# Patient Record
Sex: FEMALE | Race: BLACK OR AFRICAN AMERICAN | Employment: OTHER | URBAN - METROPOLITAN AREA
[De-identification: names, ages, dates, MRNs, and addresses within clinical notes are randomized per-mention and may not be internally consistent; named-entity substitution may affect disease eponyms.]

---

## 2017-03-26 RX ORDER — PRAVASTATIN SODIUM 80 MG/1
TABLET ORAL
Qty: 30 TABLET | Refills: 1 | Status: SHIPPED | OUTPATIENT
Start: 2017-03-26 | End: 2017-06-01

## 2017-06-01 RX ORDER — PRAVASTATIN SODIUM 80 MG/1
TABLET ORAL
Qty: 30 TABLET | Refills: 0 | Status: SHIPPED | OUTPATIENT
Start: 2017-06-01 | End: 2017-06-23

## 2017-06-23 ENCOUNTER — OFFICE VISIT (OUTPATIENT)
Dept: INTERNAL MEDICINE CLINIC | Facility: CLINIC | Age: 57
End: 2017-06-23

## 2017-06-23 VITALS
BODY MASS INDEX: 31.66 KG/M2 | OXYGEN SATURATION: 96 % | SYSTOLIC BLOOD PRESSURE: 116 MMHG | WEIGHT: 197 LBS | TEMPERATURE: 99 F | DIASTOLIC BLOOD PRESSURE: 74 MMHG | HEIGHT: 66 IN | HEART RATE: 92 BPM

## 2017-06-23 DIAGNOSIS — D12.6 ADENOMATOUS POLYP OF COLON, UNSPECIFIED PART OF COLON: ICD-10-CM

## 2017-06-23 DIAGNOSIS — E11.9 TYPE 2 DIABETES MELLITUS WITHOUT COMPLICATION, WITHOUT LONG-TERM CURRENT USE OF INSULIN (HCC): Primary | ICD-10-CM

## 2017-06-23 DIAGNOSIS — R10.31 ABDOMINAL PAIN, RLQ: ICD-10-CM

## 2017-06-23 DIAGNOSIS — N76.0 ACUTE VAGINITIS: ICD-10-CM

## 2017-06-23 DIAGNOSIS — Z01.419 ENCOUNTER FOR GYNECOLOGICAL EXAMINATION: ICD-10-CM

## 2017-06-23 PROCEDURE — 99215 OFFICE O/P EST HI 40 MIN: CPT | Performed by: INTERNAL MEDICINE

## 2017-06-23 PROCEDURE — 81003 URINALYSIS AUTO W/O SCOPE: CPT | Performed by: INTERNAL MEDICINE

## 2017-06-23 PROCEDURE — 99213 OFFICE O/P EST LOW 20 MIN: CPT | Performed by: INTERNAL MEDICINE

## 2017-06-23 PROCEDURE — 36415 COLL VENOUS BLD VENIPUNCTURE: CPT | Performed by: INTERNAL MEDICINE

## 2017-06-23 RX ORDER — PRAVASTATIN SODIUM 80 MG/1
TABLET ORAL
Qty: 90 TABLET | Refills: 1 | Status: SHIPPED | OUTPATIENT
Start: 2017-06-23 | End: 2018-04-02

## 2017-06-23 RX ORDER — METFORMIN HCL 100 %
POWDER (GRAM) MISCELLANEOUS
COMMUNITY
End: 2017-06-23

## 2017-06-23 RX ORDER — NITROGLYCERIN 0.4 MG/1
0.4 TABLET SUBLINGUAL
COMMUNITY
Start: 2012-06-23 | End: 2017-06-23

## 2017-06-23 NOTE — PROGRESS NOTES
HPI:    Patient ID: Marilyn Ashford is a 65year old female. HPI  Diabetes  Patient here for follow up of Diabetes. Has been taking medications regularly. Currently taking metformin  Checks sugars 1 times daily.   not active   Watches diabetic di uncontrolled    • B12 deficiency    • Other and unspecified hyperlipidemia          Past Surgical History    CHOLECYSTECTOMY  1994    TUBAL LIGATION  1986    COLONOSCOPY & POLYPECTOMY  2012    OTHER SURGICAL HISTORY  1993    Comment [facility] biopsy    OTHER OGLESBY and well-nourished. HENT:   Head: Normocephalic and atraumatic. Right Ear: Tympanic membrane normal.   Left Ear: Tympanic membrane normal.   Cardiovascular: Normal rate and regular rhythm. Edema not present.   Pulmonary/Chest: Effort normal and deborah

## 2017-06-23 NOTE — PATIENT INSTRUCTIONS
ASSESSMENT/PLAN:   Diagnoses and all orders for this visit:    Type 2 diabetes mellitus without complication, without long-term current use of insulin (HCC)  -     CMP;  Future  -     A1C; Future  Patient seems to be doing fairly well, needs to exercise mor

## 2017-06-26 ENCOUNTER — HOSPITAL ENCOUNTER (OUTPATIENT)
Dept: ULTRASOUND IMAGING | Age: 57
Discharge: HOME | End: 2017-06-26
Attending: INTERNAL MEDICINE
Payer: COMMERCIAL

## 2017-06-26 DIAGNOSIS — Z01.419 ENCOUNTER FOR GYNECOLOGICAL EXAMINATION: ICD-10-CM

## 2017-06-26 DIAGNOSIS — R10.31 ABDOMINAL PAIN, RLQ: ICD-10-CM

## 2017-06-26 PROCEDURE — 76856 US EXAM PELVIC COMPLETE: CPT | Performed by: INTERNAL MEDICINE

## 2017-06-26 PROCEDURE — 76830 TRANSVAGINAL US NON-OB: CPT | Performed by: INTERNAL MEDICINE

## 2017-07-07 PROBLEM — Z80.0 FAMILY HISTORY OF COLON CANCER: Status: ACTIVE | Noted: 2017-07-07

## 2017-07-07 PROCEDURE — 88305 TISSUE EXAM BY PATHOLOGIST: CPT | Performed by: INTERNAL MEDICINE

## 2017-08-11 PROCEDURE — 88305 TISSUE EXAM BY PATHOLOGIST: CPT | Performed by: INTERNAL MEDICINE

## 2018-03-27 RX ORDER — PRAVASTATIN SODIUM 80 MG/1
TABLET ORAL
Qty: 90 TABLET | Refills: 0 | OUTPATIENT
Start: 2018-03-27

## 2018-04-02 ENCOUNTER — OFFICE VISIT (OUTPATIENT)
Dept: INTERNAL MEDICINE CLINIC | Facility: CLINIC | Age: 58
End: 2018-04-02

## 2018-04-02 VITALS
OXYGEN SATURATION: 97 % | DIASTOLIC BLOOD PRESSURE: 80 MMHG | WEIGHT: 202 LBS | BODY MASS INDEX: 31.71 KG/M2 | SYSTOLIC BLOOD PRESSURE: 142 MMHG | HEIGHT: 67 IN | HEART RATE: 96 BPM | TEMPERATURE: 98 F

## 2018-04-02 DIAGNOSIS — E11.9 TYPE 2 DIABETES MELLITUS WITHOUT COMPLICATION, WITHOUT LONG-TERM CURRENT USE OF INSULIN (HCC): Primary | ICD-10-CM

## 2018-04-02 PROCEDURE — 99213 OFFICE O/P EST LOW 20 MIN: CPT | Performed by: INTERNAL MEDICINE

## 2018-04-02 PROCEDURE — 99212 OFFICE O/P EST SF 10 MIN: CPT | Performed by: INTERNAL MEDICINE

## 2018-04-02 RX ORDER — NAPROXEN 375 MG/1
375 TABLET ORAL 2 TIMES DAILY WITH MEALS
Qty: 60 TABLET | Refills: 3 | Status: ON HOLD | OUTPATIENT
Start: 2018-04-02 | End: 2021-03-24

## 2018-04-02 RX ORDER — PRAVASTATIN SODIUM 80 MG/1
TABLET ORAL
Qty: 90 TABLET | Refills: 1 | Status: SHIPPED | OUTPATIENT
Start: 2018-04-02 | End: 2021-02-22

## 2018-04-03 ENCOUNTER — TELEPHONE (OUTPATIENT)
Dept: INTERNAL MEDICINE CLINIC | Facility: CLINIC | Age: 58
End: 2018-04-03

## 2018-04-03 RX ORDER — CALCIUM CITRATE/VITAMIN D3 200MG-6.25
TABLET ORAL
Qty: 100 STRIP | Refills: 2 | Status: SHIPPED | OUTPATIENT
Start: 2018-04-03 | End: 2018-04-05

## 2018-04-03 NOTE — PATIENT INSTRUCTIONS
ASSESSMENT/PLAN:   Diagnoses and all orders for this visit:    Type 2 diabetes mellitus without complication, without long-term current use of insulin ([facility] [address]  Patient needs to be back on her meds, will restart and then do labs in 1 month and f/u for her BP

## 2018-04-03 NOTE — PROGRESS NOTES
HPI:    Patient ID: Guzman Patterson is a 63year old female. HPI   Patient hasn't been here in 10 months because she didn't have insurance. She has been out of her meds for quite some time and not taking her meds.    Diabetes  Patient here for follow (ONETOUCH TEST) In Vitro Strip Test sugar twice daily Disp: 100 each Rfl: 6   naproxen 375 MG Oral Tab Take 1 tablet (375 mg total) by mouth 2 (two) times daily with meals.  Disp: 60 tablet Rfl: 3   OneTouch UltraSoft Lancets Does not apply Misc Test sugar

## 2018-04-04 ENCOUNTER — TELEPHONE (OUTPATIENT)
Dept: INTERNAL MEDICINE CLINIC | Facility: CLINIC | Age: 58
End: 2018-04-04

## 2018-05-10 ENCOUNTER — TELEPHONE (OUTPATIENT)
Dept: INTERNAL MEDICINE CLINIC | Facility: CLINIC | Age: 58
End: 2018-05-10

## 2018-05-10 DIAGNOSIS — E11.9 TYPE 2 DIABETES MELLITUS WITHOUT COMPLICATION, WITHOUT LONG-TERM CURRENT USE OF INSULIN (HCC): Primary | ICD-10-CM

## 2018-05-10 NOTE — TELEPHONE ENCOUNTER
Patient is calling for A1C and cholesterol tests, patient will be going [address] [place] [address]

## 2018-07-11 ENCOUNTER — TELEPHONE (OUTPATIENT)
Dept: INTERNAL MEDICINE CLINIC | Facility: CLINIC | Age: 58
End: 2018-07-11

## 2018-07-11 DIAGNOSIS — E11.9 TYPE 2 DIABETES MELLITUS WITHOUT COMPLICATION, WITHOUT LONG-TERM CURRENT USE OF INSULIN (HCC): Primary | ICD-10-CM

## 2018-07-11 NOTE — TELEPHONE ENCOUNTER
Received fax from [employer] in [facility] stating one touch ultra blue is not covered by insurance. Per pharmacist true metrix is covered. Diabetic DME order pending, please sign.

## 2018-07-11 NOTE — TELEPHONE ENCOUNTER
Diabetic DME script to pharmacy, fax successful. Call placed to pharmacy to double check, spoke with pharmacist. Also called in verbal order just in case they do not receive script. No further action needed.

## 2021-01-02 ENCOUNTER — NURSE TRIAGE (OUTPATIENT)
Dept: INTERNAL MEDICINE CLINIC | Facility: CLINIC | Age: 61
End: 2021-01-02

## 2021-01-02 NOTE — TELEPHONE ENCOUNTER
Action Requested: Summary for Provider     []  Critical Lab, Recommendations Needed  [] Need Additional Advice  []   FYI    []   Need Orders  [] Need Medications Sent to Pharmacy  []  Other     SUMMARY: New patient scheduled for appt 1/4/21 11:45am with

## 2021-01-04 ENCOUNTER — OFFICE VISIT (OUTPATIENT)
Dept: INTERNAL MEDICINE CLINIC | Facility: CLINIC | Age: 61
End: 2021-01-04
Payer: MEDICAID

## 2021-01-04 VITALS
BODY MASS INDEX: 32.65 KG/M2 | TEMPERATURE: 98 F | WEIGHT: 208 LBS | HEIGHT: 67 IN | OXYGEN SATURATION: 99 % | RESPIRATION RATE: 18 BRPM | DIASTOLIC BLOOD PRESSURE: 78 MMHG | HEART RATE: 78 BPM | SYSTOLIC BLOOD PRESSURE: 112 MMHG

## 2021-01-04 DIAGNOSIS — K59.00 CONSTIPATION, UNSPECIFIED CONSTIPATION TYPE: ICD-10-CM

## 2021-01-04 DIAGNOSIS — R20.0 NUMBNESS OF FOOT: ICD-10-CM

## 2021-01-04 DIAGNOSIS — R51.9 CHRONIC NONINTRACTABLE HEADACHE, UNSPECIFIED HEADACHE TYPE: ICD-10-CM

## 2021-01-04 DIAGNOSIS — D51.8 OTHER VITAMIN B12 DEFICIENCY ANEMIA: ICD-10-CM

## 2021-01-04 DIAGNOSIS — G89.29 CHRONIC NONINTRACTABLE HEADACHE, UNSPECIFIED HEADACHE TYPE: ICD-10-CM

## 2021-01-04 DIAGNOSIS — E11.9 TYPE 2 DIABETES MELLITUS WITHOUT COMPLICATION, WITHOUT LONG-TERM CURRENT USE OF INSULIN (HCC): ICD-10-CM

## 2021-01-04 DIAGNOSIS — Z76.89 ENCOUNTER TO ESTABLISH CARE: Primary | ICD-10-CM

## 2021-01-04 LAB
ALBUMIN SERPL-MCNC: 3.9 G/DL (ref 3.4–5)
ALBUMIN/GLOB SERPL: 1 {RATIO} (ref 1–2)
ALP LIVER SERPL-CCNC: 91 U/L
ALT SERPL-CCNC: 18 U/L
ANION GAP SERPL CALC-SCNC: 7 MMOL/L (ref 0–18)
AST SERPL-CCNC: 11 U/L (ref 15–37)
BILIRUB SERPL-MCNC: 0.6 MG/DL (ref 0.1–2)
BUN BLD-MCNC: 11 MG/DL (ref 7–18)
BUN/CREAT SERPL: 15.3 (ref 10–20)
CALCIUM BLD-MCNC: 10 MG/DL (ref 8.5–10.1)
CHLORIDE SERPL-SCNC: 109 MMOL/L (ref 98–112)
CO2 SERPL-SCNC: 25 MMOL/L (ref 21–32)
CREAT BLD-MCNC: 0.72 MG/DL
GLOBULIN PLAS-MCNC: 4.1 G/DL (ref 2.8–4.4)
GLUCOSE BLD-MCNC: 152 MG/DL (ref 70–99)
M PROTEIN MFR SERPL ELPH: 8 G/DL (ref 6.4–8.2)
OSMOLALITY SERPL CALC.SUM OF ELEC: 294 MOSM/KG (ref 275–295)
PATIENT FASTING Y/N/NP: YES
POTASSIUM SERPL-SCNC: 4 MMOL/L (ref 3.5–5.1)
SODIUM SERPL-SCNC: 141 MMOL/L (ref 136–145)
VIT B12 SERPL-MCNC: 588 PG/ML (ref 193–986)

## 2021-01-04 PROCEDURE — 36415 COLL VENOUS BLD VENIPUNCTURE: CPT | Performed by: INTERNAL MEDICINE

## 2021-01-04 PROCEDURE — 3008F BODY MASS INDEX DOCD: CPT | Performed by: INTERNAL MEDICINE

## 2021-01-04 PROCEDURE — 3074F SYST BP LT 130 MM HG: CPT | Performed by: INTERNAL MEDICINE

## 2021-01-04 PROCEDURE — 3052F HG A1C>EQUAL 8.0%<EQUAL 9.0%: CPT | Performed by: INTERNAL MEDICINE

## 2021-01-04 PROCEDURE — 3078F DIAST BP <80 MM HG: CPT | Performed by: INTERNAL MEDICINE

## 2021-01-04 PROCEDURE — 99214 OFFICE O/P EST MOD 30 MIN: CPT | Performed by: INTERNAL MEDICINE

## 2021-01-04 RX ORDER — GLIPIZIDE 10 MG/1
10 TABLET ORAL DAILY
COMMUNITY
Start: 2020-04-04 | End: 2021-01-07

## 2021-01-04 RX ORDER — METOPROLOL SUCCINATE 25 MG/1
TABLET, EXTENDED RELEASE ORAL
COMMUNITY
Start: 2020-08-20 | End: 2021-03-01

## 2021-01-04 RX ORDER — ALBUTEROL SULFATE 90 UG/1
2 AEROSOL, METERED RESPIRATORY (INHALATION) EVERY 6 HOURS PRN
COMMUNITY

## 2021-01-04 RX ORDER — DOCUSATE SODIUM 100 MG/1
100 CAPSULE, LIQUID FILLED ORAL 2 TIMES DAILY PRN
Qty: 30 CAPSULE | Refills: 0 | Status: ON HOLD | OUTPATIENT
Start: 2021-01-04 | End: 2021-03-22

## 2021-01-04 RX ORDER — AMLODIPINE BESYLATE 5 MG/1
TABLET ORAL
COMMUNITY
End: 2021-02-01

## 2021-01-04 RX ORDER — ATORVASTATIN CALCIUM 40 MG/1
TABLET, FILM COATED ORAL
COMMUNITY
End: 2021-01-04

## 2021-01-04 NOTE — PROGRESS NOTES
HPI:    Patient ID: Khushi Rodriguez is a 62year old female. HPI    She comes in today to establish care. She used to see Dr. Giselle Garcia.   Patient has few complaints today she says lately she has been constipated and bloated stool is small shaped and lyndsey capsule 0   • MetFORMIN HCl 500 MG Oral Tab Take 2 tablets (1,000 mg total) by mouth 2 (two) times daily with meals. 360 tablet 1   • naproxen 375 MG Oral Tab Take 1 tablet (375 mg total) by mouth 2 (two) times daily with meals.  60 tablet 3   • Pravastatin Heart Disease Mother    • Diabetes Sister    • Thyroid Disorder Sister    • Heart Disease Maternal Grandmother    • Diabetes Brother    • Diabetes Brother       Social History: Social History    Tobacco Use      Smoking status: Current Every Day Smoker since new headaches and getting worse  Numbness of foot this most likely due to diabetes and poorly controlled blood sugars will follow results follow with podiatrist  Other vitamin b12 deficiency anemia we will check B12 level she has been deficient in th

## 2021-01-05 LAB
EST. AVERAGE GLUCOSE BLD GHB EST-MCNC: 189 MG/DL (ref 68–126)
HBA1C MFR BLD HPLC: 8.2 % (ref ?–5.7)

## 2021-01-07 RX ORDER — GLIPIZIDE 10 MG/1
10 TABLET ORAL
Qty: 60 TABLET | Refills: 2 | Status: SHIPPED | OUTPATIENT
Start: 2021-01-07 | End: 2021-01-31

## 2021-01-07 RX ORDER — GLIPIZIDE 10 MG/1
10 TABLET ORAL
Qty: 60 TABLET | Refills: 2 | Status: SHIPPED | OUTPATIENT
Start: 2021-01-07 | End: 2021-01-07

## 2021-01-19 ENCOUNTER — TELEPHONE (OUTPATIENT)
Dept: NEUROLOGY | Facility: CLINIC | Age: 61
End: 2021-01-19

## 2021-01-19 ENCOUNTER — OFFICE VISIT (OUTPATIENT)
Dept: NEUROLOGY | Facility: CLINIC | Age: 61
End: 2021-01-19
Payer: MEDICAID

## 2021-01-19 VITALS
SYSTOLIC BLOOD PRESSURE: 118 MMHG | WEIGHT: 198 LBS | HEIGHT: 66 IN | BODY MASS INDEX: 31.82 KG/M2 | DIASTOLIC BLOOD PRESSURE: 78 MMHG

## 2021-01-19 DIAGNOSIS — M48.02 CERVICAL STENOSIS OF SPINE: ICD-10-CM

## 2021-01-19 DIAGNOSIS — G44.059 SHORT UNILATERAL NEURALGIFORM HEADACHE, CONJUNCTIVAL INJECTION/TEARING: Primary | ICD-10-CM

## 2021-01-19 PROCEDURE — 3078F DIAST BP <80 MM HG: CPT | Performed by: OTHER

## 2021-01-19 PROCEDURE — 99205 OFFICE O/P NEW HI 60 MIN: CPT | Performed by: OTHER

## 2021-01-19 PROCEDURE — 3008F BODY MASS INDEX DOCD: CPT | Performed by: OTHER

## 2021-01-19 PROCEDURE — 3074F SYST BP LT 130 MM HG: CPT | Performed by: OTHER

## 2021-01-19 RX ORDER — LAMOTRIGINE 25 MG/1
TABLET ORAL
Qty: 84 TABLET | Refills: 0 | Status: SHIPPED | OUTPATIENT
Start: 2021-01-19 | End: 2021-02-11

## 2021-01-19 NOTE — TELEPHONE ENCOUNTER
Contacted Kelly PALOMINO at [facility] Case/Reference # [id number] to initiate authorization for C-Spine MRI CPT 70233 to be done at [address] [place] [address].   Hellen pollock clinicals-clinicals faxed to [phone]  Status: pending review

## 2021-01-19 NOTE — PROGRESS NOTES
[address]  NEW PATIENT EVALUATION  Reason for Admission/Consultation: headaches     Requested by: Librado Darby   PCP: Lala Britt MD    Chief Complaint: Headache (C/o intermittent headache for last 2 months. Tender to touch left temple. Reports her visions in her left eye is poor at baseline secondary to her DM;   She was prescribed contacts and glasses 2 yrs ago; she ran out of contacts and when she tried to use her glasses but had no improvement in her vision; denies any sx yesica Review and summation of prior records  1. 3/2/2011 [facility] emergency medicine note: Presented with dizziness, left arm heaviness, and a thick tongue. Has had intermittent dizziness since Monday.   Became lightheaded and dizzy again while she was cord.     C5-C6 : Diffuse disc bulge with superimposed central disc osteophyte complex and mild facet arthropathy. Moderate spinal canal stenosis. Mild bilateral foraminal stenosis.  There is indentation upon ventral surface of the cord.     C6-C7 : Diffuse Mil HARMON: Seen for dizziness for 4 months. She is also evaluated by cardiology for the same. They suspected she had postural hypotension.   Reported pain behind her eyes; she has pain behind her ears that radiates to the back of her eyes following epi enlargement of the thyroid gland. Attending: Bryna Leyden on 01/29/2020 3:56 PM      ROS:  Pertinent positive and negatives per HPI. All others were reviewed and negative.       Past Medical History:   Diagnosis Date   • B12 deficiency    • Hist Vitro Strip, Test sugar twice daily, Disp: 100 each, Rfl: 6  •  OneTouch UltraSoft Lancets Does not apply Misc, Test sugar twice daily, Disp: 100 each, Rfl: 6      Ace Inhibitors          ANAPHYLAXIS, ANGIOEDEMA, SWELLING    Comment:April 2020, hospitalize CV:S1, S2 normal and regular rate and rhythm   Carotids: no bruits  - Pulmonary: CTAB  Neurologic Exam  - Mental Status: Alert and attentive. Oriented to person, place, time/date, situation, day of week, month of year and year.   Speech is spontaneous, flue 11/12/2016    TRIG 82 11/12/2016     Lab Results   Component Value Date    HGB 13.9 01/16/2016    HCT 42.0 01/16/2016    MCV 84.6 01/16/2016    WBC 4.0 01/16/2016     01/16/2016      Lab Results   Component Value Date    BUN 11 01/04/2021    CA 10. 0 Headache/VISHAL:  1. Trial of lamictal; pt aware of AE  Including SJS. Knows to stop medication, call the clinic, and go to the hospital if she develops a rash. Therapeutics:  1.   Lamictal                 Education/Instructions given to: patient   Barriers

## 2021-01-22 NOTE — TELEPHONE ENCOUNTER
Case was Denied due to not receiving clinical info in allotted time. Татьяна Etienne at [place] to initiate new authorization for C-Spine MRI Case #[id number]  David Chinchilla transferred call to [facility] reviewer Mrs. Mauro Holter Mrs Mauro Holter Approved C-Spine MRI with Carmenza Warren

## 2021-01-25 ENCOUNTER — OFFICE VISIT (OUTPATIENT)
Dept: INTERNAL MEDICINE CLINIC | Facility: CLINIC | Age: 61
End: 2021-01-25
Payer: MEDICAID

## 2021-01-25 VITALS
RESPIRATION RATE: 18 BRPM | HEART RATE: 83 BPM | WEIGHT: 209 LBS | SYSTOLIC BLOOD PRESSURE: 132 MMHG | TEMPERATURE: 98 F | BODY MASS INDEX: 32.8 KG/M2 | OXYGEN SATURATION: 97 % | HEIGHT: 67 IN | DIASTOLIC BLOOD PRESSURE: 82 MMHG

## 2021-01-25 DIAGNOSIS — Z80.0 FAMILY HISTORY OF COLON CANCER: ICD-10-CM

## 2021-01-25 DIAGNOSIS — Z00.00 ANNUAL PHYSICAL EXAM: Primary | ICD-10-CM

## 2021-01-25 DIAGNOSIS — J44.9 CHRONIC OBSTRUCTIVE PULMONARY DISEASE, UNSPECIFIED COPD TYPE (HCC): ICD-10-CM

## 2021-01-25 DIAGNOSIS — E11.9 TYPE 2 DIABETES MELLITUS WITHOUT COMPLICATION, WITHOUT LONG-TERM CURRENT USE OF INSULIN (HCC): ICD-10-CM

## 2021-01-25 DIAGNOSIS — E04.2 NONTOXIC MULTINODULAR GOITER: ICD-10-CM

## 2021-01-25 DIAGNOSIS — G44.209 TENSION HEADACHE: ICD-10-CM

## 2021-01-25 DIAGNOSIS — K59.00 CONSTIPATION, UNSPECIFIED CONSTIPATION TYPE: ICD-10-CM

## 2021-01-25 PROCEDURE — 3079F DIAST BP 80-89 MM HG: CPT | Performed by: INTERNAL MEDICINE

## 2021-01-25 PROCEDURE — 90732 PPSV23 VACC 2 YRS+ SUBQ/IM: CPT | Performed by: INTERNAL MEDICINE

## 2021-01-25 PROCEDURE — 99396 PREV VISIT EST AGE 40-64: CPT | Performed by: INTERNAL MEDICINE

## 2021-01-25 PROCEDURE — 3008F BODY MASS INDEX DOCD: CPT | Performed by: INTERNAL MEDICINE

## 2021-01-25 PROCEDURE — 90471 IMMUNIZATION ADMIN: CPT | Performed by: INTERNAL MEDICINE

## 2021-01-25 PROCEDURE — 3075F SYST BP GE 130 - 139MM HG: CPT | Performed by: INTERNAL MEDICINE

## 2021-01-25 RX ORDER — PEN NEEDLE, DIABETIC 32GX 5/32"
1 NEEDLE, DISPOSABLE MISCELLANEOUS
COMMUNITY
End: 2021-01-25

## 2021-01-25 RX ORDER — PEN NEEDLE, DIABETIC 32GX 5/32"
1 NEEDLE, DISPOSABLE MISCELLANEOUS DAILY
Qty: 1000 EACH | Refills: 3 | Status: SHIPPED | OUTPATIENT
Start: 2021-01-25 | End: 2021-07-21

## 2021-01-25 NOTE — PROGRESS NOTES
HPI:    Patient ID: Dimple Gaviria is a 62year old female. HPI    Patient comes in for annual physical and was seen recently as new patient.   She says overall she is doing okay she tried glipizide to twice daily but she cannot take the evening dos tablets (1,000 mg total) by mouth 2 (two) times daily with meals. 360 tablet 1   • naproxen 375 MG Oral Tab Take 1 tablet (375 mg total) by mouth 2 (two) times daily with meals.  60 tablet 3   • Pravastatin Sodium 80 MG Oral Tab TAKE 1 TABLET BY MOUTH ATBED Disorder Sister    • Heart Disease Maternal Grandmother    • Diabetes Brother    • Diabetes Brother       Social History: Social History    Tobacco Use      Smoking status: Current Every Day Smoker        Packs/day: 1.00        Years: 20.00        Pack yea This Encounter      Lipid Panel [E], normal      TSH W Reflex To Free T4 [E], normal      Urinalysis, Routine, normal      Microalb/Creat Ratio, Random Urine      PNEUMOCOCCAL IMM, 23      Meds This Visit:  Requested Prescriptions     Signed Prescriptions

## 2021-01-27 ENCOUNTER — OFFICE VISIT (OUTPATIENT)
Dept: OPHTHALMOLOGY | Facility: CLINIC | Age: 61
End: 2021-01-27
Payer: MEDICAID

## 2021-01-27 DIAGNOSIS — R51.9 HEADACHE, UNSPECIFIED HEADACHE TYPE: ICD-10-CM

## 2021-01-27 DIAGNOSIS — E11.9 TYPE 2 DIABETES MELLITUS WITHOUT RETINOPATHY (HCC): Primary | ICD-10-CM

## 2021-01-27 DIAGNOSIS — H25.13 AGE-RELATED NUCLEAR CATARACT OF BOTH EYES: ICD-10-CM

## 2021-01-27 PROCEDURE — 99243 OFF/OP CNSLTJ NEW/EST LOW 30: CPT | Performed by: OPHTHALMOLOGY

## 2021-01-27 NOTE — PATIENT INSTRUCTIONS
Age-related nuclear cataract of both eyes  Discussed very mild cataracts in both eyes that are not affecting vision and are not surgical at this time.         Type 2 diabetes mellitus without retinopathy ([address]  Diabetes type II: no background of retinopathy

## 2021-01-27 NOTE — PROGRESS NOTES
Glendy Pitts is a 62year old female.     HPI:     HPI     Diabetic Eye Exam      Additional comments: Pt has been a diabetic for 20 years       Pt's diabetes is currently controlled by pills and insulin  Pt checks BS 4x a day  Pt's last blood sugar Grandmother    • Diabetes Brother    • Diabetes Brother    • Cataracts Niece    • Macular degeneration Neg        Social History: Social History    Tobacco Use      Smoking status: Current Every Day Smoker        Packs/day: 1.00        Years: 20.00 daily 100 each 6   • OneTouch UltraSoft Lancets Does not apply Misc Test sugar twice daily 100 each 6   • DiphenhydrAMINE HCl (BENADRYL ALLERGY) 25 MG Oral Cap Take  by mouth. • aspirin 81 MG Oral Tab EC Take 162 mg by mouth. Allergies:     Ace Macula Normal- no BDR Normal- no BDR    Vessels Normal Normal    Periphery Normal Normal            Refraction     Wearing Rx     Type: Forgot glasses          Manifest Refraction (Auto)       Sphere Cylinder Axis    Right -2.75 +1.25 165    Left -2.25

## 2021-02-01 RX ORDER — GLIPIZIDE 10 MG/1
10 TABLET ORAL
Qty: 60 TABLET | Refills: 2 | Status: SHIPPED | OUTPATIENT
Start: 2021-02-01

## 2021-02-01 RX ORDER — AMLODIPINE BESYLATE 5 MG/1
5 TABLET ORAL AS DIRECTED
Qty: 30 TABLET | Refills: 0 | Status: SHIPPED | OUTPATIENT
Start: 2021-02-01 | End: 2021-03-01

## 2021-02-02 ENCOUNTER — HOSPITAL ENCOUNTER (OUTPATIENT)
Dept: MRI IMAGING | Age: 61
Discharge: HOME | End: 2021-02-02
Attending: Other
Payer: MEDICAID

## 2021-02-02 DIAGNOSIS — M48.02 CERVICAL STENOSIS OF SPINE: ICD-10-CM

## 2021-02-02 PROCEDURE — 72141 MRI NECK SPINE W/O DYE: CPT | Performed by: OTHER

## 2021-02-04 ENCOUNTER — TELEPHONE (OUTPATIENT)
Dept: ENDOCRINOLOGY CLINIC | Facility: CLINIC | Age: 61
End: 2021-02-04

## 2021-02-04 ENCOUNTER — OFFICE VISIT (OUTPATIENT)
Dept: ENDOCRINOLOGY CLINIC | Facility: CLINIC | Age: 61
End: 2021-02-04
Payer: MEDICAID

## 2021-02-04 VITALS
WEIGHT: 208 LBS | HEART RATE: 92 BPM | BODY MASS INDEX: 33 KG/M2 | SYSTOLIC BLOOD PRESSURE: 152 MMHG | DIASTOLIC BLOOD PRESSURE: 65 MMHG

## 2021-02-04 DIAGNOSIS — E11.9 TYPE 2 DIABETES MELLITUS WITHOUT RETINOPATHY (HCC): Primary | ICD-10-CM

## 2021-02-04 DIAGNOSIS — E78.2 MIXED HYPERLIPIDEMIA: ICD-10-CM

## 2021-02-04 DIAGNOSIS — I10 ESSENTIAL HYPERTENSION: ICD-10-CM

## 2021-02-04 LAB
GLUCOSE BLOOD: 199
TEST STRIP LOT #: NORMAL NUMERIC

## 2021-02-04 PROCEDURE — 36416 COLLJ CAPILLARY BLOOD SPEC: CPT | Performed by: INTERNAL MEDICINE

## 2021-02-04 PROCEDURE — 3077F SYST BP >= 140 MM HG: CPT | Performed by: INTERNAL MEDICINE

## 2021-02-04 PROCEDURE — 99244 OFF/OP CNSLTJ NEW/EST MOD 40: CPT | Performed by: INTERNAL MEDICINE

## 2021-02-04 PROCEDURE — 3078F DIAST BP <80 MM HG: CPT | Performed by: INTERNAL MEDICINE

## 2021-02-04 PROCEDURE — 82947 ASSAY GLUCOSE BLOOD QUANT: CPT | Performed by: INTERNAL MEDICINE

## 2021-02-04 RX ORDER — LANCETS 33 GAUGE
1 EACH MISCELLANEOUS 3 TIMES DAILY
Qty: 300 EACH | Refills: 1 | Status: SHIPPED | OUTPATIENT
Start: 2021-02-04 | End: 2021-02-23

## 2021-02-04 RX ORDER — BLOOD-GLUCOSE METER
1 EACH MISCELLANEOUS ONCE
Qty: 1 KIT | Refills: 0 | Status: SHIPPED | OUTPATIENT
Start: 2021-02-04 | End: 2021-02-04

## 2021-02-04 RX ORDER — BLOOD SUGAR DIAGNOSTIC
1 STRIP MISCELLANEOUS 3 TIMES DAILY
Qty: 300 STRIP | Refills: 1 | Status: SHIPPED | OUTPATIENT
Start: 2021-02-04 | End: 2021-05-05

## 2021-02-04 NOTE — H&P
Name: Chepachet [place]  Date: 2/4/2021    Referring Physician: Dr. Erik Gibson is a 62year old female who presents for evaluaton and management of uncontrolled T2D.  She was diagnosed about 15 years on Medications:   •  glipiZIDE 10 MG Oral Tab, Take 1 tablet (10 mg total) by mouth 2 (two) times daily before meals. , Disp: 60 tablet, Rfl: 2  •  amLODIPine Besylate 5 MG Oral Tab, Take 1 tablet (5 mg total) by mouth As Directed., Disp: 30 tablet, Rfl: 0  • Tab EC, Take 162 mg by mouth., Disp: , Rfl:      Allergies:      Ace Inhibitors          ANAPHYLAXIS, ANGIOEDEMA, SWELLING    Comment:April 2020, hospitalized at [facility] in [facility] with             tongue swelling and throat closing sensation, on             l muscle strength and tone  Skin:  normal moisture and skin texture  Hair & Nails:  normal scalp hair     Hematologic:  no excessive bruising  Neuro:  sensory grossly intact and motor grossly intact  Psychiatric:  oriented to time, self, and place  Nutrition weeks with blood sugar check    2.) Management of Diabetic Complications- We discussed the complications of diabetes include retinopathy, neuropathy, nephropathy and cardiovascular disease.   - She is up to date with ophthalmology, podiatry  - She is [facility]

## 2021-02-04 NOTE — PATIENT INSTRUCTIONS
Please try and limit red meat and incorporate more vegetables into your diet    Please also have all of the blood tests and urine test as well

## 2021-02-09 ENCOUNTER — OFFICE VISIT (OUTPATIENT)
Dept: PODIATRY CLINIC | Facility: CLINIC | Age: 61
End: 2021-02-09
Payer: MEDICAID

## 2021-02-09 DIAGNOSIS — E11.9 TYPE 2 DIABETES MELLITUS WITHOUT COMPLICATION, WITHOUT LONG-TERM CURRENT USE OF INSULIN (HCC): Primary | ICD-10-CM

## 2021-02-09 PROCEDURE — 99243 OFF/OP CNSLTJ NEW/EST LOW 30: CPT | Performed by: PODIATRIST

## 2021-02-09 NOTE — PROGRESS NOTES
HPI:    Patient ID: Candy Butcher is a 62year old female. This 45-year-old female presents to me today as a new patient on consult from [address] [place] [address]. Patient states that she is here for a diabetic foot evaluation.   She states that she has been a diabe 14 days. 84 tablet 0   • Metoprolol Succinate ER 25 MG Oral Tablet 24 Hr metoprolol succinate ER 25 mg tablet,extended release 24 hr   TK 1 T PO QD     • insulin glargine 100 UNIT/ML Subcutaneous Solution Inject 16 Units into the skin nightly.      • Gabe are no concerns on the right. I am not able to detect any signs of weakness. Presently has a diabetic I see no evidence of concern. Hygiene is excellent her shoes today are less no bruit. Patient is scheduled to see neurology in the next 2 days.   On th

## 2021-02-11 ENCOUNTER — OFFICE VISIT (OUTPATIENT)
Dept: NEUROLOGY | Facility: CLINIC | Age: 61
End: 2021-02-11
Payer: MEDICAID

## 2021-02-11 VITALS
SYSTOLIC BLOOD PRESSURE: 120 MMHG | HEART RATE: 88 BPM | BODY MASS INDEX: 32.14 KG/M2 | DIASTOLIC BLOOD PRESSURE: 80 MMHG | HEIGHT: 66 IN | WEIGHT: 200 LBS

## 2021-02-11 DIAGNOSIS — G44.059 SHORT UNILATERAL NEURALGIFORM HEADACHE, CONJUNCTIVAL INJECTION/TEARING: ICD-10-CM

## 2021-02-11 DIAGNOSIS — M47.812 CERVICAL SPONDYLOSIS: Primary | ICD-10-CM

## 2021-02-11 PROCEDURE — 3008F BODY MASS INDEX DOCD: CPT | Performed by: OTHER

## 2021-02-11 PROCEDURE — 3074F SYST BP LT 130 MM HG: CPT | Performed by: OTHER

## 2021-02-11 PROCEDURE — 3079F DIAST BP 80-89 MM HG: CPT | Performed by: OTHER

## 2021-02-11 PROCEDURE — 99214 OFFICE O/P EST MOD 30 MIN: CPT | Performed by: OTHER

## 2021-02-11 RX ORDER — PREGABALIN 100 MG/1
100 CAPSULE ORAL 2 TIMES DAILY
Qty: 180 CAPSULE | Refills: 0 | Status: SHIPPED | OUTPATIENT
Start: 2021-02-11 | End: 2021-03-31

## 2021-02-11 NOTE — PROGRESS NOTES
[facility]  [address] [facility], [address] [place] [address]  [phone]        Neurology Clinic Follow Up Note    Chief Complaint:  Headache ([address] 1/19/21- Patient states she is still experiencing headaches and numbness in her left Medications:  Current Outpatient Medications   Medication Instructions   • Albuterol Sulfate  (90 Base) MCG/ACT Inhalation Aero Soln 2 puffs, Inhalation   • amLODIPine Besylate (NORVASC) 5 mg, Oral, As Directed   • aspirin 162 mg, Oral   • Blood Glu Phrase length and rate are normal. No paraphasic errors, neologisms, anomia, acalculia, apraxia, anosognosia, or R/L confusion.   - Cranial Nerves: No gaze preference.  Visual fields:normal  Pupils are 4 mm briskly constricting to3  and equally round and re 1. 1.Left arm weakness (reported) and pain known cervical stenosis/cervical myelopathy  Differential Diagnosis:  1. Progression of cervical myelopathy  Diagnostics:  1.  none  Therapeutics:  1. Physical therapy  2. - NEUROSURGERY - INTERNAL    2.   Headach

## 2021-02-12 ENCOUNTER — TELEPHONE (OUTPATIENT)
Dept: INTERNAL MEDICINE CLINIC | Facility: CLINIC | Age: 61
End: 2021-02-12

## 2021-02-12 DIAGNOSIS — M47.812 CERVICAL SPONDYLOSIS: Primary | ICD-10-CM

## 2021-02-12 NOTE — TELEPHONE ENCOUNTER
Patient is requesting a referral for a neurosurgeon. She states that her neurologist put a referral in but it was a surgeon not in her network.

## 2021-02-17 ENCOUNTER — ORDER TRANSCRIPTION (OUTPATIENT)
Dept: PHYSICAL THERAPY | Facility: HOSPITAL | Age: 61
End: 2021-02-17

## 2021-02-17 ENCOUNTER — APPOINTMENT (OUTPATIENT)
Dept: PHYSICAL THERAPY | Facility: HOSPITAL | Age: 61
End: 2021-02-17
Attending: Other
Payer: MEDICAID

## 2021-02-17 ENCOUNTER — TELEPHONE (OUTPATIENT)
Dept: INTERNAL MEDICINE CLINIC | Facility: CLINIC | Age: 61
End: 2021-02-17

## 2021-02-17 DIAGNOSIS — M47.812 CERVICAL SPONDYLOSIS: Primary | ICD-10-CM

## 2021-02-17 NOTE — TELEPHONE ENCOUNTER
Patient states that she was referred to two Neuro surgeons by Dr. Ying Abreu and neither doctor accept her insurance.

## 2021-02-17 NOTE — TELEPHONE ENCOUNTER
Managed Care - Care you able to assist with providing us a list of physicians who are covered under pts insurance? Or if you can redirect us to the correct department, that would be helpful too. Thanks?

## 2021-02-17 NOTE — TELEPHONE ENCOUNTER
Patient calling states her blood sugars have been very high since lov.  Checking am post 3 hrs post meals and hs   F PB pL PD Hs  2/17 248 298  2/16 226 226  233 254  2/15 218 244 268 273 265  2/14 195  264 296 228  2/13 221 213 99  213    States when she w

## 2021-02-18 ENCOUNTER — OFFICE VISIT (OUTPATIENT)
Dept: PHYSICAL THERAPY | Facility: HOSPITAL | Age: 61
End: 2021-02-18
Attending: Other
Payer: MEDICAID

## 2021-02-18 DIAGNOSIS — M47.812 CERVICAL SPONDYLOSIS: ICD-10-CM

## 2021-02-18 PROCEDURE — 97163 PT EVAL HIGH COMPLEX 45 MIN: CPT

## 2021-02-18 PROCEDURE — 97110 THERAPEUTIC EXERCISES: CPT

## 2021-02-18 NOTE — PROGRESS NOTES
SPINE EVALUATION:   Referring Physician: Dr. Saurav Díaz  Diagnosis: Cervical spondylosis (H68.845)     Date of Service: 2/18/2021     PATIENT Keo Guevara is a 62year old female who presents to therapy today with complaints of headaches L te 11/30/20:   Past Medical History:   Diagnosis Date   • Asthma   • Bronchitis 2017   • Cancer (CMS-HCC) 1999   Cervical Cancer   • Cervical stenosis of spinal canal   • Diabetes (CMS-HCC)   • DM (diabetes mellitus) (CMS-HCC)   • Dyslipidemia   • High choles performs HEP correctly without reported pain. Skilled Physical Therapy is medically necessary to address the above impairments and reach functional goals.      Precautions: TIA, HTN, DM, COPD, Asthma, hyperlipidemia, cancer per medical chart, stenosis of ce Lig tests:  Alar Lig  test: Neg  Modified Sharp Guanako test: Neg  Supine: Transverse Lig test: Neg    Deep Tendon Reflexes: slightly decreased B UE for biceps, triceps, and brachioradialis    Cervical AROM: KEY: ERP=end range pain;  NE=no efffect, thoracic spine.      Charges: PT Eval High Complexity, TE x1       Total Timed Treatment: 12 min     Total Treatment Time: 60 min     Based on clinical rationale and outcome measures, this evaluation involved High Complexity decision making due to 3+ person under this plan of treatment and while under my care.     X___________________________________________________ Date____________________    Certification From: 6/51/6198  To:5/19/2021

## 2021-02-19 NOTE — TELEPHONE ENCOUNTER
Patient was contacted and states her BG this morning fasting was 298. States she's frustrated because she's eating less and still waking up with high sugars. She denies any upper respiratory infections, UTI symptoms.   She said when she was on insulin her

## 2021-02-19 NOTE — TELEPHONE ENCOUNTER
Patient called in to speak directly to RN about her high blood sugar levels. She states this morning it was at 300. Attempting to call RN now no answer.  Please follow up as soon as possible

## 2021-02-19 NOTE — TELEPHONE ENCOUNTER
I am sorry to hear that   The body makes sugars and hence at times, even when we do not eat that much , sugars can be high  Based on her sugars, I recommend starting long acting insulin at 8 units daily along with metformin and glipizide  Please call with

## 2021-02-19 NOTE — TELEPHONE ENCOUNTER
Hi!  Noted. This sounds like a very good plan. Let us follow up with her on Monday and make adjustments as necessary. Thank you!

## 2021-02-19 NOTE — TELEPHONE ENCOUNTER
Dr. Quinn Rodriguez)  Spoke to patient to relay Dr. Cipriano Roberto recommendation - patient stated she will take 8 units of lantus at bedtime in addition to her MTF and glipizide  Patient stated she will call with BG readings on Monday and was advised recomm

## 2021-02-19 NOTE — TELEPHONE ENCOUNTER
Pt calling to f/u on referral. I explained to the pt she needs to call her insurance company and find out which provider is in network and can send us a Gusto message or call us back with the name of the provider.  We will send that info to Dr Honey Rodriguez to th

## 2021-02-22 ENCOUNTER — TELEPHONE (OUTPATIENT)
Dept: INTERNAL MEDICINE CLINIC | Facility: CLINIC | Age: 61
End: 2021-02-22

## 2021-02-22 RX ORDER — ATORVASTATIN CALCIUM 40 MG/1
40 TABLET, FILM COATED ORAL NIGHTLY
Qty: 30 TABLET | Refills: 2 | Status: SHIPPED | OUTPATIENT
Start: 2021-02-22 | End: 2021-03-10

## 2021-02-22 NOTE — TELEPHONE ENCOUNTER
Pt asking for Atorvastatin 40 mg prescription, states med was prescribed by previous doctor. Med pended for review, please advise.      LOV 1/25/21

## 2021-02-23 ENCOUNTER — OFFICE VISIT (OUTPATIENT)
Dept: SURGERY | Facility: CLINIC | Age: 61
End: 2021-02-23
Payer: MEDICAID

## 2021-02-23 ENCOUNTER — TELEPHONE (OUTPATIENT)
Dept: SURGERY | Facility: CLINIC | Age: 61
End: 2021-02-23

## 2021-02-23 VITALS
HEIGHT: 66 IN | DIASTOLIC BLOOD PRESSURE: 82 MMHG | SYSTOLIC BLOOD PRESSURE: 126 MMHG | BODY MASS INDEX: 32.14 KG/M2 | WEIGHT: 200 LBS

## 2021-02-23 DIAGNOSIS — R26.9 NEUROLOGIC GAIT DYSFUNCTION: ICD-10-CM

## 2021-02-23 DIAGNOSIS — R29.898 WEAKNESS OF BOTH LOWER EXTREMITIES: ICD-10-CM

## 2021-02-23 DIAGNOSIS — M54.12 CERVICAL MYELOPATHY WITH CERVICAL RADICULOPATHY (HCC): Primary | ICD-10-CM

## 2021-02-23 DIAGNOSIS — G95.9 CERVICAL MYELOPATHY WITH CERVICAL RADICULOPATHY (HCC): Primary | ICD-10-CM

## 2021-02-23 DIAGNOSIS — M48.02 CERVICAL STENOSIS OF SPINAL CANAL: ICD-10-CM

## 2021-02-23 DIAGNOSIS — R29.898 WEAKNESS OF BOTH UPPER EXTREMITIES: ICD-10-CM

## 2021-02-23 PROCEDURE — 99204 OFFICE O/P NEW MOD 45 MIN: CPT | Performed by: PHYSICIAN ASSISTANT

## 2021-02-23 PROCEDURE — 3079F DIAST BP 80-89 MM HG: CPT | Performed by: PHYSICIAN ASSISTANT

## 2021-02-23 PROCEDURE — 3008F BODY MASS INDEX DOCD: CPT | Performed by: PHYSICIAN ASSISTANT

## 2021-02-23 PROCEDURE — 3074F SYST BP LT 130 MM HG: CPT | Performed by: PHYSICIAN ASSISTANT

## 2021-02-23 NOTE — PATIENT INSTRUCTIONS
Refill policies:    • Allow 2-3 business days for refills; controlled substances may take longer.   • Contact your pharmacy at least 5 days prior to running out of medication and have them send an electronic request or submit request through the “request re Depending on your insurance carrier, approval may take 3-10 days. It is highly recommended patients contact their insurance carrier directly to determine coverage.   If test is done without insurance authorization, patient may be responsible for the entire placed for a C4-7 ACDF

## 2021-02-23 NOTE — PROGRESS NOTES
Had imaging done outside of here  Pain comes and goes, nothing really triggers pain  Pain on Left side of temple, if she touches too hard or if she lays on it, will cause pain to go across top of head  Can't walk too far, gets exhausted, heavy breathing an

## 2021-02-23 NOTE — PROGRESS NOTES
[facility] Neurosurgery Consultation      HISTORY OF PRESENT ILLNESS:Di Grayson is a 62year old female here for spinal evaluation. She has a complicated history of developing left arm and leg weakness in 2019 they thought she had a stroke or TIA.   Sh of onset: 46) in her father; Cataracts in her niece; Diabetes in her brother, brother, mother, and sister; Glaucoma in her sister; Heart Disease in her maternal grandmother and mother; Lung Disorder in her mother; Thyroid Disorder in her sister.     SOCIAL DTRs:     Biceps   Brachioradialis   Triceps    Patellar    Ankle  Hamstring   Right      1+           1+       1+        1+       1+           Left      1+           1+       1+        1+       1+              IMAGING:  MRI of the cervical spine 2/2/2021 a C4-7 ACDF    Dr Vika Amaya evaluated the patient and is in agreement.       Jen Reid M.S., PA-C  [facility]  [address], [address] [facility] Lexa Cunningham, [address] [place] [address] Rd  [phone]

## 2021-02-23 NOTE — TELEPHONE ENCOUNTER
You are scheduled for ANTERIOR DISCECTOMY AND FUSION. CERVICAL 4- CERVICAL5, CERVICAL5 - CERVICAL 6, AND CERVICAL 6- CERVICAL 7. ALLOGRAFT AND ALL REQUIRED INSTRUMENTATION on 3/22/21 with .     · You will need to contact the Pre-admission department will get authorization for surgery. · Surgery is usually scheduled as 1-2 day admission. · The hospital will contact you 1-2 days before surgery with your arrival time. · You may need an Aspen cervical collar.      · You may need an external bone

## 2021-02-23 NOTE — TELEPHONE ENCOUNTER
Patient is scheduled for Anterior Discectomy and Fusion. C4-C5, C5-C6, C6-C7 allograft and all required instrumentation on 3/22/21 with Dr Vera Dinh .     XSurgery order signed   Placed sx on surgery sheet  XPlaced on outlook calendar  XMychart message sent to

## 2021-02-24 ENCOUNTER — APPOINTMENT (OUTPATIENT)
Dept: PHYSICAL THERAPY | Facility: HOSPITAL | Age: 61
End: 2021-02-24
Payer: MEDICAID

## 2021-02-24 ENCOUNTER — APPOINTMENT (OUTPATIENT)
Dept: PHYSICAL THERAPY | Facility: HOSPITAL | Age: 61
End: 2021-02-24
Attending: Other
Payer: MEDICAID

## 2021-02-25 ENCOUNTER — APPOINTMENT (OUTPATIENT)
Dept: PHYSICAL THERAPY | Facility: HOSPITAL | Age: 61
End: 2021-02-25
Attending: Other
Payer: MEDICAID

## 2021-02-25 ENCOUNTER — TELEPHONE (OUTPATIENT)
Dept: INTERNAL MEDICINE CLINIC | Facility: CLINIC | Age: 61
End: 2021-02-25

## 2021-02-25 NOTE — TELEPHONE ENCOUNTER
Patient calling in because she is having neck surgery next month with Dr. Tylor Booker on 3/22 and is requesting someone contact her regarding a disabled parking placard. She did contact the forms department and leave a message but was never called back. Please contact patient with instructions on how to have this form completed and with her next step.

## 2021-02-25 NOTE — TELEPHONE ENCOUNTER
Dr. Parisa Sheldon,     Called patient and obtained the following update. She denies any recent changes or high carb intake lately, no new medications, no recent illness.   Asked if neck pain could be contributing to her erratic blood sugar, RN states pain c

## 2021-02-25 NOTE — TELEPHONE ENCOUNTER
Hi!  Can we please increase her lantus dose to 12 units. Then let us check back with her in two days. Thank !

## 2021-02-25 NOTE — TELEPHONE ENCOUNTER
Pt was called and informed of Dr. Camille Waterman message below.  Appt was made for Monday with Dr. Minesh Segura for pre op clearance    Future Appointments   Date Time Provider Rebecca Dan   2/26/2021 12:15 PM [facility] XR [address] [facility]   3/1/2021  2:45

## 2021-02-25 NOTE — TELEPHONE ENCOUNTER
Spoke with patient informed her for the disable parking placard, We will give it to the Doctor as soon as his/her done with the form she will have to come a  the form. Patient will need the placard for more than a year. Dr Kelsie Robison will you be able to fill out this for her or should we wait For Dr Kelsie Figueroa.

## 2021-02-25 NOTE — TELEPHONE ENCOUNTER
Spoke to patient to relay message below  -patient stated understanding to increase dose of lantus to 12 units nightly.   RN advised patient to call clinic on Monday with update on sugars - patient stated understanding

## 2021-02-25 NOTE — TELEPHONE ENCOUNTER
Patient is requesting Dr. Sarah Vasquez review her recent OV notes from Dr. Clementine Holt and Dr. Rashawn Banuelos and either have him or someone clinical give her a call back with Dr. Bk Gonzales opinion and recommendations regarding these visits.

## 2021-02-25 NOTE — TELEPHONE ENCOUNTER
I did review note from Dr. Russell Living he is recommending surgery. If this is his recommendation I will suggest him to do . He can wait and talk to Jelani Avalos when he is back on Monday.   If he goes for surgery he will need medical clearance he will need t

## 2021-02-26 ENCOUNTER — APPOINTMENT (OUTPATIENT)
Dept: PHYSICAL THERAPY | Facility: HOSPITAL | Age: 61
End: 2021-02-26
Payer: MEDICAID

## 2021-02-26 ENCOUNTER — HOSPITAL ENCOUNTER (OUTPATIENT)
Dept: GENERAL RADIOLOGY | Age: 61
Discharge: HOME | End: 2021-02-26
Attending: PHYSICIAN ASSISTANT
Payer: MEDICAID

## 2021-02-26 ENCOUNTER — HOSPITAL ENCOUNTER (EMERGENCY)
Facility: HOSPITAL | Age: 61
Discharge: HOME | End: 2021-02-27
Attending: EMERGENCY MEDICINE
Payer: MEDICAID

## 2021-02-26 DIAGNOSIS — M48.02 CERVICAL STENOSIS OF SPINAL CANAL: ICD-10-CM

## 2021-02-26 DIAGNOSIS — M54.12 CERVICAL MYELOPATHY WITH CERVICAL RADICULOPATHY (HCC): ICD-10-CM

## 2021-02-26 DIAGNOSIS — G95.9 CERVICAL MYELOPATHY WITH CERVICAL RADICULOPATHY (HCC): ICD-10-CM

## 2021-02-26 DIAGNOSIS — T78.40XA ALLERGIC REACTION, INITIAL ENCOUNTER: Primary | ICD-10-CM

## 2021-02-26 PROCEDURE — 99284 EMERGENCY DEPT VISIT MOD MDM: CPT

## 2021-02-26 PROCEDURE — 96375 TX/PRO/DX INJ NEW DRUG ADDON: CPT

## 2021-02-26 PROCEDURE — 72050 X-RAY EXAM NECK SPINE 4/5VWS: CPT | Performed by: PHYSICIAN ASSISTANT

## 2021-02-26 PROCEDURE — 96374 THER/PROPH/DIAG INJ IV PUSH: CPT

## 2021-02-26 PROCEDURE — S0028 INJECTION, FAMOTIDINE, 20 MG: HCPCS | Performed by: EMERGENCY MEDICINE

## 2021-02-26 RX ORDER — FAMOTIDINE 10 MG/ML
20 INJECTION, SOLUTION INTRAVENOUS ONCE
Status: COMPLETED | OUTPATIENT
Start: 2021-02-26 | End: 2021-02-26

## 2021-02-26 RX ORDER — METHYLPREDNISOLONE SODIUM SUCCINATE 125 MG/2ML
125 INJECTION, POWDER, LYOPHILIZED, FOR SOLUTION INTRAMUSCULAR; INTRAVENOUS ONCE
Status: COMPLETED | OUTPATIENT
Start: 2021-02-26 | End: 2021-02-26

## 2021-02-27 VITALS
SYSTOLIC BLOOD PRESSURE: 115 MMHG | DIASTOLIC BLOOD PRESSURE: 72 MMHG | RESPIRATION RATE: 16 BRPM | OXYGEN SATURATION: 98 % | TEMPERATURE: 98 F | HEART RATE: 82 BPM

## 2021-02-27 RX ORDER — FAMOTIDINE 20 MG/1
20 TABLET ORAL 2 TIMES DAILY
Qty: 10 TABLET | Refills: 0 | Status: SHIPPED | OUTPATIENT
Start: 2021-02-27 | End: 2021-03-04

## 2021-02-27 RX ORDER — PREDNISONE 20 MG/1
20 TABLET ORAL DAILY
Qty: 5 TABLET | Refills: 0 | Status: SHIPPED | OUTPATIENT
Start: 2021-02-27 | End: 2021-03-04

## 2021-02-27 RX ORDER — ACETAMINOPHEN 500 MG
1000 TABLET ORAL ONCE
Status: COMPLETED | OUTPATIENT
Start: 2021-02-27 | End: 2021-02-27

## 2021-02-27 NOTE — ED PROVIDER NOTES
Patient Seen in: [facility] AND [facility] Emergency Department      History   Patient presents with:   Allergic Rxn Allergies    Stated Complaint: allergic reaction after eating fish     HPI/Subjective:   HPI    50-year-old female with history of diabetes, TIA, Eyes: Negative for visual disturbance. Respiratory: Negative for shortness of breath. Cardiovascular: Negative for chest pain. Gastrointestinal: Negative for abdominal pain. Genitourinary: Negative for dysuria.    Musculoskeletal: Negative for ba previous visit (from the past 24 hour(s)).     Imaging Results Available and Reviewed by me while in ED:  Xr Cervical Spine Ap Lat Flex Ext Em (cpt=72050)    Result Date: 2/26/2021  CONCLUSION:  Mild multilevel cervical spondylosis with suspected superimpos MD  [address] [facility] [address]  [phone]    Schedule an appointment as soon as possible for a visit in 2 days  As needed          Medications Prescribed:  Discharge Medication List as of 2/27/2021 12:34 AM    START taking these medication

## 2021-02-27 NOTE — ED INITIAL ASSESSMENT (HPI)
Throat feeling tight after smelling fish around 2130. Benadryl 1 tablet PTA. Uvula appears mildly edematous. PT with known allergy to fish. Itching to arms.

## 2021-03-01 ENCOUNTER — APPOINTMENT (OUTPATIENT)
Dept: PHYSICAL THERAPY | Facility: HOSPITAL | Age: 61
End: 2021-03-01
Attending: Other
Payer: MEDICAID

## 2021-03-01 ENCOUNTER — OFFICE VISIT (OUTPATIENT)
Dept: INTERNAL MEDICINE CLINIC | Facility: CLINIC | Age: 61
End: 2021-03-01
Payer: MEDICAID

## 2021-03-01 VITALS
HEIGHT: 66 IN | RESPIRATION RATE: 17 BRPM | BODY MASS INDEX: 32.47 KG/M2 | HEART RATE: 76 BPM | TEMPERATURE: 98 F | WEIGHT: 202 LBS | SYSTOLIC BLOOD PRESSURE: 126 MMHG | DIASTOLIC BLOOD PRESSURE: 72 MMHG | OXYGEN SATURATION: 99 %

## 2021-03-01 DIAGNOSIS — Z01.818 PRE-OP EVALUATION: Primary | ICD-10-CM

## 2021-03-01 PROCEDURE — 3008F BODY MASS INDEX DOCD: CPT | Performed by: INTERNAL MEDICINE

## 2021-03-01 PROCEDURE — 3074F SYST BP LT 130 MM HG: CPT | Performed by: INTERNAL MEDICINE

## 2021-03-01 PROCEDURE — 3078F DIAST BP <80 MM HG: CPT | Performed by: INTERNAL MEDICINE

## 2021-03-01 PROCEDURE — 93000 ELECTROCARDIOGRAM COMPLETE: CPT | Performed by: INTERNAL MEDICINE

## 2021-03-01 PROCEDURE — 99214 OFFICE O/P EST MOD 30 MIN: CPT | Performed by: INTERNAL MEDICINE

## 2021-03-01 RX ORDER — METOPROLOL SUCCINATE 25 MG/1
TABLET, EXTENDED RELEASE ORAL
Qty: 90 TABLET | Refills: 1 | Status: CANCELLED | OUTPATIENT
Start: 2021-03-01

## 2021-03-01 RX ORDER — AMLODIPINE BESYLATE 5 MG/1
5 TABLET ORAL AS DIRECTED
Qty: 90 TABLET | Refills: 1 | Status: SHIPPED | OUTPATIENT
Start: 2021-03-01 | End: 2021-09-08

## 2021-03-01 RX ORDER — METOPROLOL SUCCINATE 25 MG/1
25 TABLET, EXTENDED RELEASE ORAL DAILY
Qty: 90 TABLET | Refills: 2 | Status: SHIPPED | OUTPATIENT
Start: 2021-03-01 | End: 2021-12-07

## 2021-03-01 NOTE — TELEPHONE ENCOUNTER
Received DME orders from [facility]. Faxed appropriately. Confirmation received.

## 2021-03-02 ENCOUNTER — TELEPHONE (OUTPATIENT)
Dept: PHYSICAL THERAPY | Facility: HOSPITAL | Age: 61
End: 2021-03-02

## 2021-03-03 ENCOUNTER — APPOINTMENT (OUTPATIENT)
Dept: PHYSICAL THERAPY | Facility: HOSPITAL | Age: 61
End: 2021-03-03
Payer: MEDICAID

## 2021-03-03 NOTE — PROGRESS NOTES
HPI:    Patient ID: Mary Tran is a 62year old female. HPI  Comes in for preop for clearance for cervical spine surgery.   Denies any chest pain or shortness of breath today she says that she has had a stress test most recently less than 6 gunner 100 MG Oral Cap Take 1 capsule (100 mg total) by mouth 2 (two) times daily as needed for constipation. 30 capsule 0   • MetFORMIN HCl 500 MG Oral Tab Take 2 tablets (1,000 mg total) by mouth 2 (two) times daily with meals.  360 tablet 1   • naproxen 375 MG Problem Relation Age of Onset   • Cancer Father 46        Lung   • Diabetes Mother    • Lung Disorder Mother         Emphysema   • Heart Disease Mother    • Diabetes Sister    • Thyroid Disorder Sister    • Glaucoma Sister    • Heart Disease Maternal Gra (14) [E] Specimen      Meds This Visit:  Requested Prescriptions     Signed Prescriptions Disp Refills   • amLODIPine Besylate 5 MG Oral Tab 90 tablet 1     Sig: Take 1 tablet (5 mg total) by mouth As Directed.    • Metoprolol Succinate ER 25 MG Oral Tablet

## 2021-03-03 NOTE — TELEPHONE ENCOUNTER
Prior Authorization for inpatient surgery initiated with [employer]  Requesting coverage for:ACDF C4-5,C5-6,C6-7  Date of Service: 3/22/21   Inpatient days requested: 3 days  CPT codes: [address]    Request for surgery pending review, p

## 2021-03-04 ENCOUNTER — APPOINTMENT (OUTPATIENT)
Dept: PHYSICAL THERAPY | Facility: HOSPITAL | Age: 61
End: 2021-03-04
Attending: Other
Payer: MEDICAID

## 2021-03-04 RX ORDER — EMPAGLIFLOZIN 10 MG/1
10 TABLET, FILM COATED ORAL DAILY
Qty: 30 TABLET | Refills: 0 | Status: SHIPPED | OUTPATIENT
Start: 2021-03-04 | End: 2021-04-03

## 2021-03-04 NOTE — TELEPHONE ENCOUNTER
Hi!  Can we start her on Jardiance? I will put the order in. Thank you! In the meantime, please ask her to increase her lantus to 14 units. Thank you!

## 2021-03-04 NOTE — TELEPHONE ENCOUNTER
Pt states sugars are coming down, but still too high and she is frustrated because she is eating lower carb and less food in general. Pt switched to only 1 pop/day and is substituting with tea/water.      fasting this morning -   Yesterday  fast

## 2021-03-04 NOTE — TELEPHONE ENCOUNTER
RN left  to check [facility] and call if she has any questions. Advised to start Jardiance (new med) and increase lantus to 14 units nightly. Call with questions.

## 2021-03-05 ENCOUNTER — APPOINTMENT (OUTPATIENT)
Dept: PHYSICAL THERAPY | Facility: HOSPITAL | Age: 61
End: 2021-03-05
Payer: MEDICAID

## 2021-03-05 NOTE — TELEPHONE ENCOUNTER
Per PCP Venecia Mckeon on 3/1/21: \"Pre-op evaluation  (primary encounter diagnosis) we will order labs pending results and once we get the results from prior stress test will clear for surgery  EKG nonspecific ST-T wave changes normal rate normal rhythm.   We wi

## 2021-03-08 ENCOUNTER — APPOINTMENT (OUTPATIENT)
Dept: PHYSICAL THERAPY | Facility: HOSPITAL | Age: 61
End: 2021-03-08
Attending: Other
Payer: MEDICAID

## 2021-03-09 ENCOUNTER — LABORATORY ENCOUNTER (OUTPATIENT)
Dept: LAB | Facility: REFERENCE LAB | Age: 61
End: 2021-03-09
Attending: NEUROLOGICAL SURGERY
Payer: MEDICAID

## 2021-03-09 ENCOUNTER — APPOINTMENT (OUTPATIENT)
Dept: PHYSICAL THERAPY | Facility: HOSPITAL | Age: 61
End: 2021-03-09
Payer: MEDICAID

## 2021-03-09 ENCOUNTER — HOSPITAL ENCOUNTER (OUTPATIENT)
Dept: GENERAL RADIOLOGY | Age: 61
Discharge: HOME | End: 2021-03-09
Attending: INTERNAL MEDICINE
Payer: MEDICAID

## 2021-03-09 DIAGNOSIS — K59.00 CONSTIPATION, UNSPECIFIED CONSTIPATION TYPE: ICD-10-CM

## 2021-03-09 DIAGNOSIS — M54.12 CERVICAL MYELOPATHY WITH CERVICAL RADICULOPATHY (HCC): ICD-10-CM

## 2021-03-09 DIAGNOSIS — E11.9 TYPE 2 DIABETES MELLITUS WITHOUT COMPLICATION, WITHOUT LONG-TERM CURRENT USE OF INSULIN (HCC): ICD-10-CM

## 2021-03-09 DIAGNOSIS — Z01.818 PRE-OP EVALUATION: ICD-10-CM

## 2021-03-09 DIAGNOSIS — Z00.00 ANNUAL PHYSICAL EXAM: ICD-10-CM

## 2021-03-09 DIAGNOSIS — G95.9 CERVICAL MYELOPATHY WITH CERVICAL RADICULOPATHY (HCC): ICD-10-CM

## 2021-03-09 LAB
ALBUMIN SERPL-MCNC: 3.7 G/DL (ref 3.4–5)
ALBUMIN/GLOB SERPL: 0.9 {RATIO} (ref 1–2)
ALP LIVER SERPL-CCNC: 95 U/L
ALT SERPL-CCNC: 16 U/L
ANION GAP SERPL CALC-SCNC: 6 MMOL/L (ref 0–18)
ANTIBODY SCREEN: NEGATIVE
AST SERPL-CCNC: 8 U/L (ref 15–37)
BASOPHILS # BLD AUTO: 0.03 X10(3) UL (ref 0–0.2)
BASOPHILS NFR BLD AUTO: 0.6 %
BILIRUB SERPL-MCNC: 0.5 MG/DL (ref 0.1–2)
BUN BLD-MCNC: 10 MG/DL (ref 7–18)
BUN/CREAT SERPL: 14.3 (ref 10–20)
CALCIUM BLD-MCNC: 9.1 MG/DL (ref 8.5–10.1)
CHLORIDE SERPL-SCNC: 108 MMOL/L (ref 98–112)
CHOLEST SMN-MCNC: 200 MG/DL (ref ?–200)
CO2 SERPL-SCNC: 25 MMOL/L (ref 21–32)
CREAT BLD-MCNC: 0.7 MG/DL
DEPRECATED RDW RBC AUTO: 47.1 FL (ref 35.1–46.3)
EOSINOPHIL # BLD AUTO: 0.3 X10(3) UL (ref 0–0.7)
EOSINOPHIL NFR BLD AUTO: 6 %
ERYTHROCYTE [DISTWIDTH] IN BLOOD BY AUTOMATED COUNT: 15.1 % (ref 11–15)
GLOBULIN PLAS-MCNC: 3.9 G/DL (ref 2.8–4.4)
GLUCOSE BLD-MCNC: 233 MG/DL (ref 70–99)
HCT VFR BLD AUTO: 40.4 %
HDLC SERPL-MCNC: 35 MG/DL (ref 40–59)
HGB BLD-MCNC: 12.8 G/DL
IMM GRANULOCYTES # BLD AUTO: 0.02 X10(3) UL (ref 0–1)
IMM GRANULOCYTES NFR BLD: 0.4 %
LDLC SERPL CALC-MCNC: 142 MG/DL (ref ?–100)
LYMPHOCYTES # BLD AUTO: 1.84 X10(3) UL (ref 1–4)
LYMPHOCYTES NFR BLD AUTO: 36.6 %
M PROTEIN MFR SERPL ELPH: 7.6 G/DL (ref 6.4–8.2)
MCH RBC QN AUTO: 27 PG (ref 26–34)
MCHC RBC AUTO-ENTMCNC: 31.7 G/DL (ref 31–37)
MCV RBC AUTO: 85.2 FL
MONOCYTES # BLD AUTO: 0.32 X10(3) UL (ref 0.1–1)
MONOCYTES NFR BLD AUTO: 6.4 %
NEUTROPHILS # BLD AUTO: 2.52 X10 (3) UL (ref 1.5–7.7)
NEUTROPHILS # BLD AUTO: 2.52 X10(3) UL (ref 1.5–7.7)
NEUTROPHILS NFR BLD AUTO: 50 %
NONHDLC SERPL-MCNC: 165 MG/DL (ref ?–130)
OSMOLALITY SERPL CALC.SUM OF ELEC: 295 MOSM/KG (ref 275–295)
PATIENT FASTING Y/N/NP: NO
PATIENT FASTING Y/N/NP: NO
PLATELET # BLD AUTO: 306 10(3)UL (ref 150–450)
POTASSIUM SERPL-SCNC: 4 MMOL/L (ref 3.5–5.1)
RBC # BLD AUTO: 4.74 X10(6)UL
RH BLOOD TYPE: POSITIVE
SODIUM SERPL-SCNC: 139 MMOL/L (ref 136–145)
TRIGL SERPL-MCNC: 116 MG/DL (ref 30–149)
TSI SER-ACNC: 0.7 MIU/ML (ref 0.36–3.74)
VLDLC SERPL CALC-MCNC: 23 MG/DL (ref 0–30)
WBC # BLD AUTO: 5 X10(3) UL (ref 4–11)

## 2021-03-09 PROCEDURE — 86901 BLOOD TYPING SEROLOGIC RH(D): CPT

## 2021-03-09 PROCEDURE — 80061 LIPID PANEL: CPT

## 2021-03-09 PROCEDURE — 86900 BLOOD TYPING SEROLOGIC ABO: CPT

## 2021-03-09 PROCEDURE — 80053 COMPREHEN METABOLIC PANEL: CPT

## 2021-03-09 PROCEDURE — 71046 X-RAY EXAM CHEST 2 VIEWS: CPT | Performed by: INTERNAL MEDICINE

## 2021-03-09 PROCEDURE — 86850 RBC ANTIBODY SCREEN: CPT

## 2021-03-09 PROCEDURE — 84443 ASSAY THYROID STIM HORMONE: CPT

## 2021-03-09 PROCEDURE — 85025 COMPLETE CBC W/AUTO DIFF WBC: CPT

## 2021-03-09 PROCEDURE — 36415 COLL VENOUS BLD VENIPUNCTURE: CPT

## 2021-03-10 RX ORDER — ATORVASTATIN CALCIUM 80 MG/1
80 TABLET, FILM COATED ORAL NIGHTLY
Qty: 90 TABLET | Refills: 1 | Status: SHIPPED | OUTPATIENT
Start: 2021-03-10 | End: 2021-09-07

## 2021-03-11 ENCOUNTER — APPOINTMENT (OUTPATIENT)
Dept: PHYSICAL THERAPY | Facility: HOSPITAL | Age: 61
End: 2021-03-11
Attending: Other
Payer: MEDICAID

## 2021-03-15 ENCOUNTER — APPOINTMENT (OUTPATIENT)
Dept: PHYSICAL THERAPY | Facility: HOSPITAL | Age: 61
End: 2021-03-15
Attending: Other
Payer: MEDICAID

## 2021-03-16 ENCOUNTER — APPOINTMENT (OUTPATIENT)
Dept: PHYSICAL THERAPY | Facility: HOSPITAL | Age: 61
End: 2021-03-16
Payer: MEDICAID

## 2021-03-16 ENCOUNTER — TELEPHONE (OUTPATIENT)
Dept: INTERNAL MEDICINE CLINIC | Facility: CLINIC | Age: 61
End: 2021-03-16

## 2021-03-16 DIAGNOSIS — Z01.818 PRE-OP EVALUATION: Primary | ICD-10-CM

## 2021-03-16 NOTE — TELEPHONE ENCOUNTER
Prior authorization request completed for: ACDF C4-5,C5-6,C6-7   Authorization #[id number]  Authorization dates: 3/3/21-5/3/21  CPT codes approved: 58068,45708L1,57117,67362  Number of visits/dates of service approved: called [employer] requested 3 days

## 2021-03-16 NOTE — TELEPHONE ENCOUNTER
Patient found her stress echo from [facility] done 10/6/2020. She is trying to upload report into [employer] acct. Patient unable to upload stress echo. She will print and bring to office.

## 2021-03-16 NOTE — TELEPHONE ENCOUNTER
Arleth from Pre admission testing testing states they are still missing PT, PTT Mrsa and confirmed EKG.  please fax to number below   Fax [phone]

## 2021-03-17 ENCOUNTER — TELEPHONE (OUTPATIENT)
Dept: INTERNAL MEDICINE CLINIC | Facility: CLINIC | Age: 61
End: 2021-03-17

## 2021-03-17 ENCOUNTER — MED REC SCAN ONLY (OUTPATIENT)
Dept: INTERNAL MEDICINE CLINIC | Facility: CLINIC | Age: 61
End: 2021-03-17

## 2021-03-17 DIAGNOSIS — Z23 NEED FOR VACCINATION: ICD-10-CM

## 2021-03-17 NOTE — TELEPHONE ENCOUNTER
Called pt to confirm PCP appointment date and time for clearance. Pt to review stress test with PCP. No answer, LVB or message clinic via [facility] informing us of PCP appt date.

## 2021-03-17 NOTE — TELEPHONE ENCOUNTER
Received call back from pt stating she does not have a follow up visit with her PCP and that they have requested the results of a previous stress test that she was not able to release to her PCP so she is dropping off the results to their office today for

## 2021-03-17 NOTE — TELEPHONE ENCOUNTER
Per MD notes, he wanted pts stress test she had done at [facility] about 6mos ago. We still have not received these results. Pt was called yesterday by Aldo HAWTHORNE and pt states she was not aware of this even though she signed an ELSIE to obtain this results, once obtained he will placed order for remaining labs. Pt states she will try to get these results over to us ASAP and her labs done ASAP.      Just FYI

## 2021-03-17 NOTE — TELEPHONE ENCOUNTER
Patient states the Covid vaccine was offered to her to have it on 3/18, 3/19, 3/20, but would like to know if this is safe to do before surgery on 3/22.

## 2021-03-17 NOTE — TELEPHONE ENCOUNTER
Called ROBBI pereira informing her stress test wise she is cleared for surgery. Dr added labs needed, and she needs to go to lab to have them done to be completely cleared for surgery.

## 2021-03-17 NOTE — TELEPHONE ENCOUNTER
Per pt PCP dr. Alcaraz Bark note 3/1/21 states    \"Addendum: She did have a stress test stress echo done 10/ 2019 which was normal also she had another echo in the summer 2020 which was normal again so patient will be cleared for surgery no further cardiac w

## 2021-03-17 NOTE — TELEPHONE ENCOUNTER
Dr Shane Lr, Please read my msg below.   Dr. Lisa Guan is not clearing pt for surgery until he receives Stress test from Nory Feliz, then he will place order for PT/PTT and MSA

## 2021-03-17 NOTE — TELEPHONE ENCOUNTER
Received call from [address] from Dr. Jose See office from [facility]. Per [address] they are still waiting for results of PTT/ INR and Nasal MRSA, EKG note of clearance for this patient's upcoming surgery on 3/22/21.      Please advise and fax this information to the following number:    [phone]

## 2021-03-18 ENCOUNTER — APPOINTMENT (OUTPATIENT)
Dept: PHYSICAL THERAPY | Facility: HOSPITAL | Age: 61
End: 2021-03-18
Attending: Other
Payer: MEDICAID

## 2021-03-18 ENCOUNTER — PATIENT MESSAGE (OUTPATIENT)
Dept: SURGERY | Facility: CLINIC | Age: 61
End: 2021-03-18

## 2021-03-18 ENCOUNTER — LAB ENCOUNTER (OUTPATIENT)
Dept: LAB | Facility: HOSPITAL | Age: 61
End: 2021-03-18
Attending: INTERNAL MEDICINE
Payer: MEDICAID

## 2021-03-18 ENCOUNTER — TELEPHONE (OUTPATIENT)
Dept: INTERNAL MEDICINE CLINIC | Facility: CLINIC | Age: 61
End: 2021-03-18

## 2021-03-18 ENCOUNTER — APPOINTMENT (OUTPATIENT)
Dept: PHYSICAL THERAPY | Facility: HOSPITAL | Age: 61
End: 2021-03-18
Payer: MEDICAID

## 2021-03-18 DIAGNOSIS — Z00.00 ANNUAL PHYSICAL EXAM: ICD-10-CM

## 2021-03-18 DIAGNOSIS — E11.9 TYPE 2 DIABETES MELLITUS WITHOUT COMPLICATION, WITHOUT LONG-TERM CURRENT USE OF INSULIN (HCC): ICD-10-CM

## 2021-03-18 DIAGNOSIS — G95.9 CERVICAL MYELOPATHY WITH CERVICAL RADICULOPATHY (HCC): ICD-10-CM

## 2021-03-18 DIAGNOSIS — M54.12 CERVICAL MYELOPATHY WITH CERVICAL RADICULOPATHY (HCC): ICD-10-CM

## 2021-03-18 DIAGNOSIS — K59.00 CONSTIPATION, UNSPECIFIED CONSTIPATION TYPE: ICD-10-CM

## 2021-03-18 DIAGNOSIS — Z01.818 PRE-OP EVALUATION: ICD-10-CM

## 2021-03-18 LAB
APTT PPP: 27.1 SECONDS (ref 23.2–35.3)
BILIRUB UR QL: NEGATIVE
COLOR UR: YELLOW
CREAT UR-SCNC: 299 MG/DL
GLUCOSE UR-MCNC: NEGATIVE MG/DL
INR BLD: 1.05 (ref 0.9–1.2)
KETONES UR-MCNC: NEGATIVE MG/DL
MICROALBUMIN UR-MCNC: 2.53 MG/DL
MICROALBUMIN/CREAT 24H UR-RTO: 8.5 UG/MG (ref ?–30)
NITRITE UR QL STRIP.AUTO: NEGATIVE
PH UR: 5 [PH] (ref 5–8)
PROT UR-MCNC: NEGATIVE MG/DL
PROTHROMBIN TIME: 13.5 SECONDS (ref 11.8–14.5)
SP GR UR STRIP: 1.03 (ref 1–1.03)
UROBILINOGEN UR STRIP-ACNC: 2

## 2021-03-18 PROCEDURE — 3061F NEG MICROALBUMINURIA REV: CPT | Performed by: INTERNAL MEDICINE

## 2021-03-18 PROCEDURE — 36415 COLL VENOUS BLD VENIPUNCTURE: CPT

## 2021-03-18 PROCEDURE — 81001 URINALYSIS AUTO W/SCOPE: CPT

## 2021-03-18 PROCEDURE — 82043 UR ALBUMIN QUANTITATIVE: CPT

## 2021-03-18 PROCEDURE — 87081 CULTURE SCREEN ONLY: CPT

## 2021-03-18 PROCEDURE — 82570 ASSAY OF URINE CREATININE: CPT

## 2021-03-18 PROCEDURE — 85730 THROMBOPLASTIN TIME PARTIAL: CPT

## 2021-03-18 PROCEDURE — 85610 PROTHROMBIN TIME: CPT

## 2021-03-18 NOTE — TELEPHONE ENCOUNTER
From: Aicha Jack  To: Giuliana Senior MD  Sent: 3/18/2021 9:21 AM CDT  Subject: Non-Urgent Medical Question    Good morning, wanted to know if I should get the Covid vaccine before surgery? The surgery is on Monday.     Thank you,  Alin Gallardo

## 2021-03-18 NOTE — TELEPHONE ENCOUNTER
Pt's in office EKG was faxed to Dr. Levi Marrero office today, fax # [phone], pt aware she needs additional labs to be drawn.

## 2021-03-18 NOTE — TELEPHONE ENCOUNTER
preop scheduling called to make sure that pt knows to complete lab work. (needs MRSA test, PT/PTT and EKG). Pt did have EKG in office at [facility] on 03/01. I called over to office to have them fax the EKG tracing directly to pre op testing.   Pt will have la

## 2021-03-19 ENCOUNTER — ANESTHESIA EVENT (OUTPATIENT)
Dept: SURGERY | Facility: HOSPITAL | Age: 61
DRG: 472 | End: 2021-03-19
Payer: MEDICAID

## 2021-03-19 ENCOUNTER — LAB ENCOUNTER (OUTPATIENT)
Dept: LAB | Age: 61
End: 2021-03-19
Attending: NEUROLOGICAL SURGERY
Payer: MEDICAID

## 2021-03-19 DIAGNOSIS — K59.00 CONSTIPATION, UNSPECIFIED CONSTIPATION TYPE: ICD-10-CM

## 2021-03-19 DIAGNOSIS — M54.12 CERVICAL MYELOPATHY WITH CERVICAL RADICULOPATHY (HCC): ICD-10-CM

## 2021-03-19 DIAGNOSIS — Z01.818 PRE-OP EVALUATION: ICD-10-CM

## 2021-03-19 DIAGNOSIS — E11.9 TYPE 2 DIABETES MELLITUS WITHOUT COMPLICATION, WITHOUT LONG-TERM CURRENT USE OF INSULIN (HCC): ICD-10-CM

## 2021-03-19 DIAGNOSIS — Z00.00 ANNUAL PHYSICAL EXAM: ICD-10-CM

## 2021-03-19 DIAGNOSIS — G95.9 CERVICAL MYELOPATHY WITH CERVICAL RADICULOPATHY (HCC): ICD-10-CM

## 2021-03-20 LAB — SARS-COV-2 RNA RESP QL NAA+PROBE: NOT DETECTED

## 2021-03-22 ENCOUNTER — APPOINTMENT (OUTPATIENT)
Dept: GENERAL RADIOLOGY | Facility: HOSPITAL | Age: 61
DRG: 472 | End: 2021-03-22
Attending: NEUROLOGICAL SURGERY
Payer: MEDICAID

## 2021-03-22 ENCOUNTER — HOSPITAL ENCOUNTER (INPATIENT)
Facility: HOSPITAL | Age: 61
LOS: 2 days | Discharge: HOME | DRG: 472 | End: 2021-03-24
Attending: NEUROLOGICAL SURGERY | Admitting: NEUROLOGICAL SURGERY
Payer: MEDICAID

## 2021-03-22 ENCOUNTER — ANESTHESIA (OUTPATIENT)
Dept: SURGERY | Facility: HOSPITAL | Age: 61
DRG: 472 | End: 2021-03-22
Payer: MEDICAID

## 2021-03-22 DIAGNOSIS — R29.898 WEAKNESS OF BOTH LOWER EXTREMITIES: ICD-10-CM

## 2021-03-22 DIAGNOSIS — M54.12 CERVICAL MYELOPATHY WITH CERVICAL RADICULOPATHY (HCC): Primary | ICD-10-CM

## 2021-03-22 DIAGNOSIS — M48.02 CERVICAL STENOSIS OF SPINAL CANAL: ICD-10-CM

## 2021-03-22 DIAGNOSIS — G95.9 CERVICAL MYELOPATHY WITH CERVICAL RADICULOPATHY (HCC): Primary | ICD-10-CM

## 2021-03-22 DIAGNOSIS — R29.898 WEAKNESS OF BOTH UPPER EXTREMITIES: ICD-10-CM

## 2021-03-22 LAB
GLUCOSE BLD-MCNC: 139 MG/DL (ref 70–99)
GLUCOSE BLD-MCNC: 198 MG/DL (ref 70–99)
GLUCOSE BLD-MCNC: 232 MG/DL (ref 70–99)
GLUCOSE BLD-MCNC: 278 MG/DL (ref 70–99)
GLUCOSE BLD-MCNC: 296 MG/DL (ref 70–99)
ISTAT BLOOD GAS BASE EXCESS: -3 MMOL/L (ref ?–30)
ISTAT BLOOD GAS BASE EXCESS: -3 MMOL/L (ref ?–30)
ISTAT BLOOD GAS HCO3: 22.9 MEQ/L (ref 22–26)
ISTAT BLOOD GAS HCO3: 23.5 MEQ/L (ref 22–26)
ISTAT BLOOD GAS O2 SATURATION: 100 % (ref 92–100)
ISTAT BLOOD GAS O2 SATURATION: 99 % (ref 92–100)
ISTAT BLOOD GAS PCO2: 45.6 MMHG (ref 35–45)
ISTAT BLOOD GAS PCO2: 46.1 MMHG (ref 35–45)
ISTAT BLOOD GAS PH: 7.3 (ref 7.35–7.45)
ISTAT BLOOD GAS PH: 7.32 (ref 7.35–7.45)
ISTAT BLOOD GAS PO2: 153 MMHG (ref 80–105)
ISTAT BLOOD GAS PO2: 223 MMHG (ref 80–105)
ISTAT BLOOD GAS TCO2: 24 MMOL/L (ref 22–32)
ISTAT BLOOD GAS TCO2: 25 MMOL/L (ref 22–32)
ISTAT HEMATOCRIT: 32 %
ISTAT HEMATOCRIT: 34 %
ISTAT IONIZED CALCIUM: 1.08 MMOL/L (ref 1.12–1.32)
ISTAT IONIZED CALCIUM: 1.2 MMOL/L (ref 1.12–1.32)
ISTAT POTASSIUM: 3.3 MMOL/L (ref 3.6–5.1)
ISTAT POTASSIUM: 3.7 MMOL/L (ref 3.6–5.1)
ISTAT SODIUM: 139 MMOL/L (ref 136–145)
ISTAT SODIUM: 140 MMOL/L (ref 136–145)

## 2021-03-22 PROCEDURE — 99252 IP/OBS CONSLTJ NEW/EST SF 35: CPT | Performed by: STUDENT IN AN ORGANIZED HEALTH CARE EDUCATION/TRAINING PROGRAM

## 2021-03-22 PROCEDURE — 0RG20K0 FUSION OF 2 OR MORE CERVICAL VERTEBRAL JOINTS WITH NONAUTOLOGOUS TISSUE SUBSTITUTE, ANTERIOR APPROACH, ANTERIOR COLUMN, OPEN APPROACH: ICD-10-PCS | Performed by: NEUROLOGICAL SURGERY

## 2021-03-22 PROCEDURE — 01N10ZZ RELEASE CERVICAL NERVE, OPEN APPROACH: ICD-10-PCS | Performed by: NEUROLOGICAL SURGERY

## 2021-03-22 PROCEDURE — 76942 ECHO GUIDE FOR BIOPSY: CPT | Performed by: INTERNAL MEDICINE

## 2021-03-22 PROCEDURE — P9045 ALBUMIN (HUMAN), 5%, 250 ML: HCPCS | Performed by: INTERNAL MEDICINE

## 2021-03-22 PROCEDURE — 3078F DIAST BP <80 MM HG: CPT | Performed by: STUDENT IN AN ORGANIZED HEALTH CARE EDUCATION/TRAINING PROGRAM

## 2021-03-22 PROCEDURE — 76000 FLUOROSCOPY <1 HR PHYS/QHP: CPT | Performed by: NEUROLOGICAL SURGERY

## 2021-03-22 PROCEDURE — 3074F SYST BP LT 130 MM HG: CPT | Performed by: STUDENT IN AN ORGANIZED HEALTH CARE EDUCATION/TRAINING PROGRAM

## 2021-03-22 PROCEDURE — 3008F BODY MASS INDEX DOCD: CPT | Performed by: STUDENT IN AN ORGANIZED HEALTH CARE EDUCATION/TRAINING PROGRAM

## 2021-03-22 PROCEDURE — S0028 INJECTION, FAMOTIDINE, 20 MG: HCPCS | Performed by: INTERNAL MEDICINE

## 2021-03-22 PROCEDURE — 0RB30ZZ EXCISION OF CERVICAL VERTEBRAL DISC, OPEN APPROACH: ICD-10-PCS | Performed by: NEUROLOGICAL SURGERY

## 2021-03-22 DEVICE — BONE CERV 5 LIFENET VG2C-T57: Type: IMPLANTABLE DEVICE | Site: NECK | Status: FUNCTIONAL

## 2021-03-22 DEVICE — PLATE 6190055 ANTERIOR CERVICAL 55MM
Type: IMPLANTABLE DEVICE | Site: NECK | Status: FUNCTIONAL
Brand: ATLANTIS® ANTERIOR CERVICAL PLATE SYSTEM

## 2021-03-22 RX ORDER — ACETAMINOPHEN 325 MG/1
650 TABLET ORAL EVERY 4 HOURS PRN
Status: DISCONTINUED | OUTPATIENT
Start: 2021-03-22 | End: 2021-03-24

## 2021-03-22 RX ORDER — AMLODIPINE BESYLATE 5 MG/1
5 TABLET ORAL DAILY
Status: DISCONTINUED | OUTPATIENT
Start: 2021-03-22 | End: 2021-03-24

## 2021-03-22 RX ORDER — DIPHENHYDRAMINE HYDROCHLORIDE 50 MG/ML
25 INJECTION INTRAMUSCULAR; INTRAVENOUS EVERY 4 HOURS PRN
Status: DISCONTINUED | OUTPATIENT
Start: 2021-03-22 | End: 2021-03-24

## 2021-03-22 RX ORDER — ALBUTEROL SULFATE 2.5 MG/3ML
SOLUTION RESPIRATORY (INHALATION)
Status: COMPLETED
Start: 2021-03-22 | End: 2021-03-22

## 2021-03-22 RX ORDER — DEXTROSE MONOHYDRATE 25 G/50ML
50 INJECTION, SOLUTION INTRAVENOUS
Status: DISCONTINUED | OUTPATIENT
Start: 2021-03-22 | End: 2021-03-22 | Stop reason: HOSPADM

## 2021-03-22 RX ORDER — METOPROLOL SUCCINATE 25 MG/1
25 TABLET, EXTENDED RELEASE ORAL DAILY
Status: DISCONTINUED | OUTPATIENT
Start: 2021-03-22 | End: 2021-03-24

## 2021-03-22 RX ORDER — PROCHLORPERAZINE EDISYLATE 5 MG/ML
10 INJECTION INTRAMUSCULAR; INTRAVENOUS EVERY 6 HOURS PRN
Status: DISCONTINUED | OUTPATIENT
Start: 2021-03-22 | End: 2021-03-24

## 2021-03-22 RX ORDER — HYDROMORPHONE HYDROCHLORIDE 1 MG/ML
0.8 INJECTION, SOLUTION INTRAMUSCULAR; INTRAVENOUS; SUBCUTANEOUS EVERY 2 HOUR PRN
Status: DISCONTINUED | OUTPATIENT
Start: 2021-03-22 | End: 2021-03-24

## 2021-03-22 RX ORDER — HYDROMORPHONE HYDROCHLORIDE 1 MG/ML
0.4 INJECTION, SOLUTION INTRAMUSCULAR; INTRAVENOUS; SUBCUTANEOUS EVERY 5 MIN PRN
Status: DISCONTINUED | OUTPATIENT
Start: 2021-03-22 | End: 2021-03-22 | Stop reason: HOSPADM

## 2021-03-22 RX ORDER — LIDOCAINE HYDROCHLORIDE 10 MG/ML
INJECTION, SOLUTION EPIDURAL; INFILTRATION; INTRACAUDAL; PERINEURAL AS NEEDED
Status: DISCONTINUED | OUTPATIENT
Start: 2021-03-22 | End: 2021-03-22 | Stop reason: SURG

## 2021-03-22 RX ORDER — DIPHENHYDRAMINE HCL 25 MG
25 CAPSULE ORAL EVERY 4 HOURS PRN
Status: DISCONTINUED | OUTPATIENT
Start: 2021-03-22 | End: 2021-03-24

## 2021-03-22 RX ORDER — INSULIN ASPART 100 [IU]/ML
3 INJECTION, SOLUTION INTRAVENOUS; SUBCUTANEOUS ONCE
Status: COMPLETED | OUTPATIENT
Start: 2021-03-22 | End: 2021-03-22

## 2021-03-22 RX ORDER — SODIUM PHOSPHATE, DIBASIC AND SODIUM PHOSPHATE, MONOBASIC 7; 19 G/133ML; G/133ML
1 ENEMA RECTAL ONCE AS NEEDED
Status: DISCONTINUED | OUTPATIENT
Start: 2021-03-22 | End: 2021-03-24

## 2021-03-22 RX ORDER — CEFAZOLIN SODIUM/WATER 2 G/20 ML
2 SYRINGE (ML) INTRAVENOUS EVERY 8 HOURS
Status: COMPLETED | OUTPATIENT
Start: 2021-03-22 | End: 2021-03-23

## 2021-03-22 RX ORDER — HYDROCODONE BITARTRATE AND ACETAMINOPHEN 10; 325 MG/1; MG/1
2 TABLET ORAL EVERY 4 HOURS PRN
Status: DISCONTINUED | OUTPATIENT
Start: 2021-03-22 | End: 2021-03-24

## 2021-03-22 RX ORDER — HYDROCODONE BITARTRATE AND ACETAMINOPHEN 10; 325 MG/1; MG/1
1 TABLET ORAL EVERY 4 HOURS PRN
Status: DISCONTINUED | OUTPATIENT
Start: 2021-03-22 | End: 2021-03-24

## 2021-03-22 RX ORDER — HYDROMORPHONE HYDROCHLORIDE 1 MG/ML
INJECTION, SOLUTION INTRAMUSCULAR; INTRAVENOUS; SUBCUTANEOUS
Status: COMPLETED
Start: 2021-03-22 | End: 2021-03-22

## 2021-03-22 RX ORDER — ALBUTEROL SULFATE 90 UG/1
2 AEROSOL, METERED RESPIRATORY (INHALATION) EVERY 6 HOURS PRN
Status: DISCONTINUED | OUTPATIENT
Start: 2021-03-22 | End: 2021-03-24

## 2021-03-22 RX ORDER — BACITRACIN 50000 [USP'U]/1
INJECTION, POWDER, LYOPHILIZED, FOR SOLUTION INTRAMUSCULAR AS NEEDED
Status: DISCONTINUED | OUTPATIENT
Start: 2021-03-22 | End: 2021-03-22 | Stop reason: HOSPADM

## 2021-03-22 RX ORDER — ATORVASTATIN CALCIUM 40 MG/1
80 TABLET, FILM COATED ORAL NIGHTLY
Status: DISCONTINUED | OUTPATIENT
Start: 2021-03-22 | End: 2021-03-24

## 2021-03-22 RX ORDER — ONDANSETRON 2 MG/ML
INJECTION INTRAMUSCULAR; INTRAVENOUS AS NEEDED
Status: DISCONTINUED | OUTPATIENT
Start: 2021-03-22 | End: 2021-03-22 | Stop reason: SURG

## 2021-03-22 RX ORDER — POLYETHYLENE GLYCOL 3350 17 G/17G
17 POWDER, FOR SOLUTION ORAL DAILY PRN
Status: DISCONTINUED | OUTPATIENT
Start: 2021-03-22 | End: 2021-03-24

## 2021-03-22 RX ORDER — DIPHENHYDRAMINE HYDROCHLORIDE 50 MG/ML
INJECTION INTRAMUSCULAR; INTRAVENOUS AS NEEDED
Status: DISCONTINUED | OUTPATIENT
Start: 2021-03-22 | End: 2021-03-22 | Stop reason: SURG

## 2021-03-22 RX ORDER — DEXTROSE MONOHYDRATE 25 G/50ML
50 INJECTION, SOLUTION INTRAVENOUS
Status: DISCONTINUED | OUTPATIENT
Start: 2021-03-22 | End: 2021-03-24

## 2021-03-22 RX ORDER — SODIUM CHLORIDE, SODIUM LACTATE, POTASSIUM CHLORIDE, CALCIUM CHLORIDE 600; 310; 30; 20 MG/100ML; MG/100ML; MG/100ML; MG/100ML
INJECTION, SOLUTION INTRAVENOUS CONTINUOUS
Status: DISCONTINUED | OUTPATIENT
Start: 2021-03-22 | End: 2021-03-22 | Stop reason: HOSPADM

## 2021-03-22 RX ORDER — ROCURONIUM BROMIDE 10 MG/ML
INJECTION, SOLUTION INTRAVENOUS AS NEEDED
Status: DISCONTINUED | OUTPATIENT
Start: 2021-03-22 | End: 2021-03-22 | Stop reason: SURG

## 2021-03-22 RX ORDER — INSULIN ASPART 100 [IU]/ML
INJECTION, SOLUTION INTRAVENOUS; SUBCUTANEOUS ONCE
Status: COMPLETED | OUTPATIENT
Start: 2021-03-22 | End: 2021-03-22

## 2021-03-22 RX ORDER — DIPHENHYDRAMINE HCL 25 MG
25 CAPSULE ORAL EVERY 4 HOURS PRN
Status: DISCONTINUED | OUTPATIENT
Start: 2021-03-22 | End: 2021-03-22

## 2021-03-22 RX ORDER — CEFAZOLIN SODIUM/WATER 2 G/20 ML
2 SYRINGE (ML) INTRAVENOUS ONCE
Status: COMPLETED | OUTPATIENT
Start: 2021-03-22 | End: 2021-03-22

## 2021-03-22 RX ORDER — GLIPIZIDE 5 MG/1
10 TABLET ORAL
Status: DISCONTINUED | OUTPATIENT
Start: 2021-03-23 | End: 2021-03-22

## 2021-03-22 RX ORDER — DOCUSATE SODIUM 100 MG/1
100 CAPSULE, LIQUID FILLED ORAL 2 TIMES DAILY
Status: DISCONTINUED | OUTPATIENT
Start: 2021-03-22 | End: 2021-03-24

## 2021-03-22 RX ORDER — SODIUM CHLORIDE 9 MG/ML
INJECTION, SOLUTION INTRAVENOUS CONTINUOUS PRN
Status: DISCONTINUED | OUTPATIENT
Start: 2021-03-22 | End: 2021-03-22 | Stop reason: SURG

## 2021-03-22 RX ORDER — PHENYLEPHRINE HCL 10 MG/ML
VIAL (ML) INJECTION AS NEEDED
Status: DISCONTINUED | OUTPATIENT
Start: 2021-03-22 | End: 2021-03-22 | Stop reason: SURG

## 2021-03-22 RX ORDER — SENNOSIDES 8.6 MG
17.2 TABLET ORAL NIGHTLY
Status: DISCONTINUED | OUTPATIENT
Start: 2021-03-22 | End: 2021-03-24

## 2021-03-22 RX ORDER — METOPROLOL TARTRATE 5 MG/5ML
INJECTION INTRAVENOUS AS NEEDED
Status: DISCONTINUED | OUTPATIENT
Start: 2021-03-22 | End: 2021-03-22 | Stop reason: SURG

## 2021-03-22 RX ORDER — FAMOTIDINE 10 MG/ML
INJECTION, SOLUTION INTRAVENOUS AS NEEDED
Status: DISCONTINUED | OUTPATIENT
Start: 2021-03-22 | End: 2021-03-22 | Stop reason: SURG

## 2021-03-22 RX ORDER — MIDAZOLAM HYDROCHLORIDE 1 MG/ML
INJECTION INTRAMUSCULAR; INTRAVENOUS AS NEEDED
Status: DISCONTINUED | OUTPATIENT
Start: 2021-03-22 | End: 2021-03-22 | Stop reason: SURG

## 2021-03-22 RX ORDER — DEXAMETHASONE SODIUM PHOSPHATE 4 MG/ML
VIAL (ML) INJECTION AS NEEDED
Status: DISCONTINUED | OUTPATIENT
Start: 2021-03-22 | End: 2021-03-22 | Stop reason: SURG

## 2021-03-22 RX ORDER — ONDANSETRON 2 MG/ML
4 INJECTION INTRAMUSCULAR; INTRAVENOUS AS NEEDED
Status: DISCONTINUED | OUTPATIENT
Start: 2021-03-22 | End: 2021-03-22 | Stop reason: HOSPADM

## 2021-03-22 RX ORDER — HYDROMORPHONE HYDROCHLORIDE 1 MG/ML
0.2 INJECTION, SOLUTION INTRAMUSCULAR; INTRAVENOUS; SUBCUTANEOUS EVERY 2 HOUR PRN
Status: DISCONTINUED | OUTPATIENT
Start: 2021-03-22 | End: 2021-03-24

## 2021-03-22 RX ORDER — NALOXONE HYDROCHLORIDE 0.4 MG/ML
80 INJECTION, SOLUTION INTRAMUSCULAR; INTRAVENOUS; SUBCUTANEOUS AS NEEDED
Status: DISCONTINUED | OUTPATIENT
Start: 2021-03-22 | End: 2021-03-22 | Stop reason: HOSPADM

## 2021-03-22 RX ORDER — ALBUTEROL SULFATE 2.5 MG/3ML
2.5 SOLUTION RESPIRATORY (INHALATION) AS NEEDED
Status: DISCONTINUED | OUTPATIENT
Start: 2021-03-22 | End: 2021-03-22 | Stop reason: HOSPADM

## 2021-03-22 RX ORDER — SODIUM CHLORIDE AND POTASSIUM CHLORIDE .9; .15 G/100ML; G/100ML
75 SOLUTION INTRAVENOUS CONTINUOUS
Status: DISCONTINUED | OUTPATIENT
Start: 2021-03-22 | End: 2021-03-24

## 2021-03-22 RX ORDER — BISACODYL 10 MG
10 SUPPOSITORY, RECTAL RECTAL
Status: DISCONTINUED | OUTPATIENT
Start: 2021-03-22 | End: 2021-03-24

## 2021-03-22 RX ORDER — CYCLOBENZAPRINE HCL 10 MG
10 TABLET ORAL 3 TIMES DAILY PRN
Status: DISCONTINUED | OUTPATIENT
Start: 2021-03-22 | End: 2021-03-24

## 2021-03-22 RX ORDER — PREGABALIN 100 MG/1
100 CAPSULE ORAL 2 TIMES DAILY
Status: DISCONTINUED | OUTPATIENT
Start: 2021-03-22 | End: 2021-03-24

## 2021-03-22 RX ORDER — ALBUMIN, HUMAN INJ 5% 5 %
SOLUTION INTRAVENOUS CONTINUOUS PRN
Status: DISCONTINUED | OUTPATIENT
Start: 2021-03-22 | End: 2021-03-22 | Stop reason: SURG

## 2021-03-22 RX ORDER — ONDANSETRON 2 MG/ML
4 INJECTION INTRAMUSCULAR; INTRAVENOUS EVERY 4 HOURS PRN
Status: ACTIVE | OUTPATIENT
Start: 2021-03-22 | End: 2021-03-23

## 2021-03-22 RX ORDER — HYDROMORPHONE HYDROCHLORIDE 1 MG/ML
0.4 INJECTION, SOLUTION INTRAMUSCULAR; INTRAVENOUS; SUBCUTANEOUS EVERY 2 HOUR PRN
Status: DISCONTINUED | OUTPATIENT
Start: 2021-03-22 | End: 2021-03-24

## 2021-03-22 RX ORDER — CEFAZOLIN SODIUM 1 G/3ML
INJECTION, POWDER, FOR SOLUTION INTRAMUSCULAR; INTRAVENOUS AS NEEDED
Status: DISCONTINUED | OUTPATIENT
Start: 2021-03-22 | End: 2021-03-22 | Stop reason: SURG

## 2021-03-22 RX ORDER — ACETAMINOPHEN 500 MG
1000 TABLET ORAL ONCE
Status: DISCONTINUED | OUTPATIENT
Start: 2021-03-22 | End: 2021-03-22

## 2021-03-22 RX ORDER — INSULIN ASPART 100 [IU]/ML
INJECTION, SOLUTION INTRAVENOUS; SUBCUTANEOUS
Status: COMPLETED
Start: 2021-03-22 | End: 2021-03-22

## 2021-03-22 RX ADMIN — SODIUM CHLORIDE: 9 INJECTION, SOLUTION INTRAVENOUS at 18:35:00

## 2021-03-22 RX ADMIN — ROCURONIUM BROMIDE 10 MG: 10 INJECTION, SOLUTION INTRAVENOUS at 13:28:00

## 2021-03-22 RX ADMIN — ROCURONIUM BROMIDE 10 MG: 10 INJECTION, SOLUTION INTRAVENOUS at 17:05:00

## 2021-03-22 RX ADMIN — MIDAZOLAM HYDROCHLORIDE 1 MG: 1 INJECTION INTRAMUSCULAR; INTRAVENOUS at 13:21:00

## 2021-03-22 RX ADMIN — PHENYLEPHRINE HCL 100 MCG: 10 MG/ML VIAL (ML) INJECTION at 14:45:00

## 2021-03-22 RX ADMIN — FAMOTIDINE 20 MG: 10 INJECTION, SOLUTION INTRAVENOUS at 12:29:00

## 2021-03-22 RX ADMIN — DIPHENHYDRAMINE HYDROCHLORIDE 50 MG: 50 INJECTION INTRAMUSCULAR; INTRAVENOUS at 12:29:00

## 2021-03-22 RX ADMIN — METOPROLOL TARTRATE 1 MG: 5 INJECTION INTRAVENOUS at 16:52:00

## 2021-03-22 RX ADMIN — PHENYLEPHRINE HCL 200 MCG: 10 MG/ML VIAL (ML) INJECTION at 12:12:00

## 2021-03-22 RX ADMIN — ROCURONIUM BROMIDE 10 MG: 10 INJECTION, SOLUTION INTRAVENOUS at 17:31:00

## 2021-03-22 RX ADMIN — MIDAZOLAM HYDROCHLORIDE 2 MG: 1 INJECTION INTRAMUSCULAR; INTRAVENOUS at 11:28:00

## 2021-03-22 RX ADMIN — LIDOCAINE HYDROCHLORIDE 50 MG: 10 INJECTION, SOLUTION EPIDURAL; INFILTRATION; INTRACAUDAL; PERINEURAL at 11:30:00

## 2021-03-22 RX ADMIN — DEXAMETHASONE SODIUM PHOSPHATE 4 MG: 4 MG/ML VIAL (ML) INJECTION at 11:38:00

## 2021-03-22 RX ADMIN — CEFAZOLIN SODIUM 2 G: 1 INJECTION, POWDER, FOR SOLUTION INTRAMUSCULAR; INTRAVENOUS at 15:55:00

## 2021-03-22 RX ADMIN — PHENYLEPHRINE HCL 100 MCG: 10 MG/ML VIAL (ML) INJECTION at 11:59:00

## 2021-03-22 RX ADMIN — PHENYLEPHRINE HCL 100 MCG: 10 MG/ML VIAL (ML) INJECTION at 13:52:00

## 2021-03-22 RX ADMIN — ONDANSETRON 4 MG: 2 INJECTION INTRAMUSCULAR; INTRAVENOUS at 18:14:00

## 2021-03-22 RX ADMIN — MIDAZOLAM HYDROCHLORIDE 1 MG: 1 INJECTION INTRAMUSCULAR; INTRAVENOUS at 15:00:00

## 2021-03-22 RX ADMIN — ROCURONIUM BROMIDE 10 MG: 10 INJECTION, SOLUTION INTRAVENOUS at 15:40:00

## 2021-03-22 RX ADMIN — ALBUMIN, HUMAN INJ 5%: 5 SOLUTION INTRAVENOUS at 12:32:00

## 2021-03-22 RX ADMIN — PHENYLEPHRINE HCL 100 MCG: 10 MG/ML VIAL (ML) INJECTION at 12:04:00

## 2021-03-22 RX ADMIN — ROCURONIUM BROMIDE 10 MG: 10 INJECTION, SOLUTION INTRAVENOUS at 13:50:00

## 2021-03-22 RX ADMIN — ALBUMIN, HUMAN INJ 5%: 5 SOLUTION INTRAVENOUS at 16:03:00

## 2021-03-22 RX ADMIN — SODIUM CHLORIDE: 9 INJECTION, SOLUTION INTRAVENOUS at 11:26:00

## 2021-03-22 RX ADMIN — CEFAZOLIN SODIUM/WATER 2 G: 2 G/20 ML SYRINGE (ML) INTRAVENOUS at 11:55:00

## 2021-03-22 RX ADMIN — ROCURONIUM BROMIDE 60 MG: 10 INJECTION, SOLUTION INTRAVENOUS at 11:33:00

## 2021-03-22 RX ADMIN — SODIUM CHLORIDE: 9 INJECTION, SOLUTION INTRAVENOUS at 12:47:00

## 2021-03-22 RX ADMIN — ROCURONIUM BROMIDE 10 MG: 10 INJECTION, SOLUTION INTRAVENOUS at 12:36:00

## 2021-03-22 NOTE — ANESTHESIA PROCEDURE NOTES
Airway  Date/Time: 3/22/2021 11:35 AM  Urgency: elective      General Information and Staff    Patient location during procedure: OR  Anesthesiologist: Ashley Vazquez MD  Performed: anesthesiologist     Indications and Patient Condition  Indications for a

## 2021-03-22 NOTE — ANESTHESIA POSTPROCEDURE EVALUATION
[address] Patient Status:  Inpatient   Age/Gender 62year old female MRN [id number]   Location [address] [facility] Attending Reji Castellanos MD   Hosp Day # 0 PCP Elyssa Interiano MD       Anesthesia Post-op

## 2021-03-22 NOTE — H&P
HISTORY OF PRESENT ILLNESS:Di Grayson is a 62year old female here for spinal evaluation. She has a complicated history of developing left arm and leg weakness in 2019 they thought she had a stroke or TIA.   She continued to have symptoms she had 46) in her father; Cataracts in her niece; Diabetes in her brother, brother, mother, and sister; Glaucoma in her sister; Heart Disease in her maternal grandmother and mother; Lung Disorder in her mother;  Thyroid Disorder in her sister.     SOCIAL HISTORY:    Reflexes DTRs:      Biceps   Brachioradialis   Triceps    Patellar    Ankle  Hamstring   Right      1+           1+       1+        1+       1+           Left      1+           1+       1+        1+       1+                IMAGING:  MRI of the cervica flexion-extension  Orders were placed for a C4-7 ACDF         Pt seen and examined. No changes since last visit. All questions answered. Pt ready for surgery.

## 2021-03-22 NOTE — BRIEF OP NOTE
Pre-Operative Diagnosis: Cervical myelopathy with cervical radiculopathy [M47.12]  Cervical stenosis of spinal canal [M48.02]  Weakness of both lower extremities [R29.898]  Weakness of both upper extremities [R29.898]     Post-Operative Diagnosis: Cervical

## 2021-03-22 NOTE — ANESTHESIA PREPROCEDURE EVALUATION
PRE-OP EVALUATION    Patient Name: Marycarmen Hurt    Admit Diagnosis: Cervical myelopathy with cervical radiculopathy [M47.12]  Cervical stenosis of spinal canal [M48.02]  Weakness of both lower extremities [R29.898]  Weakness of both upper extremitie tablet, Rfl: 0, 3/15/2021 at Unknown time  amLODIPine Besylate 5 MG Oral Tab, Take 1 tablet (5 mg total) by mouth As Directed., Disp: 90 tablet, Rfl: 1, 3/21/2021 at 0900  Metoprolol Succinate ER 25 MG Oral Tablet 24 Hr, Take 1 tablet (25 mg total) by mout Ace Inhibitors, Fish-Derived Products, Peanuts, and Tomato      Anesthesia Evaluation    Patient summary reviewed.     Anesthetic Complications  (-) history of anesthetic complications         GI/Hepatic/Renal                                 Cardiovascular 108 03/09/2021    CO2 25.0 03/09/2021    BUN 10 03/09/2021    CREATSERUM 0.70 03/09/2021     (H) 03/09/2021    CA 9.1 03/09/2021     Lab Results   Component Value Date    INR 1.05 03/18/2021         Airway      Mallampati: III  Mouth opening: 3 FB

## 2021-03-22 NOTE — ANESTHESIA PROCEDURE NOTES
Arterial Line  Performed by: Tereza Dejesus MD  Authorized by: Tereza Dejesus MD     General Information and Staff    Procedure Start:  3/22/2021 11:36 AM  Procedure End:  3/22/2021 11:37 AM  Anesthesiologist:  Tereza Dejesus MD  Performed By:  Raj Faye

## 2021-03-23 ENCOUNTER — APPOINTMENT (OUTPATIENT)
Dept: GENERAL RADIOLOGY | Facility: HOSPITAL | Age: 61
DRG: 472 | End: 2021-03-23
Attending: PHYSICIAN ASSISTANT
Payer: MEDICAID

## 2021-03-23 LAB
GLUCOSE BLD-MCNC: 179 MG/DL (ref 70–99)
GLUCOSE BLD-MCNC: 226 MG/DL (ref 70–99)
GLUCOSE BLD-MCNC: 322 MG/DL (ref 70–99)
GLUCOSE BLD-MCNC: 334 MG/DL (ref 70–99)

## 2021-03-23 PROCEDURE — 99232 SBSQ HOSP IP/OBS MODERATE 35: CPT | Performed by: HOSPITALIST

## 2021-03-23 PROCEDURE — 99024 POSTOP FOLLOW-UP VISIT: CPT | Performed by: NEUROLOGICAL SURGERY

## 2021-03-23 PROCEDURE — 72050 X-RAY EXAM NECK SPINE 4/5VWS: CPT | Performed by: PHYSICIAN ASSISTANT

## 2021-03-23 RX ORDER — DEXAMETHASONE SODIUM PHOSPHATE 4 MG/ML
4 VIAL (ML) INJECTION EVERY 6 HOURS
Status: COMPLETED | OUTPATIENT
Start: 2021-03-23 | End: 2021-03-23

## 2021-03-23 RX ORDER — DEXAMETHASONE SODIUM PHOSPHATE 4 MG/ML
10 VIAL (ML) INJECTION ONCE
Status: COMPLETED | OUTPATIENT
Start: 2021-03-23 | End: 2021-03-23

## 2021-03-23 NOTE — PROGRESS NOTES
[facility]  Neurosurgery Progress Note    Ronnie Stoner Patient Status:  Inpatient    3/2/1960 MRN [id number]   [facility] 3SW-A Attending Manish Varma MD   Hosp Day # 1 PCP Jadyn Díaz MD     Chief Complaint:  Cervical mye as written above with any additions or corrections by me in bold font. Juancho Ventura.  Renny Lombardi, [address] [place]  Neurological Surgery    Co-Director  [facility]  [address]

## 2021-03-23 NOTE — PROGRESS NOTES
Patient sitting up in chair. Reviewed swallowing precautions. Reviewed indications, side effects of pain medication/narcotics and constipation prevention.  Stressed importance of increased fluids/roughage in diet, continued use stool softeners along with la

## 2021-03-23 NOTE — PROGRESS NOTES
NURSING ADMISSION NOTE      Report received from PACU RN. PT S/P C4-7 ACDF. Patient admitted via bed. Oriented to room. Safety precautions initiated. Bed in low position. Call light in reach.

## 2021-03-23 NOTE — PLAN OF CARE
Recd SLIGHTLY DROWSY, OCC COUGHING. C/O SORE THROAT. C/O DIFFICULTY SWALLOWING REFUSED PILL CRUSH. APN DR Lucrecia Humphries NOTIFIED RE; ABIOVE SEE NEW ORDER. PT AWARE OF PLAN OF CARE.  AND INFORMED WILL HOLD PILLS AND FOOD UNTIL STEROID GIVEN PT ABLE TO FOLLOW INSTRUC

## 2021-03-23 NOTE — CONSULTS
[address] Patient Status:  Inpatient    3/2/1960 MRN [id number]   [facility] 3SW-A Attending Chiara Spivey MD   Hosp Day # 0 PCP Soco Werner MD     Reason for consult: MEdical Mgt     Request Mother    • Diabetes Sister    • Thyroid Disorder Sister    • Glaucoma Sister    • Heart Disease Maternal Grandmother    • Diabetes Brother    • Diabetes Brother    • Cataracts Niece    • Macular degeneration Neg     reviewed    Allergies:    Ace Inhibitors Disp: 1000 each, Rfl: 3  Blood Glucose Monitoring Suppl ([address]) W/DEVICE Does not apply Kit, Test sugar twice daily, Disp: 1 kit, Rfl: 0  OneTouch UltraSoft Lancets Does not apply Misc, Test sugar twice daily, Disp: 100 each, Rfl: 6  Diphen 4. Bowel care  2. Hypertension  1. Continue home meds  3. DM type 2  1. Hold orals  2. ISS  4. Obesity  5. HLD   6.  H/o COPD- stable    Quality:  · DVT Prophylaxis: SCD  · CODE status: full    Plan of care discussed with pt, RN    Flo Alvarez MD  3/22/

## 2021-03-23 NOTE — OCCUPATIONAL THERAPY NOTE
OCCUPATIONAL THERAPY EVALUATION - INPATIENT     Room Number: 839/054-Z  Evaluation Date: 3/23/2021  Type of Evaluation: Initial  Presenting Problem: s/p C4-C7 ACDF 3/22/21    Physician Order: Radha Clements Consult to Occupational Therapy  Reason for Therapy: ADL/IADL None    Occupation/Status: retired     Drives: Yes  Patient Regularly Uses: Glasses    Prior Level of Function: Patient reports independent in all BADL and functional mobility without device prior to admission.  Patient drives, but reports daughter performs Room [facility] at Rest: 87             [address] [facility] ‘6-Clicks’ Inpatient Daily Activity Short Form  How much help from another person does the patient currently need…  -   Putting on and taking off regular lower body clothing?: A benefit from further reinforcement as well as education on donning/doffing; education on incorporation of neck protection principles into safe electronics use, grooming, hair washing, IADLs.  Patient also educated on OT role, safety, fall prevention, pain m OT Discharge Recommendations: Home (family assist)  OT Device Recommendations: TBD    PLAN  OT Treatment Plan: Balance activities; ADL training;Functional transfer training; Endurance training;Patient/Family education;Patient/Family training; Compensatory

## 2021-03-23 NOTE — PROGRESS NOTES
[facility]  Progress Note     Khushi Codding Patient Status:  Inpatient    3/2/1960 MRN [id number]   [facility] 3SW-A Attending Татьяна Melgoza MD   Hosp Day # 1 PCP Juan Navarro MD     Chief Complaint: follow up medical loni Metoprolol Succinate ER  25 mg Oral Daily   • pregabalin  100 mg Oral BID   • Senna  17.2 mg Oral Nightly   • docusate sodium  100 mg Oral BID   • Insulin Aspart Pen  1-5 Units Subcutaneous TID CC and HS       ASSESSMENT / PLAN:     1.  Cervical disc dx wit

## 2021-03-23 NOTE — PAYOR COMM NOTE
--------------  CONTINUED STAY REVIEW    Payor: Devon Alonso #:  [id number]  Authorization Number: [id number]    Admit date: 3/22/21  Admit time:  8:24 AM    Admitting Physician: Brandi Kirk MD  Attending Phys Given 2.5 mg Nebulization Jose Cisneros RN    3/22/2021 1919 Given 2.5 mg Nebulization Jose Cisneros RN      amLODIPine Besylate (NORVASC) tab 5 mg     Date Action Dose Route User    3/22/2021 2258 Given 5 mg Oral Asael Guajardo RN mg Intravenous Maureen Dodge RN    3/22/2021 1944 Given 0.4 mg Intravenous Maureen Echo [facility]    3/22/2021 1933 Given 0.4 mg Intravenous Maureen MARS RN    3/22/2021 1923 Given 0.4 mg Intravenous Maureen Dodge RN      ins Ayden Quezada MD      phenylephrine HCl (VALENTINA-SYNEPHRINE) 10 MG/ML injection     Date Action Dose Route User    3/22/2021 1445 Given 100 mcg Intravenous Alonzo Graves MD    3/22/2021 1352 Given 100 mcg Intravenous Alonzo Graves MD      0.9 % NaCl with KCl 20 m

## 2021-03-23 NOTE — PHYSICAL THERAPY NOTE
PHYSICAL THERAPY SPINE EVALUATION - INPATIENT     Room Number: 645/240-O  Evaluation Date: 3/23/2021  Type of Evaluation: Initial  Physician Order: PT Eval and Treat    Presenting Problem: s/p C4-C7 ACDF on 3/22/21  Reason for Therapy: Mobility Dysfunction Layout: Two level; Able to live on main level  Stairs to Enter : 5  Railing:  (Yes rail w/back steps; No rail in front)  Stairs to Bedroom: 0       Lives With: Daughter  Drives: Yes  Patient Owned Equipment: None       Prior Level of [place]:  Mod indep wheelchair, bedside commode, etc.): A Little   -   Moving from lying on back to sitting on the side of the bed?: None   How much help from another person does the patient currently need. ..   -   Moving to and from a bed to a chair (including a wheelchair)? performance. Updated mobility info sheet on bathroom door. Patient End of Session: Up in chair;Needs met;Call light within reach;RN aware of session/findings; All patient questions and concerns addressed;SCDs in place; Alarm set    ASSESSMENT     Patient demonstrate transfers Sit to/from Stand at assistance level: supervison     Goal #3    Patient is able to ambulate 150 feet with assistive device at assistance level: modified independent    Goal #4    Patient will negotiate 4 stairs/one curb w/ assistive

## 2021-03-23 NOTE — CM/SW NOTE
MSW received a call from the patient's daughter Sallie asking about how to get a shower chair. MSW informed her that bathroom equipment is not rented due to hygenic purposes. She will purchase one from the local pharmacy.   PT is recommending home for the

## 2021-03-23 NOTE — PLAN OF CARE
PT arrived from PACU s/p ACDF C4-7. Drowsy, oriented x4. VSS on 2LO2. Sensation intact, weakness noted in L hand  and L foot dorsi/plantar flexion. Regular diet, no nausea or vomiting. Pain managed with prn po pain meds. Voiding via flanagan.  Deming collar

## 2021-03-23 NOTE — OPERATIVE REPORT
[facility]    OPERATIVE REPORT    Patient:  Lily Phillips;  YOB: 1960     CSN:  [id number]; Medical Record Number:  [id number]    Admission Date:  3/22/2021 Operation Date:  3/22/2021    . ..........................     Operating Phys stenosis and moderate central stenosis, C4-5 moderate central and severe left foraminal stenosis.     Procedure:  After induction of adequate endotracheal anesthesia in the supine position on the operating room table the patient had sequential compression b edges and removed with Leksell rongeur at all 3 levels. The discectomy was initiated with incision of the anterior longitudinal ligament, anterior disc annulus, and disc endplate at its attachment to the vertebral bodies down to mid vertebral body level. with a right angled bipolar forceps. The surgical assistant provided continuous irrigation during cautery. The epidural veins were then butterflied open with a 1 mm Kerrison rongeur to allow the nerve root dura to expand further into the foramen.   The dura placed: C4 4.0 x 15 mm, C5, C6, 4.0 x 14 mm, C7, 4.0 x 13 mm. Once an acceptable position on imaging and by visual inspection was accomplished the closure was initiated.     The closure included generous irrigation down the lateral aspects of the bone graft

## 2021-03-24 VITALS
WEIGHT: 202.81 LBS | RESPIRATION RATE: 17 BRPM | TEMPERATURE: 99 F | OXYGEN SATURATION: 91 % | SYSTOLIC BLOOD PRESSURE: 120 MMHG | HEART RATE: 79 BPM | HEIGHT: 66 IN | DIASTOLIC BLOOD PRESSURE: 61 MMHG | BODY MASS INDEX: 32.59 KG/M2

## 2021-03-24 LAB
ATRIAL RATE: 81 BPM
GLUCOSE BLD-MCNC: 208 MG/DL (ref 70–99)
GLUCOSE BLD-MCNC: 212 MG/DL (ref 70–99)
P AXIS: 53 DEGREES
P-R INTERVAL: 158 MS
Q-T INTERVAL: 410 MS
QRS DURATION: 88 MS
QTC CALCULATION (BEZET): 476 MS
R AXIS: -1 DEGREES
T AXIS: 69 DEGREES
VENTRICULAR RATE: 81 BPM

## 2021-03-24 PROCEDURE — 99024 POSTOP FOLLOW-UP VISIT: CPT | Performed by: NEUROLOGICAL SURGERY

## 2021-03-24 PROCEDURE — 99232 SBSQ HOSP IP/OBS MODERATE 35: CPT | Performed by: HOSPITALIST

## 2021-03-24 RX ORDER — HYDROCODONE BITARTRATE AND ACETAMINOPHEN 10; 325 MG/1; MG/1
1-2 TABLET ORAL EVERY 4 HOURS PRN
Qty: 60 TABLET | Refills: 0 | Status: SHIPPED | OUTPATIENT
Start: 2021-03-24 | End: 2021-09-23

## 2021-03-24 RX ORDER — CYCLOBENZAPRINE HCL 10 MG
10 TABLET ORAL 3 TIMES DAILY PRN
Qty: 45 TABLET | Refills: 0 | Status: SHIPPED | OUTPATIENT
Start: 2021-03-24

## 2021-03-24 NOTE — PLAN OF CARE
A&O x4. VSS. 2L O2 via nc prn, . Moderate neck pain controlled with norco and flexeril prn. Ambulating with min assist. Voids freely, up to bathroom with walker. Aspen collar on and aligned. Anterior neck coverlet dressing C/D/I.  Pt reporting some diffi

## 2021-03-24 NOTE — OCCUPATIONAL THERAPY NOTE
OCCUPATIONAL THERAPY TREATMENT NOTE - INPATIENT     Room Number: 627/231-X  Session: 1   Number of Visits to Meet Established Goals: 2    Presenting Problem: s/p C4-C7 ACDF 3/22/21        Problem List  Active Problems:    Type 2 diabetes mellitus without c Little  -   Bathing (including washing, rinsing, drying)?: A Little  -   Toileting, which includes using toilet, bedpan or urinal? : A Little  -   Putting on and taking off regular upper body clothing?: A Little  -   Taking care of personal grooming such a education  Rehab Potential : Good  Frequency (Obs): 3-5x/week      OT Goals:  All goals met 3/24  ADL GOALS   Patient will perform Lower Body Dressing with supervision  Patient will perform Toileting with supervision     FUNCTIONAL TRANSFER GOALS   Patient

## 2021-03-24 NOTE — CM/SW NOTE
[facility] has accepted pt for shower chair for home.     Kathy Olmstead LCSW  /Discharge Planner  [phone]

## 2021-03-24 NOTE — PROGRESS NOTES
[facility]  Neurosurgery Progress Note    Wandy Connor Patient Status:  Inpatient    3/2/1960 MRN [id number]   [facility] 3SW-A Attending Neptali Sneed MD   Hosp Day # 2 PCP Rabia Varela MD     Chief Complaint:  Cervical mye Pain medications as ordered      MAI Coffey Cea  [facility]  3/24/2021, 10:04 AM      Neurosurgery Attending    Patient was interviewed and examined by me with RENAN Coffey Cea acting as scribe.   I agree with the note as audie

## 2021-03-24 NOTE — CM/SW NOTE
03/24/21 0900   CM/SW Referral Data   Referral Source Physician   Reason for Referral Discharge planning   Informant Patient;Friend   Patient Info   Patient's Mental Status Alert;Oriented       Sw met with pt to discuss dc planning.  Pt is 63 yo female,

## 2021-03-24 NOTE — PLAN OF CARE
RECD ALERT ,AWAKE, ORIENTED. UP TO CHAIR, EATING BF. DENIES DIFFICULTY SWALLOWING. DENIES SOB OR CP . CALL LIGHT W/IN REACH. TO CALL FOR ALL NEEDS AND ASSISTANCE.  ENC USE IS X10 /HR WA.

## 2021-03-24 NOTE — PROGRESS NOTES
Saw patient earlier and she stated it was difficult to swallow eggs and pancakes. Swallowing precautions reviewed including dietary choices. Now she is eating lunch and tolerating chicken noodle soup and mac/cheese without coughing or difficulty.  Reviewed

## 2021-03-24 NOTE — PHYSICAL THERAPY NOTE
PHYSICAL THERAPY SPINE TREATMENT NOTE - INPATIENT      Room Number: 450/735-W     Session: 1  Number of Visits to Meet Established Goals: 2    Presenting Problem: s/p C4-C7 ACDF on 3/22/21    Problem List  Active Problems:    Type 2 diabetes mellitus witho MOBILITY  How much difficulty does the patient currently have. ..  -   Turning over in bed (including adjusting bedclothes, sheets and blankets)?: None   -   Sitting down on and standing up from a chair with arms (e.g., wheelchair, bedside commode, etc.): N concerns addressed;Bracing education provided; Alarm set    ASSESSMENT     Patient currently does not meet criteria for skilled inpatient physical therapy services, however patient will continue to benefit from QID ambulation with nursing staff.   Pt is here

## 2021-03-24 NOTE — DISCHARGE SUMMARY
[facility]  Discharge Summary    [facility] Patient Status:  Inpatient    3/2/1960 MRN [id number]   [facility] 3SW-A Attending Norm Louise MD   Hosp Day # 2 PCP Brian Howell MD     Date of Admission: 3/22/2021    Date of She has cervical pain which is a 7/10 gets up to an 8/10 is worse with activity.   She complains of left C6 radiculopathy with numbness and tingling she complains of weakness in her left shoulder her left arm and left hand that is been going on since 20 daily.  Qty: 30 tablet Refills: 0  Associated Diagnoses:Type 2 diabetes mellitus without retinopathy (HCC)    amLODIPine Besylate 5 MG Oral Tab  Take 1 tablet (5 mg total) by mouth As Directed.   Qty: 90 tablet Refills: 1    Metoprolol Succinate ER 25 MG Or appointments       Date & Time Appointment Department [facility])    Mar 24, 2021 11:00 AM CDT physical therapy with Lucrecia Trammell PTA [facility] Physical Therapy [facility])        Apr 06, 2021  1:00 PM CDT Post Op Visit wi

## 2021-03-25 ENCOUNTER — HOSPITAL ENCOUNTER (INPATIENT)
Facility: HOSPITAL | Age: 61
LOS: 3 days | Discharge: HOME | DRG: 176 | End: 2021-03-28
Attending: EMERGENCY MEDICINE | Admitting: INTERNAL MEDICINE
Payer: MEDICAID

## 2021-03-25 ENCOUNTER — APPOINTMENT (OUTPATIENT)
Dept: GENERAL RADIOLOGY | Facility: HOSPITAL | Age: 61
DRG: 176 | End: 2021-03-25
Attending: EMERGENCY MEDICINE
Payer: MEDICAID

## 2021-03-25 ENCOUNTER — APPOINTMENT (OUTPATIENT)
Dept: CT IMAGING | Facility: HOSPITAL | Age: 61
DRG: 176 | End: 2021-03-25
Attending: EMERGENCY MEDICINE
Payer: MEDICAID

## 2021-03-25 ENCOUNTER — APPOINTMENT (OUTPATIENT)
Dept: ULTRASOUND IMAGING | Facility: HOSPITAL | Age: 61
DRG: 176 | End: 2021-03-25
Attending: EMERGENCY MEDICINE
Payer: MEDICAID

## 2021-03-25 ENCOUNTER — APPOINTMENT (OUTPATIENT)
Dept: CV DIAGNOSTICS | Facility: HOSPITAL | Age: 61
DRG: 176 | End: 2021-03-25
Attending: EMERGENCY MEDICINE
Payer: MEDICAID

## 2021-03-25 DIAGNOSIS — I26.99 ACUTE PULMONARY EMBOLISM WITHOUT ACUTE COR PULMONALE, UNSPECIFIED PULMONARY EMBOLISM TYPE (HCC): Primary | ICD-10-CM

## 2021-03-25 PROCEDURE — 99223 1ST HOSP IP/OBS HIGH 75: CPT | Performed by: INTERNAL MEDICINE

## 2021-03-25 PROCEDURE — 71260 CT THORAX DX C+: CPT | Performed by: EMERGENCY MEDICINE

## 2021-03-25 PROCEDURE — 93970 EXTREMITY STUDY: CPT | Performed by: EMERGENCY MEDICINE

## 2021-03-25 PROCEDURE — 93306 TTE W/DOPPLER COMPLETE: CPT | Performed by: EMERGENCY MEDICINE

## 2021-03-25 PROCEDURE — 99222 1ST HOSP IP/OBS MODERATE 55: CPT | Performed by: INTERNAL MEDICINE

## 2021-03-25 PROCEDURE — 71045 X-RAY EXAM CHEST 1 VIEW: CPT | Performed by: EMERGENCY MEDICINE

## 2021-03-25 RX ORDER — CYCLOBENZAPRINE HCL 10 MG
10 TABLET ORAL 3 TIMES DAILY PRN
Status: DISCONTINUED | OUTPATIENT
Start: 2021-03-25 | End: 2021-03-28

## 2021-03-25 RX ORDER — HYDROCODONE BITARTRATE AND ACETAMINOPHEN 10; 325 MG/1; MG/1
1 TABLET ORAL EVERY 4 HOURS PRN
Status: DISCONTINUED | OUTPATIENT
Start: 2021-03-25 | End: 2021-03-28

## 2021-03-25 RX ORDER — ALBUTEROL SULFATE 90 UG/1
2 AEROSOL, METERED RESPIRATORY (INHALATION) 4 TIMES DAILY PRN
Status: DISCONTINUED | OUTPATIENT
Start: 2021-03-25 | End: 2021-03-25

## 2021-03-25 RX ORDER — DEXTROSE MONOHYDRATE 25 G/50ML
50 INJECTION, SOLUTION INTRAVENOUS
Status: DISCONTINUED | OUTPATIENT
Start: 2021-03-25 | End: 2021-03-28

## 2021-03-25 RX ORDER — HEPARIN SODIUM 1000 [USP'U]/ML
80 INJECTION, SOLUTION INTRAVENOUS; SUBCUTANEOUS ONCE
Status: COMPLETED | OUTPATIENT
Start: 2021-03-25 | End: 2021-03-25

## 2021-03-25 RX ORDER — ALBUTEROL SULFATE 90 UG/1
2 AEROSOL, METERED RESPIRATORY (INHALATION) EVERY 6 HOURS PRN
Status: DISCONTINUED | OUTPATIENT
Start: 2021-03-25 | End: 2021-03-28

## 2021-03-25 RX ORDER — HEPARIN SODIUM AND DEXTROSE 10000; 5 [USP'U]/100ML; G/100ML
INJECTION INTRAVENOUS CONTINUOUS
Status: DISCONTINUED | OUTPATIENT
Start: 2021-03-25 | End: 2021-03-26

## 2021-03-25 RX ORDER — MORPHINE SULFATE 2 MG/ML
2 INJECTION, SOLUTION INTRAMUSCULAR; INTRAVENOUS EVERY 4 HOURS PRN
Status: DISCONTINUED | OUTPATIENT
Start: 2021-03-25 | End: 2021-03-28

## 2021-03-25 RX ORDER — ATORVASTATIN CALCIUM 40 MG/1
80 TABLET, FILM COATED ORAL NIGHTLY
Status: DISCONTINUED | OUTPATIENT
Start: 2021-03-25 | End: 2021-03-28

## 2021-03-25 RX ORDER — DEXTROSE MONOHYDRATE 25 G/50ML
50 INJECTION, SOLUTION INTRAVENOUS
Status: DISCONTINUED | OUTPATIENT
Start: 2021-03-25 | End: 2021-03-25

## 2021-03-25 RX ORDER — HEPARIN SODIUM AND DEXTROSE 10000; 5 [USP'U]/100ML; G/100ML
18 INJECTION INTRAVENOUS ONCE
Status: COMPLETED | OUTPATIENT
Start: 2021-03-25 | End: 2021-03-25

## 2021-03-25 RX ORDER — METOPROLOL SUCCINATE 25 MG/1
25 TABLET, EXTENDED RELEASE ORAL DAILY
Status: DISCONTINUED | OUTPATIENT
Start: 2021-03-25 | End: 2021-03-26

## 2021-03-25 RX ORDER — AMLODIPINE BESYLATE 5 MG/1
5 TABLET ORAL DAILY
Status: DISCONTINUED | OUTPATIENT
Start: 2021-03-25 | End: 2021-03-26

## 2021-03-25 RX ORDER — MORPHINE SULFATE 4 MG/ML
4 INJECTION, SOLUTION INTRAMUSCULAR; INTRAVENOUS ONCE
Status: COMPLETED | OUTPATIENT
Start: 2021-03-25 | End: 2021-03-25

## 2021-03-25 NOTE — PAYOR COMM NOTE
--------------  DISCHARGE REVIEW    Payor: Devon Alonso #:  [id number]  Authorization Number: [id number]    Admit date: 3/22/21  Admit time:   8:24 AM  Discharge Date: 3/24/2021  3:01 PM     Admitting Physician: Elda Caldera

## 2021-03-25 NOTE — TELEPHONE ENCOUNTER
Noted:  Patient underwent surgery on 3/22/21:    ANTERIOR DISCECTOMY AND FUSION. CERVICAL 4- CERVICAL 5, CERVICAL5 - CERVICAL 6, AND CERVICAL 6- CERVICAL 7. ALLOGRAFT AND ALL REQUIRED INSTRUMENTATION.

## 2021-03-25 NOTE — H&P
[facility]    History and Physical    Aichamadiha Jack Patient Status:  Inpatient    3/2/1960 MRN [id number]   Location [facility] 2W/SW Attending Kelsea Bustamante MD   Hosp Day # 0 PCP Zhen Shelley MD     Date:  3/25/2021  Date • COLONOSCOPY & POLYPECTOMY  2012   • COLONOSCOPY, POSSIBLE BIOPSY, POSSIBLE POLYPECTOMY 90241 N/A 8/11/2017    Performed by Irish Alberto MD at [address] [place] [address]   • OTHER      Lung biopsy   • OTHER SURGICAL HISTORY  1993    Cone biopsy   • O (two) times daily. Glucose Blood (ONETOUCH VERIO) In Vitro Strip, Inject 1 each into the skin 3 (three) times daily. glipiZIDE 10 MG Oral Tab, Take 1 tablet (10 mg total) by mouth 2 (two) times daily before meals.   Insulin Pen Needle (BD PEN NEEDLE NAHID is oriented to person, place, and time. She appears well-developed. Neck: Neck supple. Cervical collar present   Cardiovascular: Normal rate, regular rhythm and normal heart sounds. Edema not present.   Pulmonary/Chest: Effort normal and breath soun AORTA (IV ONLY) (CPT=71260)    Result Date: 3/25/2021  CONCLUSION:  1. Moderate bilateral pulmonary emboli involving the left and to a lesser degree the right distal descending pulmonary arteries and their branches. No central embolus.   No suggestion of r except oral diabetic agents  Resume Lantus and cover with the sliding scale  Electrolyte protocol  Follow critical care and cardiology recommendations              Sita Armstrong MD  3/25/2021

## 2021-03-25 NOTE — CONSULTS
[facility] HOSP - [facility]    Report of Consultation    [facility]:  [facility]    3/2/1960 MRN [id number]   Location [address] Attending Hyun Ventura MD   Hosp Day # 0 PCP Torey Ellis MD     D BIOPSY, POSSIBLE POLYPECTOMY 78733 N/A 8/11/2017    Performed by Corinne Shire, MD at [facility]   • OTHER      Lung biopsy   • OTHER SURGICAL HISTORY  1993    Cone biopsy   • OTHER SURGICAL HISTORY  2013    Cystoscopy   • TUBAL LIGATION Family:       Frequency of Social Gatherings with Friends and Family:       Attends Christian Services:       Active Member of Clubs or Organizations:       Attends Club or Organization Meetings:       Marital Status:   Intimate Partner Violence:       Fea clubbing, cyanosis, edema  Neurologic: no gross motor deficits  Skin: warm, dry    Results:   Laboratory Data  Lab Results   Component Value Date    WBC 7.0 03/25/2021    HGB 11.5 (L) 03/25/2021    HCT 35.1 03/25/2021    .0 03/25/2021    CREATSERUM ground-glass airspace opacities bilaterally. I can not exclude multifocal pneumonia/atelectasis. Consider COVID-19 pneumonia. Recommend follow-up chest x-ray.  5. Results were called to the emergency room by Dr. Marlin Newman and discussed with Dr. Chelsie Reddy at

## 2021-03-25 NOTE — ED NOTES
rec'd care of pt from triage. Had neck/back surgery done this week and was released from the hospital last night. Returns to er today for increased pain and sob. Speaking in full sentences at this time. No distress noted. +congested cough. Denies fevers.  n

## 2021-03-25 NOTE — ED NOTES
NO CHANGE IN PT CONDITION. RESTING ON CART IN NO APPARENT DISTRESS. AWAITING ADMISSION TO ICU, WILL CONTINUE TO MONITOR. CALL LIGHT REMAINS WITHIN REACH.

## 2021-03-25 NOTE — CONSULTS
Cardiology Consult Note:    HPI: 28-year-old female, with history of hypertension, hyperlipidemia, diabetes came into the hospital for shortness of breath.     She underwent discectomy and cervical fusion surgery in [facility] and she got discharged on Lung Disorder Mother         Emphysema   • Heart Disease Mother    • Diabetes Sister    • Thyroid Disorder Sister    • Glaucoma Sister    • Heart Disease Maternal Grandmother    • Diabetes Brother    • Diabetes Brother    • Cataracts Niece    • Macular deg total) by mouth 3 (three) times daily as needed for Muscle spasms. atorvastatin 80 MG Oral Tab, Take 1 tablet (80 mg total) by mouth nightly. amLODIPine Besylate 5 MG Oral Tab, Take 1 tablet (5 mg total) by mouth As Directed.   Metoprolol Succinate ER 25 pressure 139/87, pulse 103, temperature 99.2 °F (37.3 °C), temperature source Temporal, resp. rate 22, height 5' 6\" (1.676 m), weight 201 lb 8 oz (91.4 kg), SpO2 97 %, not currently breastfeeding.     Scheduled Meds:   • amLODIPine Besylate  5 mg Oral Santino Mandi Schilling MD on 3/23/2021 at 3:39 PM     Finalized by (CST): Mandi Schilling MD on 3/23/2021 at 3:41 PM        XR CHEST AP PORTABLE  (CPT=71045)     Result Date: 3/25/2021  CONCLUSION:   Mild linear opacity left lung base which may reflect mild atelectas    Wall motion was normal; there were no regional wall motion      abnormalities. Doppler parameters are consistent with abnormal      left ventricular relaxation (grade 1 diastolic dysfunction).    No previous study was available for comparison.      LE

## 2021-03-25 NOTE — ED INITIAL ASSESSMENT (HPI)
Pt comes to ER with c/o sob, neck, and back pain. Pt reports she had neck and back surgery this past Monday. Pt arrives wearing an aspen collar.

## 2021-03-25 NOTE — ED PROVIDER NOTES
Patient Seen in: [facility] AND [facility] Emergency Department      History   Patient presents with:  Difficulty Breathing    Stated Complaint: Neck and Back pain after surgery discharged 3/24.  SOB when she lies down     HPI/Subjective:   HPI    62year-old fem 1993    Cone biopsy   • OTHER SURGICAL HISTORY  2013    Cystoscopy   • TUBAL LIGATION  1986                Social History    Tobacco Use      Smoking status: Current Every Day Smoker        Packs/day: 1.00        Years: 20.00        Pack years: 21        T warm and dry. Findings: No rash. Neurological:      General: No focal deficit present. Mental Status: She is alert and oriented to person, place, and time.                ED Course     Labs Reviewed   BASIC METABOLIC PANEL (8) - Abnormal; Notabl DOPPLER LEG BILAT-DIAG IMG (CPT=93970)         COMPARISON: None. INDICATIONS: recent spinal surgery, Pulmonary Embolism. TECHNIQUE: Color duplex Doppler venous ultrasound of both lower     extremities was performed in the     usual manner. infarction. There are patchy areas of     ground-glass airspace disease in the left upper lobe, right upper lobe,     lingula, and right lung base, can not exclude     early multifocal pneumonia and or atelectasis. Consider COVID-19     pneumonia.   Corre discussed     with Dr. Virgie Ghotra at approximately 0935 hours.                    Dictated by (CST): Radha Bermeo MD on 3/25/2021 at 9:29 AM         Finalized by (CST): Radha Bermeo MD on 3/25/2021 at 9:41 AM               XR CHEST AP PORTABLE  (CPT=7104 administration in time range)   cyclobenzaprine (FLEXERIL) tab 10 mg (has no administration in time range)   HYDROcodone-acetaminophen (NORCO)  MG per tab 1 tablet (has no administration in time range)   insulin detemir (LEVEMIR) 100 UNIT/ML flextouc dose and with stress. There is no evidence for      stress-induced ischemia. 2. Stress ECG conclusions: The stress ECG is negative for ischemia.      There were rare PVCs during exercise. There were frequent PVCs      in recovery.    3. Stress: 6 out of 1 Further Inpatient evaluation and treatment will be required.  I personally discussed the results of the above ED workup and a number of associated acute management issues with the patient, and I explained the need for further follow-up evaluation and t

## 2021-03-26 PROCEDURE — 99233 SBSQ HOSP IP/OBS HIGH 50: CPT | Performed by: INTERNAL MEDICINE

## 2021-03-26 PROCEDURE — 99232 SBSQ HOSP IP/OBS MODERATE 35: CPT | Performed by: INTERNAL MEDICINE

## 2021-03-26 RX ORDER — POTASSIUM CHLORIDE 1.5 G/1.77G
40 POWDER, FOR SOLUTION ORAL EVERY 4 HOURS
Status: COMPLETED | OUTPATIENT
Start: 2021-03-26 | End: 2021-03-26

## 2021-03-26 NOTE — PLAN OF CARE
Problem: Patient Centered Care  Goal: Patient preferences are identified and integrated in the patient's plan of care  Description: Interventions:  - What would you like us to know as we care for you?   - Provide timely, complete, and accurate informatio Progressing  Pt transferred from PCU this afternoon, accuchecks AC&HS, aspen collar in place, on remote tele, continues on eliquis, norco given prn for neck pain, bed kept low and locked, call light left within reach.

## 2021-03-26 NOTE — PROGRESS NOTES
[facility]    Progress Note    Candy Butcher Patient Status:  Inpatient    3/2/1960 MRN [id number]   Location [facility] 5SW/SE Attending Rudy Shaver MD   Hosp Day # 1 PCP Liane Lee MD     HPI/Subjective:   Emmanuel Taylor (CST):  Bk Holland MD on 3/25/2021 at 4:05 PM     Finalized by (CST): Bk Holland MD on 3/25/2021 at 4:05 PM          XR CHEST AP PORTABLE  (CPT=71045)    Result Date: 3/25/2021  CONCLUSION:   Mild linear opacity left lung base which may reflect mild discectomy and fusion-stable     Asthma/COPD-stable     Tobacco abuse-need to stop smoking.     Dyslipidemia continue statins     Hypertension blood pressure running slightly high.   Medication adjusted by cardiology     Diabetes-hold oral agents continue L

## 2021-03-26 NOTE — PLAN OF CARE
Patient is alert and oriented x 4, remains on room air. Heparin infusing. Denies chest pain and shortness of breath. Complains of pain in neck at surgical site; Norco given. Aspen collar in place. Ambulatory. Adequate urine output.  Nighttime accucheck 337

## 2021-03-26 NOTE — PROGRESS NOTES
[facility]     Progress Note        Dimple Gaviria Patient Status:  Inpatient    3/2/1960 MRN [id number]   Location [facility] 2W/SW Attending Kayleigh An MD   Hosp Day # 1 PCP Rafiq Cary MD       Subjective:   Patient Nightly  Insulin Aspart Pen (NOVOLOG) 100 UNIT/ML flexpen 1-7 Units, 1-7 Units, Subcutaneous, TID CC        Continuous Infusions:   • Continuous dose Heparin infusion 1,450 Units/hr (03/26/21 0526)       Physical Exam  Constitutional: no acute distress  Ey aortic dissection. 3. Moderate left basal consolidation/atelectasis, although I can not exclude early left basal pulmonary infarction. 4. Patchy poorly defined ground-glass airspace opacities bilaterally.   I can not exclude multifocal pneumonia/atelectasis floor    Angie Comas, DO  Pulmonary [address] [place] [address]

## 2021-03-26 NOTE — PROGRESS NOTES
Cardiology Consult Note:    Cardiology progress note    24 h events patient doing well, still tachy    Current Medications:  potassium chloride (KLOR-CON) powder packet 40 mEq, 40 mEq, Oral, Q4H  metoprolol Tartrate (LOPRESSOR) tab 25 mg, 25 mg, Oral, QID times daily. Glucose Blood (ONETOUCH VERIO) In Vitro Strip, Inject 1 each into the skin 3 (three) times daily. glipiZIDE 10 MG Oral Tab, Take 1 tablet (10 mg total) by mouth 2 (two) times daily before meals.   Insulin Pen Needle (BD PEN NEEDLE NAHID U/F) 3 Continuous Infusions:   • Continuous dose Heparin infusion 1,450 Units/hr (03/26/21 9035)         Physical Exam:    General: No acute distress. HEENT: No scleral icterus.   External ears are normal.  Lids are normal.  Oral mucosa is moist.  Neck: No on 3/25/2021 at 8:48 AM           CT CHEST PE AORTA (IV ONLY) (CPT=71260)     Result Date: 3/25/2021  CONCLUSION:  1.  Moderate bilateral pulmonary emboli involving the left and to a lesser degree the right distal descending pulmonary arteries and their bra surgery decision  - Echo with no RV strain  - post surgical state      2. Hypertension: BP ok. Continue current meds    3. Hyperlipidemia: On statin. Continue    4. Diabetes    5.  Tachycardia stop Norvasc and metoprolol XL start tartrate 25 po QID      Librado

## 2021-03-26 NOTE — PAYOR COMM NOTE
--------------  ADMISSION REVIEW     Payor: Devon Alonso #:  [id number]  Authorization Number: [id number]    Admit date: 3/25/21  Admit time:  1:40 PM       Admitting Physician: Ana Lilia Cooney MD  Attending Physici Type II or unspecified type diabetes mellitus without mention of complication, not stated as uncontrolled               Past Surgical History:   Procedure Laterality Date   • ANTERIOR DISCECTOMY AND FUSION.  CERVICAL 4- CERVICAL 5, CERVICAL5 - CERVICAL 6, A Normocephalic and atraumatic. Eyes:      Conjunctiva/sclera: Conjunctivae normal.   Cardiovascular:      Rate and Rhythm: Normal rate and regular rhythm. Heart sounds: Normal heart sounds.    Pulmonary:      Effort: Pulmonary effort is normal. No res -----------         ------                     CBC W/ DIFFERENTIAL[[id number]]          Abnormal            Final result                 Please view results for these tests on the individual orders.    PTT, ACTIVATED   RAINBOW DRAW BLUE contrast material.  Automated exposure control for dose reduction was     used. Adjustment of the mA and/or kV was done based on the patient's size. Use of iterative reconstruction     technique for dose reduction was used.  Dose information is transmit left and to a lesser     degree the right distal descending pulmonary arteries and their branches. No central embolus. No suggestion of right heart strain. 2. Mild cardiomegaly with suggestion of left ventricular hypertrophy.   No     aortic dissec mild atelectasis. Mild pneumonia thought to be less likely but not entirely excluded.                              Dictated by (CST): Skye Dumas MD on 3/25/2021 at 8:46 AM         Finalized by (CST): Skye Dumas MD on 3/25/2021 at 8:48 AM Intravenous Given 3/25/21 0952)   heparin (PORCINE) 22507xvsmv/250mL infusion ED INITIAL DOSE (18 Units/kg/hr × 91.4 kg Intravenous Handoff 3/25/21 1311)             03/25/21  1130 03/25/21  1215 03/25/21  1300 03/25/21  1400   BP: 136/86 136/83  139/87 is okay with heparin given patient's PE, will update Dr. Lisa Stoner. Discussed with and admitted to Dr. Kath Pineda. Discussed with and seen in the emergency department by Dr. Kathrine Mendoza. Discussed with cardiology, no further recommendations.     Impression:  After re J44.9 7/15/2006 Yes    Essential hypertension I10 2/4/2021 Yes    Type 2 diabetes mellitus without complication, with long-term current use of insulin ([facility] E11.9, Z79.4 4/24/2010 Yes    More. ..                           Signed by Brie Mora MD on abnormal cervical Pap smear    • Muscle weakness    • Neuropathy    • Other and unspecified hyperlipidemia    • Spinal stenosis    • TIA (transient ischemic attack)    • Type II or unspecified type diabetes mellitus without mention of complication, not sta SWELLING  HYDROcodone-acetaminophen  MG Oral Tab, Take 1-2 tablets by mouth every 4 (four) hours as needed for Pain. cyclobenzaprine 10 MG Oral Tab, Take 1 tablet (10 mg total) by mouth 3 (three) times daily as needed for Muscle spasms.   atorva Gastrointestinal: Negative for abdominal pain and constipation. Endocrine: Negative for polyuria. Genitourinary: Negative for flank pain. Musculoskeletal: Positive for neck pain. Negative for back pain and gait problem.    Neurological: Negative for d the cervical spine from C4-C7 with expected postsurgical changes.    Dictated by (CST): Carola Juan MD on 3/23/2021 at 3:39 PM     Finalized by (CST): Carola Juan MD on 3/23/2021 at 3:41 PM       XR CHEST AP PORTABLE  (CPT=71045)    Result Date: 3/25/20 consultation. Plan to start heparin drip.   Check echocardiogram  Check lower extremity duplex  Hopefully can start Coumadin tomorrow    Cervical spine disease status post cervical discectomy and fusion-stable    Asthma/COPD-resume home regimen    Tobacco Back pain    • Back problem    • COPD (chronic obstructive pulmonary disease) (HCC)    • High blood pressure    • History of abnormal cervical Pap smear    • Muscle weakness    • Neuropathy    • Other and unspecified hyperlipidemia    • Spinal stenosis Other Topics   Concerns:   Caffeine Concern: No   Exercise: No   Social History Narrative   The patient does not use an assistive device. .   The patient does live in a home with stairs.    Social Determinants of Health   Financial Resource Strain:   Moyu denies depression, anxiety   Hematologic: denies bruising   Physical Exam:   Blood pressure 136/86, pulse 78, temperature 98.6 °F (37 °C), temperature source Oral, resp.  rate 14, height 5' 6\" (1.676 m), weight 201 lb 8 oz (91.4 kg), SpO2 96 %, not current MD FILEMON on 3/25/2021 at 8:48 AM   CT CHEST PE AORTA (IV ONLY) (CPT=71260)   Result Date: 3/25/2021   CONCLUSION: 1.  Moderate bilateral pulmonary emboli involving the left and to a lesser degree the right distal descending pulmonary arteries and their branche Jeison Granados MD at 3/25/2021  4:53 PM    Author: Jeison Granados MD Service: Cardiology Author Type: Physician   Filed: 3/25/2021  4:53 PM Date of Service: 3/25/2021  3:58 PM Status: Signed   : Jeison Granados MD (Physician)   Consult Orders   1. POSSIBLE POLYPECTOMY 36449 N/A 8/11/2017     Performed by Toni Diamond MD at [facility]   • OTHER         Lung biopsy   • OTHER SURGICAL HISTORY   1993     Cone biopsy   • OTHER SURGICAL HISTORY   2013     Cystoscopy   • TUBAL LIGATION   Or  glucose (DEX4) oral liquid 30 g, 30 g, Oral, Q15 Min PRN   Or  Glucose-Vitamin C (DEX-4) chewable tab 8 tablet, 8 tablet, Oral, Q15 Min PRN  Insulin Aspart Pen (NOVOLOG) 100 UNIT/ML flexpen 1-5 Units, 1-5 Units, Subcutaneous, TID CC        HYDROcodon Comment:April 2020, hospitalized at [facility] in [facility] with             tongue swelling and throat closing sensation, on             lisinopril at the time.  Not intubated  Fish-Derived Produc*    SWELLING  Peanuts                 SWELLING  Tomato 95 03/09/2021     TP 7.6 03/09/2021     AST 8 (L) 03/09/2021     ALT 16 03/09/2021     PTT 24.8 03/25/2021     INR 1.05 03/18/2021     PTP 13.5 03/18/2021     TSH 0.703 03/09/2021     DDIMER 1.63 (H) 03/25/2021     TROP <0.045 03/25/2021     B12 588 01/04/  -------------------------- Sinus Tachycardia Non-specific ST-T changes ABNORMAL When compared with ECG of 05/22/2016 01:45:28 No significant changes have occurred Electronically signed on 03/25/2021 at 09:44 by Meghann Barrios MD     Echocardiogram:  1. parameters (last 24 hours):       Scheduled Meds: potassium chloride (KLOR-CON) powder packet 40 mEq, 40 mEq, Oral, Q4H  heparin (PORCINE) drip 13964ycnes/250mL infusion CONTINUOUS, 200-3,000 Units/hr, Intravenous, Continuous  Albuterol Sulfate  (90 03/26/2021     .0 03/26/2021     CREATSERUM 0.56 03/26/2021     BUN 11 03/26/2021      03/26/2021     K 3.4 03/26/2021      03/26/2021     CO2 28.0 03/26/2021      03/26/2021     CA 8.9 03/26/2021     .0 03/26/2021     INR Tachycardia Non-specific ST-T changes ABNORMAL When compared with ECG of 05/22/2016 01:45:28 No significant changes have occurred Electronically signed on 03/25/2021 at 09:44 by Deng Reynoso MD        Assessment   1.  Acute pulmonary embolism  2.  Acu Change 1,450 Units/hr Intravenous Twyla Walsh RN    3/25/2021 2349 New Bag 1,600 Units/hr Intravenous Twyla Walsh RN    3/25/2021 1701 New Bag 1,600 Units/hr Intravenous Maxime Clark RN      HYDROcodone-acetaminophen (NORCO)  MG per tab mg Oral Haley Moctezuma RN          REVIEWER COMMENTS:     OTHER:

## 2021-03-26 NOTE — PLAN OF CARE
A/ox4, independent except for assistance with IV pole and monitors this morning. Transitioned off heparin infusion today, eliquis started. Social work assisting with checking on insurance coverage. Pt reports adequate pain control with norco as needed.  Jamel request assistance  - Assess pain using appropriate pain scale  - Administer analgesics based on type and severity of pain and evaluate response  - Implement non-pharmacological measures as appropriate and evaluate response  - Consider cultural and social

## 2021-03-27 PROCEDURE — 99232 SBSQ HOSP IP/OBS MODERATE 35: CPT | Performed by: INTERNAL MEDICINE

## 2021-03-27 PROCEDURE — 99233 SBSQ HOSP IP/OBS HIGH 50: CPT | Performed by: INTERNAL MEDICINE

## 2021-03-27 RX ORDER — ACETAMINOPHEN 325 MG/1
650 TABLET ORAL EVERY 6 HOURS PRN
Status: DISCONTINUED | OUTPATIENT
Start: 2021-03-27 | End: 2021-03-28

## 2021-03-27 RX ORDER — TRAMADOL HYDROCHLORIDE 50 MG/1
50 TABLET ORAL EVERY 8 HOURS PRN
Status: DISCONTINUED | OUTPATIENT
Start: 2021-03-27 | End: 2021-03-28

## 2021-03-27 RX ORDER — METOPROLOL TARTRATE 50 MG/1
50 TABLET, FILM COATED ORAL
Status: DISCONTINUED | OUTPATIENT
Start: 2021-03-27 | End: 2021-03-28

## 2021-03-27 NOTE — PROGRESS NOTES
Pulmonary/Critical Care Follow Up Note    HPI:   Marycarmen Hurt is a 62year old female with Patient presents with:  Difficulty Breathing      PCP Bradford Fuchs MD  Admission Attending Fifi Bey [facility] Day #2    No CP  No sob  C/o new L sh Glucose-Vitamin C (DEX-4) chewable tab 8 tablet, 8 tablet, Oral, Q15 Min PRN  •  Insulin Aspart Pen (NOVOLOG) 100 UNIT/ML flexpen 1-7 Units, 1-7 Units, Subcutaneous, TID CC      Allergies:     Ace Inhibitors          ANAPHYLAXIS, ANGIOEDEMA, SWELLING    Com

## 2021-03-27 NOTE — PROGRESS NOTES
Patient seen in follow up. Patient denies any chest pain, dizziness or palpitations. She reports occasional SOB on exertion. Patient states that neck pain has been well-controlled. Denies any bloody stools/urine or nosebleeding while on Eliquis.   Se HYDROcodone-acetaminophen  MG Oral Tab, Take 1-2 tablets by mouth every 4 (four) hours as needed for Pain. cyclobenzaprine 10 MG Oral Tab, Take 1 tablet (10 mg total) by mouth 3 (three) times daily as needed for Muscle spasms.   atorvastatin 80 MG Or    at the upper limits of normal. Systolic function was normal. The      estimated ejection fraction was 61%, by biplane method of disks.      Wall motion was normal; there were no regional wall motion      abnormalities.  Doppler parameters are consistent

## 2021-03-27 NOTE — PLAN OF CARE
Problem: Patient Centered Care  Goal: Patient preferences are identified and integrated in the patient's plan of care  Description: Interventions:  - What would you like us to know as we care for you?   - Provide timely, complete, and accurate informatio Progressing  Seen by PT this afternoon, zoey AC&HS, plans for possible discharge tomorrow, on remote tele, continues on eliquis, tramadol ordered and given prn for pain, flexeril also given prn for pain, bed kept low and locked, call light left withi

## 2021-03-27 NOTE — PHYSICAL THERAPY NOTE
PHYSICAL THERAPY EVALUATION - INPATIENT     Room Number: 565/565-A  Evaluation Date: 3/27/2021  Type of Evaluation: Initial   Physician Order: PT Eval and Treat    Presenting Problem: Pulmonary embolism  Reason for Therapy: Mobility Dysfunction and Discha MEDICAL/SOCIAL HISTORY     History related to current admission: cervical discectomy and fusion 3/22.      Problem List  Principal Problem:    Acute pulmonary embolism without acute cor pulmonale, unspecified pulmonary embolism type (HCC)  Active Problems: to go home, feeling much better.     PHYSICAL THERAPY EXAMINATION     OBJECTIVE  Precautions: Cervical brace  Fall Risk: Standard fall risk    WEIGHT BEARING RESTRICTION                PAIN ASSESSMENT             COGNITION  · Overall Cognitive Status:  [facility] achieve the following . ..    Patient able to transfer  Safely and independently    Patient able to ambulate on level surfaces  Safely and independently

## 2021-03-27 NOTE — PROGRESS NOTES
[facility]    Progress Note    Amber [place] Patient Status:  Inpatient    3/2/1960 MRN [id number]   Location [facility] 5SW/SE Attending Noa Eli MD   Hosp Day # 2 PCP Srinivasan Vieira MD     HPI/Subjective:   Louise Martinez BILT 0.5 03/09/2021    TP 7.6 03/09/2021    AST 8 (L) 03/09/2021    ALT 16 03/09/2021    .0 (H) 03/26/2021    INR 1.09 03/26/2021    TSH 0.703 03/09/2021    DDIMER 1.63 (H) 03/25/2021    TROP <0.045 03/25/2021    B12 588 01/04/2021       US GEORGE

## 2021-03-28 VITALS
OXYGEN SATURATION: 98 % | HEIGHT: 66 IN | WEIGHT: 201.5 LBS | DIASTOLIC BLOOD PRESSURE: 93 MMHG | SYSTOLIC BLOOD PRESSURE: 112 MMHG | HEART RATE: 97 BPM | TEMPERATURE: 98 F | RESPIRATION RATE: 18 BRPM | BODY MASS INDEX: 32.38 KG/M2

## 2021-03-28 PROCEDURE — 99239 HOSP IP/OBS DSCHRG MGMT >30: CPT | Performed by: INTERNAL MEDICINE

## 2021-03-28 NOTE — PLAN OF CARE
Problem: Patient Centered Care  Goal: Patient preferences are identified and integrated in the patient's plan of care  Description: Interventions:  - What would you like us to know as we care for you?  I had surgery on 3/22  - Provide timely, complete, an based on type and severity of pain and evaluate response  - Implement non-pharmacological measures as appropriate and evaluate response  - Consider cultural and social influences on pain and pain management  - Manage/alleviate anxiety  - Utilize distractio

## 2021-03-28 NOTE — DISCHARGE SUMMARY
Beverley Martinezis 1266 Patient Status:  Inpatient    3/2/1960 MRN [id number]   Location [facility] 5SW/SE Attending Lauren Hillman MD   Hosp Day # 3 PCP Aguilar Bauer MD     Date of Admission: 3/25/2021  Date of Discharge: 3/28 tenderness. no palpable masses  Abdominal: Soft. Bowel sounds are normal. She exhibits no distension and no mass. There is no hepatosplenomegaly. There is no abdominal tenderness. There is no rebound. No hernia. Musculoskeletal: Normal range of motion. every 4 (four) hours as needed for Allergies. Refills: 0     cyclobenzaprine 10 MG Tabs  Commonly known as: FLEXERIL      Take 1 tablet (10 mg total) by mouth 3 (three) times daily as needed for Muscle spasms.    Quantity: 45 tablet  Refills: 0     glipiZ 2 tablets (10 mg total) by mouth 2 (two) times daily for 6 days. Home Meds - Unchanged    HYDROcodone-acetaminophen  MG Oral Tab  Take 1-2 tablets by mouth every 4 (four) hours as needed for Pain.     cyclobenzaprine 10 MG Oral Tab  Take 1 tablet appointment as soon as possible for a visit in 2 weeks  o You requested to make your own appointment for diabetes follow up. Please call Dr. Pastor Cuevas,  as soon as possible schedule an appointment in 1-2 weeks.                Vital signs:  Temp:  Batuhan.Mis

## 2021-03-28 NOTE — PLAN OF CARE
Problem: Patient Centered Care  Goal: Patient preferences are identified and integrated in the patient's plan of care  Description: Interventions:  - What would you like us to know as we care for you?  - Provide timely, complete, and accurate information severity of pain and evaluate response  - Implement non-pharmacological measures as appropriate and evaluate response  - Consider cultural and social influences on pain and pain management  - Manage/alleviate anxiety  - Utilize distraction and/or relaxatio

## 2021-03-29 ENCOUNTER — APPOINTMENT (OUTPATIENT)
Dept: GENERAL RADIOLOGY | Facility: HOSPITAL | Age: 61
End: 2021-03-29
Attending: EMERGENCY MEDICINE
Payer: MEDICAID

## 2021-03-29 ENCOUNTER — HOSPITAL ENCOUNTER (EMERGENCY)
Facility: HOSPITAL | Age: 61
Discharge: HOME | End: 2021-03-29
Attending: EMERGENCY MEDICINE
Payer: MEDICAID

## 2021-03-29 VITALS
HEIGHT: 66 IN | BODY MASS INDEX: 32.14 KG/M2 | WEIGHT: 200 LBS | HEART RATE: 99 BPM | TEMPERATURE: 99 F | RESPIRATION RATE: 13 BRPM | OXYGEN SATURATION: 98 % | DIASTOLIC BLOOD PRESSURE: 77 MMHG | SYSTOLIC BLOOD PRESSURE: 118 MMHG

## 2021-03-29 DIAGNOSIS — I48.91 ATRIAL FIBRILLATION WITH RAPID VENTRICULAR RESPONSE (HCC): Primary | ICD-10-CM

## 2021-03-29 DIAGNOSIS — M25.512 ACUTE PAIN OF LEFT SHOULDER: ICD-10-CM

## 2021-03-29 LAB
ANION GAP SERPL CALC-SCNC: 4 MMOL/L (ref 0–18)
BASOPHILS # BLD AUTO: 0.01 X10(3) UL (ref 0–0.2)
BASOPHILS NFR BLD AUTO: 0.2 %
BUN BLD-MCNC: 7 MG/DL (ref 7–18)
BUN/CREAT SERPL: 11.1 (ref 10–20)
CALCIUM BLD-MCNC: 9.3 MG/DL (ref 8.5–10.1)
CHLORIDE SERPL-SCNC: 104 MMOL/L (ref 98–112)
CO2 SERPL-SCNC: 28 MMOL/L (ref 21–32)
CREAT BLD-MCNC: 0.63 MG/DL
DEPRECATED RDW RBC AUTO: 43.4 FL (ref 35.1–46.3)
EOSINOPHIL # BLD AUTO: 0.12 X10(3) UL (ref 0–0.7)
EOSINOPHIL NFR BLD AUTO: 2.1 %
ERYTHROCYTE [DISTWIDTH] IN BLOOD BY AUTOMATED COUNT: 14.2 % (ref 11–15)
GLUCOSE BLD-MCNC: 263 MG/DL (ref 70–99)
HCT VFR BLD AUTO: 33.7 %
HGB BLD-MCNC: 11 G/DL
IMM GRANULOCYTES # BLD AUTO: 0.02 X10(3) UL (ref 0–1)
IMM GRANULOCYTES NFR BLD: 0.3 %
LYMPHOCYTES # BLD AUTO: 1.66 X10(3) UL (ref 1–4)
LYMPHOCYTES NFR BLD AUTO: 29 %
MCH RBC QN AUTO: 27.2 PG (ref 26–34)
MCHC RBC AUTO-ENTMCNC: 32.6 G/DL (ref 31–37)
MCV RBC AUTO: 83.4 FL
MONOCYTES # BLD AUTO: 0.35 X10(3) UL (ref 0.1–1)
MONOCYTES NFR BLD AUTO: 6.1 %
NEUTROPHILS # BLD AUTO: 3.57 X10 (3) UL (ref 1.5–7.7)
NEUTROPHILS # BLD AUTO: 3.57 X10(3) UL (ref 1.5–7.7)
NEUTROPHILS NFR BLD AUTO: 62.3 %
OSMOLALITY SERPL CALC.SUM OF ELEC: 289 MOSM/KG (ref 275–295)
PLATELET # BLD AUTO: 256 10(3)UL (ref 150–450)
POTASSIUM SERPL-SCNC: 3.7 MMOL/L (ref 3.5–5.1)
RBC # BLD AUTO: 4.04 X10(6)UL
SODIUM SERPL-SCNC: 136 MMOL/L (ref 136–145)
TROPONIN I SERPL-MCNC: <0.045 NG/ML (ref ?–0.04)
TROPONIN I SERPL-MCNC: <0.045 NG/ML (ref ?–0.04)
WBC # BLD AUTO: 5.7 X10(3) UL (ref 4–11)

## 2021-03-29 PROCEDURE — 93010 ELECTROCARDIOGRAM REPORT: CPT | Performed by: EMERGENCY MEDICINE

## 2021-03-29 PROCEDURE — 80048 BASIC METABOLIC PNL TOTAL CA: CPT | Performed by: EMERGENCY MEDICINE

## 2021-03-29 PROCEDURE — 85025 COMPLETE CBC W/AUTO DIFF WBC: CPT | Performed by: EMERGENCY MEDICINE

## 2021-03-29 PROCEDURE — 96374 THER/PROPH/DIAG INJ IV PUSH: CPT

## 2021-03-29 PROCEDURE — 99285 EMERGENCY DEPT VISIT HI MDM: CPT

## 2021-03-29 PROCEDURE — 84484 ASSAY OF TROPONIN QUANT: CPT | Performed by: EMERGENCY MEDICINE

## 2021-03-29 PROCEDURE — 96376 TX/PRO/DX INJ SAME DRUG ADON: CPT

## 2021-03-29 PROCEDURE — 71045 X-RAY EXAM CHEST 1 VIEW: CPT | Performed by: EMERGENCY MEDICINE

## 2021-03-29 PROCEDURE — 93005 ELECTROCARDIOGRAM TRACING: CPT

## 2021-03-29 PROCEDURE — 96375 TX/PRO/DX INJ NEW DRUG ADDON: CPT

## 2021-03-29 RX ORDER — MORPHINE SULFATE 4 MG/ML
2 INJECTION, SOLUTION INTRAMUSCULAR; INTRAVENOUS ONCE
Status: COMPLETED | OUTPATIENT
Start: 2021-03-29 | End: 2021-03-29

## 2021-03-29 RX ORDER — MORPHINE SULFATE 4 MG/ML
4 INJECTION, SOLUTION INTRAMUSCULAR; INTRAVENOUS ONCE
Status: COMPLETED | OUTPATIENT
Start: 2021-03-29 | End: 2021-03-29

## 2021-03-29 RX ORDER — METOPROLOL TARTRATE 5 MG/5ML
5 INJECTION INTRAVENOUS ONCE
Status: COMPLETED | OUTPATIENT
Start: 2021-03-29 | End: 2021-03-29

## 2021-03-29 NOTE — ED NOTES
Mrs. Mena Dennis arrived through triage with her daughter for c/o L upper arm / shoulder pain - states the pain awoke her from sleep at approx 0130 this morning. No relief with Tylenol taken early this morning.  Primitivo Ornelas was discharged home from [facility] yesterday

## 2021-03-29 NOTE — ED NOTES
Jnena Wan has been reported a \"stabbing pain\" behind L knee ever since receiving the IV Morphine \"and the pain in my arm is starting to come back now. \" Will relay information to Dr John Velazquez.

## 2021-03-29 NOTE — ED INITIAL ASSESSMENT (HPI)
Atraumatic left arm pain since 2 AM. Pain worse with movement. Pain radiates to chest.     Recent spinal surgery, wearing aspen collar.      Discharged yesterday with PE, compliant with meds

## 2021-03-29 NOTE — ED NOTES
Adelso Rivers is declining additional Morphine at this time - asking to speak with Dr Desi Nieves again

## 2021-03-29 NOTE — ED NOTES
Discharge instructions reviewed with Jerrod Crow and her daughter - to follow-up with Cardiologist as discussed. Encouraged to take Norco and Metoprolol as prescribed. To return to ED for any new or worsening symptoms.

## 2021-03-29 NOTE — PAYOR COMM NOTE
--------------  DISCHARGE REVIEW    Payor: Devon Alonso #:  [id number]  Authorization Number: [id number]    Admit date: 3/25/21  Admit time:   1:40 PM  Discharge Date: 3/28/2021 11:02 AM     Admitting Physician: Latrice Otero with nsx as outpatient     Asthma/COPD-stable     Tobacco abuse-need to stop smoking.     Dyslipidemia continue statins     Hypertension continue with current meds, monitor      Diabetes-type 2 , resume homeemds     Hypokalemia-resolved      Left foot ting UltraSoft Lancets Misc      Test sugar twice daily   Quantity: 100 each  Refills: 6        ASK your doctor about these medications      Instructions Prescription details   Albuterol Sulfate  (90 Base) MCG/ACT Aers      Inhale 2 puffs into the lungs daily.   Stop taking on: May 5, 2021  Quantity: 300 strip  Refills: 1     pregabalin 100 MG Caps  Commonly known as: LYRICA      Take 1 capsule (100 mg total) by mouth 2 (two) times daily. Stop taking on:  May 12, 2021  Quantity: 180 capsule  Refills: 0 HCl 500 MG Oral Tab  Take 2 tablets (1,000 mg total) by mouth 2 (two) times daily with meals.     Blood Glucose Monitoring Suppl (ONETOUCH ULTRA SYSTEM) W/DEVICE Does not apply Kit  Test sugar twice daily    OneTouch UltraSoft Lancets Does not apply Misc  T

## 2021-03-29 NOTE — CM/SW NOTE
Received call from patient's daughter, Meg Lowery, requesting status of hospital bed. Called E liaison, Xochilt Posada and she has received signed MD order for the bed. She is trying to determine if signed Medicaid questionnaire was received.     Addendum 1400  Conf

## 2021-03-30 ENCOUNTER — TELEPHONE (OUTPATIENT)
Dept: INTERNAL MEDICINE CLINIC | Facility: CLINIC | Age: 61
End: 2021-03-30

## 2021-03-30 ENCOUNTER — PATIENT OUTREACH (OUTPATIENT)
Dept: CASE MANAGEMENT | Age: 61
End: 2021-03-30

## 2021-03-30 DIAGNOSIS — E11.9 TYPE 2 DIABETES MELLITUS WITHOUT RETINOPATHY (HCC): ICD-10-CM

## 2021-03-30 DIAGNOSIS — I26.99 ACUTE PULMONARY EMBOLISM WITHOUT ACUTE COR PULMONALE, UNSPECIFIED PULMONARY EMBOLISM TYPE (HCC): ICD-10-CM

## 2021-03-30 PROCEDURE — 1111F DSCHRG MED/CURRENT MED MERGE: CPT

## 2021-03-30 NOTE — TELEPHONE ENCOUNTER
S/W pt for condition update since discharge (see [facility] outreach from today). Reviewed medication list was reviewed. Per Dr. Garcia Postal note (cardiology),   -stop Norvasc and metoprolol XL   -started metoprolol tartrate 25 po QID  Transition metoprolol tartrate 25 po QID to metoprolol tartrate 50 mg BID  -Okay to discharge from cardiology standpoint tomorrow if HR stable on BID metoprolol. Pt was suppose to be started on Metoprolol Tartrate 50 mg BID after discharge however this was not changed. S/w PA Juanis Sorensen from Dr. Schultz Artist office earlier today and she planned to discuss with Dr. Adriana Rivera and contact the pt. S/w pt recently and she stated that she did speak with Juanis Sorensen but was advised to continue the Metoprolol Succinate 25 mg she has at home until the appt tomorrow with Dr. Adriana Rivera. Pt is aware to monitor her HR and B/P and to keep a log of the readings to provide at the appt. Pt reports her HR was 120-130 before she took her Metoprolol today and B/P was 140's/80's. Pt plans to check her HR and B/P later this evening again and contact cardiology or Dr. Adriana Rivera if needed. Pt declines the need for ED at this time. Pt denies CP, dizziness, worsening weakness or worsening SOB, HA and palpitations. Thank you. Wanted to provide an update prior to the appt tomorrow.

## 2021-03-30 NOTE — PROGRESS NOTES
Contacted the pt back to see if she had heard from the cardiology PA, Alice Marshall, Melany Dubon stated that she had and that she was told to continue on the Metoprolol Succinate ER she has at home for now and to discuss this with her PCP tomorrow.  Pt stated she is rajat

## 2021-03-30 NOTE — PROGRESS NOTES
Initial Post Discharge Follow Up   Discharge Date: 3/29/21  Contact Date: 3/30/2021    Consent Verification:  Assessment Completed With: Patient  HIPAA Verified?   Yes    Discharge Dx:   Acute PE    General:   • How have you been since your discharge fro are some barriers or concerns? Pt feels she is 50/50 not back to 100%. Pt has to take her time with ADL's  • (NCM) Was patient given a different diet per AVS? no     Medications: Reviewed medication list with the patient. Medications are up to date.  See b 108 (90 Base) MCG/ACT Inhalation Aero Soln Inhale 2 puffs into the lungs every 6 (six) hours as needed. • MetFORMIN HCl 500 MG Oral Tab Take 2 tablets (1,000 mg total) by mouth 2 (two) times daily with meals.  360 tablet 1   • Blood Glucose Monitoring [facility])        Apr 06, 2021  1:00 PM CDT Post Op Visit with ABDULAZIZ Miller [address], [address] [facility] ([address] [facility])        May 06, 2021 11:30 AM CDT Post Op Visit with Jose Benedict, is going to FU on this and contact pt's PCP to discuss further. She plans to call the pt to discuss the medication changes and send in the new script if needed.  Let Carlie Augustine know to please let NCM know if anything needed to be done to ensure pt was on the

## 2021-03-30 NOTE — TELEPHONE ENCOUNTER
Chandu Morales states when patient was in the hospital, they were giving patient Metoprolol Tartrate 50 mg twice a day. Upon discharge, they saw patient was advised to take Metoprolol 25 mg once daily. Chandu Morales states patient's heart rate is still 130's/120s at home. Please advise.

## 2021-03-31 ENCOUNTER — APPOINTMENT (OUTPATIENT)
Dept: GENERAL RADIOLOGY | Facility: HOSPITAL | Age: 61
End: 2021-03-31
Attending: EMERGENCY MEDICINE
Payer: MEDICAID

## 2021-03-31 ENCOUNTER — HOSPITAL ENCOUNTER (EMERGENCY)
Facility: HOSPITAL | Age: 61
Discharge: HOME | End: 2021-03-31
Attending: EMERGENCY MEDICINE
Payer: MEDICAID

## 2021-03-31 ENCOUNTER — OFFICE VISIT (OUTPATIENT)
Dept: INTERNAL MEDICINE CLINIC | Facility: CLINIC | Age: 61
End: 2021-03-31
Payer: MEDICAID

## 2021-03-31 ENCOUNTER — TELEPHONE (OUTPATIENT)
Dept: INTERNAL MEDICINE CLINIC | Facility: CLINIC | Age: 61
End: 2021-03-31

## 2021-03-31 VITALS
RESPIRATION RATE: 22 BRPM | HEIGHT: 66 IN | OXYGEN SATURATION: 99 % | SYSTOLIC BLOOD PRESSURE: 172 MMHG | TEMPERATURE: 98 F | DIASTOLIC BLOOD PRESSURE: 102 MMHG | HEART RATE: 114 BPM | WEIGHT: 192 LBS | BODY MASS INDEX: 30.86 KG/M2

## 2021-03-31 VITALS
HEIGHT: 66 IN | SYSTOLIC BLOOD PRESSURE: 136 MMHG | BODY MASS INDEX: 32.14 KG/M2 | DIASTOLIC BLOOD PRESSURE: 99 MMHG | RESPIRATION RATE: 14 BRPM | HEART RATE: 103 BPM | WEIGHT: 200 LBS | OXYGEN SATURATION: 97 % | TEMPERATURE: 98 F

## 2021-03-31 DIAGNOSIS — G44.059 SHORT UNILATERAL NEURALGIFORM HEADACHE, CONJUNCTIVAL INJECTION/TEARING: ICD-10-CM

## 2021-03-31 DIAGNOSIS — R05.9 COUGH: ICD-10-CM

## 2021-03-31 DIAGNOSIS — R11.2 NAUSEA VOMITING AND DIARRHEA: Primary | ICD-10-CM

## 2021-03-31 DIAGNOSIS — R00.2 PALPITATIONS: ICD-10-CM

## 2021-03-31 DIAGNOSIS — R10.13 EPIGASTRIC PAIN: ICD-10-CM

## 2021-03-31 DIAGNOSIS — R19.7 NAUSEA VOMITING AND DIARRHEA: Primary | ICD-10-CM

## 2021-03-31 DIAGNOSIS — Z09 HOSPITAL DISCHARGE FOLLOW-UP: Primary | ICD-10-CM

## 2021-03-31 DIAGNOSIS — J02.9 SORE THROAT: ICD-10-CM

## 2021-03-31 DIAGNOSIS — Z98.890 H/O CERVICAL SPINE SURGERY: ICD-10-CM

## 2021-03-31 DIAGNOSIS — I26.99 OTHER ACUTE PULMONARY EMBOLISM WITHOUT ACUTE COR PULMONALE (HCC): ICD-10-CM

## 2021-03-31 PROCEDURE — 84484 ASSAY OF TROPONIN QUANT: CPT | Performed by: EMERGENCY MEDICINE

## 2021-03-31 PROCEDURE — 93005 ELECTROCARDIOGRAM TRACING: CPT

## 2021-03-31 PROCEDURE — 96375 TX/PRO/DX INJ NEW DRUG ADDON: CPT

## 2021-03-31 PROCEDURE — C9113 INJ PANTOPRAZOLE SODIUM, VIA: HCPCS | Performed by: EMERGENCY MEDICINE

## 2021-03-31 PROCEDURE — 83690 ASSAY OF LIPASE: CPT | Performed by: EMERGENCY MEDICINE

## 2021-03-31 PROCEDURE — 3008F BODY MASS INDEX DOCD: CPT | Performed by: INTERNAL MEDICINE

## 2021-03-31 PROCEDURE — 99214 OFFICE O/P EST MOD 30 MIN: CPT | Performed by: INTERNAL MEDICINE

## 2021-03-31 PROCEDURE — 80076 HEPATIC FUNCTION PANEL: CPT | Performed by: EMERGENCY MEDICINE

## 2021-03-31 PROCEDURE — 84145 PROCALCITONIN (PCT): CPT | Performed by: EMERGENCY MEDICINE

## 2021-03-31 PROCEDURE — 80048 BASIC METABOLIC PNL TOTAL CA: CPT | Performed by: EMERGENCY MEDICINE

## 2021-03-31 PROCEDURE — 71045 X-RAY EXAM CHEST 1 VIEW: CPT | Performed by: EMERGENCY MEDICINE

## 2021-03-31 PROCEDURE — 96374 THER/PROPH/DIAG INJ IV PUSH: CPT

## 2021-03-31 PROCEDURE — 99285 EMERGENCY DEPT VISIT HI MDM: CPT

## 2021-03-31 PROCEDURE — 3077F SYST BP >= 140 MM HG: CPT | Performed by: INTERNAL MEDICINE

## 2021-03-31 PROCEDURE — 3080F DIAST BP >= 90 MM HG: CPT | Performed by: INTERNAL MEDICINE

## 2021-03-31 PROCEDURE — 96361 HYDRATE IV INFUSION ADD-ON: CPT

## 2021-03-31 PROCEDURE — 93010 ELECTROCARDIOGRAM REPORT: CPT | Performed by: EMERGENCY MEDICINE

## 2021-03-31 PROCEDURE — 85025 COMPLETE CBC W/AUTO DIFF WBC: CPT | Performed by: EMERGENCY MEDICINE

## 2021-03-31 RX ORDER — PANTOPRAZOLE SODIUM 40 MG/1
40 TABLET, DELAYED RELEASE ORAL DAILY
Qty: 30 TABLET | Refills: 0 | Status: SHIPPED | OUTPATIENT
Start: 2021-03-31 | End: 2021-04-30

## 2021-03-31 RX ORDER — ONDANSETRON 2 MG/ML
4 INJECTION INTRAMUSCULAR; INTRAVENOUS ONCE
Status: COMPLETED | OUTPATIENT
Start: 2021-03-31 | End: 2021-03-31

## 2021-03-31 RX ORDER — ONDANSETRON 4 MG/1
4 TABLET, ORALLY DISINTEGRATING ORAL EVERY 8 HOURS PRN
Qty: 15 TABLET | Refills: 0 | Status: SHIPPED | OUTPATIENT
Start: 2021-03-31 | End: 2021-04-05

## 2021-03-31 RX ORDER — PREGABALIN 100 MG/1
100 CAPSULE ORAL 2 TIMES DAILY
Qty: 180 CAPSULE | Refills: 0 | Status: SHIPPED | OUTPATIENT
Start: 2021-03-31 | End: 2021-06-29

## 2021-03-31 RX ORDER — PREGABALIN 100 MG/1
100 CAPSULE ORAL 2 TIMES DAILY
Qty: 60 CAPSULE | Refills: 0 | Status: SHIPPED | OUTPATIENT
Start: 2021-03-31 | End: 2021-04-30

## 2021-03-31 NOTE — ED INITIAL ASSESSMENT (HPI)
Patient presents to ER with complaints of chest pain. Sent by PCP, for concerns of elevated BP and HR. Patient describes chest pain as \"indigestion\", states pain to mid upper quadrant. Admits to dizziness, and some shortness of breath with exertion.

## 2021-03-31 NOTE — ED QUICK NOTES
All discharge instructions including discharge meds and follow up reviewed with patient. Verbalized understanding. IV removed with catheter intact. Patient wheeled out of ED in no apparent distress.

## 2021-03-31 NOTE — ED PROVIDER NOTES
Patient Seen in: [facility] AND [facility] Emergency Department    History   Patient presents with:  Chest Pain Angina  Arrythmia/Palpitations    Stated Complaint: elevated  heart rate, chest pain     HPI    70-year-old female with past medical history of diabetes [facility] biopsy   • OTHER SURGICAL HISTORY  2013    Cystoscopy   • REMOVAL GALLBLADDER     • TUBAL LIGATION  1986       Medications :   apixaban 5 MG Oral Tab,  Take 1 tablet (5 mg total) by mouth 2 (two) times daily.    HYDROcodone-acetaminophen  MG O Disorder Sister    • Glaucoma Sister    • Heart Disease Maternal Grandmother    • Diabetes Brother    • Diabetes Brother    • Cataracts Niece    • Macular degeneration Neg        Social History    Tobacco Use      Smoking status: Current Every Day Smoker Glucose 117 (*)     All other components within normal limits   LIPASE - Abnormal; Notable for the following components:    Lipase 60 (*)     All other components within normal limits   TROPONIN I - Normal   HEPATIC FUNCTION PANEL (7) - Normal   PROCALC 12:25 PM          XR CERVICAL SPINE (4VIEWS) (CPT=72050)    Result Date: 3/23/2021  PROCEDURE:  XR CERVICAL SPINE (4VIEWS) (CPT=72050)  TECHNIQUE:  AP, lateral, obliques, and coned down view of the spine were obtained. COMPARISON:  None.   INDICATIONS:  Po 4:05 PM          CARD ECHO 2D DOPPLER (CPT=93306)    Result Date: 3/25/2021                    [facility]* -------------------------------------------------------------------      Transthoracic Echocardiography M-mode, complete 2D, comp tolerated the procedure well. There were no complications. Transthoracic echocardiography. M-mode, complete 2D, complete spectral Doppler, and color Doppler. Patient YOB: 1960. Patient is 61yr old. Gender: female. BMI: 32.4kg/m^2.  BSA: 2.09m^2 the normal range (= 50%), consistent with normal central venous pressure. ------------------------------------------------------------------- Measurements  Left ventricle                              Value        Reference  LV ID, ED, PLAX ID, A-P, S                  3.5   cm     1.9 - 3.5   Left atrium                                 Value        Reference  LA ID, A-P, ES                              3.0   cm     2.7 - 3.8  LA volume, ES, 2-p                          37    ml     --------- thickening. BONES: Status post anterior cervical fusion. OTHER: Negative. CONCLUSION: Normal examination.      Dictated by (CST): Kajal Garcia MD on 3/31/2021 at 1:10 PM     Finalized by (CST): Kajal Garcia MD on 3/31/2021 at the left lung base. No focal opacity in the right lung. No pleural effusion. OTHER:  Multilevel degenerative change thoracic spine. There is also evidence of postsurgical change/fusion in the lower cervical spine.          CONCLUSION:   Mild linear opaci transmitted to the [facility]) [address]) which includes the Dose Index Registry. FINDINGS:  CARDIAC: Mild cardiomegaly and suggestion of left ventricular hypertrophy.   MEDIASTINUM/YOAV: No mass or enla poorly defined ground-glass airspace opacities bilaterally. I can not exclude multifocal pneumonia/atelectasis. Consider COVID-19 pneumonia. Recommend follow-up chest x-ray.  5. Results were called to the emergency room by Dr. Dar Carbone and discussed with MARIANNA doctor for a repeat blood pressure check and further discussion of lifestyle modifications that include Weight Reduction - Dietary Sodium Restriction - Increased Physical Activity and Moderation in alcohol (ETOH) Consumption.  If possible check your pressur

## 2021-03-31 NOTE — TELEPHONE ENCOUNTER
Patients daughter is requesting to speak with Dr. Parag Rodríguez regarding ADA extension due to her mothers vaccination status. She just needs a letter stating her mother is at risk and not vaccinated at this time.  Please advise, patient needs response ASAP as it r

## 2021-03-31 NOTE — PROGRESS NOTES
HPI/Subjective:     Patient ID: Stanley Garcia is a 62year old female.     HPI  Patient comes in today for follow-up after discharge from the hospital patient recently had a cervical spine surgery at [facility] was sent home in few days later and up in E 10 mg by mouth daily. 30 tablet 0   • amLODIPine Besylate 5 MG Oral Tab Take 1 tablet (5 mg total) by mouth As Directed. 90 tablet 1   • Metoprolol Succinate ER 25 MG Oral Tablet 24 Hr Take 1 tablet (25 mg total) by mouth daily.  90 tablet 2   • pregabalin hyperlipidemia    • Spinal stenosis    • TIA (transient ischemic attack)    • Type II or unspecified type diabetes mellitus without mention of complication, not stated as uncontrolled       Past Surgical History:   Procedure Laterality Date   • ANTERIOR DI Rhythm: Normal rate and regular rhythm. Heart sounds: Normal heart sounds. Pulmonary:      Effort: Pulmonary effort is normal.      Breath sounds: Normal breath sounds.    Abdominal:      General: Bowel sounds are normal.      Palpations: Abdomen is

## 2021-04-01 NOTE — TELEPHONE ENCOUNTER
Can we find out what was the name of the daughter needing the letter and and her   I spoke with her yesterday I need to the the leter

## 2021-04-05 ENCOUNTER — TELEPHONE (OUTPATIENT)
Dept: ENDOCRINOLOGY CLINIC | Facility: CLINIC | Age: 61
End: 2021-04-05

## 2021-04-05 DIAGNOSIS — E11.9 TYPE 2 DIABETES MELLITUS WITHOUT RETINOPATHY (HCC): Primary | ICD-10-CM

## 2021-04-05 RX ORDER — EMPAGLIFLOZIN 10 MG/1
TABLET, FILM COATED ORAL
Qty: 30 TABLET | Refills: 0 | OUTPATIENT
Start: 2021-04-05

## 2021-04-05 NOTE — TELEPHONE ENCOUNTER
LOV:  NOV:    Doesn't look like this medication has been prescribed my Dr Lasha Mitchell. May have been sent in error.  Will sent it to the doctor for review

## 2021-04-05 NOTE — TELEPHONE ENCOUNTER
Pharmacy refill request for:    Jardiance 10mg Tablets  Take 1 Tablet by mouth daily  QTY: 30    Please follow up, thank you.

## 2021-04-05 NOTE — TELEPHONE ENCOUNTER
Hi!  I did start this medication. I would like to see this patient in follow-up. She has been in the hospital a could of times since she last saw me in February. Could we please call her and ask her to set up a follow-up visit? Thank you!

## 2021-04-06 ENCOUNTER — OFFICE VISIT (OUTPATIENT)
Dept: SURGERY | Facility: CLINIC | Age: 61
End: 2021-04-06
Payer: MEDICAID

## 2021-04-06 VITALS
WEIGHT: 200 LBS | HEIGHT: 66 IN | BODY MASS INDEX: 32.14 KG/M2 | SYSTOLIC BLOOD PRESSURE: 126 MMHG | DIASTOLIC BLOOD PRESSURE: 86 MMHG

## 2021-04-06 DIAGNOSIS — R29.898 LEFT ARM WEAKNESS: ICD-10-CM

## 2021-04-06 DIAGNOSIS — Z98.1 S/P CERVICAL SPINAL FUSION: Primary | ICD-10-CM

## 2021-04-06 PROCEDURE — 3074F SYST BP LT 130 MM HG: CPT | Performed by: PHYSICIAN ASSISTANT

## 2021-04-06 PROCEDURE — 3008F BODY MASS INDEX DOCD: CPT | Performed by: PHYSICIAN ASSISTANT

## 2021-04-06 PROCEDURE — 99024 POSTOP FOLLOW-UP VISIT: CPT | Performed by: PHYSICIAN ASSISTANT

## 2021-04-06 PROCEDURE — 3079F DIAST BP 80-89 MM HG: CPT | Performed by: PHYSICIAN ASSISTANT

## 2021-04-06 NOTE — PROGRESS NOTES
Today is a good day  Having pain in left shoulder area and in the back occasionally  This is post op pain  Toes are still numb, fingertips are a little better on the left hand  Has some numbness in the face and throat

## 2021-04-06 NOTE — TELEPHONE ENCOUNTER
Med already filled today. See TE    LOV 2/4/21 RTC 6 weeks  No F/U     Pt has been hospitalized.     RN sent [address] reminder for pt to book apt

## 2021-04-06 NOTE — PROGRESS NOTES
Patient: Mary Tran    Medical Record Number: [id number]    PCP: Author Allan MD      HISTORY OF CHIEF COMPLAINT:    Mary Tran is a 62year old female, who presents 2 weeks post op from C4-7 ACDF with Dr. Kisha Price.  Her post-op course was co Subcutaneous Solution Inject 14 Units into the skin nightly. • Albuterol Sulfate  (90 Base) MCG/ACT Inhalation Aero Soln Inhale 2 puffs into the lungs every 6 (six) hours as needed.        • MetFORMIN HCl 500 MG Oral Tab Take 2 tablets (1,000 m have improved. She feels her swallowing is slowly improving. We discussed that it will take time for her pain and numbness to continue improving and we will continue to follow her progress. She was given a referral to begin working with PT.  She will f/u in

## 2021-04-07 ENCOUNTER — TELEPHONE (OUTPATIENT)
Dept: INTERNAL MEDICINE CLINIC | Facility: CLINIC | Age: 61
End: 2021-04-07

## 2021-04-07 NOTE — TELEPHONE ENCOUNTER
Prior authorization for eliquis has been initiated through [employer] using keycode: [id number] It takes about 1-5 business days for a decision to come back.

## 2021-04-07 NOTE — TELEPHONE ENCOUNTER
Pt requesting for refills on Eliquis. Pt states since her discharge from the hospital, she needed to taper her Eliquis for 6 days, 4 tabs bid, then 2 tabs bid, then 1 tab bid. Pt now taking 1 tablet bid, but is on her last pill  Chart reviewed.  Pt has ref

## 2021-04-08 NOTE — TELEPHONE ENCOUNTER
Patient states she is out of medication Eliquis and is still waiting for medication to be authorized. Patient would like a call when medication is authorized.      Viki contacted to see if patient could get a 5 day supply, but patient would have to pay

## 2021-04-08 NOTE — TELEPHONE ENCOUNTER
Prior authorization for eliquis has been approved from 1/8/21 through 10/8/21 pharmacy has been notified. Spoke to [employer] and its still not going through.      I called [employer] and they need to run it for 50 tablets for 25 days because aly

## 2021-04-20 ENCOUNTER — APPOINTMENT (OUTPATIENT)
Dept: MRI IMAGING | Facility: HOSPITAL | Age: 61
End: 2021-04-20
Attending: EMERGENCY MEDICINE
Payer: MEDICAID

## 2021-04-20 ENCOUNTER — APPOINTMENT (OUTPATIENT)
Dept: ULTRASOUND IMAGING | Facility: HOSPITAL | Age: 61
End: 2021-04-20
Attending: EMERGENCY MEDICINE
Payer: MEDICAID

## 2021-04-20 ENCOUNTER — HOSPITAL ENCOUNTER (EMERGENCY)
Facility: HOSPITAL | Age: 61
Discharge: HOME | End: 2021-04-21
Attending: EMERGENCY MEDICINE
Payer: MEDICAID

## 2021-04-20 DIAGNOSIS — M79.604 ACUTE LEG PAIN, RIGHT: Primary | ICD-10-CM

## 2021-04-20 PROCEDURE — 36415 COLL VENOUS BLD VENIPUNCTURE: CPT

## 2021-04-20 PROCEDURE — 81001 URINALYSIS AUTO W/SCOPE: CPT | Performed by: EMERGENCY MEDICINE

## 2021-04-20 PROCEDURE — 99284 EMERGENCY DEPT VISIT MOD MDM: CPT

## 2021-04-20 PROCEDURE — A9575 INJ GADOTERATE MEGLUMI 0.1ML: HCPCS | Performed by: EMERGENCY MEDICINE

## 2021-04-20 PROCEDURE — 93971 EXTREMITY STUDY: CPT | Performed by: EMERGENCY MEDICINE

## 2021-04-20 PROCEDURE — 72156 MRI NECK SPINE W/O & W/DYE: CPT | Performed by: EMERGENCY MEDICINE

## 2021-04-20 PROCEDURE — 80048 BASIC METABOLIC PNL TOTAL CA: CPT | Performed by: EMERGENCY MEDICINE

## 2021-04-21 VITALS
BODY MASS INDEX: 30.53 KG/M2 | SYSTOLIC BLOOD PRESSURE: 122 MMHG | OXYGEN SATURATION: 98 % | HEART RATE: 98 BPM | HEIGHT: 66 IN | RESPIRATION RATE: 20 BRPM | TEMPERATURE: 98 F | DIASTOLIC BLOOD PRESSURE: 84 MMHG | WEIGHT: 190 LBS

## 2021-04-21 NOTE — ED QUICK NOTES
Pt states her rt leg has been throbbing when not elevated. States is concerned it could be a blood clot. Pt is on Eliquis. States pain in rt posterior upper calf. Denies shortness of breath. Pt also states has a headache and weakness in arms.  States that m

## 2021-04-21 NOTE — ED PROVIDER NOTES
Patient Seen in: [facility] AND [facility] Emergency Department      History   Patient presents with:  Deep Vein Thrombosis    Stated Complaint: Possible DVT    HPI/Subjective:   HPI    The patient is a 67-year-old female status post cervical fusion on 3/23/2021 History    Tobacco Use      Smoking status: Current Every Day Smoker        Packs/day: 1.00        Years: 20.00        Pack years: 20        Types: Cigarettes      Smokeless tobacco: Never Used    Vaping Use      Vaping Use: Never used    Alcohol use: Not Normal range of motion and neck supple. Right lower leg: No edema. Left lower leg: No edema. Skin:     General: Skin is warm and dry. Capillary Refill: Capillary refill takes less than 2 seconds. Findings: No erythema or rash.    Neuro RAINBOW DRAW GOLD   CBC W/ DIFFERENTIAL          Patient is MRI without acute finding to explain the sensation that her arms are weak. Clinically I do not appreciate the weakness she is describing. Low suspicion for DVT but still pending.   Signed out t [id number]  [phone]          Yoselin Cordoba MD  [facility] [address]    Schedule an appointment as soon as possible for a visit in 1 day            Medications Prescribed:  Current Discharge Medication List

## 2021-04-21 NOTE — ED INITIAL ASSESSMENT (HPI)
C/o right leg pain and swelling for the past few days, on eloquis for PE diagnosed 3/26.  Pt presents in aspen collar s/p \"spine and neck surgery 3/22\"

## 2021-05-03 ENCOUNTER — HOSPITAL ENCOUNTER (OUTPATIENT)
Dept: GENERAL RADIOLOGY | Age: 61
Discharge: HOME | End: 2021-05-03
Attending: PHYSICIAN ASSISTANT
Payer: MEDICAID

## 2021-05-03 DIAGNOSIS — Z98.1 S/P CERVICAL SPINAL FUSION: ICD-10-CM

## 2021-05-03 PROCEDURE — 72040 X-RAY EXAM NECK SPINE 2-3 VW: CPT | Performed by: PHYSICIAN ASSISTANT

## 2021-05-06 ENCOUNTER — OFFICE VISIT (OUTPATIENT)
Dept: SURGERY | Facility: CLINIC | Age: 61
End: 2021-05-06
Payer: MEDICAID

## 2021-05-06 VITALS
DIASTOLIC BLOOD PRESSURE: 76 MMHG | BODY MASS INDEX: 31.34 KG/M2 | WEIGHT: 195 LBS | HEART RATE: 75 BPM | SYSTOLIC BLOOD PRESSURE: 108 MMHG | HEIGHT: 66 IN

## 2021-05-06 DIAGNOSIS — M54.2 CERVICAL PAIN: ICD-10-CM

## 2021-05-06 DIAGNOSIS — Z98.1 S/P CERVICAL SPINAL FUSION: Primary | ICD-10-CM

## 2021-05-06 DIAGNOSIS — R29.898 WEAKNESS OF BOTH UPPER EXTREMITIES: ICD-10-CM

## 2021-05-06 PROCEDURE — 3074F SYST BP LT 130 MM HG: CPT | Performed by: PHYSICIAN ASSISTANT

## 2021-05-06 PROCEDURE — 3078F DIAST BP <80 MM HG: CPT | Performed by: PHYSICIAN ASSISTANT

## 2021-05-06 PROCEDURE — 99024 POSTOP FOLLOW-UP VISIT: CPT | Performed by: PHYSICIAN ASSISTANT

## 2021-05-06 PROCEDURE — 3008F BODY MASS INDEX DOCD: CPT | Performed by: PHYSICIAN ASSISTANT

## 2021-05-06 NOTE — PROGRESS NOTES
Patient here for 6-week postop follow-up sx 03/22/21 for: ANTERIOR DISCECTOMY AND FUSION. CERVICAL 4- CERVICAL 5, CERVICAL5 - CERVICAL 6, AND CERVICAL 6- CERVICAL 7. ALLOGRAFT AND ALL REQUIRED INSTRUMENTATION.     Patient still with numbness to right cheek

## 2021-05-06 NOTE — PATIENT INSTRUCTIONS
Refill policies:    • Allow 2-3 business days for refills; controlled substances may take longer.   • Contact your pharmacy at least 5 days prior to running out of medication and have them send an electronic request or submit request through the “request re Depending on your insurance carrier, approval may take 3-10 days. It is highly recommended patients contact their insurance carrier directly to determine coverage.   If test is done without insurance authorization, patient may be responsible for the entire smoking  Discussed risk of pseudoarthrosis because of smoking.   PT

## 2021-05-06 NOTE — PROGRESS NOTES
[facility]  Follow up      Fior Hensley is a 62year old female s/p C4-7 ACDF 3/22/21. Breathing is fine. Voice is better. Swallowing full but improving. New left facial numbness, old right facial numbness.  Not usi except the left arm and left hand and left foot improving only 3 toes now. Gait unsteady and myelopathic. Patient can heel and toe walk. Negative Spurling's. Negative Wallace's.      Upper extremity strength: Te R 5-, L 4+      Deltoid    Triceps     Bicep stenosis. C6-C7:  Large posterior disc-osteophyte complex with superimposed broad-based central disc protrusion/extrusion, which significantly effaces the ventral thecal sac and results in severe spinal canal stenosis.   Mild bilateral uncovertebral joint

## 2021-05-18 ENCOUNTER — OFFICE VISIT (OUTPATIENT)
Dept: ENDOCRINOLOGY CLINIC | Facility: CLINIC | Age: 61
End: 2021-05-18
Payer: MEDICAID

## 2021-05-18 VITALS
DIASTOLIC BLOOD PRESSURE: 79 MMHG | HEIGHT: 66 IN | SYSTOLIC BLOOD PRESSURE: 123 MMHG | HEART RATE: 104 BPM | RESPIRATION RATE: 16 BRPM | WEIGHT: 195 LBS | BODY MASS INDEX: 31.34 KG/M2

## 2021-05-18 DIAGNOSIS — E78.2 MIXED HYPERLIPIDEMIA: ICD-10-CM

## 2021-05-18 DIAGNOSIS — I10 ESSENTIAL HYPERTENSION: ICD-10-CM

## 2021-05-18 DIAGNOSIS — E11.9 TYPE 2 DIABETES MELLITUS WITHOUT RETINOPATHY (HCC): Primary | ICD-10-CM

## 2021-05-18 PROCEDURE — 82947 ASSAY GLUCOSE BLOOD QUANT: CPT | Performed by: INTERNAL MEDICINE

## 2021-05-18 PROCEDURE — 3074F SYST BP LT 130 MM HG: CPT | Performed by: INTERNAL MEDICINE

## 2021-05-18 PROCEDURE — 3078F DIAST BP <80 MM HG: CPT | Performed by: INTERNAL MEDICINE

## 2021-05-18 PROCEDURE — 3051F HG A1C>EQUAL 7.0%<8.0%: CPT | Performed by: INTERNAL MEDICINE

## 2021-05-18 PROCEDURE — 99214 OFFICE O/P EST MOD 30 MIN: CPT | Performed by: INTERNAL MEDICINE

## 2021-05-18 PROCEDURE — 83036 HEMOGLOBIN GLYCOSYLATED A1C: CPT | Performed by: INTERNAL MEDICINE

## 2021-05-18 PROCEDURE — 36416 COLLJ CAPILLARY BLOOD SPEC: CPT | Performed by: INTERNAL MEDICINE

## 2021-05-18 PROCEDURE — 3008F BODY MASS INDEX DOCD: CPT | Performed by: INTERNAL MEDICINE

## 2021-05-18 NOTE — PROGRESS NOTES
Name: Lindsay Jose Gbartoloem  Date: 5/18/21    Referring Physician: Dr. Layla Villatoro is a 62year old female who presents for follow-up of evaluaton and management of uncontrolled T2D.  She was diagnosed about 2/09/21    Skin: Infection or ulceration: none    Osteoporosis: none    Thyroid disease: having TSH checked    Medications:     Current Outpatient Medications:   •  apixaban 5 MG Oral Tab, Take 1 tablet (5 mg total) by mouth 2 (two) times daily. , Disp: 180 Take 1-2 tablets by mouth every 4 (four) hours as needed for Pain. (Patient not taking: Reported on 4/6/2021 ), Disp: 60 tablet, Rfl: 0  •  insulin glargine 100 UNIT/ML Subcutaneous Solution, Inject 14 Units into the skin nightly.    (Patient not taking: Re LIGATION  1986         PHYSICAL EXAM   05/18/21  1315   Resp: 16   Weight: 195 lb (88.5 kg)   Height: 5' 6\" (1.676 m)   BMI-32.58----> 31.47    General Appearance:  alert, well developed, in no acute distress  Eyes:  normal conjunctivae, sclera. , normal s 25mg  - Continue to check blood sugars fasting and pre-meals  - Contact us with blood sugars <70 and >300  - We will contact her in 2 weeks with blood sugar check    2.) Management of Diabetic Complications- We discussed the complications of diabetes inclu

## 2021-05-19 RX ORDER — EMPAGLIFLOZIN 25 MG/1
25 TABLET, FILM COATED ORAL DAILY
Qty: 90 TABLET | Refills: 0 | Status: SHIPPED | OUTPATIENT
Start: 2021-05-19 | End: 2021-08-23

## 2021-05-25 ENCOUNTER — TELEPHONE (OUTPATIENT)
Dept: ENDOCRINOLOGY CLINIC | Facility: CLINIC | Age: 61
End: 2021-05-25

## 2021-05-25 NOTE — TELEPHONE ENCOUNTER
Prior Authorization request for:    •  Empagliflozin (JARDIANCE) 25 MG Oral Tab, Take 25 mg by mouth daily. , Disp: 90 tablet, Rfl: 0    MESSAGE:  Plan does not cover this medication.  Please call plan at [phone] to initiate prior authorization or call/

## 2021-05-26 NOTE — TELEPHONE ENCOUNTER
Called pharmacy and spoke to Bibi. Drug should not be requiring PA as it's in the formulary w/o PA. RN asked to try another [facility] code as this was an issue before with another patient. She put through another [facility] and drug is now going through.   No further

## 2021-05-27 ENCOUNTER — TELEPHONE (OUTPATIENT)
Dept: INTERNAL MEDICINE CLINIC | Facility: CLINIC | Age: 61
End: 2021-05-27

## 2021-06-01 ENCOUNTER — TELEPHONE (OUTPATIENT)
Dept: INTERNAL MEDICINE CLINIC | Facility: CLINIC | Age: 61
End: 2021-06-01

## 2021-06-01 DIAGNOSIS — M47.22 OSTEOARTHRITIS OF SPINE WITH RADICULOPATHY, CERVICAL REGION: Primary | ICD-10-CM

## 2021-06-01 NOTE — TELEPHONE ENCOUNTER
Tomaslala Oswald pt will like a referral for Physical therapy. Pt stated that she had neck and spine surgery on March 22,2021. Her surgeon Shin Nolen informed pt that she now needs Physical therapy and the referral needs to come from her PCP.  Please Advise Referral

## 2021-06-01 NOTE — TELEPHONE ENCOUNTER
Pt was called and informed of Dr. Pat Navarro message below. Informed Physical Therapy will have to call the surgeon as to what kind of therapy is needed. Pt verbalized understanding.       Lelo Mckee MD  You 6 hours ago (10:18 AM)     Ok, aproved but they n

## 2021-06-08 ENCOUNTER — OFFICE VISIT (OUTPATIENT)
Dept: PHYSICAL THERAPY | Facility: HOSPITAL | Age: 61
End: 2021-06-08
Attending: INTERNAL MEDICINE
Payer: MEDICAID

## 2021-06-08 DIAGNOSIS — R26.9 NEUROLOGIC GAIT DYSFUNCTION: ICD-10-CM

## 2021-06-08 DIAGNOSIS — M48.02 CERVICAL STENOSIS OF SPINAL CANAL: ICD-10-CM

## 2021-06-08 DIAGNOSIS — M54.2 CERVICAL PAIN: ICD-10-CM

## 2021-06-08 DIAGNOSIS — Z98.1 S/P CERVICAL SPINAL FUSION: ICD-10-CM

## 2021-06-08 DIAGNOSIS — R29.898 WEAKNESS OF BOTH UPPER EXTREMITIES: ICD-10-CM

## 2021-06-08 DIAGNOSIS — G95.9 CERVICAL MYELOPATHY WITH CERVICAL RADICULOPATHY (HCC): ICD-10-CM

## 2021-06-08 DIAGNOSIS — M54.12 CERVICAL MYELOPATHY WITH CERVICAL RADICULOPATHY (HCC): ICD-10-CM

## 2021-06-08 DIAGNOSIS — R29.898 LEFT ARM WEAKNESS: ICD-10-CM

## 2021-06-08 PROCEDURE — 97162 PT EVAL MOD COMPLEX 30 MIN: CPT

## 2021-06-08 PROCEDURE — 97110 THERAPEUTIC EXERCISES: CPT

## 2021-06-08 NOTE — PROGRESS NOTES
SPINE EVALUATION:   Referring Physician: Dr. Domenic Oakes  Diagnosis:     S/P cervical spinal fusion (Z98.1)  Cervical pain (M54.2)  Weakness of both upper extremities (R29.898)  Left arm weakness (R29.898)  Cervical myelopathy with cervical radiculopathy ([facility] [address] to raise her arms overhead without pain, walk longer distances  Past medical history was reviewed with Kurt Manuel.  Significant findings include  has a past medical history of Asthma, B12 deficiency, Back pain, Back problem, COPD (chronic obstructive pulmona 10; L 15*  Rotation: R 60; L 50    Shoulder AROM:   Standing Flex L =115* ; R = 125  Supine L = 150  R = 170  Standing abd B = 140.   Supine L = 160, R = 180  ER L = 70 R = 90  IR (B) WNL    Palpation: TTP in L lateral cervical    Strength: (* denotes perfo carrying, reaching  · Pt will be able to amb 500' to facilitate ability to run errands and shop    Frequency / Duration: Patient will be seen for 1-2 x/week or a total of 18 visits over a 90 day period.  Treatment will include: Gait training, Manual Therapy

## 2021-06-14 ENCOUNTER — OFFICE VISIT (OUTPATIENT)
Dept: PHYSICAL THERAPY | Facility: HOSPITAL | Age: 61
End: 2021-06-14
Attending: INTERNAL MEDICINE
Payer: MEDICAID

## 2021-06-14 PROCEDURE — 97110 THERAPEUTIC EXERCISES: CPT

## 2021-06-14 NOTE — PROGRESS NOTES
Diagnosis: S/P cervical spinal fusion (Z98.1)     s/p C4-7 ACDF on 3/22/21   Cervical pain (M54.2)  Weakness of both upper extremities (R29.898)  Left arm weakness (R29.898)  Cervical myelopathy with cervical radiculopathy (HCC) (G95.9,M54.12)  Cervical st weakness

## 2021-06-25 ENCOUNTER — APPOINTMENT (OUTPATIENT)
Dept: PHYSICAL THERAPY | Facility: HOSPITAL | Age: 61
End: 2021-06-25
Attending: INTERNAL MEDICINE
Payer: MEDICAID

## 2021-06-25 ENCOUNTER — TELEPHONE (OUTPATIENT)
Dept: PHYSICAL THERAPY | Facility: HOSPITAL | Age: 61
End: 2021-06-25

## 2021-06-30 ENCOUNTER — OFFICE VISIT (OUTPATIENT)
Dept: PHYSICAL THERAPY | Facility: HOSPITAL | Age: 61
End: 2021-06-30
Attending: INTERNAL MEDICINE
Payer: MEDICAID

## 2021-06-30 PROCEDURE — 97110 THERAPEUTIC EXERCISES: CPT

## 2021-06-30 PROCEDURE — 97140 MANUAL THERAPY 1/> REGIONS: CPT

## 2021-06-30 NOTE — PROGRESS NOTES
Diagnosis: S/P cervical spinal fusion (Z98.1)     s/p C4-7 ACDF on 3/22/21   Cervical pain (M54.2)  Weakness of both upper extremities (R29.898)  Left arm weakness (R29.898)  Cervical myelopathy with cervical radiculopathy (HCC) (G95.9,M54.12)  Cervical st shoulder flex supine.  Cont STM and NuStep    From al for reference:  s/p C4-7 ACDF on 3/22/21 with complaints of:  · pain in the central and L cervical and suprascapular region = 0-8/10  · Pain in the (L) proximal arm  = 0-6/10  · Constant numbness and t

## 2021-07-08 RX ORDER — APIXABAN 5 MG/1
TABLET, FILM COATED ORAL
Qty: 180 TABLET | Refills: 0 | Status: SHIPPED | OUTPATIENT
Start: 2021-07-08 | End: 2021-09-07

## 2021-07-13 ENCOUNTER — OFFICE VISIT (OUTPATIENT)
Dept: PHYSICAL THERAPY | Facility: HOSPITAL | Age: 61
End: 2021-07-13
Attending: INTERNAL MEDICINE
Payer: MEDICAID

## 2021-07-13 ENCOUNTER — HOSPITAL ENCOUNTER (OUTPATIENT)
Dept: GENERAL RADIOLOGY | Facility: HOSPITAL | Age: 61
Discharge: HOME | End: 2021-07-13
Attending: PHYSICIAN ASSISTANT
Payer: MEDICAID

## 2021-07-13 DIAGNOSIS — M54.2 CERVICAL PAIN: ICD-10-CM

## 2021-07-13 DIAGNOSIS — R29.898 WEAKNESS OF BOTH UPPER EXTREMITIES: ICD-10-CM

## 2021-07-13 DIAGNOSIS — Z98.1 S/P CERVICAL SPINAL FUSION: ICD-10-CM

## 2021-07-13 PROCEDURE — 72050 X-RAY EXAM NECK SPINE 4/5VWS: CPT | Performed by: PHYSICIAN ASSISTANT

## 2021-07-13 PROCEDURE — 97110 THERAPEUTIC EXERCISES: CPT

## 2021-07-13 NOTE — PROGRESS NOTES
Diagnosis: S/P cervical spinal fusion (Z98.1)     s/p C4-7 ACDF on 3/22/21   Cervical pain (M54.2)  Weakness of both upper extremities (R29.898)  Left arm weakness (R29.898)  Cervical myelopathy with cervical radiculopathy (HCC) (G95.9,M54.12)  Cervical st decreased anti-gravity shoulder flex B      Plan: progress UE strengthening as able - IR with yellow, ER sidelying, shoulder flex supine.  Cont STM and NuStep    From [facility] for reference:  s/p C4-7 ACDF on 3/22/21 with complaints of:  · pain in the central a

## 2021-07-19 ENCOUNTER — OFFICE VISIT (OUTPATIENT)
Dept: PHYSICAL THERAPY | Facility: HOSPITAL | Age: 61
End: 2021-07-19
Attending: INTERNAL MEDICINE
Payer: MEDICAID

## 2021-07-19 PROCEDURE — 97110 THERAPEUTIC EXERCISES: CPT

## 2021-07-19 PROCEDURE — 97140 MANUAL THERAPY 1/> REGIONS: CPT

## 2021-07-19 NOTE — PROGRESS NOTES
Diagnosis: S/P cervical spinal fusion (Z98.1)     s/p C4-7 ACDF on 3/22/21   Cervical pain (M54.2)  Weakness of both upper extremities (R29.898)  Left arm weakness (R29.898)  Cervical myelopathy with cervical radiculopathy (HCC) (G95.9,M54.12)  Cervical st Modalities    HP cervical during UE PROM and neuromob     HEP to date: neuromob, GH flex sup, sup pressups  Piriformis stretch  Education:     Assessment: Pt cont to exhibit decreased anti-gravity shoulder flex B      Plan: Cont to progress UE strengthen

## 2021-07-21 ENCOUNTER — APPOINTMENT (OUTPATIENT)
Dept: PHYSICAL THERAPY | Facility: HOSPITAL | Age: 61
End: 2021-07-21
Attending: INTERNAL MEDICINE
Payer: MEDICAID

## 2021-07-21 ENCOUNTER — OFFICE VISIT (OUTPATIENT)
Dept: INTERNAL MEDICINE CLINIC | Facility: CLINIC | Age: 61
End: 2021-07-21
Payer: MEDICAID

## 2021-07-21 VITALS
HEIGHT: 66 IN | BODY MASS INDEX: 30.53 KG/M2 | RESPIRATION RATE: 17 BRPM | DIASTOLIC BLOOD PRESSURE: 76 MMHG | SYSTOLIC BLOOD PRESSURE: 120 MMHG | WEIGHT: 190 LBS | HEART RATE: 76 BPM | TEMPERATURE: 99 F | OXYGEN SATURATION: 99 %

## 2021-07-21 DIAGNOSIS — G89.29 CHRONIC BILATERAL LOW BACK PAIN WITHOUT SCIATICA: ICD-10-CM

## 2021-07-21 DIAGNOSIS — Z71.6 ENCOUNTER FOR SMOKING CESSATION COUNSELING: ICD-10-CM

## 2021-07-21 DIAGNOSIS — R91.8 LUNG NODULES: ICD-10-CM

## 2021-07-21 DIAGNOSIS — G47.00 INSOMNIA, UNSPECIFIED TYPE: ICD-10-CM

## 2021-07-21 DIAGNOSIS — M54.50 CHRONIC BILATERAL LOW BACK PAIN WITHOUT SCIATICA: ICD-10-CM

## 2021-07-21 DIAGNOSIS — Z12.31 SCREENING MAMMOGRAM, ENCOUNTER FOR: ICD-10-CM

## 2021-07-21 DIAGNOSIS — Z00.00 ENCOUNTER FOR PREVENTIVE CARE: ICD-10-CM

## 2021-07-21 DIAGNOSIS — E11.9 TYPE 2 DIABETES MELLITUS WITHOUT RETINOPATHY (HCC): Primary | ICD-10-CM

## 2021-07-21 PROCEDURE — 99214 OFFICE O/P EST MOD 30 MIN: CPT | Performed by: INTERNAL MEDICINE

## 2021-07-21 PROCEDURE — 3078F DIAST BP <80 MM HG: CPT | Performed by: INTERNAL MEDICINE

## 2021-07-21 PROCEDURE — 3074F SYST BP LT 130 MM HG: CPT | Performed by: INTERNAL MEDICINE

## 2021-07-21 PROCEDURE — 3008F BODY MASS INDEX DOCD: CPT | Performed by: INTERNAL MEDICINE

## 2021-07-21 RX ORDER — BLOOD SUGAR DIAGNOSTIC
STRIP MISCELLANEOUS
COMMUNITY
Start: 2021-07-16 | End: 2021-10-15

## 2021-07-21 RX ORDER — BLOOD SUGAR DIAGNOSTIC
STRIP MISCELLANEOUS
Qty: 100 STRIP | Refills: 3 | Status: CANCELLED | OUTPATIENT
Start: 2021-07-21

## 2021-07-21 RX ORDER — VARENICLINE TARTRATE 0.5 (11)-1
0.5 KIT ORAL 2 TIMES DAILY
Qty: 1 EACH | Refills: 2 | Status: SHIPPED | OUTPATIENT
Start: 2021-07-21 | End: 2021-08-20

## 2021-07-21 NOTE — PROGRESS NOTES
HPI/Subjective:     Patient ID: Candy Butcher is a 62year old female. HPI  She comes in today for follow-up she has history of for lung nodules patient is seeing pulmonary before has had biopsy was told to need to follow-up in 1 year.   Patient co 90 tablet 0   • HYDROcodone-acetaminophen  MG Oral Tab Take 1-2 tablets by mouth every 4 (four) hours as needed for Pain.  60 tablet 0   • cyclobenzaprine 10 MG Oral Tab Take 1 tablet (10 mg total) by mouth 3 (three) times daily as needed for Muscle s Surgical History:   Procedure Laterality Date   • CHOLECYSTECTOMY  1994   • COLONOSCOPY N/A 8/11/2017    Procedure: COLONOSCOPY, POSSIBLE BIOPSY, POSSIBLE POLYPECTOMY 35574;  Surgeon: Heraclio Aparicio MD;  Location: [address] [place] [address] Cervical back: Normal range of motion and neck supple. Comments: Low back pain    Cervical spine pain    Skin:     General: Skin is warm and dry. Neurological:      Mental Status: She is alert and oriented to person, place, and time.       Deep Tendo

## 2021-07-22 ENCOUNTER — HOSPITAL ENCOUNTER (OUTPATIENT)
Dept: GENERAL RADIOLOGY | Facility: HOSPITAL | Age: 61
Discharge: HOME | End: 2021-07-22
Attending: PHYSICIAN ASSISTANT
Payer: MEDICAID

## 2021-07-22 ENCOUNTER — OFFICE VISIT (OUTPATIENT)
Dept: SURGERY | Facility: CLINIC | Age: 61
End: 2021-07-22
Payer: MEDICAID

## 2021-07-22 VITALS — SYSTOLIC BLOOD PRESSURE: 120 MMHG | DIASTOLIC BLOOD PRESSURE: 78 MMHG | HEART RATE: 84 BPM

## 2021-07-22 DIAGNOSIS — R29.898 LEG FATIGUE: ICD-10-CM

## 2021-07-22 DIAGNOSIS — R29.898 LEG HEAVINESS: ICD-10-CM

## 2021-07-22 DIAGNOSIS — M54.41 CHRONIC MIDLINE LOW BACK PAIN WITH BILATERAL SCIATICA: Primary | ICD-10-CM

## 2021-07-22 DIAGNOSIS — M62.89 MUSCLE TIGHTNESS: ICD-10-CM

## 2021-07-22 DIAGNOSIS — R20.2 NUMBNESS AND TINGLING OF BOTH LEGS: ICD-10-CM

## 2021-07-22 DIAGNOSIS — G95.19 NEUROGENIC CLAUDICATION (HCC): ICD-10-CM

## 2021-07-22 DIAGNOSIS — R29.898 WEAKNESS OF BOTH LOWER EXTREMITIES: ICD-10-CM

## 2021-07-22 DIAGNOSIS — M47.812 CERVICAL FACET JOINT SYNDROME: ICD-10-CM

## 2021-07-22 DIAGNOSIS — Z98.1 S/P CERVICAL SPINAL FUSION: ICD-10-CM

## 2021-07-22 DIAGNOSIS — M54.41 CHRONIC MIDLINE LOW BACK PAIN WITH BILATERAL SCIATICA: ICD-10-CM

## 2021-07-22 DIAGNOSIS — G89.29 CHRONIC MIDLINE LOW BACK PAIN WITH BILATERAL SCIATICA: Primary | ICD-10-CM

## 2021-07-22 DIAGNOSIS — G89.29 CHRONIC MIDLINE LOW BACK PAIN WITH BILATERAL SCIATICA: ICD-10-CM

## 2021-07-22 DIAGNOSIS — M62.81 MUSCLE WEAKNESS ON EXAMINATION: ICD-10-CM

## 2021-07-22 DIAGNOSIS — R20.0 NUMBNESS AND TINGLING OF BOTH LEGS: ICD-10-CM

## 2021-07-22 DIAGNOSIS — M54.42 CHRONIC MIDLINE LOW BACK PAIN WITH BILATERAL SCIATICA: ICD-10-CM

## 2021-07-22 DIAGNOSIS — M54.42 CHRONIC MIDLINE LOW BACK PAIN WITH BILATERAL SCIATICA: Primary | ICD-10-CM

## 2021-07-22 PROCEDURE — 3078F DIAST BP <80 MM HG: CPT | Performed by: PHYSICIAN ASSISTANT

## 2021-07-22 PROCEDURE — 3074F SYST BP LT 130 MM HG: CPT | Performed by: PHYSICIAN ASSISTANT

## 2021-07-22 PROCEDURE — 72114 X-RAY EXAM L-S SPINE BENDING: CPT | Performed by: PHYSICIAN ASSISTANT

## 2021-07-22 PROCEDURE — 99214 OFFICE O/P EST MOD 30 MIN: CPT | Performed by: PHYSICIAN ASSISTANT

## 2021-07-22 RX ORDER — CYCLOBENZAPRINE HCL 10 MG
10 TABLET ORAL 3 TIMES DAILY PRN
Qty: 40 TABLET | Refills: 0 | Status: SHIPPED | OUTPATIENT
Start: 2021-07-22

## 2021-07-22 NOTE — PROGRESS NOTES
Patient: Gordy Carty  Medical Record Number: [id number]  YOB: 1960  PCP: Mercy Joshi MD    Reason for visit: cervical follow up, lumbar pain    HISTORY OF CHIEF COMPLAINT:    Gordy Carty is a very pleasant 62year old female, gets up to an 8/10 is worse with activity. She complains of left C6 radiculopathy with numbness and tingling she complains of weakness in her left shoulder her left arm and left hand that is been going on since 2019.   She has left leg pain in her calf wea Grandmother    • Diabetes Brother    • Diabetes Brother    • Cataracts Niece    • Macular degeneration Neg       Social History    Socioeconomic History      Marital status: Single      Spouse name: Not on file      Number of children: Not on file      Ravi mg total) by mouth nightly. 90 tablet 1   • amLODIPine Besylate 5 MG Oral Tab Take 1 tablet (5 mg total) by mouth As Directed. 90 tablet 1   • Metoprolol Succinate ER 25 MG Oral Tablet 24 Hr Take 1 tablet (25 mg total) by mouth daily.  90 tablet 2   • glipi P-flexion Great Toe   Right       5         5       5         4+ 5- 4+   Left       5         5       5         4+ 5- 4     Tests:   Test Right   (POS or NEG) Left   (POS or NEG)   Wallace's Sign Neg Neg   Tromner's Sign Neg Neg   Clonus Neg Neg     Incisi Neurogenic claudication    (M62.81) Muscle weakness on examination    (R20.0,  R20.2) Numbness and tingling of both legs    (R29.898) Weakness of both lower extremities    (R29.898) Leg fatigue    (R29.898) Leg heaviness    PLAN:   1.  Medication:    - Flex appreciative. All questions were sought out and thoroughly answered to satisfaction.        Total visit time: 30 min  More than 50% spent coordinating care, providing patient education, reviewing imaging, discussing further imaging, discussing physical ther

## 2021-07-22 NOTE — PROGRESS NOTES
Patient here for a follow up visit. Patient here for a follow up appointment:  C4-7 ACDF 3/22/21     Patient reports pain and reduced strength in her left arm. Patient taking tylenol to treat back pain. Patient reporting fatigue with ambulation.   Patient

## 2021-07-23 ENCOUNTER — HOSPITAL ENCOUNTER (OUTPATIENT)
Dept: MAMMOGRAPHY | Age: 61
Discharge: HOME | End: 2021-07-23
Attending: INTERNAL MEDICINE
Payer: MEDICAID

## 2021-07-23 ENCOUNTER — TELEPHONE (OUTPATIENT)
Dept: INTERNAL MEDICINE CLINIC | Facility: CLINIC | Age: 61
End: 2021-07-23

## 2021-07-23 ENCOUNTER — APPOINTMENT (OUTPATIENT)
Dept: PHYSICAL THERAPY | Facility: HOSPITAL | Age: 61
End: 2021-07-23
Attending: INTERNAL MEDICINE
Payer: MEDICAID

## 2021-07-23 DIAGNOSIS — Z12.31 SCREENING MAMMOGRAM, ENCOUNTER FOR: ICD-10-CM

## 2021-07-23 DIAGNOSIS — N63.0 BREAST LUMP: Primary | ICD-10-CM

## 2021-07-23 NOTE — TELEPHONE ENCOUNTER
Bebo Gamez APRN - Patient requesting a diagnostic mammogram order. Please advise. Thank you. RN/DEISY - please call patient once ordered so she can schedule. Thank you    Had gone to the mammogram appointment this morning.    However, she mentioned to

## 2021-07-26 ENCOUNTER — HOSPITAL ENCOUNTER (OUTPATIENT)
Dept: MAMMOGRAPHY | Facility: HOSPITAL | Age: 61
Discharge: HOME | End: 2021-07-26
Attending: INTERNAL MEDICINE
Payer: MEDICAID

## 2021-07-26 ENCOUNTER — APPOINTMENT (OUTPATIENT)
Dept: PHYSICAL THERAPY | Facility: HOSPITAL | Age: 61
End: 2021-07-26
Attending: INTERNAL MEDICINE
Payer: MEDICAID

## 2021-07-26 ENCOUNTER — HOSPITAL ENCOUNTER (OUTPATIENT)
Dept: ULTRASOUND IMAGING | Facility: HOSPITAL | Age: 61
Discharge: HOME | End: 2021-07-26
Attending: INTERNAL MEDICINE
Payer: MEDICAID

## 2021-07-26 DIAGNOSIS — N63.0 BREAST LUMP: ICD-10-CM

## 2021-07-26 PROCEDURE — 77062 BREAST TOMOSYNTHESIS BI: CPT | Performed by: INTERNAL MEDICINE

## 2021-07-26 PROCEDURE — 76642 ULTRASOUND BREAST LIMITED: CPT | Performed by: INTERNAL MEDICINE

## 2021-07-26 PROCEDURE — 77066 DX MAMMO INCL CAD BI: CPT | Performed by: INTERNAL MEDICINE

## 2021-07-26 NOTE — TELEPHONE ENCOUNTER
Spoke with pt,  verified. Pt informed of MD recommendation, pt stated understanding. Pt call transferred to central [facility] dept.          Future Appointments   Date Time Provider Rebecca Dan   2021  1:30 PM Breana Crawley PTA [facility] PT EM [facility]

## 2021-07-27 ENCOUNTER — OFFICE VISIT (OUTPATIENT)
Dept: PHYSICAL THERAPY | Facility: HOSPITAL | Age: 61
End: 2021-07-27
Attending: INTERNAL MEDICINE
Payer: MEDICAID

## 2021-07-27 PROCEDURE — 97110 THERAPEUTIC EXERCISES: CPT

## 2021-07-27 NOTE — PROGRESS NOTES
Diagnosis: S/P cervical spinal fusion (Z98.1)     s/p C4-7 ACDF on 3/22/21   Cervical pain (M54.2)  Weakness of both upper extremities (R29.898)  Left arm weakness (R29.898)  Cervical myelopathy with cervical radiculopathy (HCC) (G95.9,M54.12)  Cervical st x10, x6  -Supine pressup with 1# 10x2 R, 10x1 L  -ER with sidelying 1# 10x2  -Seated posture education with lumbar roll  -Seated slouch OC x10  -Seated scap squeeze 10x3\"  -Seated shoulder roll x10   Manual STM/MFR L > R cerv/upper thoracic  X   X seated       Today’s Treatment and Response

## 2021-07-28 ENCOUNTER — TELEPHONE (OUTPATIENT)
Dept: NEUROLOGY | Facility: CLINIC | Age: 61
End: 2021-07-28

## 2021-07-28 ENCOUNTER — APPOINTMENT (OUTPATIENT)
Dept: PHYSICAL THERAPY | Facility: HOSPITAL | Age: 61
End: 2021-07-28
Attending: INTERNAL MEDICINE
Payer: MEDICAID

## 2021-07-28 NOTE — TELEPHONE ENCOUNTER
Received notification of imaging denial.  Message routed to provider regarding arranging peer to peer.

## 2021-07-28 NOTE — TELEPHONE ENCOUNTER
Please advise the patient that insurance is requesting 6 weeks of conservative treatment. I will order PT to include the lumbar spine.  If no significant improvement with PT, please call and we will re order the MRI lumbar spine

## 2021-07-29 ENCOUNTER — APPOINTMENT (OUTPATIENT)
Dept: PHYSICAL THERAPY | Facility: HOSPITAL | Age: 61
End: 2021-07-29
Attending: INTERNAL MEDICINE
Payer: MEDICAID

## 2021-07-30 ENCOUNTER — TELEPHONE (OUTPATIENT)
Dept: CASE MANAGEMENT | Age: 61
End: 2021-07-30

## 2021-07-30 NOTE — TELEPHONE ENCOUNTER
Haily Barbosa,    The following order for US Breast was denied by patients insurance. Patient already had US completed on 7/26/21. A peer to peer is available for this order.     [employer] phone#  [phone]    Option# 4

## 2021-08-02 ENCOUNTER — APPOINTMENT (OUTPATIENT)
Dept: PHYSICAL THERAPY | Facility: HOSPITAL | Age: 61
End: 2021-08-02
Attending: INTERNAL MEDICINE
Payer: MEDICAID

## 2021-08-02 NOTE — TELEPHONE ENCOUNTER
Good Afternoon Dr Yasmany Harmon    Peer to Peer info:    Please call phone#  [phone] (option#4)    Ref# [id number]    Thank you,  Yusuf Randall

## 2021-08-02 NOTE — TELEPHONE ENCOUNTER
Denial - US Breast    Good Afternoon Dr Camille Waterman,    Below is letter from [facility] documenting reasons why US Breast was not authorized.     Patient already had US completed on 7/26/21    Thank you,  [facility] [place]  [phone]

## 2021-08-04 ENCOUNTER — OFFICE VISIT (OUTPATIENT)
Dept: PHYSICAL THERAPY | Facility: HOSPITAL | Age: 61
End: 2021-08-04
Attending: INTERNAL MEDICINE
Payer: MEDICAID

## 2021-08-04 PROCEDURE — 97140 MANUAL THERAPY 1/> REGIONS: CPT

## 2021-08-04 PROCEDURE — 97110 THERAPEUTIC EXERCISES: CPT

## 2021-08-04 NOTE — PROGRESS NOTES
Diagnosis: S/P cervical spinal fusion (Z98.1)     s/p C4-7 ACDF on 3/22/21   Cervical pain (M54.2)  Weakness of both upper extremities (R29.898)  Left arm weakness (R29.898)  Cervical myelopathy with cervical radiculopathy (HCC) (G95.9,M54.12)  Cervical st flexion  Shoulder Strength: increased to 5/5 (L) shoulder flex, abd, Biceps, Triceps, wrist flex, thumb ext, but more difficult on (L) vs (R) for abd, triceps and wrist flex    all ex's done B unless specified otherwise   6/14/2021   Visit: 2  6/30/2021 significant progress with UE strength and ROM. No significant change in cervical AROM the past few weeks. Functionally pt is reporting her chief limiting factor is her lumbar spine. Plan: Cont to progress UE strengthening as able  Cont STM and NuStep.

## 2021-08-05 ENCOUNTER — OFFICE VISIT (OUTPATIENT)
Dept: SURGERY | Facility: CLINIC | Age: 61
End: 2021-08-05
Payer: MEDICAID

## 2021-08-05 ENCOUNTER — TELEPHONE (OUTPATIENT)
Dept: PHYSICAL THERAPY | Facility: HOSPITAL | Age: 61
End: 2021-08-05

## 2021-08-05 VITALS
SYSTOLIC BLOOD PRESSURE: 120 MMHG | HEIGHT: 66 IN | HEART RATE: 100 BPM | OXYGEN SATURATION: 97 % | WEIGHT: 190 LBS | DIASTOLIC BLOOD PRESSURE: 64 MMHG | BODY MASS INDEX: 30.53 KG/M2

## 2021-08-05 DIAGNOSIS — R29.898 WEAKNESS OF BOTH LOWER EXTREMITIES: ICD-10-CM

## 2021-08-05 DIAGNOSIS — M47.812 CERVICAL FACET JOINT SYNDROME: ICD-10-CM

## 2021-08-05 DIAGNOSIS — R20.0 NUMBNESS AND TINGLING OF BOTH LEGS: ICD-10-CM

## 2021-08-05 DIAGNOSIS — G95.19 NEUROGENIC CLAUDICATION (HCC): ICD-10-CM

## 2021-08-05 DIAGNOSIS — M62.81 MUSCLE WEAKNESS ON EXAMINATION: ICD-10-CM

## 2021-08-05 DIAGNOSIS — G89.29 CHRONIC MIDLINE LOW BACK PAIN WITH BILATERAL SCIATICA: Primary | ICD-10-CM

## 2021-08-05 DIAGNOSIS — M54.41 CHRONIC MIDLINE LOW BACK PAIN WITH BILATERAL SCIATICA: Primary | ICD-10-CM

## 2021-08-05 DIAGNOSIS — R29.898 LEG HEAVINESS: ICD-10-CM

## 2021-08-05 DIAGNOSIS — R29.898 LEG FATIGUE: ICD-10-CM

## 2021-08-05 DIAGNOSIS — M62.89 MUSCLE TIGHTNESS: ICD-10-CM

## 2021-08-05 DIAGNOSIS — M54.42 CHRONIC MIDLINE LOW BACK PAIN WITH BILATERAL SCIATICA: Primary | ICD-10-CM

## 2021-08-05 DIAGNOSIS — R20.2 NUMBNESS AND TINGLING OF BOTH LEGS: ICD-10-CM

## 2021-08-05 DIAGNOSIS — Z98.1 S/P CERVICAL SPINAL FUSION: ICD-10-CM

## 2021-08-05 PROCEDURE — 3078F DIAST BP <80 MM HG: CPT | Performed by: PHYSICIAN ASSISTANT

## 2021-08-05 PROCEDURE — 3074F SYST BP LT 130 MM HG: CPT | Performed by: PHYSICIAN ASSISTANT

## 2021-08-05 PROCEDURE — 3008F BODY MASS INDEX DOCD: CPT | Performed by: PHYSICIAN ASSISTANT

## 2021-08-05 PROCEDURE — 99213 OFFICE O/P EST LOW 20 MIN: CPT | Performed by: PHYSICIAN ASSISTANT

## 2021-08-05 NOTE — PROGRESS NOTES
Patient: Mary Tran  Medical Record Number: [id number]  YOB: 1960  PCP: Author Allan MD    Reason for visit: Lumbar follow up    HISTORY OF CHIEF COMPLAINT:    Mary Tran is a very pleasant 62year old female, with a pertine incision is. Also with left cheek numbness down to jaw. Patient notes weakness to bilateral arms.  Unable lift arms higher than shoulders.     Last hx  here for spinal evaluation.  She has a complicated history of developing left arm and leg weakness in [employer]   • COLONOSCOPY & POLYPECTOMY  2012   • OTHER      Lung biopsy   • OTHER SURGICAL HISTORY  1993    [facility] biopsy   • OTHER SURGICAL HISTORY  2013    Cystoscopy   • REMOVAL GALLBLADDER     • TUBAL LIGATION  1986      Family History   Problem Relation Age total) by mouth 2 (two) times daily with meals. 360 tablet 1   • Varenicline Tartrate (CHANTIX STARTING MONTH ROBERTH) 0.5 MG X 11 & 1 MG X 42 Oral Misc Take 0.5 mg by mouth 2 (two) times daily.  Take as directed 1 each 2   • ELIQUIS 5 MG Oral Tab TAKE 1 TABLET orientated. Speech fluent, comprehension intact, answering questions appropriately. SPINE:  Gait/Coordination: Intact, antalgic gait.    Palpation: Patient is tender to palpation over L5 and over the right facet joint  Palpation Right   (POS or NEG) Lef Muscle tightness    (M48.062) Neurogenic claudication    (R20.0,  R20.2) Numbness and tingling of both legs    (R29.898) Weakness of both lower extremities    (R29.898) Leg heaviness    (R29.898) Leg fatigue    (M62.81) Muscle weakness on examination    PL

## 2021-08-09 ENCOUNTER — TELEPHONE (OUTPATIENT)
Dept: PHYSICAL THERAPY | Facility: HOSPITAL | Age: 61
End: 2021-08-09

## 2021-08-09 NOTE — TELEPHONE ENCOUNTER
Pt sent [employer] message stating she had received our message offering her a PT appt today, but that she is unable to make it due to her car being in the shop. She states she tried to return our call, but the waited on hold for 18 minutes.

## 2021-08-10 NOTE — TELEPHONE ENCOUNTER
Per chart pt should have refills available. Spoke with pharmacist who confirms pt has refills on file and he will get one ready for pt today. Refill request declined.

## 2021-08-11 ENCOUNTER — TELEPHONE (OUTPATIENT)
Dept: INTERNAL MEDICINE CLINIC | Facility: CLINIC | Age: 61
End: 2021-08-11

## 2021-08-11 NOTE — TELEPHONE ENCOUNTER
Prior authorization for Chantix completed w/ [employer] on CoverMyMeds Key: [id number], turn around time 1-5 days.

## 2021-08-12 NOTE — TELEPHONE ENCOUNTER
Prior authorization has been denied for Chantix. Patients plan states medication is not covered. This is a plan exclusion.

## 2021-08-16 ENCOUNTER — OFFICE VISIT (OUTPATIENT)
Dept: PHYSICAL THERAPY | Facility: HOSPITAL | Age: 61
End: 2021-08-16
Attending: PHYSICIAN ASSISTANT
Payer: MEDICAID

## 2021-08-16 DIAGNOSIS — R29.898 LEG WEAKNESS, BILATERAL: ICD-10-CM

## 2021-08-16 DIAGNOSIS — M62.81 MUSCLE WEAKNESS ON EXAMINATION: ICD-10-CM

## 2021-08-16 DIAGNOSIS — G95.19 NEUROGENIC CLAUDICATION (HCC): ICD-10-CM

## 2021-08-16 DIAGNOSIS — Z98.1 S/P CERVICAL SPINAL FUSION: ICD-10-CM

## 2021-08-16 PROCEDURE — 97110 THERAPEUTIC EXERCISES: CPT

## 2021-08-16 PROCEDURE — 97162 PT EVAL MOD COMPLEX 30 MIN: CPT

## 2021-08-16 NOTE — PROGRESS NOTES
SPINE EVALUATION:   Referring Physician: Dr. Jacques Alvarez  Diagnosis:  S/P cervical spinal fusion (Z98.1)  Neurogenic claudication (V15.342)  Muscle weakness on examination (M62.81)  Leg weakness, bilateral (J02.452)      Date of Service: 8/16/2021     ADENIKE vomiting), or Pseudocholinesterase deficiency. Beverley Edgar Lexa Mcguire [age] presents to physical therapy evaluation with primary c/o  intermittent pain in B mid to lower lumbar region with R > L. = 0-7/10. . The results of the objective tests and measures michelle factors/comorbidities, 3 body structures involved/activity limitations, and evolving symptoms including changing pain levels.   PLAN OF CARE:    Goals: (to be met in 12 visits)   Physical Therapy Goals:  · Patient will be independent in a HEP for lumbar sta

## 2021-08-23 DIAGNOSIS — E11.9 TYPE 2 DIABETES MELLITUS WITHOUT RETINOPATHY (HCC): ICD-10-CM

## 2021-08-23 RX ORDER — EMPAGLIFLOZIN 25 MG/1
TABLET, FILM COATED ORAL
Qty: 90 TABLET | Refills: 0 | Status: SHIPPED | OUTPATIENT
Start: 2021-08-23 | End: 2021-11-29

## 2021-08-30 ENCOUNTER — APPOINTMENT (OUTPATIENT)
Dept: PHYSICAL THERAPY | Facility: HOSPITAL | Age: 61
End: 2021-08-30
Attending: PHYSICIAN ASSISTANT
Payer: MEDICAID

## 2021-09-02 ENCOUNTER — OFFICE VISIT (OUTPATIENT)
Dept: PHYSICAL THERAPY | Facility: HOSPITAL | Age: 61
End: 2021-09-02
Attending: PHYSICIAN ASSISTANT
Payer: MEDICAID

## 2021-09-02 PROCEDURE — 97110 THERAPEUTIC EXERCISES: CPT

## 2021-09-02 NOTE — PROGRESS NOTES
Diagnosis:  S/P cervical spinal fusion (Z98.1)  Neurogenic claudication (M48.062)  Muscle weakness on examination (M62.81)  Leg weakness, bilateral (R29.898)   Precautions: none  Insurance Type (# Auth): [employer] [place] (12) Total Timed Treatment: 40 min  Da

## 2021-09-05 ENCOUNTER — APPOINTMENT (OUTPATIENT)
Dept: GENERAL RADIOLOGY | Facility: HOSPITAL | Age: 61
End: 2021-09-05
Attending: EMERGENCY MEDICINE
Payer: MEDICAID

## 2021-09-05 ENCOUNTER — HOSPITAL ENCOUNTER (EMERGENCY)
Facility: HOSPITAL | Age: 61
Discharge: HOME | End: 2021-09-05
Attending: EMERGENCY MEDICINE
Payer: MEDICAID

## 2021-09-05 VITALS
DIASTOLIC BLOOD PRESSURE: 73 MMHG | SYSTOLIC BLOOD PRESSURE: 121 MMHG | HEART RATE: 81 BPM | TEMPERATURE: 99 F | OXYGEN SATURATION: 98 % | RESPIRATION RATE: 18 BRPM

## 2021-09-05 DIAGNOSIS — M25.512 ACUTE PAIN OF LEFT SHOULDER: ICD-10-CM

## 2021-09-05 DIAGNOSIS — R07.89 CHEST PAIN, ATYPICAL: Primary | ICD-10-CM

## 2021-09-05 LAB
ALBUMIN SERPL-MCNC: 3.7 G/DL (ref 3.4–5)
ALBUMIN/GLOB SERPL: 0.9 {RATIO} (ref 1–2)
ALP LIVER SERPL-CCNC: 103 U/L
ALT SERPL-CCNC: 16 U/L
ANION GAP SERPL CALC-SCNC: 5 MMOL/L (ref 0–18)
AST SERPL-CCNC: 7 U/L (ref 15–37)
BASOPHILS # BLD AUTO: 0.03 X10(3) UL (ref 0–0.2)
BASOPHILS NFR BLD AUTO: 0.6 %
BILIRUB SERPL-MCNC: 0.4 MG/DL (ref 0.1–2)
BUN BLD-MCNC: 9 MG/DL (ref 7–18)
BUN/CREAT SERPL: 16.1 (ref 10–20)
CALCIUM BLD-MCNC: 9.1 MG/DL (ref 8.5–10.1)
CHLORIDE SERPL-SCNC: 112 MMOL/L (ref 98–112)
CO2 SERPL-SCNC: 24 MMOL/L (ref 21–32)
CREAT BLD-MCNC: 0.56 MG/DL
DEPRECATED RDW RBC AUTO: 57.4 FL (ref 35.1–46.3)
EOSINOPHIL # BLD AUTO: 0.14 X10(3) UL (ref 0–0.7)
EOSINOPHIL NFR BLD AUTO: 3 %
ERYTHROCYTE [DISTWIDTH] IN BLOOD BY AUTOMATED COUNT: 19.8 % (ref 11–15)
GLOBULIN PLAS-MCNC: 4.1 G/DL (ref 2.8–4.4)
GLUCOSE BLD-MCNC: 155 MG/DL (ref 70–99)
HCT VFR BLD AUTO: 39 %
HGB BLD-MCNC: 12.1 G/DL
IMM GRANULOCYTES # BLD AUTO: 0.02 X10(3) UL (ref 0–1)
IMM GRANULOCYTES NFR BLD: 0.4 %
LIPASE SERPL-CCNC: 116 U/L (ref 73–393)
LYMPHOCYTES # BLD AUTO: 1.8 X10(3) UL (ref 1–4)
LYMPHOCYTES NFR BLD AUTO: 38.2 %
M PROTEIN MFR SERPL ELPH: 7.8 G/DL (ref 6.4–8.2)
MCH RBC QN AUTO: 24.9 PG (ref 26–34)
MCHC RBC AUTO-ENTMCNC: 31 G/DL (ref 31–37)
MCV RBC AUTO: 80.4 FL
MONOCYTES # BLD AUTO: 0.33 X10(3) UL (ref 0.1–1)
MONOCYTES NFR BLD AUTO: 7 %
NEUTROPHILS # BLD AUTO: 2.39 X10 (3) UL (ref 1.5–7.7)
NEUTROPHILS # BLD AUTO: 2.39 X10(3) UL (ref 1.5–7.7)
NEUTROPHILS NFR BLD AUTO: 50.8 %
OSMOLALITY SERPL CALC.SUM OF ELEC: 294 MOSM/KG (ref 275–295)
PLATELET # BLD AUTO: 309 10(3)UL (ref 150–450)
POTASSIUM SERPL-SCNC: 3.7 MMOL/L (ref 3.5–5.1)
RBC # BLD AUTO: 4.85 X10(6)UL
SODIUM SERPL-SCNC: 141 MMOL/L (ref 136–145)
TROPONIN I SERPL-MCNC: <0.045 NG/ML (ref ?–0.04)
WBC # BLD AUTO: 4.7 X10(3) UL (ref 4–11)

## 2021-09-05 PROCEDURE — 94640 AIRWAY INHALATION TREATMENT: CPT

## 2021-09-05 PROCEDURE — 99284 EMERGENCY DEPT VISIT MOD MDM: CPT

## 2021-09-05 PROCEDURE — 85025 COMPLETE CBC W/AUTO DIFF WBC: CPT | Performed by: EMERGENCY MEDICINE

## 2021-09-05 PROCEDURE — 96374 THER/PROPH/DIAG INJ IV PUSH: CPT

## 2021-09-05 PROCEDURE — 71045 X-RAY EXAM CHEST 1 VIEW: CPT | Performed by: EMERGENCY MEDICINE

## 2021-09-05 PROCEDURE — 80053 COMPREHEN METABOLIC PANEL: CPT | Performed by: EMERGENCY MEDICINE

## 2021-09-05 PROCEDURE — 96375 TX/PRO/DX INJ NEW DRUG ADDON: CPT

## 2021-09-05 PROCEDURE — 93005 ELECTROCARDIOGRAM TRACING: CPT

## 2021-09-05 PROCEDURE — 93010 ELECTROCARDIOGRAM REPORT: CPT | Performed by: EMERGENCY MEDICINE

## 2021-09-05 PROCEDURE — 84484 ASSAY OF TROPONIN QUANT: CPT | Performed by: EMERGENCY MEDICINE

## 2021-09-05 PROCEDURE — C9113 INJ PANTOPRAZOLE SODIUM, VIA: HCPCS | Performed by: EMERGENCY MEDICINE

## 2021-09-05 PROCEDURE — 83690 ASSAY OF LIPASE: CPT | Performed by: EMERGENCY MEDICINE

## 2021-09-05 RX ORDER — KETOROLAC TROMETHAMINE 30 MG/ML
30 INJECTION, SOLUTION INTRAMUSCULAR; INTRAVENOUS ONCE
Status: COMPLETED | OUTPATIENT
Start: 2021-09-05 | End: 2021-09-05

## 2021-09-05 RX ORDER — IPRATROPIUM BROMIDE AND ALBUTEROL SULFATE 2.5; .5 MG/3ML; MG/3ML
3 SOLUTION RESPIRATORY (INHALATION) ONCE
Status: COMPLETED | OUTPATIENT
Start: 2021-09-05 | End: 2021-09-05

## 2021-09-05 NOTE — ED PROVIDER NOTES
Patient Seen in: [facility] Emergency Department    History   Patient presents with:  Shoulder Pain  Chest Pain Angina    Stated Complaint: Left shoulder pain    HPI    Patient presents to the emergency department with complaint of left shoulder p Surgeon: Heraclio Aparicio MD;  Location: [address] [place] [address]   • COLONOSCOPY & POLYPECTOMY  2012   • OTHER      Lung biopsy   • OTHER SURGICAL HISTORY  1993    [facility] biopsy   • OTHER SURGICAL HISTORY  2013    Cystoscopy   • REMOVAL GALLBLADDER     • T Test sugar twice daily         Review of Systems  Constitutional: no fever  Patient was noticed coughing she states she has been coughing intermittently for the past few days  Gastrointestinal: no nausea, no vomiting      Positive for stated complaint: Lef the past.  We will give her IV Toradol DuoNeb we will do blood work chest x-ray    Patient work-up was reassuring. DuoNeb did not make her feel any better.   I did at this point recommend GI cocktail and medication for possible reflux I discussed limitatio -----------         ------                     CBC W/ DIFFERENTIAL[[id number]]          Abnormal            Final result                 Please view results for these tests on the individual orders.    RAINBOW DRAW LAVENDER   RAINBOW DRAW TERRY

## 2021-09-08 ENCOUNTER — OFFICE VISIT (OUTPATIENT)
Dept: PHYSICAL THERAPY | Facility: HOSPITAL | Age: 61
End: 2021-09-08
Attending: PHYSICIAN ASSISTANT
Payer: MEDICAID

## 2021-09-08 PROCEDURE — 97110 THERAPEUTIC EXERCISES: CPT

## 2021-09-08 PROCEDURE — 97140 MANUAL THERAPY 1/> REGIONS: CPT

## 2021-09-08 RX ORDER — AMLODIPINE BESYLATE 5 MG/1
5 TABLET ORAL DAILY
Qty: 90 TABLET | Refills: 1 | Status: SHIPPED | OUTPATIENT
Start: 2021-09-08

## 2021-09-08 RX ORDER — ATORVASTATIN CALCIUM 80 MG/1
80 TABLET, FILM COATED ORAL NIGHTLY
Qty: 90 TABLET | Refills: 1 | Status: SHIPPED | OUTPATIENT
Start: 2021-09-08

## 2021-09-08 NOTE — TELEPHONE ENCOUNTER
Refill passed per [address] [place] protocol.    Requested Prescriptions   Pending Prescriptions Disp Refills    AMLODIPINE 5 MG Oral Tab [Pharmacy Med Name: AMLODIPINE BESYLATE 5MG TABLETS] 90 tablet 1     Sig: TAKE 1 TABLET BY MOUTH DAILY AS DIRECTED

## 2021-09-15 ENCOUNTER — APPOINTMENT (OUTPATIENT)
Dept: GENERAL RADIOLOGY | Facility: HOSPITAL | Age: 61
End: 2021-09-15
Attending: PHYSICIAN ASSISTANT
Payer: MEDICAID

## 2021-09-15 ENCOUNTER — OFFICE VISIT (OUTPATIENT)
Dept: PHYSICAL THERAPY | Facility: HOSPITAL | Age: 61
End: 2021-09-15
Attending: PHYSICIAN ASSISTANT
Payer: MEDICAID

## 2021-09-15 ENCOUNTER — HOSPITAL ENCOUNTER (OUTPATIENT)
Dept: GENERAL RADIOLOGY | Facility: HOSPITAL | Age: 61
Discharge: HOME | End: 2021-09-15
Attending: PHYSICIAN ASSISTANT
Payer: MEDICAID

## 2021-09-15 DIAGNOSIS — M62.89 MUSCLE TIGHTNESS: ICD-10-CM

## 2021-09-15 DIAGNOSIS — Z98.1 S/P CERVICAL SPINAL FUSION: ICD-10-CM

## 2021-09-15 DIAGNOSIS — M47.812 CERVICAL FACET JOINT SYNDROME: ICD-10-CM

## 2021-09-15 PROCEDURE — 72050 X-RAY EXAM NECK SPINE 4/5VWS: CPT | Performed by: PHYSICIAN ASSISTANT

## 2021-09-15 PROCEDURE — 97110 THERAPEUTIC EXERCISES: CPT

## 2021-09-17 ENCOUNTER — NURSE TRIAGE (OUTPATIENT)
Dept: INTERNAL MEDICINE CLINIC | Facility: CLINIC | Age: 61
End: 2021-09-17

## 2021-09-17 NOTE — TELEPHONE ENCOUNTER
Action Requested: Summary for Provider     []  Critical Lab, Recommendations Needed  [] Need Additional Advice  [x]   FYI    []   Need Orders  [] Need Medications Sent to Pharmacy  []  Other     SUMMARY:   Spoke with pt,  verified, pt c/o pain on her RT

## 2021-09-20 ENCOUNTER — OFFICE VISIT (OUTPATIENT)
Dept: PHYSICAL THERAPY | Facility: HOSPITAL | Age: 61
End: 2021-09-20
Attending: PHYSICIAN ASSISTANT
Payer: MEDICAID

## 2021-09-20 PROCEDURE — 97110 THERAPEUTIC EXERCISES: CPT

## 2021-09-20 NOTE — H&P
[facility], [facility] - Gastroenterology                                                                                                               Reason for consult: crc screening, upper gi complain oz (86.2 kg)       History, Medications, Allergies, ROS:      Past Medical History:   Diagnosis Date   • Asthma    • B12 deficiency    • Back pain    • Back problem    • COPD (chronic obstructive pulmonary disease) (HCC)    • Essential hypertension    • Hi Take 1 tablet (80 mg total) by mouth nightly. 90 tablet 1   • apixaban (ELIQUIS) 5 MG Oral Tab Take 1 tablet (5 mg total) by mouth 2 (two) times daily. 180 tablet 0   • amLODIPine 5 MG Oral Tab Take 1 tablet (5 mg total) by mouth daily.  90 tablet 1   • JAR for red eyes, yellow eyes, changes in vision  HEENT: Negative for dysphagia and hoarseness  RESPIRATORY: Negative for cough and shortness of breath  CARDIOVASCULAR: Negative for chest pain, palpitations  GASTROINTESTINAL: See HPI  GENITOURINARY: Negative f however she reports having large polyps and recommendation to repeat in 1 year. Her brother had colon ca in his 52's and will plan to request records and for cln now. #constipation  Chronic constipation and using therapy prn.   TSH and CBC 2021 normal. defined types were placed in this encounter.       Meds This Visit:  Requested Prescriptions      No prescriptions requested or ordered in this encounter       Imaging & Referrals:  None    ENDOSCOPIC RISK BENEFIT DISCUSSION: I described the procedure in gr

## 2021-09-20 NOTE — PROGRESS NOTES
ProgressSummary  Pt has attended 11 visits in Physical Therapy, including 5 for her lumbar spine.      Diagnosis:  S/P cervical spinal fusion (Z98.1)  Neurogenic claudication (M48.062)  Muscle weakness on examination (M62.81)  Leg weakness, bilateral (R29 fall out x 5  Heel slide x 5  90/90 hand on knee  Bridge x 10  90/90 with hand on knee x 10  Bridge x 10  ER sidelying x 10  Biceps with 1# 10x2  Cervical ROM  Review of sitting posture.     Manual  STM/MFR B Lumbar/lower thoracic in L sidelying     Neuro R

## 2021-09-22 ENCOUNTER — TELEPHONE (OUTPATIENT)
Dept: GASTROENTEROLOGY | Facility: CLINIC | Age: 61
End: 2021-09-22

## 2021-09-22 ENCOUNTER — OFFICE VISIT (OUTPATIENT)
Dept: GASTROENTEROLOGY | Facility: CLINIC | Age: 61
End: 2021-09-22
Payer: MEDICAID

## 2021-09-22 VITALS
DIASTOLIC BLOOD PRESSURE: 70 MMHG | SYSTOLIC BLOOD PRESSURE: 113 MMHG | WEIGHT: 190 LBS | HEIGHT: 66 IN | BODY MASS INDEX: 30.53 KG/M2 | HEART RATE: 92 BPM

## 2021-09-22 DIAGNOSIS — R10.11 RUQ PAIN: ICD-10-CM

## 2021-09-22 DIAGNOSIS — F17.200 TOBACCO DEPENDENCE: ICD-10-CM

## 2021-09-22 DIAGNOSIS — Z80.0 FAMILY HISTORY OF COLON CANCER: ICD-10-CM

## 2021-09-22 DIAGNOSIS — Z86.010 PERSONAL HISTORY OF COLONIC POLYPS: Primary | ICD-10-CM

## 2021-09-22 DIAGNOSIS — R10.11 RUQ PAIN: Primary | ICD-10-CM

## 2021-09-22 DIAGNOSIS — K59.00 CONSTIPATION, UNSPECIFIED CONSTIPATION TYPE: ICD-10-CM

## 2021-09-22 DIAGNOSIS — K21.9 GASTROESOPHAGEAL REFLUX DISEASE, UNSPECIFIED WHETHER ESOPHAGITIS PRESENT: ICD-10-CM

## 2021-09-22 DIAGNOSIS — R11.0 NAUSEA: ICD-10-CM

## 2021-09-22 DIAGNOSIS — R14.0 BLOATING: ICD-10-CM

## 2021-09-22 PROCEDURE — 99244 OFF/OP CNSLTJ NEW/EST MOD 40: CPT | Performed by: NURSE PRACTITIONER

## 2021-09-22 PROCEDURE — 3008F BODY MASS INDEX DOCD: CPT | Performed by: NURSE PRACTITIONER

## 2021-09-22 PROCEDURE — 3078F DIAST BP <80 MM HG: CPT | Performed by: NURSE PRACTITIONER

## 2021-09-22 PROCEDURE — 3074F SYST BP LT 130 MM HG: CPT | Performed by: NURSE PRACTITIONER

## 2021-09-22 RX ORDER — POLYETHYLENE GLYCOL 3350, SODIUM CHLORIDE, SODIUM BICARBONATE, POTASSIUM CHLORIDE 420; 11.2; 5.72; 1.48 G/4L; G/4L; G/4L; G/4L
POWDER, FOR SOLUTION ORAL
Qty: 4000 ML | Refills: 0 | Status: SHIPPED | OUTPATIENT
Start: 2021-09-22

## 2021-09-22 RX ORDER — PANTOPRAZOLE SODIUM 40 MG/1
40 TABLET, DELAYED RELEASE ORAL
Qty: 30 TABLET | Refills: 0 | Status: SHIPPED | OUTPATIENT
Start: 2021-09-22 | End: 2021-10-20

## 2021-09-22 NOTE — PATIENT INSTRUCTIONS
-request colonoscopy from [address] [place] [address] sub  -miralax twice daily  -pantoprazole 40 mg daily  -reflux diet modifciation  -ct a/p  -er if condition decline    1.  Schedule colonoscopy/egd with MAUREEN cook/  [Diagnosis:   #gerd  #bloating  #nausea  #ruq pain  #tob to  · Don’t exercise near bedtime  · Don't wear tight-fitting clothes  · Limit aspirin and ibuprofen  · Stop smoking     Ayala last reviewed this educational content on 6/1/2019  © 7392-4660 [employer] [address].  [address] [place]

## 2021-09-22 NOTE — TELEPHONE ENCOUNTER
Okay to hold Eliquis for GI procedure patient is taking this for history of pulmonary embolism but patient needs to be aware that even though the risk of getting another pulmonary embolism while being off the medication for few days is low there is still a

## 2021-09-22 NOTE — PROGRESS NOTES
Follow up 2 months     LOV: 7/22/21 with Amy    PMH of s/p C4-7 ACDF 3/22/21   XR cervical spine:                                            -Hardware stable. Concern for possible haloing at the C5 screw.   We will monitor closely with further

## 2021-09-22 NOTE — TELEPHONE ENCOUNTER
Dr. Aiden Godoy,     Anticoagulant Adjustment Request:     GI is requesting to HOLD apixaban (eliquis) for 48 hours prior to colonoscopy/egd with Dr. Farhan Thompson. Please advise if this is OK to do so. Thank you!

## 2021-09-22 NOTE — TELEPHONE ENCOUNTER
Dr. Callum Velazquez clinical staff/[place] [id number] [facility] and reviewed Dr. Huber Ross message below. Patient is very concerned even though there is a small risk of potential  PE forming again and doesn't feel comfortable with taking the chance.      I

## 2021-09-22 NOTE — TELEPHONE ENCOUNTER
Scheduled for:  Colonoscopy [phone] ,[address]   Provider Name:  Jimi Loera  Date:  10/21/21  Location:  [facility]   Sedation:  Mac , [facility]  Time:  2:00 Pm (pt is aware to arrive at 1:00 Pm )   Prep:  Aishwarya Myers /Egd Prep instructions were given to pt in the office, pt

## 2021-09-23 ENCOUNTER — OFFICE VISIT (OUTPATIENT)
Dept: SURGERY | Facility: CLINIC | Age: 61
End: 2021-09-23
Payer: MEDICAID

## 2021-09-23 VITALS
HEIGHT: 66 IN | DIASTOLIC BLOOD PRESSURE: 82 MMHG | SYSTOLIC BLOOD PRESSURE: 126 MMHG | BODY MASS INDEX: 30.53 KG/M2 | WEIGHT: 190 LBS

## 2021-09-23 DIAGNOSIS — Z98.1 S/P CERVICAL SPINAL FUSION: Primary | ICD-10-CM

## 2021-09-23 DIAGNOSIS — M54.2 CERVICAL PAIN: ICD-10-CM

## 2021-09-23 PROCEDURE — 3074F SYST BP LT 130 MM HG: CPT | Performed by: NEUROLOGICAL SURGERY

## 2021-09-23 PROCEDURE — 3079F DIAST BP 80-89 MM HG: CPT | Performed by: NEUROLOGICAL SURGERY

## 2021-09-23 PROCEDURE — 3008F BODY MASS INDEX DOCD: CPT | Performed by: NEUROLOGICAL SURGERY

## 2021-09-23 PROCEDURE — 99214 OFFICE O/P EST MOD 30 MIN: CPT | Performed by: NEUROLOGICAL SURGERY

## 2021-09-23 RX ORDER — HYDROCODONE BITARTRATE AND ACETAMINOPHEN 10; 325 MG/1; MG/1
1-2 TABLET ORAL EVERY 4 HOURS PRN
Qty: 60 TABLET | Refills: 0 | Status: SHIPPED | OUTPATIENT
Start: 2021-09-23

## 2021-09-23 NOTE — PROGRESS NOTES
Has had a little more pain since last seen  Here to go over imaging  Find out what next steps are    Out of pain meds

## 2021-09-27 ENCOUNTER — OFFICE VISIT (OUTPATIENT)
Dept: INTERNAL MEDICINE CLINIC | Facility: CLINIC | Age: 61
End: 2021-09-27
Payer: MEDICAID

## 2021-09-27 ENCOUNTER — TELEPHONE (OUTPATIENT)
Dept: GASTROENTEROLOGY | Facility: CLINIC | Age: 61
End: 2021-09-27

## 2021-09-27 ENCOUNTER — APPOINTMENT (OUTPATIENT)
Dept: PHYSICAL THERAPY | Facility: HOSPITAL | Age: 61
End: 2021-09-27
Attending: PHYSICIAN ASSISTANT
Payer: MEDICAID

## 2021-09-27 VITALS
HEART RATE: 76 BPM | OXYGEN SATURATION: 99 % | BODY MASS INDEX: 30.37 KG/M2 | TEMPERATURE: 98 F | SYSTOLIC BLOOD PRESSURE: 120 MMHG | WEIGHT: 189 LBS | DIASTOLIC BLOOD PRESSURE: 70 MMHG | HEIGHT: 66 IN | RESPIRATION RATE: 17 BRPM

## 2021-09-27 DIAGNOSIS — R00.2 PALPITATIONS: ICD-10-CM

## 2021-09-27 DIAGNOSIS — Z86.711 HISTORY OF PULMONARY EMBOLUS (PE): ICD-10-CM

## 2021-09-27 DIAGNOSIS — R94.31 ABNORMAL EKG: ICD-10-CM

## 2021-09-27 DIAGNOSIS — R11.0 NAUSEA: ICD-10-CM

## 2021-09-27 DIAGNOSIS — R10.11 RUQ PAIN: ICD-10-CM

## 2021-09-27 DIAGNOSIS — F17.200 TOBACCO DEPENDENCE: ICD-10-CM

## 2021-09-27 DIAGNOSIS — R10.11 RUQ ABDOMINAL PAIN: ICD-10-CM

## 2021-09-27 DIAGNOSIS — Z01.818 PRE-OP EVALUATION: ICD-10-CM

## 2021-09-27 DIAGNOSIS — R14.0 BLOATING: Primary | ICD-10-CM

## 2021-09-27 DIAGNOSIS — Z01.818 PRE-OP EXAM: Primary | ICD-10-CM

## 2021-09-27 DIAGNOSIS — K21.9 GASTROESOPHAGEAL REFLUX DISEASE, UNSPECIFIED WHETHER ESOPHAGITIS PRESENT: ICD-10-CM

## 2021-09-27 PROCEDURE — 3074F SYST BP LT 130 MM HG: CPT | Performed by: INTERNAL MEDICINE

## 2021-09-27 PROCEDURE — 99214 OFFICE O/P EST MOD 30 MIN: CPT | Performed by: INTERNAL MEDICINE

## 2021-09-27 PROCEDURE — 3078F DIAST BP <80 MM HG: CPT | Performed by: INTERNAL MEDICINE

## 2021-09-27 PROCEDURE — 93000 ELECTROCARDIOGRAM COMPLETE: CPT | Performed by: INTERNAL MEDICINE

## 2021-09-27 PROCEDURE — 3008F BODY MASS INDEX DOCD: CPT | Performed by: INTERNAL MEDICINE

## 2021-09-27 NOTE — TELEPHONE ENCOUNTER
Luly Fong--    CT a/p ordered at [facility] (09/22/2021) has been denied with rationale attached below. Reasoning: Must have had results of ultrasound that failed to explain/find etiology of symptoms.      Please advise on alternative in imaging or if submissio

## 2021-09-27 NOTE — PROGRESS NOTES
Subjective:     Patient ID: Trish Lewis is a 62year old female.     HPI  Patient comes in today for preop clearance that she is scheduled for colonoscopy and EGD in October patient he is on blood thinner for pulmonary embolism that she sustained in tablet (5 mg total) by mouth daily. 90 tablet 1   • JARDIANCE 25 MG Oral Tab TAKE 1 TABLET BY MOUTH DAILY 90 tablet 0   • cyclobenzaprine 10 MG Oral Tab Take 1 tablet (10 mg total) by mouth 3 (three) times daily as needed for Muscle spasms.  40 tablet 0   • unspecified type diabetes mellitus without mention of complication, not stated as uncontrolled       Past Surgical History:   Procedure Laterality Date   • CHOLECYSTECTOMY  1994   • COLONOSCOPY N/A 8/11/2017    Procedure: COLONOSCOPY, POSSIBLE BIOPSY, POSS Abdomen is soft. Tenderness: There is abdominal tenderness. Comments: Mid ep to ruq   Genitourinary:     Vagina: Normal.   Musculoskeletal:         General: Normal range of motion. Cervical back: Normal range of motion and neck supple.    Ski

## 2021-09-27 NOTE — TELEPHONE ENCOUNTER
Additional letter sent from [facility] regarding denial:          [id number] has denied the request for pt's CT ABD/Pelvis.   Case #     The following is the letter sent from [address]:

## 2021-09-28 ENCOUNTER — TELEPHONE (OUTPATIENT)
Dept: INTERNAL MEDICINE CLINIC | Facility: CLINIC | Age: 61
End: 2021-09-28

## 2021-09-28 NOTE — TELEPHONE ENCOUNTER
[address] and reviewed denial letter attached below & reasoning for change in imaging  2/2 insurance requirements. Patient understands and agreed appropriate to proceed with scheduling us.      Verified central scheduling contact number to set

## 2021-09-28 NOTE — TELEPHONE ENCOUNTER
Pt states she was recently seen by Dr. Geni Otero and they are requesting a copy of her EKG that was done in the office yesterday. Please fax copy of EKG tracing to Dr. Sanjay Mercer office. They need EKG for comparison. Chart reviewed. EKG states in process.

## 2021-09-29 LAB
ALBUMIN/GLOBULIN RATIO: 1.5 (CALC) (ref 1–2.5)
ALBUMIN: 4.3 G/DL (ref 3.6–5.1)
ALKALINE PHOSPHATASE: 94 U/L (ref 37–153)
ALT: 8 U/L (ref 6–29)
AST: 11 U/L (ref 10–35)
BILIRUBIN, TOTAL: 0.4 MG/DL (ref 0.2–1.2)
BUN: 7 MG/DL (ref 7–25)
CALCIUM: 9.8 MG/DL (ref 8.6–10.4)
CARBON DIOXIDE: 23 MMOL/L (ref 20–32)
CHLORIDE: 108 MMOL/L (ref 98–110)
CREATININE: 0.61 MG/DL (ref 0.5–0.99)
EGFR IF AFRICN AM: 114 ML/MIN/1.73M2
EGFR IF NONAFRICN AM: 99 ML/MIN/1.73M2
GLOBULIN: 2.9 G/DL (CALC) (ref 1.9–3.7)
GLUCOSE: 172 MG/DL (ref 65–99)
LIPASE: 50 U/L (ref 7–60)
POTASSIUM: 4.2 MMOL/L (ref 3.5–5.3)
PROTEIN, TOTAL: 7.2 G/DL (ref 6.1–8.1)
SODIUM: 141 MMOL/L (ref 135–146)

## 2021-09-29 NOTE — TELEPHONE ENCOUNTER
Pulmonary Clinical Staff--    Patient referred to pulmonary (per primary) for further advisement on pre-op clearance and anticoagulant hold. Developed pe s/p neck surgery earlier this year and started on eliquis shortly after.  Dr. Rinku Murray requesting pre-

## 2021-09-29 NOTE — TELEPHONE ENCOUNTER
On review of clinical notes with Dr. Lety Trimble 09/27/2021, pre-op clearance and anticoagulation hold was directed to pulmonary for further advisement. Patient is scheduled for consult with Dr. Werner Beal 11/4/2021.      Contacted Di to obtain additional i

## 2021-09-29 NOTE — TELEPHONE ENCOUNTER
Spoke with patient. Appointment scheduled with Dr. Andrew Gibbons on 10/4/21 at 12PM. Verified date, time, place, and where to park. Patient verbalized understanding.

## 2021-09-29 NOTE — TELEPHONE ENCOUNTER
Noted and appreciated. Thank you!!     Will follow up s/p appointment with Dr. Linda Perez (10/4/2021) to assure eliquis hold is reasonable per pulmonary & able to keep scheduled procedure. Currently scheduled for colonoscopy/egd with Dr. Aicha Luther 10/21/2021.

## 2021-10-04 ENCOUNTER — TELEPHONE (OUTPATIENT)
Dept: PULMONOLOGY | Facility: CLINIC | Age: 61
End: 2021-10-04

## 2021-10-04 NOTE — TELEPHONE ENCOUNTER
Pt was already rescheduled on 10/15 @ 4 pm at [facility]. Per Julio Cesar Ulloa (MA) pt was given appt info. Nothing further needed from pulmo office at this time.

## 2021-10-04 NOTE — TELEPHONE ENCOUNTER
Patient was scheduled to see dr Judith Myers - Monday 10-4-21 at 12:00p [facility] that she needs clearance from dr Judith Myers for an procedure 10-21-21. Requesting an appointment before her 10-21-21 procedure.

## 2021-10-05 NOTE — TELEPHONE ENCOUNTER
Patient rescheduled for office visit appointment with Dr. Nikolas Rao to 10/15/2021. Will follow up s/p appointment to assure eliquis hold is reasonable per pulmonary & able to keep scheduled procedure.      Scheduled for colonoscopy/egd with Dr. Daily Rank 10/21

## 2021-10-08 NOTE — TELEPHONE ENCOUNTER
Please review; protocol failed. Requested Prescriptions   Pending Prescriptions Disp Refills    apixaban (ELIQUIS) 5 MG Oral Tab 180 tablet 0     Sig: Take 1 tablet (5 mg total) by mouth 2 (two) times daily.         There is no refill protocol informatio

## 2021-10-13 ENCOUNTER — TELEPHONE (OUTPATIENT)
Dept: PULMONOLOGY | Facility: CLINIC | Age: 61
End: 2021-10-13

## 2021-10-13 NOTE — TELEPHONE ENCOUNTER
Per Dr. Christiano Mayes pt to be rescheduled to 10/15 at noon or 10/18 at anytime. Message left for pt to call back.

## 2021-10-13 NOTE — TELEPHONE ENCOUNTER
Discussed Dr. Ben Brown orders below w/ pt. She accepted appt w/ Dr. Linda Bonner on 10/15 at noon.  Explained she may discuss lung nodule w/ MD during her appt time, [facility]d [facility], she will have to discuss when l

## 2021-10-15 RX ORDER — BLOOD SUGAR DIAGNOSTIC
STRIP MISCELLANEOUS
Qty: 300 STRIP | Refills: 0 | Status: SHIPPED | OUTPATIENT
Start: 2021-10-15

## 2021-10-15 NOTE — TELEPHONE ENCOUNTER
"Chief Complaint(s) & History of Present Illness  Chief Complaint(s) and History of Present Illness(es)     Exotropia Follow Up     Laterality: right eye    Onset: chronic    Quality: horizontal    Associated symptoms: Negative for droopy eyelid, unequal pupil size and eye pain    Treatments tried: patching              Comments     Dad is here with pt today, he states that pt's eyes are always RXT to him, this appears to worsen when pt is tired. Dad states that they were not told to patch eye prior to appt today.  Pt has sx schedule with Dr. Correa next week. Still patching LE 4-5 hrs daily. No monocular lid closure, no squinting.   Had covid testing this am.                   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   - Sensorimotor       PLAN:  Could build at near today with slow APCT. Stable IXT, proceed to surgery.     Lost cooperation at end of exam \"done\" per dad, unable to get external photos.     Attending Physician Attestation:  I did not see Tyrell Paz at this encounter, but I was available and reviewed the history, examination, assessment, and plan as documented. I agree with the plan. - Ashwin Correa Jr., MD   " LOV 5/18/21    RTC in 3 months--no follow up scheduled. Mychart reminder sent.     LR 7/16/21    Refill sent per protocol

## 2021-10-15 NOTE — H&P
[facility] MEDICAL OFFICE BUILDING, [facility], [facility]    Initial office visit and consultation     Татьяна Hankins Patient Status:  Specimen    3/2/1960 MRN [id number]   [place] [address] POSSIBLE BIOPSY, POSSIBLE POLYPECTOMY [address];  Surgeon: Letty Morrow MD;  Location: [place] [address]  2012   • OTHER      Lung biopsy   • OTHER SURGICAL HISTORY  1993    [facility] biopsy   • OTHER SURGICAL HISTORY  2013 Negative. Physical Exam:   Vital Signs:  Blood pressure 121/82, pulse 96, temperature 98.8 °F (37.1 °C), resp. rate 10, height 5' 6\" (1.676 m), weight 192 lb 12.8 oz (87.5 kg), SpO2 98 %, not currently breastfeeding.      Constitutional: She is orie are patchy areas of ground-glass airspace disease in the left upper lobe, right upper lobe, lingula, and right lung base, can not exclude   early multifocal pneumonia and or atelectasis.  Consider COVID-19 pneumonia.  Correlate clinically and follow-up lee provoked by cervical spine surgery in 3/22/21 but also possible underlying hypercoagulable state with significant family history of VTE both brother and sister with recurrent DVT     For now I would recommend continue anticoagulation with DOAC ( Eliquis /

## 2021-10-18 ENCOUNTER — LAB ENCOUNTER (OUTPATIENT)
Dept: LAB | Facility: HOSPITAL | Age: 61
End: 2021-10-18
Attending: INTERNAL MEDICINE
Payer: MEDICAID

## 2021-10-18 DIAGNOSIS — Z01.818 PREOP TESTING: ICD-10-CM

## 2021-10-18 RX ORDER — ENOXAPARIN SODIUM 100 MG/ML
INJECTION SUBCUTANEOUS
Qty: 2 EACH | Refills: 0 | Status: ON HOLD | OUTPATIENT
Start: 2021-10-18 | End: 2021-10-26 | Stop reason: CLARIF

## 2021-10-18 RX ORDER — ENOXAPARIN SODIUM 100 MG/ML
INJECTION SUBCUTANEOUS
Qty: 2 EACH | Refills: 0 | Status: SHIPPED | OUTPATIENT
Start: 2021-10-18 | End: 2021-10-18

## 2021-10-18 NOTE — TELEPHONE ENCOUNTER
Reviewed below bridging orders on behalf of Dr. Mckeon Guest with [employer]. Patient wrote down instructions and read-back correctly. Clarified all questions/concerns and expressed understanding on instructions provided.      Appreciative for subhashatio

## 2021-10-18 NOTE — TELEPHONE ENCOUNTER
Please place an order for bridging with Lovenox  Hold oral anticoagulation /Eliquis 2 days prior to procedure  The following day off of Eliquis to take Lovenox 1 mg/kg subcu every 12 hour  Hold Lovenox the night before the procedure  No Lovenox other day o

## 2021-10-18 NOTE — TELEPHONE ENCOUNTER
Dr. Nicole Araujo--    Patient is undergoing colonoscopy/egd with Dr. Laverne Medellin this Thursday, 10/21/2021. Seen in office 10/15/2021 with the following recommendations:     \"Epigastric discomfort and patient going to have EGD and colonoscopy by GI.  Patient at th

## 2021-10-18 NOTE — TELEPHONE ENCOUNTER
Dr. Montserrat Monzon - Please see below. Patient weight at office visit 10/15/21 is 192 lbs (87.27 kg). Please place order for Lovenox and provide instructions.

## 2021-10-18 NOTE — TELEPHONE ENCOUNTER
Dr. Lanette Trinh - Patient's weight is 87.27kg. Lovenox comes in 80mg or 100mg. Please place order.

## 2021-10-19 ENCOUNTER — TELEPHONE (OUTPATIENT)
Dept: PULMONOLOGY | Facility: CLINIC | Age: 61
End: 2021-10-19

## 2021-10-19 DIAGNOSIS — Z87.891 PERSONAL HISTORY OF TOBACCO USE, PRESENTING HAZARDS TO HEALTH: Primary | ICD-10-CM

## 2021-10-19 DIAGNOSIS — J44.9 CHRONIC OBSTRUCTIVE PULMONARY DISEASE, UNSPECIFIED COPD TYPE (HCC): ICD-10-CM

## 2021-10-19 DIAGNOSIS — I26.99 OTHER PULMONARY EMBOLISM WITHOUT ACUTE COR PULMONALE, UNSPECIFIED CHRONICITY (HCC): ICD-10-CM

## 2021-10-19 DIAGNOSIS — F17.200 SMOKER: ICD-10-CM

## 2021-10-19 NOTE — TELEPHONE ENCOUNTER
Dr. Harinder Boland- Please review/sign pended CT Lung LD Screening with diagnosis code U55.920 (Personal history of tobacco use, presenting hazards to health).

## 2021-10-19 NOTE — TELEPHONE ENCOUNTER
Good Afternoon    Called patient regarding ref# [id number]    Health plan denied did not meed clinical guidelines- see below.  Health plan-  [place] [id number]    Called patient insurance denied however we will wait for Dr. Antonio Doshi to suggest next plan of care

## 2021-10-20 ENCOUNTER — HOSPITAL ENCOUNTER (OUTPATIENT)
Dept: ULTRASOUND IMAGING | Age: 61
Discharge: HOME | End: 2021-10-20
Attending: NURSE PRACTITIONER
Payer: MEDICAID

## 2021-10-20 DIAGNOSIS — R10.11 RUQ PAIN: ICD-10-CM

## 2021-10-20 DIAGNOSIS — K21.9 GASTROESOPHAGEAL REFLUX DISEASE, UNSPECIFIED WHETHER ESOPHAGITIS PRESENT: ICD-10-CM

## 2021-10-20 DIAGNOSIS — F17.200 TOBACCO DEPENDENCE: ICD-10-CM

## 2021-10-20 DIAGNOSIS — R14.0 BLOATING: ICD-10-CM

## 2021-10-20 DIAGNOSIS — R11.0 NAUSEA: ICD-10-CM

## 2021-10-20 PROCEDURE — 76700 US EXAM ABDOM COMPLETE: CPT | Performed by: NURSE PRACTITIONER

## 2021-10-20 RX ORDER — PANTOPRAZOLE SODIUM 40 MG/1
TABLET, DELAYED RELEASE ORAL
Qty: 30 TABLET | Refills: 0 | Status: SHIPPED | OUTPATIENT
Start: 2021-10-20

## 2021-10-20 NOTE — TELEPHONE ENCOUNTER
Requested Prescriptions     Pending Prescriptions Disp Refills   • PANTOPRAZOLE 40 MG Oral Tab EC [Pharmacy Med Name: PANTOPRAZOLE 40MG TABLETS] 30 tablet 0     Sig: TAKE 1 TABLET(40 MG) BY MOUTH EVERY MORNING BEFORE BREAKFAST        LOV   9/22/21    LR  9

## 2021-10-20 NOTE — TELEPHONE ENCOUNTER
Jose Newton     Peer to peer is still needed see below      Regarding ref# [id number]  Ata Rinaldi [phone] per Ms. Joey Nelson- physician support unit  Stated still needs peer to peer even though diagnosis changed and there is smoking history    Please

## 2021-10-21 ENCOUNTER — HOSPITAL ENCOUNTER (OUTPATIENT)
Facility: HOSPITAL | Age: 61
Setting detail: HOSPITAL OUTPATIENT SURGERY
Discharge: HOME | End: 2021-11-21
Attending: INTERNAL MEDICINE | Admitting: INTERNAL MEDICINE
Payer: MEDICAID

## 2021-10-21 ENCOUNTER — ANESTHESIA (OUTPATIENT)
Dept: ENDOSCOPY | Facility: HOSPITAL | Age: 61
End: 2021-10-21
Payer: MEDICAID

## 2021-10-21 ENCOUNTER — ANESTHESIA EVENT (OUTPATIENT)
Dept: ENDOSCOPY | Facility: HOSPITAL | Age: 61
End: 2021-10-21
Payer: MEDICAID

## 2021-10-21 VITALS
SYSTOLIC BLOOD PRESSURE: 111 MMHG | BODY MASS INDEX: 31.82 KG/M2 | DIASTOLIC BLOOD PRESSURE: 64 MMHG | HEIGHT: 66 IN | TEMPERATURE: 98 F | HEART RATE: 83 BPM | WEIGHT: 198 LBS | OXYGEN SATURATION: 96 % | RESPIRATION RATE: 18 BRPM

## 2021-10-21 DIAGNOSIS — Z80.0 FAMILY HISTORY OF COLON CANCER: ICD-10-CM

## 2021-10-21 DIAGNOSIS — Z01.818 PREOP TESTING: Primary | ICD-10-CM

## 2021-10-21 DIAGNOSIS — R10.11 RUQ PAIN: ICD-10-CM

## 2021-10-21 PROCEDURE — 82962 GLUCOSE BLOOD TEST: CPT

## 2021-10-21 RX ORDER — SODIUM CHLORIDE, SODIUM LACTATE, POTASSIUM CHLORIDE, CALCIUM CHLORIDE 600; 310; 30; 20 MG/100ML; MG/100ML; MG/100ML; MG/100ML
INJECTION, SOLUTION INTRAVENOUS CONTINUOUS
Status: DISCONTINUED | OUTPATIENT
Start: 2021-10-21 | End: 2021-11-28

## 2021-10-21 NOTE — TELEPHONE ENCOUNTER
Dr. Julius Vasquez- Please read message below. CT Lung LD screening was denied. Peer to peer is needed.

## 2021-10-21 NOTE — PROGRESS NOTES
Discussed with patient  Held eliquis but did not get the lovenox (went at 7 pm and pharmacy said they did not have it but reviewed chart and order in and confrimed 10/18 at 4 pm)  Concerned about PE  Wants to proceed but not if high risk for PE  Discussed

## 2021-10-21 NOTE — ANESTHESIA PREPROCEDURE EVALUATION
Anesthesia PreOp Note    HPI:     Ja Lyle is a 62year old female who presents for preoperative consultation requested by: Cristofer Daniel MD    Date of Surgery: 10/21/2021    Procedure(s):  COLONOSCOPY  ESOPHAGOGASTRODUODENOSCOPY (EGD)  Indicati Noted: 07/15/2006        Past Medical History:   Diagnosis Date   • Asthma    • B12 deficiency    • Back pain    • Back problem    • Cancer ([facility]     1990   • COPD (chronic obstructive pulmonary disease) ([facility]    • Essential hypertension    • High blood pr 4000 mL, Rfl: 0  atorvastatin 80 MG Oral Tab, Take 1 tablet (80 mg total) by mouth nightly., Disp: 90 tablet, Rfl: 1, 10/19/2021 at 2100  amLODIPine 5 MG Oral Tab, Take 1 tablet (5 mg total) by mouth daily.  (Patient not taking: No sig reported), Disp: 90 t [facility] with             tongue swelling and throat closing sensation, on             lisinopril at the time.  Not intubated  Fish-Derived Produc*    SWELLING  Lisinopril              ANAPHYLAXIS  Peanuts                 SWELLING  Tomato use an assistive device. .        The patient does live in a home with stairs.     Social Determinants of Health  Financial Resource Strain:       Difficulty of Paying Living Expenses: Not on file  Food Insecurity:       Worried About Running Out of Food in height is 1.676 m (5' 6\") and weight is 89.8 kg (198 lb). Her temperature is 97.6 °F (36.4 °C). Her blood pressure is 111/64 and her pulse is 83. Her respiration is 18 and oxygen saturation is 96%.     10/21/21  1259   BP: 111/64   Pulse: 83   Resp: 18   T

## 2021-10-26 ENCOUNTER — TELEPHONE (OUTPATIENT)
Dept: GASTROENTEROLOGY | Facility: CLINIC | Age: 61
End: 2021-10-26

## 2021-10-26 ENCOUNTER — TELEPHONE (OUTPATIENT)
Dept: INTERNAL MEDICINE CLINIC | Facility: CLINIC | Age: 61
End: 2021-10-26

## 2021-10-26 DIAGNOSIS — R10.11 RUQ PAIN: ICD-10-CM

## 2021-10-26 DIAGNOSIS — R14.0 BLOATING: ICD-10-CM

## 2021-10-26 DIAGNOSIS — R11.0 NAUSEA: ICD-10-CM

## 2021-10-26 DIAGNOSIS — Z80.0 FAMILY HISTORY OF COLON CANCER: ICD-10-CM

## 2021-10-26 DIAGNOSIS — K21.9 GASTROESOPHAGEAL REFLUX DISEASE, UNSPECIFIED WHETHER ESOPHAGITIS PRESENT: Primary | ICD-10-CM

## 2021-10-26 DIAGNOSIS — K59.00 CONSTIPATION, UNSPECIFIED CONSTIPATION TYPE: ICD-10-CM

## 2021-10-26 DIAGNOSIS — Z86.010 PERSONAL HISTORY OF COLONIC POLYPS: ICD-10-CM

## 2021-10-26 DIAGNOSIS — Z72.0 TOBACCO USE: ICD-10-CM

## 2021-10-26 RX ORDER — ENOXAPARIN SODIUM 100 MG/ML
INJECTION SUBCUTANEOUS
Qty: 4 EACH | Refills: 0 | Status: SHIPPED | OUTPATIENT
Start: 2021-10-26

## 2021-10-26 NOTE — TELEPHONE ENCOUNTER
Spoke with Piotr Dow from Dr. Efrain Delacruz office. Received transfer call from [facility] staff. Patient provided lovenox orders on behalf of her pcp office. Instructed to stop eliquis two days prior.  Start lovenox injection administion the day she stops the eliquis--

## 2021-10-26 NOTE — TELEPHONE ENCOUNTER
Spoke with pt,  verified. Pt was informed of MD recommendation, pt stated understanding. Routed to GI dept to schedule pt for her EGD/Colonoscopy. Also spoke with Sailaja NGUYEN from GI dept, she was informed of Dr Mercy Joshi message.    RN mentioned that

## 2021-10-26 NOTE — TELEPHONE ENCOUNTER
Scheduled for:  Colonoscopy - [address] & EGD - [address]0575  Provider Name:  Dr. Brayden Mederos  Date:  2/18/22  Location:  [facility]  Sedation:  MAC  Time:  10:30 am (pt is aware to arrive at 9:30 am)  Prep:  Trilyte, Prep instructions were given to pt over the phone, pt verbaliz

## 2021-10-26 NOTE — TELEPHONE ENCOUNTER
Patient spoke to Dr. Orlin Weber NP this morning and was advised to ask Dr. Monica Guzman for a Lovenox prescription. She is having an EGD/Colonoscopy and will need to be off Eliquis for two days prior to her procedure.  Her directions were to take Lovenox the day befo

## 2021-10-26 NOTE — TELEPHONE ENCOUNTER
GI Schedulers--    Please assist with rescheduling colonoscopy/egd per orders attached below from office visit 09/22/2021. Reviewed below lovenox bridging/blood thinner hold with patient which she verbalized understanding. Thank you! 1.  Manuelul

## 2021-10-26 NOTE — TELEPHONE ENCOUNTER
apn contacted pt regarding ultrasound abd complete 10/21/21:    Impression  CONCLUSION:   1. There is diffuse increase in the echogenicity of the liver compatible with either fatty infiltration, or hepatic parenchymal disease.  Normal liver size.    2. Stat

## 2021-11-01 NOTE — PROGRESS NOTES
Patient: Trish Lewis  Medical Record Number: [id number]  YOB: 1960  PCP: Jolene Mcqueen MD    Reason for visit: Lumbar follow up    HISTORY OF CHIEF COMPLAINT:    Trish Lewis is a very pleasant 62year old female, with a pertine the thighs. Unsure if walking aggravates the numbness/tingling. Can walk about 30-4- ft before stopping to rest. Leaning on a grocery cart helps. Unable to do activities of daily living such as grocery shopping.   She has to be on mobile grocery cart in o • Back pain    • Back problem    • Cancer ([facility]     1990   • COPD (chronic obstructive pulmonary disease) (HCC)    • Essential hypertension    • High blood pressure    • High cholesterol    • History of abnormal cervical Pap smear    • Muscle weakness Concern: No        Exercise: No       Ace Inhibitors          ANAPHYLAXIS, ANGIOEDEMA, SWELLING    Comment:April 2020, hospitalized at [facility] in [place] [address] with             tongue swelling and throat closing sensation, on             lisinopril at the time.  Not progress  9/15/2021 fusions are maturing there is some slight stable haloing around the C5 and C6 screws.       Narrative   PROCEDURE:  XR LUMBAR SPINE COMPLETE W/FLEX + EXT (CPT=72114)       TECHNIQUE:  AP, lateral, obliques, coned down view, and flexion/e wrote an order for cervical x-ray in 2 months. We will see her in 2 months. Angeles Umanzor.  [place], [address] [facility]  Neurological Surgery    Co-Director  [place] [address] Jeannine Guzman

## 2021-11-05 NOTE — TELEPHONE ENCOUNTER
Dr. Andrew Gibbons- Please see message below. Chest Lung LD Screening was denied but at Chest CT without contrast (CPT 31186) would be approved.

## 2021-11-05 NOTE — TELEPHONE ENCOUNTER
Jamaal Watson for the delayed response,    Per denial below    Suggested cpt 44925 -please advise    Thanks  [address] [facility]

## 2021-11-09 NOTE — TELEPHONE ENCOUNTER
Adriana Yu for late reply    I submitted to insurance for determination,i will let you know once approved can take 3-5 business days    Thanks  Stephen

## 2021-11-11 ENCOUNTER — TELEPHONE (OUTPATIENT)
Dept: INTERNAL MEDICINE CLINIC | Facility: CLINIC | Age: 61
End: 2021-11-11

## 2021-11-11 NOTE — TELEPHONE ENCOUNTER
The patient is calling for Eliquis 5 mg take 2 times a day. Last filled on per patient on 10/8/21  Per the patient has been on since February    She only has 1 tablet left.

## 2021-11-11 NOTE — TELEPHONE ENCOUNTER
Patient calling  and states that her phramacy told her that PA is needed for Eliquis. Dr Price=FYI. Route to Triage support to start PA.

## 2021-11-12 ENCOUNTER — TELEPHONE (OUTPATIENT)
Dept: INTERNAL MEDICINE CLINIC | Facility: CLINIC | Age: 61
End: 2021-11-12

## 2021-11-12 DIAGNOSIS — I27.82 OTHER CHRONIC PULMONARY EMBOLISM WITHOUT ACUTE COR PULMONALE (HCC): Primary | ICD-10-CM

## 2021-11-12 NOTE — TELEPHONE ENCOUNTER
Beny/ Clinical Review Technician of [employer] is requesting diagnosis code for patient's medication, apixaban (ELIQUIS) 5 MG Oral Tab.     Case#[id number]

## 2021-11-12 NOTE — TELEPHONE ENCOUNTER
Prior authorization form has been filled out and faxed to [employer] to [phone] and [phone]

## 2021-11-13 ENCOUNTER — TELEPHONE (OUTPATIENT)
Dept: INTERNAL MEDICINE CLINIC | Facility: CLINIC | Age: 61
End: 2021-11-13

## 2021-11-13 NOTE — TELEPHONE ENCOUNTER
Called Alesia at [employer] [facility] and advised of Dr Otilio Odom note for the Eliquis. Put the request through as urgent for clinical review. Stated that they do have staff working during the weekend.

## 2021-11-13 NOTE — TELEPHONE ENCOUNTER
Spoke to Tanisha at [facility] and indicated that the eliquis is going through [employer] now. Patient advised.

## 2021-11-17 ENCOUNTER — TELEPHONE (OUTPATIENT)
Dept: ENDOCRINOLOGY CLINIC | Facility: CLINIC | Age: 61
End: 2021-11-17

## 2021-11-17 DIAGNOSIS — E78.2 MIXED HYPERLIPIDEMIA: ICD-10-CM

## 2021-11-17 DIAGNOSIS — E11.9 TYPE 2 DIABETES MELLITUS WITHOUT RETINOPATHY (HCC): Primary | ICD-10-CM

## 2021-11-18 ENCOUNTER — HOSPITAL ENCOUNTER (OUTPATIENT)
Dept: CT IMAGING | Facility: HOSPITAL | Age: 61
Discharge: HOME | End: 2021-11-18
Attending: INTERNAL MEDICINE
Payer: MEDICAID

## 2021-11-18 DIAGNOSIS — F17.200 SMOKER: ICD-10-CM

## 2021-11-18 DIAGNOSIS — I26.99 OTHER PULMONARY EMBOLISM WITHOUT ACUTE COR PULMONALE, UNSPECIFIED CHRONICITY (HCC): ICD-10-CM

## 2021-11-18 DIAGNOSIS — J44.9 CHRONIC OBSTRUCTIVE PULMONARY DISEASE, UNSPECIFIED COPD TYPE (HCC): ICD-10-CM

## 2021-11-18 DIAGNOSIS — Z87.891 PERSONAL HISTORY OF TOBACCO USE, PRESENTING HAZARDS TO HEALTH: ICD-10-CM

## 2021-11-18 PROCEDURE — 71250 CT THORAX DX C-: CPT | Performed by: INTERNAL MEDICINE

## 2021-11-18 NOTE — TELEPHONE ENCOUNTER
Hi!  She has to have MAC, lipid panel and CMP done, thank you! I ordered them for [facility]. Please ask her where she gets them done and make sure that they are ordered for the right place.

## 2021-11-18 NOTE — TELEPHONE ENCOUNTER
Spoke to patient to advise that lab orders are in the system for [facility] - patient stated understanding and will have labs completed prior to f/u on 12/1/21

## 2021-11-22 NOTE — TELEPHONE ENCOUNTER
rAis Hatchet forms were signed and faxed to [facility] ,awaiting results for Colon /Egd reports from 4/2019.

## 2021-11-27 DIAGNOSIS — E11.9 TYPE 2 DIABETES MELLITUS WITHOUT RETINOPATHY (HCC): ICD-10-CM

## 2021-11-28 ENCOUNTER — HOSPITAL ENCOUNTER (EMERGENCY)
Facility: HOSPITAL | Age: 61
Discharge: HOME | End: 2021-11-28
Payer: MEDICAID

## 2021-11-28 ENCOUNTER — APPOINTMENT (OUTPATIENT)
Dept: GENERAL RADIOLOGY | Facility: HOSPITAL | Age: 61
End: 2021-11-28
Payer: MEDICAID

## 2021-11-28 VITALS
HEART RATE: 93 BPM | BODY MASS INDEX: 30.53 KG/M2 | OXYGEN SATURATION: 95 % | DIASTOLIC BLOOD PRESSURE: 72 MMHG | HEIGHT: 66 IN | WEIGHT: 190 LBS | SYSTOLIC BLOOD PRESSURE: 115 MMHG | TEMPERATURE: 98 F | RESPIRATION RATE: 18 BRPM

## 2021-11-28 DIAGNOSIS — J06.9 UPPER RESPIRATORY TRACT INFECTION, UNSPECIFIED TYPE: Primary | ICD-10-CM

## 2021-11-28 PROCEDURE — 93010 ELECTROCARDIOGRAM REPORT: CPT | Performed by: INTERNAL MEDICINE

## 2021-11-28 PROCEDURE — 80048 BASIC METABOLIC PNL TOTAL CA: CPT

## 2021-11-28 PROCEDURE — 71045 X-RAY EXAM CHEST 1 VIEW: CPT

## 2021-11-28 PROCEDURE — 84484 ASSAY OF TROPONIN QUANT: CPT

## 2021-11-28 PROCEDURE — 93005 ELECTROCARDIOGRAM TRACING: CPT

## 2021-11-28 PROCEDURE — 99285 EMERGENCY DEPT VISIT HI MDM: CPT

## 2021-11-28 PROCEDURE — 85025 COMPLETE CBC W/AUTO DIFF WBC: CPT

## 2021-11-28 PROCEDURE — 96374 THER/PROPH/DIAG INJ IV PUSH: CPT

## 2021-11-28 RX ORDER — ALBUTEROL SULFATE 90 UG/1
2 AEROSOL, METERED RESPIRATORY (INHALATION) EVERY 4 HOURS PRN
Qty: 1 EACH | Refills: 0 | Status: SHIPPED | OUTPATIENT
Start: 2021-11-28 | End: 2021-12-28

## 2021-11-28 RX ORDER — BENZONATATE 100 MG/1
100 CAPSULE ORAL 3 TIMES DAILY PRN
Qty: 30 CAPSULE | Refills: 0 | Status: SHIPPED | OUTPATIENT
Start: 2021-11-28 | End: 2021-12-28

## 2021-11-28 RX ORDER — KETOROLAC TROMETHAMINE 10 MG/1
10 TABLET, FILM COATED ORAL EVERY 6 HOURS PRN
Qty: 20 TABLET | Refills: 0 | Status: SHIPPED | OUTPATIENT
Start: 2021-11-28 | End: 2021-12-05

## 2021-11-28 RX ORDER — KETOROLAC TROMETHAMINE 15 MG/ML
15 INJECTION, SOLUTION INTRAMUSCULAR; INTRAVENOUS ONCE
Status: COMPLETED | OUTPATIENT
Start: 2021-11-28 | End: 2021-11-28

## 2021-11-28 RX ORDER — CODEINE PHOSPHATE AND GUAIFENESIN 10; 100 MG/5ML; MG/5ML
5 SOLUTION ORAL EVERY 6 HOURS PRN
Qty: 118 ML | Refills: 0 | Status: SHIPPED | OUTPATIENT
Start: 2021-11-28

## 2021-11-28 RX ORDER — ACETAMINOPHEN 500 MG
1000 TABLET ORAL ONCE
Status: COMPLETED | OUTPATIENT
Start: 2021-11-28 | End: 2021-11-28

## 2021-11-28 NOTE — ED PROVIDER NOTES
Patient Seen in: [facility] AND [facility] Emergency Department      History   Patient presents with:  Cough/URI  Sore Throat    Stated Complaint: sore throat + cough since thursday, fever this AM    Subjective:   HPI    27-year-old female with history of asthma use: Not Currently    Drug use: No             Review of Systems   Constitutional: Negative for fever. HENT: Negative for congestion. Eyes: Negative for visual disturbance. Respiratory: Positive for cough. Negative for shortness of breath.     Cardio on EKG Report. Rate: 109  Rhythm: Sinus Rhythm  Reading: abnormal for rate, normal for rhythm, T wave inversions in V1-2    Cardiac Monitor:    Patient placed on the cardiac monitor and a rhythm strip obtained with the indication of chest pain.   Monitor s Eosinophil Absolute 0.08 0.00 - 0.70 x10(3) uL    Basophil Absolute 0.02 0.00 - 0.20 x10(3) uL    Immature Granulocyte Absolute 0.02 0.00 - 1.00 x10(3) uL    Neutrophil % 75.0 %    Lymphocyte % 18.7 %    Monocyte % 4.6 %    Eosinophil % 1.1 %    Basophil % provided. Medical Record Review: I personally reviewed available prior medical records for any recent pertinent discharge summaries, testing, and procedures, and reviewed those reports. Complicating Factors:  The patient already has does not have any found. Please discuss with provider.

## 2021-11-28 NOTE — ED INITIAL ASSESSMENT (HPI)
Congested cough and headache x 3 days, worse last night. No resp distress noted, speaking in full sentences. Also c/o chest \"burning. \"

## 2021-11-29 RX ORDER — EMPAGLIFLOZIN 25 MG/1
TABLET, FILM COATED ORAL
Qty: 90 TABLET | Refills: 0 | Status: SHIPPED | OUTPATIENT
Start: 2021-11-29 | End: 2022-01-25

## 2021-11-30 PROCEDURE — 99283 EMERGENCY DEPT VISIT LOW MDM: CPT

## 2021-11-30 PROCEDURE — 93010 ELECTROCARDIOGRAM REPORT: CPT | Performed by: EMERGENCY MEDICINE

## 2021-11-30 PROCEDURE — 93005 ELECTROCARDIOGRAM TRACING: CPT

## 2021-12-01 ENCOUNTER — OFFICE VISIT (OUTPATIENT)
Dept: ENDOCRINOLOGY CLINIC | Facility: CLINIC | Age: 61
End: 2021-12-01
Payer: MEDICAID

## 2021-12-01 ENCOUNTER — HOSPITAL ENCOUNTER (EMERGENCY)
Facility: HOSPITAL | Age: 61
Discharge: HOME | End: 2021-12-01
Attending: EMERGENCY MEDICINE
Payer: MEDICAID

## 2021-12-01 VITALS
OXYGEN SATURATION: 96 % | WEIGHT: 190.06 LBS | RESPIRATION RATE: 15 BRPM | HEIGHT: 66 IN | DIASTOLIC BLOOD PRESSURE: 79 MMHG | HEART RATE: 89 BPM | TEMPERATURE: 100 F | SYSTOLIC BLOOD PRESSURE: 127 MMHG | BODY MASS INDEX: 30.54 KG/M2

## 2021-12-01 VITALS
DIASTOLIC BLOOD PRESSURE: 78 MMHG | HEART RATE: 92 BPM | WEIGHT: 188 LBS | SYSTOLIC BLOOD PRESSURE: 119 MMHG | BODY MASS INDEX: 30 KG/M2

## 2021-12-01 DIAGNOSIS — I10 ESSENTIAL HYPERTENSION: ICD-10-CM

## 2021-12-01 DIAGNOSIS — R21 RASH: ICD-10-CM

## 2021-12-01 DIAGNOSIS — I10 HYPERTENSION, UNSPECIFIED TYPE: Primary | ICD-10-CM

## 2021-12-01 DIAGNOSIS — E78.2 MIXED HYPERLIPIDEMIA: ICD-10-CM

## 2021-12-01 DIAGNOSIS — E11.9 TYPE 2 DIABETES MELLITUS WITHOUT RETINOPATHY (HCC): Primary | ICD-10-CM

## 2021-12-01 PROBLEM — E11.40 TYPE 2 DIABETES MELLITUS WITH DIABETIC NEUROPATHY (HCC): Status: ACTIVE | Noted: 2021-12-01

## 2021-12-01 PROBLEM — E11.65 TYPE 2 DIABETES MELLITUS WITH HYPERGLYCEMIA (HCC): Status: ACTIVE | Noted: 2021-12-01

## 2021-12-01 PROCEDURE — 36416 COLLJ CAPILLARY BLOOD SPEC: CPT | Performed by: INTERNAL MEDICINE

## 2021-12-01 PROCEDURE — 3051F HG A1C>EQUAL 7.0%<8.0%: CPT | Performed by: INTERNAL MEDICINE

## 2021-12-01 PROCEDURE — 3078F DIAST BP <80 MM HG: CPT | Performed by: INTERNAL MEDICINE

## 2021-12-01 PROCEDURE — 99214 OFFICE O/P EST MOD 30 MIN: CPT | Performed by: INTERNAL MEDICINE

## 2021-12-01 PROCEDURE — 3074F SYST BP LT 130 MM HG: CPT | Performed by: INTERNAL MEDICINE

## 2021-12-01 PROCEDURE — 82947 ASSAY GLUCOSE BLOOD QUANT: CPT | Performed by: INTERNAL MEDICINE

## 2021-12-01 PROCEDURE — 83036 HEMOGLOBIN GLYCOSYLATED A1C: CPT | Performed by: INTERNAL MEDICINE

## 2021-12-01 RX ORDER — DULAGLUTIDE 0.75 MG/.5ML
1.5 INJECTION, SOLUTION SUBCUTANEOUS WEEKLY
Qty: 6 ML | Refills: 0 | Status: SHIPPED | OUTPATIENT
Start: 2021-12-01 | End: 2022-02-01

## 2021-12-01 NOTE — PROGRESS NOTES
Name: Lily Phillips  Date: 12/01/21    Referring Physician: Dr. Mickie De La Garza is a 62year old female who presents for follow-up of evaluaton and management of uncontrolled T2D.  She was diagnosed abou on 2/09/21    Skin: Infection or ulceration: none    Osteoporosis: none    Thyroid disease: having TSH checked    Medications:     Current Outpatient Medications:   •  Dulaglutide (TRULICITY) 3.73 YP/5.5JN Subcutaneous Solution Pen-injector, Inject 1.5 mg daily as needed for Muscle spasms. , Disp: 40 tablet, Rfl: 0  •  metFORMIN HCl 500 MG Oral Tab, Take 2 tablets (1,000 mg total) by mouth 2 (two) times daily with meals. , Disp: 360 tablet, Rfl: 1  •  cyclobenzaprine 10 MG Oral Tab, Take 1 tablet (10 mg total Alcohol use: Not Currently    Drug use: No      Medical History:   Past Medical History:   Diagnosis Date   • Asthma    • B12 deficiency    • Back pain    • Back problem    • Cancer ([facility]     1990   • COPD (chronic obstructive pulmonary disease) ([facility] 72 01/16/2016    BILT 0.4 09/28/2021    TP 7.2 09/28/2021    ALB 4.3 09/28/2021    GLOBULIN 2.9 09/28/2021    AGRATIO 1.5 09/28/2021     11/28/2021    K 3.9 11/28/2021     11/28/2021    CO2 25.0 11/28/2021     Lab Results   Component Value Date poultry and fish.    - Patient is going to increase the amount of vegetables; she is going to try and cut out the red meat  - She is not able to exercise, but she is going to try as much as possible    Return to the clinic in 3 months    Prior to this encou

## 2021-12-01 NOTE — ED PROVIDER NOTES
Patient Seen in: [facility] AND [facility] Emergency Department      History   No chief complaint on file.     Stated Complaint: Hypertension    Subjective:   HPI    60-year-old female with history of COPD, hypertension, hyperlipidemia, diabetes, here with elevat 20.00        Pack years: 20        Types: Cigarettes      Smokeless tobacco: Never Used      Tobacco comment: 1 pack daily    Vaping Use      Vaping Use: Never used    Alcohol use: Not Currently    Drug use:  No             Review of Systems   Constitutiona EKG    Rate, intervals and axes as noted on EKG Report. Rate: 91  Rhythm: Sinus Rhythm  Reading: normal for rate, normal for rhythm, no acute ST changes          Pulse Oximeter:  Pulse oximetry on room air is 96%, indicating adequate oxygenation.     P performing the physical exam and reviewing the diagnostics, multiple initial diagnoses were considered based on the presenting problem including hypertension, rash, allergic reaction, anxiety reaction.                            Disposition and Plan     Cli

## 2021-12-02 ENCOUNTER — HOSPITAL ENCOUNTER (OUTPATIENT)
Dept: GENERAL RADIOLOGY | Facility: HOSPITAL | Age: 61
Discharge: HOME | End: 2021-12-02
Attending: NEUROLOGICAL SURGERY
Payer: MEDICAID

## 2021-12-02 ENCOUNTER — OFFICE VISIT (OUTPATIENT)
Dept: SURGERY | Facility: CLINIC | Age: 61
End: 2021-12-02
Payer: MEDICAID

## 2021-12-02 VITALS — HEART RATE: 80 BPM | SYSTOLIC BLOOD PRESSURE: 100 MMHG | DIASTOLIC BLOOD PRESSURE: 60 MMHG

## 2021-12-02 DIAGNOSIS — M67.912 DISORDER OF LEFT ROTATOR CUFF: ICD-10-CM

## 2021-12-02 DIAGNOSIS — Z98.1 HISTORY OF FUSION OF CERVICAL SPINE: Primary | ICD-10-CM

## 2021-12-02 DIAGNOSIS — Z98.1 S/P CERVICAL SPINAL FUSION: ICD-10-CM

## 2021-12-02 PROCEDURE — 99213 OFFICE O/P EST LOW 20 MIN: CPT | Performed by: NEUROLOGICAL SURGERY

## 2021-12-02 PROCEDURE — 3078F DIAST BP <80 MM HG: CPT | Performed by: NEUROLOGICAL SURGERY

## 2021-12-02 PROCEDURE — 72040 X-RAY EXAM NECK SPINE 2-3 VW: CPT | Performed by: NEUROLOGICAL SURGERY

## 2021-12-02 PROCEDURE — 3074F SYST BP LT 130 MM HG: CPT | Performed by: NEUROLOGICAL SURGERY

## 2021-12-02 NOTE — PROGRESS NOTES
Pt is here regarding: follow up, neck     Pain level at this moment:  0/10      Left side of the neck goes down the left arm still has numbness/tiggling in the left arm

## 2021-12-02 NOTE — PROGRESS NOTES
Pt is here for: 2 month follow up visit regarding neck and back pain    Previous surgery: C4-7 ACDF on 3/22/21      Medications prescribed by our office: Donel Nett 10-325mg last filled 9/25/21    Last imaging done: XR cervical spine 12/2/21    Plan per LOV on

## 2021-12-02 NOTE — PROGRESS NOTES
Neurological [facility] Outpatient [facility]    Aicha Jack  12/2/2021    Diagnosis: History of cervical spinal fusion C4-5, C5-6, C6-7,March 22, 2021    Chief complaint: Left shoulder pain  Interval History: Since her last visit she has completed furthe

## 2021-12-07 RX ORDER — METOPROLOL SUCCINATE 25 MG/1
25 TABLET, EXTENDED RELEASE ORAL DAILY
Qty: 90 TABLET | Refills: 1 | Status: SHIPPED | OUTPATIENT
Start: 2021-12-07 | End: 2022-05-26

## 2021-12-21 ENCOUNTER — HOSPITAL ENCOUNTER (OUTPATIENT)
Dept: CT IMAGING | Facility: HOSPITAL | Age: 61
End: 2021-12-21
Attending: NEUROLOGICAL SURGERY
Payer: MEDICAID

## 2021-12-21 ENCOUNTER — HOSPITAL ENCOUNTER (OUTPATIENT)
Dept: GENERAL RADIOLOGY | Facility: HOSPITAL | Age: 61
Discharge: HOME | End: 2021-12-21
Attending: PHYSICIAN ASSISTANT
Payer: MEDICAID

## 2021-12-21 ENCOUNTER — LAB ENCOUNTER (OUTPATIENT)
Dept: LAB | Facility: HOSPITAL | Age: 61
End: 2021-12-21
Attending: NEUROLOGICAL SURGERY
Payer: MEDICAID

## 2021-12-21 PROCEDURE — 72050 X-RAY EXAM NECK SPINE 4/5VWS: CPT | Performed by: PHYSICIAN ASSISTANT

## 2021-12-28 ENCOUNTER — PATIENT MESSAGE (OUTPATIENT)
Dept: ENDOCRINOLOGY CLINIC | Facility: CLINIC | Age: 61
End: 2021-12-28

## 2021-12-29 ENCOUNTER — NURSE TRIAGE (OUTPATIENT)
Dept: INTERNAL MEDICINE CLINIC | Facility: CLINIC | Age: 61
End: 2021-12-29

## 2021-12-29 NOTE — TELEPHONE ENCOUNTER
From: Mai Mariscal  To: Tal Reid. Royce Rico MD  Sent: 12/28/2021 5:15 PM CST  Subject: Test ordered    Good evening, I received a call regarding approval for a test I know nothing about. Did you order a test if so what was it?  Also you didn’t give

## 2021-12-29 NOTE — TELEPHONE ENCOUNTER
No procedures or imaging orders placed by this office, only blood work orders on 12/1/21. Patient has been notified.

## 2021-12-29 NOTE — TELEPHONE ENCOUNTER
EM RN TRIAGE:   Please call patient to triage symptoms   Patient has history of pulmonary embolism, lung nodules, and chronic airway obstruction

## 2021-12-29 NOTE — TELEPHONE ENCOUNTER
Action Requested: Summary for Provider     []  Critical Lab, Recommendations Needed  [] Need Additional Advice  []   FYI    []   Need Orders  [] Need Medications Sent to Pharmacy  []  Other     SUMMARY: Patient c/o exposed on Sunday fever, 101.2, some sob,

## 2021-12-29 NOTE — TELEPHONE ENCOUNTER
----- Message from [address] Tran sent at 12/29/2021 10:22 AM CST -----  Regarding: Covid testing   Good morning, a friend tested positive yesterday and he visited on Sunday.  Yesterday I had to use my inhaler for because of pressure on my back and I

## 2021-12-30 ENCOUNTER — TELEMEDICINE (OUTPATIENT)
Dept: INTERNAL MEDICINE CLINIC | Facility: CLINIC | Age: 61
End: 2021-12-30

## 2021-12-30 DIAGNOSIS — R53.83 OTHER FATIGUE: ICD-10-CM

## 2021-12-30 DIAGNOSIS — M54.50 ACUTE BILATERAL LOW BACK PAIN WITHOUT SCIATICA: ICD-10-CM

## 2021-12-30 DIAGNOSIS — Z20.822 EXPOSURE TO COVID-19 VIRUS: Primary | ICD-10-CM

## 2021-12-30 PROCEDURE — 99214 OFFICE O/P EST MOD 30 MIN: CPT | Performed by: INTERNAL MEDICINE

## 2021-12-30 NOTE — PROGRESS NOTES
Subjective:   Patient ID: Marycarmen Hurt is a 62year old female.     HPI  Patient seen through live 2 way video visit calling today with complaint of fatigue, body aches neck pain has been having symptoms for for 5 days now one of her friends she was Take prep as directed by gastro office. May substitute with Trilyte/generic equivalent if needed. 4000 mL 0   • atorvastatin 80 MG Oral Tab Take 1 tablet (80 mg total) by mouth nightly.  90 tablet 1   • amLODIPine 5 MG Oral Tab Take 1 tablet (5 mg total) by to go to ER  Other fatigue-as above rest  Acute bilateral low back pain without sciatica monitor symptoms    22 min spent  Orders Placed This Encounter      COVID-19 Testing by PCR (Eren) [E]      Meds This Visit:  Requested Prescriptions      No prescr

## 2022-01-04 NOTE — ED PROVIDER NOTES
Patient Seen in: [facility] AND [facility] Emergency Department      History   Patient presents with:  Pain    Stated Complaint: L arm pain    HPI/Subjective:   HPI    Patient is a 81-year-old female who presents with left shoulder pain that woke her up from sle sean        ED Course     Labs Reviewed   BASIC METABOLIC PANEL (8) - Abnormal; Notable for the following components:       Result Value    Glucose 263 (*)     All other components within normal limits   CBC W/ DIFFERENTIAL - Abnormal; Notable for the fo obtaining history, performing a physical exam, bedside monitoring of interventions, collecting and interpreting tests and discussion with consultants but not including time spent performing procedures.      PHYSICIAN NOTE:  Patient is a 21-year-old female w [Arthralgias] : arthralgias [Negative] : Heme/Lymph [FreeTextEntry9] : Neck pain

## 2022-01-07 ENCOUNTER — NURSE ONLY (OUTPATIENT)
Dept: LAB | Age: 62
End: 2022-01-07
Attending: INTERNAL MEDICINE
Payer: MEDICAID

## 2022-01-07 DIAGNOSIS — M54.50 ACUTE BILATERAL LOW BACK PAIN WITHOUT SCIATICA: ICD-10-CM

## 2022-01-07 DIAGNOSIS — Z20.822 EXPOSURE TO COVID-19 VIRUS: ICD-10-CM

## 2022-01-07 DIAGNOSIS — R53.83 OTHER FATIGUE: ICD-10-CM

## 2022-01-09 LAB — SARS-COV-2 RNA RESP QL NAA+PROBE: NOT DETECTED

## 2022-01-11 NOTE — ASSESSMENT & PLAN NOTE
Discussed very mild cataracts in both eyes that are not affecting vision and are not surgical at this time. Tranexamic Acid Pregnancy And Lactation Text: It is unknown if this medication is safe during pregnancy or breast feeding.

## 2022-01-12 ENCOUNTER — TELEPHONE (OUTPATIENT)
Dept: GASTROENTEROLOGY | Facility: CLINIC | Age: 62
End: 2022-01-12

## 2022-01-12 NOTE — TELEPHONE ENCOUNTER
1st reminder letter mailed to patient Via My Chart regarding her overdue Imaging order :  CT ABDOMEN+PELVIS(CONTRAST ONLY)(CPT=74177) (Order #[id number]) on 9/22/21

## 2022-01-18 ENCOUNTER — IMMUNIZATION (OUTPATIENT)
Dept: LAB | Facility: HOSPITAL | Age: 62
End: 2022-01-18
Attending: EMERGENCY MEDICINE
Payer: MEDICAID

## 2022-01-18 DIAGNOSIS — Z23 NEED FOR VACCINATION: Primary | ICD-10-CM

## 2022-01-18 PROCEDURE — 0004A SARSCOV2 VAC 30MCG/0.3ML IM: CPT

## 2022-01-18 PROCEDURE — 0054A SARSCOV2 VAC 30MCG/0.3ML IM: CPT

## 2022-01-25 ENCOUNTER — TELEPHONE (OUTPATIENT)
Dept: ENDOCRINOLOGY CLINIC | Facility: CLINIC | Age: 62
End: 2022-01-25

## 2022-01-25 ENCOUNTER — TELEPHONE (OUTPATIENT)
Dept: INTERNAL MEDICINE CLINIC | Facility: CLINIC | Age: 62
End: 2022-01-25

## 2022-01-25 DIAGNOSIS — E11.9 TYPE 2 DIABETES MELLITUS WITHOUT RETINOPATHY (HCC): ICD-10-CM

## 2022-01-25 RX ORDER — EMPAGLIFLOZIN 25 MG/1
0.5 TABLET, FILM COATED ORAL DAILY
Qty: 45 TABLET | Refills: 0 | COMMUNITY
Start: 2022-01-25

## 2022-01-25 NOTE — TELEPHONE ENCOUNTER
I agree with recommendations, and yes Ozempic can cause headache in some pt, as for EGD she needs to contact GI

## 2022-01-25 NOTE — TELEPHONE ENCOUNTER
Pt called in stating her blood sugar levels are dropping in the 60s and is having a headache. Attempting to call RN now.  Please call

## 2022-01-25 NOTE — TELEPHONE ENCOUNTER
Per patient, she has headaches for 3 days,it eases but will not go away, started taking Ozempic  insulin last Sunday, fine most part just little upset stomach, Ozempic  is only once a week, was prescribed by Dr Luis Downs , states as soon as she started ta

## 2022-01-25 NOTE — TELEPHONE ENCOUNTER
Dr. Nic Hou, patient reports some low BG reading and headache since second dose of Trulicity. Says blood sugars have gotten better since Trulicity, barely any readings above 200.      Reports the following :   1/23: After breakfast 158, after dinner: 157

## 2022-01-25 NOTE — TELEPHONE ENCOUNTER
Pt was called and informed of Dr. Tiffanie Rodgers message below. Pt stated that she already called Dr. Rudy Almodovar office and is waiting for then to call her back.

## 2022-01-25 NOTE — TELEPHONE ENCOUNTER
Hi!  Can we please ask her to cut her Jardiance pill in half? Let us see how her blood sugars improve after doing this. Please let her know that the headache may be due to the low blood sugars.  Please also reassure her that if she still continues to have i

## 2022-01-26 NOTE — TELEPHONE ENCOUNTER
Spoke to patient and informed her of Dr. Vangie Nayak recommendation as outlined below. Asked Dr. Burns Patient when pt should check back in and said in one week.   Pt said she's only taking the jardiance daily and RN informed her it is still ok to take it da

## 2022-02-01 ENCOUNTER — PATIENT MESSAGE (OUTPATIENT)
Dept: ENDOCRINOLOGY CLINIC | Facility: CLINIC | Age: 62
End: 2022-02-01

## 2022-02-01 ENCOUNTER — TELEPHONE (OUTPATIENT)
Dept: ENDOCRINOLOGY CLINIC | Facility: CLINIC | Age: 62
End: 2022-02-01

## 2022-02-01 DIAGNOSIS — E11.9 TYPE 2 DIABETES MELLITUS WITHOUT RETINOPATHY (HCC): ICD-10-CM

## 2022-02-01 RX ORDER — DULAGLUTIDE 0.75 MG/.5ML
0.75 INJECTION, SOLUTION SUBCUTANEOUS WEEKLY
Qty: 6 ML | Refills: 0 | Status: SHIPPED | OUTPATIENT
Start: 2022-02-01 | End: 2022-05-04

## 2022-02-01 NOTE — TELEPHONE ENCOUNTER
Dr. Leo Deal, please advise on Trulicity Rx. Prescription was sent for 1.94QF Trulicity weekly with instructions to be 1.5mg weekly. Spoke with patient and she's been taking Trulicity 6.63YE weekly. Also reports that her headaches are better since cutting Jardiance in half. BG has only dropped below 100 a couple times. 0.75mg weekly dose pended.

## 2022-02-04 ENCOUNTER — PATIENT MESSAGE (OUTPATIENT)
Dept: SURGERY | Facility: CLINIC | Age: 62
End: 2022-02-04

## 2022-02-05 ENCOUNTER — HOSPITAL ENCOUNTER (OUTPATIENT)
Dept: CT IMAGING | Facility: HOSPITAL | Age: 62
Discharge: HOME | End: 2022-02-05
Attending: NEUROLOGICAL SURGERY
Payer: MEDICAID

## 2022-02-05 DIAGNOSIS — Z98.1 HISTORY OF FUSION OF CERVICAL SPINE: ICD-10-CM

## 2022-02-05 PROCEDURE — 72125 CT NECK SPINE W/O DYE: CPT | Performed by: NEUROLOGICAL SURGERY

## 2022-02-07 NOTE — TELEPHONE ENCOUNTER
From: Ernie Cui  To: Mukesh Thurston MD  Sent: 2/4/2022 7:14 PM CST  Subject: Appointment     Good evening, just checking the time for my appointment on February 10, 2022 and the appointment has disappeared. Has the appointment been canceled?     Thank you,  Aaron Sneed

## 2022-02-10 ENCOUNTER — TELEPHONE (OUTPATIENT)
Dept: INTERNAL MEDICINE CLINIC | Facility: CLINIC | Age: 62
End: 2022-02-10

## 2022-02-10 ENCOUNTER — OFFICE VISIT (OUTPATIENT)
Dept: SURGERY | Facility: CLINIC | Age: 62
End: 2022-02-10
Payer: MEDICAID

## 2022-02-10 VITALS — DIASTOLIC BLOOD PRESSURE: 72 MMHG | SYSTOLIC BLOOD PRESSURE: 110 MMHG | WEIGHT: 188 LBS | BODY MASS INDEX: 30 KG/M2

## 2022-02-10 DIAGNOSIS — Z98.1 S/P CERVICAL SPINAL FUSION: ICD-10-CM

## 2022-02-10 DIAGNOSIS — M54.50 LUMBAR PAIN: Primary | ICD-10-CM

## 2022-02-10 PROCEDURE — 3074F SYST BP LT 130 MM HG: CPT | Performed by: NEUROLOGICAL SURGERY

## 2022-02-10 PROCEDURE — 3078F DIAST BP <80 MM HG: CPT | Performed by: NEUROLOGICAL SURGERY

## 2022-02-10 PROCEDURE — 99213 OFFICE O/P EST LOW 20 MIN: CPT | Performed by: NEUROLOGICAL SURGERY

## 2022-02-10 NOTE — TELEPHONE ENCOUNTER
Dr Cirilo Arauz: Patient called to inform you that she had a CT scan 2/5/22 ordered by Dr. Khoi Rajan. It was noted other incidental finds (thyroid enlarged and other)    Please review and advise any further directions.

## 2022-02-10 NOTE — PROGRESS NOTES
[facility]patient [facility]    Maru Ordonez  2/10/2022    Diagnosis: History of cervical fusion C4-7 ACDF 3/22/2021    Chief complaint: L4-5 worsening pain without radiculopathy    Interval History: Neck pain has almost completely resolved. She has no radicular pain. She has no weakness in the arms. She completed the CT scan of her cervical spine to ensure that the C4-5 fusion was solid. Her biggest complaint is that in the last few months she has developed worsening pain in the center of the spine at approximately L4-5 level. She feels somebody standing on her back. She does not have radicular pain. She does not have bowel bladder difficulty. She is not having gait  disturbance. Interval Examination: Motor strength is 5/5 all 4 extremities both proximally and distally. She is tender in the midline spine and over the facets at the L4-5 and L5-S1 levels. There is no straight leg raising pain. There is no sciatic notch tenderness. Imaging: Her cervical CT scan was reviewed and demonstrates the C4-5 fusion is solid. Radiologist did see and enlarged thyroid. Labs: No labs related this visit. Assessment: Cervical fusion solid cervical complaints have resolved. She has had occasional back pain but in the last several months significant worsening at the L4-5 area with no radiculopathy. Plan: I ordered a lumbar MRI in anticipation of pain service visit for potential facet injections for what is expected to be moderate degenerative changes without a surgical lesion has her leg strength is normal.    She will follow up with pain service. She will see us for the neck and back in 6 months. Total face to face time was 15 minutes, more than 50% of the time was spent in counseling and/or coordination of care related to back and neck complaints. Leonides Pichardo.  Mercy Menjivar, [address] [facility]  Neurological Surgery    Co-Director  [place] [facility] [address] [facility]

## 2022-02-10 NOTE — PROGRESS NOTES
Feeling ok  Having a lot lower back pain.  Going all the way across, feels like someone is standing on her back  This has been going on for about a month  No accident or injury  No pain going down the legs    Neck pain comes and goes

## 2022-02-14 ENCOUNTER — TELEPHONE (OUTPATIENT)
Dept: NEUROLOGY | Facility: CLINIC | Age: 62
End: 2022-02-14

## 2022-02-14 NOTE — TELEPHONE ENCOUNTER
Per Dr. Viki Meyer note, no radicular symptoms with 5/5 strength throughout. Has she completed any recent conservative therapy for the lumbar spine? Such as PT? She also needs an updated XR. If no recent PT, I'd recommend she begin with an XR and start PT. Please discuss. If patient is willing to begin PT, where would she like to go so APC knows how to place order.      If no significant improvement with PT, recommend advising our office and we can re order MRI and have her see pain services

## 2022-02-14 NOTE — TELEPHONE ENCOUNTER
Called pt and informed of Prerna Watson below note. Provided scheduling number on referral to pt. Pt VU and appreciative of call.

## 2022-02-14 NOTE — PAT NURSING NOTE
During PAT call, patient states she is unsure of her pre-procedure instructions for her Eliquis/lovenox, her diabetic meds, and overall instructions. Patient transferred to [facility] RN at Dr. Benson Service office for further instructions.

## 2022-02-14 NOTE — TELEPHONE ENCOUNTER
Dr. Shane Lr ordered imaging. Routed to CHUCKIE pool inquiring who would be willing to complete peer to peer.

## 2022-02-14 NOTE — TELEPHONE ENCOUNTER
Called pt to inquire if recent PT completed. Pt states she completed PT at [place] please refer to 9/20/21 Office visit note by Kristina Hendricks PT. Last X-ray Cervical completed on 12/21/22.      Routed to provider to advise

## 2022-02-14 NOTE — TELEPHONE ENCOUNTER
Patient completed PT for s/p cervical fusion    Recommend she begin dedicated PT for the lumbar spine. Order placed, please advise.  If no improvement during PT, please call and will order XR lumbar spine and MRI lumbar spine

## 2022-02-15 ENCOUNTER — LAB ENCOUNTER (OUTPATIENT)
Dept: LAB | Facility: HOSPITAL | Age: 62
End: 2022-02-15
Attending: INTERNAL MEDICINE
Payer: MEDICAID

## 2022-02-15 DIAGNOSIS — Z01.818 PRE-OP TESTING: ICD-10-CM

## 2022-02-15 LAB — SARS-COV-2 RNA RESP QL NAA+PROBE: NOT DETECTED

## 2022-02-16 ENCOUNTER — TELEPHONE (OUTPATIENT)
Dept: INTERNAL MEDICINE CLINIC | Facility: CLINIC | Age: 62
End: 2022-02-16

## 2022-02-16 ENCOUNTER — TELEPHONE (OUTPATIENT)
Dept: GASTROENTEROLOGY | Facility: CLINIC | Age: 62
End: 2022-02-16

## 2022-02-16 NOTE — TELEPHONE ENCOUNTER
Pt has procedure on 2-18 and has no bowel prep sent to pharmacy. Please call pt she has questions about the meds she is supposed to stop. Lena Koo

## 2022-02-16 NOTE — TELEPHONE ENCOUNTER
Patient is calling today for advice on how to use her lovenox as she is supposed to have a colonoscopy on Friday. Per patient Dr. Marissa Swan office advised her to stop taking her apixaban (ELIQUIS) 5 MG and the same day she stops that, she should bridge with Lovenox injections 2 x daily. She is calling for guidance and reassurance of the directions. Reviewed with her and she will call Dr. Marissa Swan office for the bowl prep.

## 2022-02-16 NOTE — TELEPHONE ENCOUNTER
Scheduled for colonoscopy/egd with Dr. Jennifer Spurling Friday, 2022. Trilyte sent e-scribed to [facility] in [place]. P: [phone]     Spoke with Alex Medel (pharamcist). Verified patient name and . Placed verbal order for trilyte and/or formulary alternative. Successfully processed and no charge. Pharmacy ensured will contact patient once ready for . Per anticoagulation adjustments provided on behalf of Dr. Norbert Sainz--    \"Please place an order for bridging with Lovenox  Hold oral anticoagulation /Eliquis 2 days prior to procedure  The following day off of Eliquis to take Lovenox 1 mg/kg subcu every 12 hour  Hold Lovenox the night before the procedure  No Lovenox other day of procedure  Resume Eliquis 1 day after procedure and then stop Lovenox\"     [address] Darby to educate on lovenox bridging as provided by Dr. Norbert Sainz. Read-back correctly and wrote down on prep instruction sheet. Expressed understanding and appreciative of call.

## 2022-02-18 ENCOUNTER — HOSPITAL ENCOUNTER (OUTPATIENT)
Facility: HOSPITAL | Age: 62
Setting detail: HOSPITAL OUTPATIENT SURGERY
Discharge: HOME | End: 2022-02-18
Attending: INTERNAL MEDICINE | Admitting: INTERNAL MEDICINE
Payer: MEDICAID

## 2022-02-18 ENCOUNTER — ANESTHESIA EVENT (OUTPATIENT)
Dept: ENDOSCOPY | Facility: HOSPITAL | Age: 62
End: 2022-02-18
Payer: MEDICAID

## 2022-02-18 ENCOUNTER — ANESTHESIA (OUTPATIENT)
Dept: ENDOSCOPY | Facility: HOSPITAL | Age: 62
End: 2022-02-18
Payer: MEDICAID

## 2022-02-18 DIAGNOSIS — R14.0 BLOATING: ICD-10-CM

## 2022-02-18 DIAGNOSIS — K21.9 GASTROESOPHAGEAL REFLUX DISEASE, UNSPECIFIED WHETHER ESOPHAGITIS PRESENT: ICD-10-CM

## 2022-02-18 DIAGNOSIS — R10.11 RUQ PAIN: ICD-10-CM

## 2022-02-18 DIAGNOSIS — Z86.010 PERSONAL HISTORY OF COLONIC POLYPS: ICD-10-CM

## 2022-02-18 DIAGNOSIS — Z01.818 PRE-OP TESTING: Primary | ICD-10-CM

## 2022-02-18 DIAGNOSIS — K59.00 CONSTIPATION, UNSPECIFIED CONSTIPATION TYPE: ICD-10-CM

## 2022-02-18 DIAGNOSIS — Z72.0 TOBACCO USE: ICD-10-CM

## 2022-02-18 DIAGNOSIS — R11.0 NAUSEA: ICD-10-CM

## 2022-02-18 DIAGNOSIS — Z80.0 FAMILY HISTORY OF COLON CANCER: ICD-10-CM

## 2022-02-18 LAB — GLUCOSE BLDC GLUCOMTR-MCNC: 112 MG/DL (ref 70–99)

## 2022-02-18 PROCEDURE — 45385 COLONOSCOPY W/LESION REMOVAL: CPT | Performed by: INTERNAL MEDICINE

## 2022-02-18 PROCEDURE — 0DBM8ZX EXCISION OF DESCENDING COLON, VIA NATURAL OR ARTIFICIAL OPENING ENDOSCOPIC, DIAGNOSTIC: ICD-10-PCS | Performed by: INTERNAL MEDICINE

## 2022-02-18 PROCEDURE — 0DBN8ZX EXCISION OF SIGMOID COLON, VIA NATURAL OR ARTIFICIAL OPENING ENDOSCOPIC, DIAGNOSTIC: ICD-10-PCS | Performed by: INTERNAL MEDICINE

## 2022-02-18 PROCEDURE — 43239 EGD BIOPSY SINGLE/MULTIPLE: CPT | Performed by: INTERNAL MEDICINE

## 2022-02-18 PROCEDURE — 0DB68ZX EXCISION OF STOMACH, VIA NATURAL OR ARTIFICIAL OPENING ENDOSCOPIC, DIAGNOSTIC: ICD-10-PCS | Performed by: INTERNAL MEDICINE

## 2022-02-18 RX ORDER — NALOXONE HYDROCHLORIDE 0.4 MG/ML
80 INJECTION, SOLUTION INTRAMUSCULAR; INTRAVENOUS; SUBCUTANEOUS AS NEEDED
OUTPATIENT
Start: 2022-02-18 | End: 2022-02-18

## 2022-02-18 RX ORDER — HALOPERIDOL 5 MG/ML
0.25 INJECTION INTRAMUSCULAR ONCE AS NEEDED
OUTPATIENT
Start: 2022-02-18 | End: 2022-02-18

## 2022-02-18 RX ORDER — DEXTROSE MONOHYDRATE 25 G/50ML
50 INJECTION, SOLUTION INTRAVENOUS
OUTPATIENT
Start: 2022-02-18

## 2022-02-18 RX ORDER — HYDROCODONE BITARTRATE AND ACETAMINOPHEN 5; 325 MG/1; MG/1
1 TABLET ORAL AS NEEDED
OUTPATIENT
Start: 2022-02-18

## 2022-02-18 RX ORDER — HYDROCODONE BITARTRATE AND ACETAMINOPHEN 5; 325 MG/1; MG/1
2 TABLET ORAL AS NEEDED
OUTPATIENT
Start: 2022-02-18

## 2022-02-18 RX ORDER — HYDROMORPHONE HYDROCHLORIDE 1 MG/ML
0.2 INJECTION, SOLUTION INTRAMUSCULAR; INTRAVENOUS; SUBCUTANEOUS EVERY 5 MIN PRN
OUTPATIENT
Start: 2022-02-18

## 2022-02-18 RX ORDER — SODIUM CHLORIDE, SODIUM LACTATE, POTASSIUM CHLORIDE, CALCIUM CHLORIDE 600; 310; 30; 20 MG/100ML; MG/100ML; MG/100ML; MG/100ML
INJECTION, SOLUTION INTRAVENOUS CONTINUOUS
Status: DISCONTINUED | OUTPATIENT
Start: 2022-02-18 | End: 2022-02-18

## 2022-02-18 RX ORDER — NICOTINE POLACRILEX 4 MG
15 LOZENGE BUCCAL
OUTPATIENT
Start: 2022-02-18

## 2022-02-18 RX ORDER — NICOTINE POLACRILEX 4 MG
30 LOZENGE BUCCAL
OUTPATIENT
Start: 2022-02-18

## 2022-02-18 RX ORDER — MORPHINE SULFATE 4 MG/ML
2 INJECTION, SOLUTION INTRAMUSCULAR; INTRAVENOUS EVERY 10 MIN PRN
OUTPATIENT
Start: 2022-02-18

## 2022-02-18 RX ORDER — METOPROLOL TARTRATE 5 MG/5ML
2.5 INJECTION INTRAVENOUS ONCE
OUTPATIENT
Start: 2022-02-18 | End: 2022-02-18

## 2022-02-18 RX ORDER — ONDANSETRON 2 MG/ML
4 INJECTION INTRAMUSCULAR; INTRAVENOUS ONCE AS NEEDED
OUTPATIENT
Start: 2022-02-18 | End: 2022-02-18

## 2022-02-18 RX ORDER — MORPHINE SULFATE 10 MG/ML
6 INJECTION, SOLUTION INTRAMUSCULAR; INTRAVENOUS EVERY 10 MIN PRN
OUTPATIENT
Start: 2022-02-18

## 2022-02-18 RX ORDER — SODIUM CHLORIDE, SODIUM LACTATE, POTASSIUM CHLORIDE, CALCIUM CHLORIDE 600; 310; 30; 20 MG/100ML; MG/100ML; MG/100ML; MG/100ML
INJECTION, SOLUTION INTRAVENOUS CONTINUOUS
OUTPATIENT
Start: 2022-02-18

## 2022-02-18 RX ORDER — PROCHLORPERAZINE EDISYLATE 5 MG/ML
5 INJECTION INTRAMUSCULAR; INTRAVENOUS ONCE AS NEEDED
OUTPATIENT
Start: 2022-02-18 | End: 2022-02-18

## 2022-02-18 RX ORDER — MORPHINE SULFATE 4 MG/ML
4 INJECTION, SOLUTION INTRAMUSCULAR; INTRAVENOUS EVERY 10 MIN PRN
OUTPATIENT
Start: 2022-02-18

## 2022-02-18 RX ORDER — HYDROMORPHONE HYDROCHLORIDE 1 MG/ML
0.4 INJECTION, SOLUTION INTRAMUSCULAR; INTRAVENOUS; SUBCUTANEOUS EVERY 5 MIN PRN
OUTPATIENT
Start: 2022-02-18

## 2022-02-18 RX ORDER — HYDROMORPHONE HYDROCHLORIDE 1 MG/ML
0.6 INJECTION, SOLUTION INTRAMUSCULAR; INTRAVENOUS; SUBCUTANEOUS EVERY 5 MIN PRN
OUTPATIENT
Start: 2022-02-18

## 2022-02-21 VITALS
WEIGHT: 190 LBS | BODY MASS INDEX: 30.53 KG/M2 | DIASTOLIC BLOOD PRESSURE: 53 MMHG | RESPIRATION RATE: 12 BRPM | HEIGHT: 66 IN | HEART RATE: 89 BPM | OXYGEN SATURATION: 97 % | SYSTOLIC BLOOD PRESSURE: 105 MMHG | TEMPERATURE: 98 F

## 2022-02-21 NOTE — ANESTHESIA POSTPROCEDURE EVALUATION
Patient: Dion Lau    Procedure Summary     Date: 02/18/22 Room / Location: [address] [place] [address] ENDOSCOPY 04 / [address] [place] [address] ENDOSCOPY    Anesthesia Start: 1053 Anesthesia Stop:     Procedures:       COLONOSCOPY/ESOPHAGOGASTRODUODENOSCOPY (EGD) (N/A )      ESOPHAGOGASTRODUODENOSCOPY (EGD) (N/A ) Diagnosis:       Gastroesophageal reflux disease, unspecified whether esophagitis present      Bloating      Nausea      RUQ pain      Tobacco use      Personal history of colonic polyps      Family history of colon cancer      Constipation, unspecified constipation type      (gastric errythema, gastric errosions, colon polyps, diveriticulosis, )    Surgeons: Lakshmi Esposito MD Anesthesiologist: Roberta Smart MD    Anesthesia Type: MAC ASA Status: 3          Anesthesia Type: MAC    PACU Vitals    BP   123/78   Temp   98   Pulse  78   Resp   12   SpO2  100        [facility] AN Post Evaluation:   Patient Evaluated in floor  Patient Participation: complete - patient participated  Level of Consciousness: awake  Pain Score: 2  Pain Management: adequate  Airway Patency:patent  Dental exam unchanged from preop  Yes    Cardiovascular Status: acceptable  Respiratory Status: acceptable  Postoperative Hydration acceptable      Beverley Nicole MD  2/21/2022 1:42 PM

## 2022-02-28 RX ORDER — EMPAGLIFLOZIN 25 MG/1
TABLET, FILM COATED ORAL
Qty: 90 TABLET | Refills: 0 | Status: SHIPPED | OUTPATIENT
Start: 2022-02-28

## 2022-03-02 ENCOUNTER — TELEPHONE (OUTPATIENT)
Dept: PHYSICAL THERAPY | Facility: HOSPITAL | Age: 62
End: 2022-03-02

## 2022-03-09 ENCOUNTER — OFFICE VISIT (OUTPATIENT)
Dept: PHYSICAL THERAPY | Facility: HOSPITAL | Age: 62
End: 2022-03-09
Attending: PHYSICIAN ASSISTANT
Payer: MEDICAID

## 2022-03-09 DIAGNOSIS — M47.816 LUMBAR FACET JOINT SYNDROME: ICD-10-CM

## 2022-03-09 DIAGNOSIS — M54.50 LUMBAR PAIN: ICD-10-CM

## 2022-03-09 PROCEDURE — 97110 THERAPEUTIC EXERCISES: CPT

## 2022-03-09 PROCEDURE — 97162 PT EVAL MOD COMPLEX 30 MIN: CPT

## 2022-03-10 RX ORDER — AMLODIPINE BESYLATE 5 MG/1
5 TABLET ORAL DAILY
Qty: 90 TABLET | Refills: 1 | Status: SHIPPED | OUTPATIENT
Start: 2022-03-10

## 2022-03-10 NOTE — TELEPHONE ENCOUNTER
Refill passed per [facility] protocol.     Requested Prescriptions   Pending Prescriptions Disp Refills    AMLODIPINE 5 MG Oral Tab [Pharmacy Med Name: AMLODIPINE BESYLATE 5MG TABLETS] 90 tablet 1     Sig: TAKE 1 TABLET(5 MG) BY MOUTH DAILY        Hypertensive Medications Protocol Passed - 3/10/2022  8:45 AM        Passed - CMP or BMP in past 12 months        Passed - Appointment in past 6 or next 3 months        Passed - GFR  > 50     Lab Results   Component Value Date    GFRAA 122 12/21/2021                       Recent Outpatient Visits              Yesterday Lumbar facet joint syndrome    [place], [place]    Office Visit    4 weeks ago Lumbar pain    Timur Matthews MD    Office Visit    2 months ago Exposure to COVID-19 virus    [facility]    Nurse Only    2 months ago Exposure to COVID-19 virus    [facility], [address], [place], Preston Presley MD    Telemedicine    3 months ago History of fusion of cervical spine    Tj Dumas, Susanne Larsen MD    Office Visit            Future Appointments         Provider Department Appt Notes    In 6 days [address] Debo Chris 03/09/2022-05/08/2022; [facility]    In 1 week [address] Debo Chris 03/09/2022-05/08/2022; [facility]    In 2 weeks [address] Debo Chris 03/09/2022-05/08/2022; [facility]    In 3 weeks [address] Debo Chris 03/09/2022-05/08/2022; [facility]    In 1 month Toby Hammond MD [facility] Endocrinology Blood levels    In 1 month [facility] [address] [place] Chris 03/09/2022-05/08/2022; [facility]    In 1 month [facility], [address] [place] [address] Rehab Services [facility] Debo Chris 03/09/2022-05/08/2022; [facility]    In 1 month Neelima Osborne, [address] 11v Rm Berman 03/09/2022-05/08/2022; [address]    In 5 months Letty Hernandez  [facility], [address] [place], [place] [address]Octavio 6 mo fu

## 2022-03-14 ENCOUNTER — TELEPHONE (OUTPATIENT)
Dept: PHYSICAL THERAPY | Facility: HOSPITAL | Age: 62
End: 2022-03-14

## 2022-03-16 ENCOUNTER — APPOINTMENT (OUTPATIENT)
Dept: PHYSICAL THERAPY | Facility: HOSPITAL | Age: 62
End: 2022-03-16
Attending: PHYSICIAN ASSISTANT
Payer: MEDICAID

## 2022-03-23 ENCOUNTER — OFFICE VISIT (OUTPATIENT)
Dept: PHYSICAL THERAPY | Facility: HOSPITAL | Age: 62
End: 2022-03-23
Attending: PHYSICIAN ASSISTANT
Payer: MEDICAID

## 2022-03-23 PROCEDURE — 97110 THERAPEUTIC EXERCISES: CPT

## 2022-03-23 NOTE — PROGRESS NOTES
Diagnosis: Lumbar facet joint syndrome (M47.816)  Lumbar pain (M54.50    Precautions: cancer  Insurance Type (# Auth): [place] (11) Total Timed Treatment: 40 min  Date POC Expires: 5/8 per Medicaid    Total Treatment time: 40 min       Charges: There Ex 3    Treatment Number: 2    Subjective: Pt states she is feeling some pressure higher in the lower thoracic region - pressure. She reports pulling in B post thighs with DKC. The lumbar pain is not as severe after the [facility]. Pain: 4/10     Objective/Goals:    3/23/2022   Visit: 2/11       HEP Standing hip flexor stretch  Knee pumps sup  [facility]      Therapeutic Exercises Standing hip flexor stretch x 1  Post pelvic tilt x 5  SKC x 1  DKC x 2  Knee pumps x 10  Clam x 10      Manual       Neuro ReEducation       Modalities         HEP to date: post pelvic tilt, SKC, DKC, knee pumps, standing hip flexor stretch    Education:     Assessment: able to perform above without provocation.  Lumbar pressure with clam      Plan: reattempt clam, abd, bridge, NuStep, STM if needed

## 2022-03-25 ENCOUNTER — OFFICE VISIT (OUTPATIENT)
Dept: PHYSICAL THERAPY | Facility: HOSPITAL | Age: 62
End: 2022-03-25
Attending: INTERNAL MEDICINE
Payer: MEDICAID

## 2022-03-25 PROCEDURE — 97110 THERAPEUTIC EXERCISES: CPT

## 2022-03-25 NOTE — PROGRESS NOTES
Diagnosis: Lumbar facet joint syndrome (M47.816)  Lumbar pain (M54.50    Precautions: cancer  Insurance Type (# Auth): [place] (11) Total Timed Treatment: 40 min  Date POC Expires: 5/8 per Medicaid    Total Treatment time: 40 min       Charges: There Ex 3    Treatment Number: 3    Subjective: Pt states she is feeling about the same but has not done her HEP due to having to care for her grandchild. She is not feeling the lower thoracic pain today. Pain: 4/10     Objective/Goals:    3/23/2022   Visit: 2/11  3/25/2022   3/11      HEP Standing hip flexor stretch  Knee pumps sup  SKC Clam  SL abd  Piriformis stretch     Therapeutic Exercises Standing hip flexor stretch x 1  Post pelvic tilt x 5  SKC x 1  DKC x 2  Knee pumps x 10  Clam x 10  X  SKC x 2  DKC x 2  Knee pumps x 10  Clam (L) 5x2; (R) x 10  abd (R) x 5, (L) x 10     Manual  STM/MFR lumbar in R SL     Neuro ReEducation       Modalities         HEP to date: post pelvic tilt, SKC, DKC, knee pumps, standing hip flexor stretch, piriformis stretch, clam, abd    Education:     Assessment: no increased pain with any above ex's. Increased perceived difficulty with R clam and abd vs L.  (B) hip ER and abd very weak/poor endurance      Plan: bridge, NuStep, STM

## 2022-03-30 ENCOUNTER — OFFICE VISIT (OUTPATIENT)
Dept: PHYSICAL THERAPY | Facility: HOSPITAL | Age: 62
End: 2022-03-30
Attending: PHYSICIAN ASSISTANT
Payer: MEDICAID

## 2022-03-30 PROCEDURE — 97140 MANUAL THERAPY 1/> REGIONS: CPT

## 2022-03-30 PROCEDURE — 97110 THERAPEUTIC EXERCISES: CPT

## 2022-03-30 NOTE — PROGRESS NOTES
Diagnosis: Lumbar facet joint syndrome (M47.816)  Lumbar pain (M54.50    Precautions: cancer  Insurance Type (# Auth): [place] (11) Total Timed Treatment: 40 min  Date POC Expires: 5/8 per Medicaid    Total Treatment time: 40 min       Charges: There Ex 2, Man Ther 1    Treatment Number: 4    Subjective: Pt states she did 2 sets of the clam, hams stretch in doorway, standing hip flexor stretch. Pain: 3-4/10     Objective/Goals:    3/23/2022   Visit: 2/11  3/25/2022   3/11  3/30/2022   4/11     HEP Standing hip flexor stretch  Knee pumps sup  SKC Clam  SL abd  Piriformis stretch  bridge    Therapeutic Exercises Standing hip flexor stretch x 1  Post pelvic tilt x 5  SKC x 1  DKC x 2  Knee pumps x 10  Clam x 10  X  SKC x 2  DKC x 2  Knee pumps x 10  Clam (L) 5x2; (R) x 10  abd (R) x 5, (L) x 10  bridge x 10  Piriformis stretch x 3  DKC x 3   NuStep seat 9, L4 x 5    Manual  STM/MFR lumbar in R SL  X    Neuro ReEducation       Modalities         HEP to date: post pelvic tilt, SKC, DKC, knee pumps, standing hip flexor stretch, piriformis stretch, clam, abd, bridge    Education:     Assessment: no increased pain with ex's and less TTP in the R LS region      Plan: cont.  Add abdominal stab

## 2022-04-05 ENCOUNTER — PATIENT MESSAGE (OUTPATIENT)
Dept: ENDOCRINOLOGY CLINIC | Facility: CLINIC | Age: 62
End: 2022-04-05

## 2022-04-05 NOTE — TELEPHONE ENCOUNTER
Dr. Desmond Foreman -- please see below message. Last labs were done 12/21/21. Please advise. Thank you.

## 2022-04-05 NOTE — TELEPHONE ENCOUNTER
From: Ernie Cui  To: Chalino Perry. Santa Garay MD  Sent: 4/5/2022 9:24 AM CDT  Subject: Blood Work     Good morning, I was wondering do I need to have any blood work completed before my appointment?     Thanks,   Aaron Sneed

## 2022-04-06 ENCOUNTER — APPOINTMENT (OUTPATIENT)
Dept: PHYSICAL THERAPY | Facility: HOSPITAL | Age: 62
End: 2022-04-06
Attending: PHYSICIAN ASSISTANT
Payer: MEDICAID

## 2022-04-08 ENCOUNTER — OFFICE VISIT (OUTPATIENT)
Dept: INTERNAL MEDICINE CLINIC | Facility: CLINIC | Age: 62
End: 2022-04-08
Payer: MEDICAID

## 2022-04-08 VITALS
HEART RATE: 87 BPM | TEMPERATURE: 98 F | DIASTOLIC BLOOD PRESSURE: 78 MMHG | OXYGEN SATURATION: 99 % | BODY MASS INDEX: 30.53 KG/M2 | SYSTOLIC BLOOD PRESSURE: 118 MMHG | RESPIRATION RATE: 18 BRPM | HEIGHT: 66 IN | WEIGHT: 190 LBS

## 2022-04-08 DIAGNOSIS — R10.11 RUQ ABDOMINAL PAIN: Primary | ICD-10-CM

## 2022-04-08 DIAGNOSIS — M54.50 MIDLINE LOW BACK PAIN WITHOUT SCIATICA, UNSPECIFIED CHRONICITY: ICD-10-CM

## 2022-04-08 DIAGNOSIS — R35.0 FREQUENT URINATION: ICD-10-CM

## 2022-04-08 LAB
ALBUMIN SERPL-MCNC: 3.7 G/DL (ref 3.4–5)
ALBUMIN/GLOB SERPL: 1 {RATIO} (ref 1–2)
ALP LIVER SERPL-CCNC: 99 U/L
ALT SERPL-CCNC: 16 U/L
ANION GAP SERPL CALC-SCNC: 6 MMOL/L (ref 0–18)
APPEARANCE: CLEAR
AST SERPL-CCNC: 11 U/L (ref 15–37)
BILIRUB SERPL-MCNC: 0.4 MG/DL (ref 0.1–2)
BILIRUB UR QL: NEGATIVE
BILIRUBIN: NEGATIVE
BUN BLD-MCNC: 10 MG/DL (ref 7–18)
BUN/CREAT SERPL: 12.2 (ref 10–20)
CALCIUM BLD-MCNC: 8.8 MG/DL (ref 8.5–10.1)
CHLORIDE SERPL-SCNC: 110 MMOL/L (ref 98–112)
CLARITY UR: CLEAR
CO2 SERPL-SCNC: 26 MMOL/L (ref 21–32)
COLOR UR: YELLOW
CREAT BLD-MCNC: 0.82 MG/DL
FASTING STATUS PATIENT QL REPORTED: NO
GLOBULIN PLAS-MCNC: 3.6 G/DL (ref 2.8–4.4)
GLUCOSE (URINE DIPSTICK): 500 MG/DL
GLUCOSE BLD-MCNC: 115 MG/DL (ref 70–99)
GLUCOSE UR-MCNC: >=500 MG/DL
KETONES (URINE DIPSTICK): NEGATIVE MG/DL
KETONES UR-MCNC: NEGATIVE MG/DL
LEUKOCYTE ESTERASE UR QL STRIP.AUTO: NEGATIVE
LEUKOCYTES: NEGATIVE
MULTISTIX LOT#: ABNORMAL NUMERIC
NITRITE UR QL STRIP.AUTO: NEGATIVE
NITRITE, URINE: NEGATIVE
OSMOLALITY SERPL CALC.SUM OF ELEC: 294 MOSM/KG (ref 275–295)
PH UR: 5 [PH] (ref 5–8)
PH, URINE: 5.5 (ref 4.5–8)
POTASSIUM SERPL-SCNC: 3.7 MMOL/L (ref 3.5–5.1)
PROT SERPL-MCNC: 7.3 G/DL (ref 6.4–8.2)
PROT UR-MCNC: NEGATIVE MG/DL
PROTEIN (URINE DIPSTICK): NEGATIVE MG/DL
SODIUM SERPL-SCNC: 142 MMOL/L (ref 136–145)
SP GR UR STRIP: >1.03 (ref 1–1.03)
SPECIFIC GRAVITY: 1.01 (ref 1–1.03)
URINE-COLOR: YELLOW
UROBILINOGEN UR STRIP-ACNC: 2
UROBILINOGEN,SEMI-QN: 1 MG/DL (ref 0–1.9)
VIT C UR-MCNC: NEGATIVE MG/DL

## 2022-04-08 PROCEDURE — 80053 COMPREHEN METABOLIC PANEL: CPT | Performed by: INTERNAL MEDICINE

## 2022-04-08 PROCEDURE — 81001 URINALYSIS AUTO W/SCOPE: CPT | Performed by: INTERNAL MEDICINE

## 2022-04-09 RX ORDER — ONDANSETRON 4 MG/1
4 TABLET, FILM COATED ORAL EVERY 8 HOURS PRN
Qty: 20 TABLET | Refills: 2 | Status: SHIPPED | OUTPATIENT
Start: 2022-04-09

## 2022-04-12 ENCOUNTER — OFFICE VISIT (OUTPATIENT)
Dept: ENDOCRINOLOGY CLINIC | Facility: CLINIC | Age: 62
End: 2022-04-12
Payer: MEDICAID

## 2022-04-12 VITALS
BODY MASS INDEX: 30.05 KG/M2 | SYSTOLIC BLOOD PRESSURE: 119 MMHG | DIASTOLIC BLOOD PRESSURE: 75 MMHG | WEIGHT: 187 LBS | HEIGHT: 66 IN | HEART RATE: 95 BPM

## 2022-04-12 DIAGNOSIS — E78.2 MIXED HYPERLIPIDEMIA: ICD-10-CM

## 2022-04-12 DIAGNOSIS — E11.9 TYPE 2 DIABETES MELLITUS WITHOUT RETINOPATHY (HCC): Primary | ICD-10-CM

## 2022-04-12 DIAGNOSIS — I10 ESSENTIAL HYPERTENSION: ICD-10-CM

## 2022-04-12 LAB
CARTRIDGE LOT#: ABNORMAL NUMERIC
GLUCOSE BLOOD: 119
HEMOGLOBIN A1C: 6.9 % (ref 4.3–5.6)
TEST STRIP LOT #: NORMAL NUMERIC

## 2022-04-12 PROCEDURE — 36416 COLLJ CAPILLARY BLOOD SPEC: CPT | Performed by: INTERNAL MEDICINE

## 2022-04-12 PROCEDURE — 3008F BODY MASS INDEX DOCD: CPT | Performed by: INTERNAL MEDICINE

## 2022-04-12 PROCEDURE — 99214 OFFICE O/P EST MOD 30 MIN: CPT | Performed by: INTERNAL MEDICINE

## 2022-04-12 PROCEDURE — 3078F DIAST BP <80 MM HG: CPT | Performed by: INTERNAL MEDICINE

## 2022-04-12 PROCEDURE — 82947 ASSAY GLUCOSE BLOOD QUANT: CPT | Performed by: INTERNAL MEDICINE

## 2022-04-12 PROCEDURE — 83036 HEMOGLOBIN GLYCOSYLATED A1C: CPT | Performed by: INTERNAL MEDICINE

## 2022-04-12 PROCEDURE — 3074F SYST BP LT 130 MM HG: CPT | Performed by: INTERNAL MEDICINE

## 2022-04-12 PROCEDURE — 3044F HG A1C LEVEL LT 7.0%: CPT | Performed by: NURSE PRACTITIONER

## 2022-04-13 ENCOUNTER — APPOINTMENT (OUTPATIENT)
Dept: PHYSICAL THERAPY | Facility: HOSPITAL | Age: 62
End: 2022-04-13
Attending: PHYSICIAN ASSISTANT
Payer: MEDICAID

## 2022-04-19 ENCOUNTER — OFFICE VISIT (OUTPATIENT)
Dept: SURGERY | Facility: CLINIC | Age: 62
End: 2022-04-19
Payer: MEDICAID

## 2022-04-19 ENCOUNTER — LAB ENCOUNTER (OUTPATIENT)
Dept: LAB | Facility: HOSPITAL | Age: 62
End: 2022-04-19
Attending: UROLOGY
Payer: MEDICAID

## 2022-04-19 VITALS
WEIGHT: 181 LBS | RESPIRATION RATE: 16 BRPM | BODY MASS INDEX: 29 KG/M2 | HEART RATE: 97 BPM | SYSTOLIC BLOOD PRESSURE: 108 MMHG | DIASTOLIC BLOOD PRESSURE: 72 MMHG

## 2022-04-19 DIAGNOSIS — R31.29 MICROHEMATURIA: Primary | ICD-10-CM

## 2022-04-19 DIAGNOSIS — R31.29 MICROHEMATURIA: ICD-10-CM

## 2022-04-19 PROCEDURE — 99244 OFF/OP CNSLTJ NEW/EST MOD 40: CPT | Performed by: UROLOGY

## 2022-04-19 PROCEDURE — 88108 CYTOPATH CONCENTRATE TECH: CPT

## 2022-04-19 PROCEDURE — 3074F SYST BP LT 130 MM HG: CPT | Performed by: UROLOGY

## 2022-04-19 PROCEDURE — 3078F DIAST BP <80 MM HG: CPT | Performed by: UROLOGY

## 2022-04-20 ENCOUNTER — APPOINTMENT (OUTPATIENT)
Dept: PHYSICAL THERAPY | Facility: HOSPITAL | Age: 62
End: 2022-04-20
Attending: PHYSICIAN ASSISTANT
Payer: MEDICAID

## 2022-04-20 LAB — NON GYNE INTERPRETATION: NEGATIVE

## 2022-04-27 ENCOUNTER — OFFICE VISIT (OUTPATIENT)
Dept: PHYSICAL THERAPY | Facility: HOSPITAL | Age: 62
End: 2022-04-27
Attending: PHYSICIAN ASSISTANT
Payer: MEDICAID

## 2022-04-27 PROCEDURE — 97110 THERAPEUTIC EXERCISES: CPT

## 2022-04-30 ENCOUNTER — HOSPITAL ENCOUNTER (OUTPATIENT)
Dept: ULTRASOUND IMAGING | Facility: HOSPITAL | Age: 62
Discharge: HOME | End: 2022-04-30
Attending: INTERNAL MEDICINE
Payer: MEDICAID

## 2022-04-30 ENCOUNTER — HOSPITAL ENCOUNTER (OUTPATIENT)
Dept: CT IMAGING | Facility: HOSPITAL | Age: 62
Discharge: HOME | End: 2022-04-30
Attending: UROLOGY
Payer: MEDICAID

## 2022-04-30 DIAGNOSIS — R31.29 MICROHEMATURIA: ICD-10-CM

## 2022-04-30 DIAGNOSIS — R10.11 RUQ ABDOMINAL PAIN: ICD-10-CM

## 2022-04-30 DIAGNOSIS — E11.9 TYPE 2 DIABETES MELLITUS WITHOUT RETINOPATHY (HCC): ICD-10-CM

## 2022-04-30 LAB — CREAT BLD-MCNC: 0.5 MG/DL

## 2022-04-30 PROCEDURE — 82565 ASSAY OF CREATININE: CPT

## 2022-04-30 PROCEDURE — 74178 CT ABD&PLV WO CNTR FLWD CNTR: CPT | Performed by: UROLOGY

## 2022-04-30 PROCEDURE — 76377 3D RENDER W/INTRP POSTPROCES: CPT | Performed by: UROLOGY

## 2022-04-30 PROCEDURE — 76705 ECHO EXAM OF ABDOMEN: CPT | Performed by: INTERNAL MEDICINE

## 2022-05-02 NOTE — TELEPHONE ENCOUNTER
I was able to pull up her chart through care everywhere and I see that patient did have stress echo done within a year or so which was normal we will addend her note and cleared her for surgery Reji Davis)  Urology  [address] [place] [address], [address]  [place] [address]  Phone: [phone]  Fax: [phone]  Follow Up Time: 1-3 Days

## 2022-05-04 ENCOUNTER — TELEPHONE (OUTPATIENT)
Dept: GASTROENTEROLOGY | Facility: CLINIC | Age: 62
End: 2022-05-04

## 2022-05-04 RX ORDER — DULAGLUTIDE 0.75 MG/.5ML
INJECTION, SOLUTION SUBCUTANEOUS
Qty: 6 ML | Refills: 0 | Status: SHIPPED | OUTPATIENT
Start: 2022-05-04 | End: 2022-07-12

## 2022-05-04 NOTE — TELEPHONE ENCOUNTER
Entered into Epic. Recall CLN in 5 years per Dr. Wilberto Cash. Last CLN done 02/18/2022. Recall entered into Patient Outreach for 02/18/2027. Health Maintenance updated.

## 2022-05-04 NOTE — TELEPHONE ENCOUNTER
Contacted Di to facilitate office f/u with julio césar. Relayed MD complete message. Agreed reasonable to arrange ov now. Verified time, location and to arrive 15 minutes early. Patient expressed understanding with no further questions or concerns at this time.      Future Appointments   Date Time Provider Rebecca Dan   6/14/2022  1:30 PM JULIO CÉSAR Looney [facility] Sharif ELAINE

## 2022-05-04 NOTE — TELEPHONE ENCOUNTER
----- Message from Vini Barron MD sent at 5/4/2022  8:34 AM CDT -----  GI staff: please place recall for colonoscopy in 5 years   Follow up with Hugo Hernandez NP to discuss repeat EGD in 1 year    KARELY Hernandez NP

## 2022-05-05 ENCOUNTER — TELEPHONE (OUTPATIENT)
Dept: SURGERY | Facility: CLINIC | Age: 62
End: 2022-05-05

## 2022-05-05 ENCOUNTER — TELEPHONE (OUTPATIENT)
Dept: INTERNAL MEDICINE CLINIC | Facility: CLINIC | Age: 62
End: 2022-05-05

## 2022-05-05 NOTE — TELEPHONE ENCOUNTER
Per pt is having experiencing urge to urinate, but is having difficulty urinating. Pt is also experiencing burning sensation when done urinating.  Please advise

## 2022-05-05 NOTE — TELEPHONE ENCOUNTER
Was seen in the office on 4/8/22 and referred to urology specialist Dr Guero Carpenter. Seen Dr Guero Carpenter on 4/199/22. Reported that she thinks that she has kidney stones, urinates with  pressure and every 20-30 minutes, more frequent, drops of urine,  started back pain for 1-2 weeks now, worse yesterday and today ,she needs to go to the bathroom and still feels with pressure, when she goes maybe 10 seconds each time and at the end it feels like vagina/uterus is pulling down, no fever . Advised to call urology, office number provided and call transferred. Advised ED for worsening symptoms.      Patient is also requesting a GYNE specialist.    Please reply to pool: KAILYN Fernandez

## 2022-05-05 NOTE — TELEPHONE ENCOUNTER
Pt noticed she started having bladder pressure yesterday and states she feels like she is having to urinate all the time. Says she usually urinates for 30 seconds or longer and now she is urinating for 3-5 seconds at a time. States she feels like \"something is getting pulled through her uterus\". Asked pt if she is having burning sensation and pt states it is more of a suction feeling rather than burning. No visible blood noted, but states urine is dark colored and has a foul smell. Pt states she has mild back pain but that this has eased up since she last saw KHDAKSHA. Pt states she has a history of kidney stones and feels like this pressure is somewhat similar but that the pulling sensation is different than last time. Instructed pt to submit urine specimen and that I will also forward this message to [facility] Yoel SMITH IN for further recommendations. Routed to [facility] LUIS IN; Please advise, thank you.

## 2022-05-11 NOTE — TELEPHONE ENCOUNTER
S/W pt and informed her of KHDAKSHA's response msg as stated below and pt stated that she was pushing fluids so her symptoms are a little better but she will go to the lab today to drop a specimen and call back on Friday for the results.

## 2022-05-13 ENCOUNTER — LAB ENCOUNTER (OUTPATIENT)
Dept: LAB | Facility: HOSPITAL | Age: 62
End: 2022-05-13
Attending: UROLOGY
Payer: MEDICAID

## 2022-05-13 ENCOUNTER — OFFICE VISIT (OUTPATIENT)
Dept: OBGYN CLINIC | Facility: CLINIC | Age: 62
End: 2022-05-13
Payer: MEDICAID

## 2022-05-13 VITALS
SYSTOLIC BLOOD PRESSURE: 112 MMHG | BODY MASS INDEX: 30 KG/M2 | WEIGHT: 187 LBS | DIASTOLIC BLOOD PRESSURE: 74 MMHG | HEART RATE: 96 BPM

## 2022-05-13 DIAGNOSIS — N83.201 RIGHT OVARIAN CYST: ICD-10-CM

## 2022-05-13 DIAGNOSIS — Z12.31 SCREENING MAMMOGRAM FOR BREAST CANCER: ICD-10-CM

## 2022-05-13 DIAGNOSIS — R30.0 DYSURIA: ICD-10-CM

## 2022-05-13 DIAGNOSIS — Z01.411 ENCOUNTER FOR GYNECOLOGICAL EXAMINATION WITH ABNORMAL FINDING: Primary | ICD-10-CM

## 2022-05-13 DIAGNOSIS — Z12.4 SCREENING FOR MALIGNANT NEOPLASM OF CERVIX: ICD-10-CM

## 2022-05-13 LAB
BILIRUB UR QL: NEGATIVE
COLOR UR: YELLOW
GLUCOSE UR-MCNC: >=1000 MG/DL
KETONES UR-MCNC: NEGATIVE MG/DL
NITRITE UR QL STRIP.AUTO: POSITIVE
PH UR: 5.5 [PH] (ref 5–8)
RBC #/AREA URNS AUTO: >10 /HPF
SP GR UR STRIP: 1.01 (ref 1–1.03)
UROBILINOGEN UR STRIP-ACNC: 0.2
WBC #/AREA URNS AUTO: >50 /HPF
WBC CLUMPS UR QL AUTO: PRESENT /HPF

## 2022-05-13 PROCEDURE — 99386 PREV VISIT NEW AGE 40-64: CPT | Performed by: OBSTETRICS & GYNECOLOGY

## 2022-05-13 PROCEDURE — 3078F DIAST BP <80 MM HG: CPT | Performed by: OBSTETRICS & GYNECOLOGY

## 2022-05-13 PROCEDURE — 87186 SC STD MICRODIL/AGAR DIL: CPT

## 2022-05-13 PROCEDURE — 81001 URINALYSIS AUTO W/SCOPE: CPT

## 2022-05-13 PROCEDURE — 87086 URINE CULTURE/COLONY COUNT: CPT

## 2022-05-13 PROCEDURE — 3074F SYST BP LT 130 MM HG: CPT | Performed by: OBSTETRICS & GYNECOLOGY

## 2022-05-13 PROCEDURE — 81015 MICROSCOPIC EXAM OF URINE: CPT

## 2022-05-13 PROCEDURE — 87088 URINE BACTERIA CULTURE: CPT

## 2022-05-16 ENCOUNTER — TELEPHONE (OUTPATIENT)
Dept: SURGERY | Facility: CLINIC | Age: 62
End: 2022-05-16

## 2022-05-16 LAB — HPV I/H RISK 1 DNA SPEC QL NAA+PROBE: NEGATIVE

## 2022-05-16 NOTE — TELEPHONE ENCOUNTER
LMTCB  - asked pt to confirm that she got Dr. Bryant Copeland message with a phone call or a [employer] message.

## 2022-05-16 NOTE — TELEPHONE ENCOUNTER
----- Message from Savannah Rendon MD sent at 5/15/2022 12:35 PM CDT -----  Please confirm that patient received a [employer] message.

## 2022-05-17 ENCOUNTER — PATIENT MESSAGE (OUTPATIENT)
Dept: SURGERY | Facility: CLINIC | Age: 62
End: 2022-05-17

## 2022-05-17 PROBLEM — N83.201 RIGHT OVARIAN CYST: Status: ACTIVE | Noted: 2022-05-17

## 2022-05-17 NOTE — TELEPHONE ENCOUNTER
Patient sent [facility] message on 5/17/22 that she started abx on Sunday (5/15/22).

## 2022-05-17 NOTE — TELEPHONE ENCOUNTER
From: Shin Moment  To: Jenny Huynh MD  Sent: 5/17/2022 12:31 PM CDT  Subject: Medication     Good afternoon, I received the prescription of antibiotic. Started on Sunday evening. Will keep you updated to effect after completing the meds.      Thank you,  Temitope Smith

## 2022-05-20 ENCOUNTER — NURSE TRIAGE (OUTPATIENT)
Dept: INTERNAL MEDICINE CLINIC | Facility: CLINIC | Age: 62
End: 2022-05-20

## 2022-05-21 ENCOUNTER — OFFICE VISIT (OUTPATIENT)
Dept: INTERNAL MEDICINE CLINIC | Facility: CLINIC | Age: 62
End: 2022-05-21
Payer: MEDICAID

## 2022-05-21 ENCOUNTER — HOSPITAL ENCOUNTER (OUTPATIENT)
Dept: GENERAL RADIOLOGY | Facility: HOSPITAL | Age: 62
Discharge: HOME | End: 2022-05-21
Attending: NURSE PRACTITIONER
Payer: MEDICAID

## 2022-05-21 VITALS
HEIGHT: 66 IN | OXYGEN SATURATION: 99 % | DIASTOLIC BLOOD PRESSURE: 72 MMHG | WEIGHT: 190 LBS | HEART RATE: 83 BPM | SYSTOLIC BLOOD PRESSURE: 108 MMHG | BODY MASS INDEX: 30.53 KG/M2

## 2022-05-21 DIAGNOSIS — S69.91XA INJURY OF RIGHT HAND, INITIAL ENCOUNTER: Primary | ICD-10-CM

## 2022-05-21 DIAGNOSIS — S69.91XA INJURY OF RIGHT HAND, INITIAL ENCOUNTER: ICD-10-CM

## 2022-05-21 PROCEDURE — 3078F DIAST BP <80 MM HG: CPT | Performed by: NURSE PRACTITIONER

## 2022-05-21 PROCEDURE — 99213 OFFICE O/P EST LOW 20 MIN: CPT | Performed by: NURSE PRACTITIONER

## 2022-05-21 PROCEDURE — 3074F SYST BP LT 130 MM HG: CPT | Performed by: NURSE PRACTITIONER

## 2022-05-21 PROCEDURE — 3008F BODY MASS INDEX DOCD: CPT | Performed by: NURSE PRACTITIONER

## 2022-05-21 PROCEDURE — 73130 X-RAY EXAM OF HAND: CPT | Performed by: NURSE PRACTITIONER

## 2022-05-21 NOTE — PATIENT INSTRUCTIONS
ASSESSMENT/PLAN:   Injury of right hand, initial encounter  (primary encounter diagnosis)  Ordered xray  Apply cool compress 15 minutes 3-4 times daily  Take tylenol 500 mg every 6 hours as needed. No more than 3,000mg in 24 hours. Wear finger splint   Elevate hand throughout the day. If no improvement follow up with Hand MD Dr Odessa Loza      No orders of the defined types were placed in this encounter.     Follow up with primary   Meds This Visit:  Requested Prescriptions      No prescriptions requested or ordered in this encounter       Imaging & Referrals:  PLASTIC SURGERY - INTERNAL

## 2022-05-25 DIAGNOSIS — E11.9 TYPE 2 DIABETES MELLITUS WITHOUT RETINOPATHY (HCC): ICD-10-CM

## 2022-05-26 RX ORDER — METOPROLOL SUCCINATE 25 MG/1
TABLET, EXTENDED RELEASE ORAL
Qty: 90 TABLET | Refills: 1 | Status: SHIPPED | OUTPATIENT
Start: 2022-05-26 | End: 2022-11-21

## 2022-05-26 RX ORDER — PEN NEEDLE, DIABETIC 32GX 5/32"
NEEDLE, DISPOSABLE MISCELLANEOUS
Qty: 100 EACH | Refills: 0 | OUTPATIENT
Start: 2022-05-26

## 2022-05-26 NOTE — TELEPHONE ENCOUNTER
Meño sent, need clarification if she is still using the pen needle. Refill passed per [email or web address] protocol. Requested Prescriptions   Pending Prescriptions Disp Refills    BD PEN NEEDLE NAHID 2ND GEN 32G X 4 MM Does not apply Misc [Pharmacy Med Name: B-D NAHID 2ND GEN PEN NDL [id number]] 100 each 0     Sig: USE 1 NEEDLE DAILY AS DIRECTED. Diabetic Supplies Protocol Passed - 5/26/2022  5:17 PM        Passed - Appointment in past 12 or next 3 months           METOPROLOL SUCCINATE ER 25 MG Oral Tablet 24 Hr [Pharmacy Med Name: METOPROLOL ER SUCCINATE 25MG TABS] 90 tablet 1     Sig: TAKE 1 TABLET(25 MG) BY MOUTH DAILY        Hypertensive Medications Protocol Passed - 5/26/2022  5:17 PM        Passed - CMP or BMP in past 12 months        Passed - Appointment in past 6 or next 3 months        Passed - GFR  > 50     Lab Results   Component Value Date    GFRAA 121 04/30/2022                        Future Appointments         Provider Department Appt Notes    In 2 weeks Betito Cosby, [facility], [address] repeat EGD; Acid Reflux;  AM    In 1 month [facility] Having the test    In 1 month Fernanda Quintana MD [email or web address] Endocrinology 3 MONTH FU     In 1 month [place] Ganser, [address] [facility], [facility] cysto (microscopic hematuria)    In 2 months MD JANES Valencia [place], [facility], [facility] 6 mo fu             Recent Outpatient Visits              5 days ago Injury of right hand, initial encounter    [email or web address], [address], [place], [place]NA[place]    Office Visit    1 week ago Encounter for gynecological examination with abnormal finding    [employer] for Walt Smith Ellena Romano,     Office Visit    4 weeks ago     [address] [place] [address]    Office Visit    1 month ago [facility] for Jason Arredondo MD    Office Visit    1 month ago Type 2 diabetes mellitus without retinopathy [facility])    [facility], [facility] Endocrinology Raphael Vogt MD    Office Visit

## 2022-05-31 NOTE — TELEPHONE ENCOUNTER
[facility] sent asking the patient if they are still on Jardiance. Per chart review, [facility] discontinued on 4/8/22. During [address] [place] [address] Avenue 4/12/22, provider asked patient to discontinue. Awaiting patient response.

## 2022-06-02 RX ORDER — ATORVASTATIN CALCIUM 80 MG/1
80 TABLET, FILM COATED ORAL NIGHTLY
Qty: 90 TABLET | Refills: 1 | Status: SHIPPED | OUTPATIENT
Start: 2022-06-02

## 2022-06-14 ENCOUNTER — OFFICE VISIT (OUTPATIENT)
Dept: GASTROENTEROLOGY | Facility: CLINIC | Age: 62
End: 2022-06-14
Payer: MEDICAID

## 2022-06-14 ENCOUNTER — TELEPHONE (OUTPATIENT)
Dept: GASTROENTEROLOGY | Facility: CLINIC | Age: 62
End: 2022-06-14

## 2022-06-14 VITALS
DIASTOLIC BLOOD PRESSURE: 56 MMHG | HEART RATE: 105 BPM | BODY MASS INDEX: 30.05 KG/M2 | HEIGHT: 66 IN | SYSTOLIC BLOOD PRESSURE: 94 MMHG | WEIGHT: 187 LBS

## 2022-06-14 DIAGNOSIS — Z80.0 FAMILY HISTORY OF COLON CANCER: ICD-10-CM

## 2022-06-14 DIAGNOSIS — K31.A0 INTESTINAL METAPLASIA OF STOMACH: ICD-10-CM

## 2022-06-14 DIAGNOSIS — Z86.010 PERSONAL HISTORY OF COLONIC POLYPS: Primary | ICD-10-CM

## 2022-06-14 DIAGNOSIS — R14.0 BLOATING: ICD-10-CM

## 2022-06-14 DIAGNOSIS — K21.9 GASTROESOPHAGEAL REFLUX DISEASE, UNSPECIFIED WHETHER ESOPHAGITIS PRESENT: ICD-10-CM

## 2022-06-14 DIAGNOSIS — K58.0 IRRITABLE BOWEL SYNDROME WITH DIARRHEA: ICD-10-CM

## 2022-06-14 PROCEDURE — 3008F BODY MASS INDEX DOCD: CPT | Performed by: NURSE PRACTITIONER

## 2022-06-14 PROCEDURE — 3078F DIAST BP <80 MM HG: CPT | Performed by: NURSE PRACTITIONER

## 2022-06-14 PROCEDURE — 3074F SYST BP LT 130 MM HG: CPT | Performed by: NURSE PRACTITIONER

## 2022-06-14 PROCEDURE — 99215 OFFICE O/P EST HI 40 MIN: CPT | Performed by: NURSE PRACTITIONER

## 2022-06-14 NOTE — TELEPHONE ENCOUNTER
Entered into Epic. Recall EGD in 1 year per julio césar Phelps. 2/2 tobacco usage and IM. Last done with Dr. Albina Logan 02/18/2022. Recall entered into Patient Outreach for 02/18/2023. Specialty Comments updated.

## 2022-06-15 NOTE — TELEPHONE ENCOUNTER
Spoke with patient and states she's still taking jardiance 12.5 mg daily on top of trulicity 9.35 mg weekly (tolerating this dose well, no GI s/e), metformin 1000 mg BID. Per patient she does not need any refill at this time. She does report of sugars dropping overnight between . States when her sugars are in the 80's she gets symptomatic and wakes her up on her sleep. Today her BG is 118 and an hour ago after breakfast was 154. She is now checking blood sugar once a day fasting and for the past week fasting readings have been between 100-130. She will check a second time when she's not feeling well or something is \"off\". RN advised to check BG before bedtime and if sugar is less than 100 to have a small snack. Dr. Aby Vidal -- please advise if you would like patient to come in for freestyle heather pro placement to further evaluate BG pattern. Also, please refuse jardiance refill request.  Thank you.

## 2022-07-01 ENCOUNTER — HOSPITAL ENCOUNTER (OUTPATIENT)
Dept: ULTRASOUND IMAGING | Facility: HOSPITAL | Age: 62
Discharge: HOME | End: 2022-07-01
Attending: OBSTETRICS & GYNECOLOGY
Payer: MEDICAID

## 2022-07-01 DIAGNOSIS — N83.201 RIGHT OVARIAN CYST: ICD-10-CM

## 2022-07-01 PROCEDURE — 76830 TRANSVAGINAL US NON-OB: CPT | Performed by: OBSTETRICS & GYNECOLOGY

## 2022-07-01 PROCEDURE — 76856 US EXAM PELVIC COMPLETE: CPT | Performed by: OBSTETRICS & GYNECOLOGY

## 2022-07-05 RX ORDER — EMPAGLIFLOZIN 25 MG/1
TABLET, FILM COATED ORAL
Qty: 90 TABLET | Refills: 0 | OUTPATIENT
Start: 2022-07-05

## 2022-07-12 ENCOUNTER — OFFICE VISIT (OUTPATIENT)
Dept: ENDOCRINOLOGY CLINIC | Facility: CLINIC | Age: 62
End: 2022-07-12
Payer: MEDICAID

## 2022-07-12 VITALS
DIASTOLIC BLOOD PRESSURE: 72 MMHG | HEART RATE: 93 BPM | RESPIRATION RATE: 18 BRPM | HEIGHT: 66 IN | SYSTOLIC BLOOD PRESSURE: 108 MMHG | WEIGHT: 186 LBS | BODY MASS INDEX: 29.89 KG/M2

## 2022-07-12 DIAGNOSIS — I10 ESSENTIAL HYPERTENSION: ICD-10-CM

## 2022-07-12 DIAGNOSIS — E11.9 TYPE 2 DIABETES MELLITUS WITHOUT RETINOPATHY (HCC): Primary | ICD-10-CM

## 2022-07-12 DIAGNOSIS — E78.2 MIXED HYPERLIPIDEMIA: ICD-10-CM

## 2022-07-12 LAB
CARTRIDGE LOT#: ABNORMAL NUMERIC
GLUCOSE BLOOD: 130
HEMOGLOBIN A1C: 6.7 % (ref 4.3–5.6)
TEST STRIP LOT #: NORMAL NUMERIC

## 2022-07-12 PROCEDURE — 3074F SYST BP LT 130 MM HG: CPT | Performed by: INTERNAL MEDICINE

## 2022-07-12 PROCEDURE — 3078F DIAST BP <80 MM HG: CPT | Performed by: INTERNAL MEDICINE

## 2022-07-12 PROCEDURE — 99214 OFFICE O/P EST MOD 30 MIN: CPT | Performed by: INTERNAL MEDICINE

## 2022-07-12 PROCEDURE — 83036 HEMOGLOBIN GLYCOSYLATED A1C: CPT | Performed by: INTERNAL MEDICINE

## 2022-07-12 PROCEDURE — 82947 ASSAY GLUCOSE BLOOD QUANT: CPT | Performed by: INTERNAL MEDICINE

## 2022-07-12 PROCEDURE — 3008F BODY MASS INDEX DOCD: CPT | Performed by: INTERNAL MEDICINE

## 2022-07-12 PROCEDURE — 3044F HG A1C LEVEL LT 7.0%: CPT | Performed by: INTERNAL MEDICINE

## 2022-07-12 RX ORDER — DULAGLUTIDE 0.75 MG/.5ML
0.75 INJECTION, SOLUTION SUBCUTANEOUS WEEKLY
Qty: 6 ML | Refills: 0 | Status: SHIPPED | OUTPATIENT
Start: 2022-07-12 | End: 2022-10-10

## 2022-07-18 ENCOUNTER — TELEPHONE (OUTPATIENT)
Dept: SURGERY | Facility: CLINIC | Age: 62
End: 2022-07-18

## 2022-07-19 DIAGNOSIS — E11.9 TYPE 2 DIABETES MELLITUS WITHOUT RETINOPATHY (HCC): ICD-10-CM

## 2022-07-23 RX ORDER — EMPAGLIFLOZIN 25 MG/1
12.5 TABLET, FILM COATED ORAL
Qty: 90 TABLET | Refills: 0 | Status: SHIPPED | OUTPATIENT
Start: 2022-07-23 | End: 2023-01-13

## 2022-07-26 ENCOUNTER — TELEPHONE (OUTPATIENT)
Dept: SURGERY | Facility: CLINIC | Age: 62
End: 2022-07-26

## 2022-07-26 DIAGNOSIS — Z98.1 S/P CERVICAL SPINAL FUSION: Primary | ICD-10-CM

## 2022-07-26 NOTE — TELEPHONE ENCOUNTER
Reached out to patient to reschedule apt w/ due to Provider no longer being with the office. Patient has been reschedule but states she is needing XR orders for her upcoming apt on 8/11/22. Please advise.

## 2022-07-27 ENCOUNTER — IMMUNIZATION (OUTPATIENT)
Dept: LAB | Age: 62
End: 2022-07-27
Attending: EMERGENCY MEDICINE
Payer: MEDICAID

## 2022-07-27 DIAGNOSIS — Z23 NEED FOR VACCINATION: Primary | ICD-10-CM

## 2022-07-27 PROCEDURE — 0054A SARSCOV2 VAC 30MCG TRS SUCR: CPT

## 2022-08-01 RX ORDER — EMPAGLIFLOZIN 25 MG/1
1 TABLET, FILM COATED ORAL DAILY
Qty: 90 TABLET | Refills: 0 | OUTPATIENT
Start: 2022-08-01 | End: 2022-10-30

## 2022-08-02 ENCOUNTER — HOSPITAL ENCOUNTER (OUTPATIENT)
Dept: GENERAL RADIOLOGY | Age: 62
Discharge: HOME | End: 2022-08-02
Attending: PHYSICIAN ASSISTANT
Payer: MEDICAID

## 2022-08-02 DIAGNOSIS — Z98.1 S/P CERVICAL SPINAL FUSION: ICD-10-CM

## 2022-08-02 PROCEDURE — 72050 X-RAY EXAM NECK SPINE 4/5VWS: CPT | Performed by: PHYSICIAN ASSISTANT

## 2022-08-11 ENCOUNTER — OFFICE VISIT (OUTPATIENT)
Dept: SURGERY | Facility: CLINIC | Age: 62
End: 2022-08-11
Payer: MEDICAID

## 2022-08-11 VITALS
HEART RATE: 104 BPM | BODY MASS INDEX: 30.05 KG/M2 | HEIGHT: 66 IN | WEIGHT: 187 LBS | SYSTOLIC BLOOD PRESSURE: 118 MMHG | OXYGEN SATURATION: 97 % | DIASTOLIC BLOOD PRESSURE: 70 MMHG

## 2022-08-11 DIAGNOSIS — R51.9 TEMPLE TENDERNESS: Primary | ICD-10-CM

## 2022-08-11 DIAGNOSIS — Z98.1 S/P CERVICAL SPINAL FUSION: ICD-10-CM

## 2022-08-11 PROCEDURE — 3078F DIAST BP <80 MM HG: CPT | Performed by: PHYSICIAN ASSISTANT

## 2022-08-11 PROCEDURE — 3074F SYST BP LT 130 MM HG: CPT | Performed by: PHYSICIAN ASSISTANT

## 2022-08-11 PROCEDURE — 3008F BODY MASS INDEX DOCD: CPT | Performed by: PHYSICIAN ASSISTANT

## 2022-08-11 PROCEDURE — 99212 OFFICE O/P EST SF 10 MIN: CPT | Performed by: PHYSICIAN ASSISTANT

## 2022-08-11 RX ORDER — PROMETHAZINE HYDROCHLORIDE AND CODEINE PHOSPHATE 6.25; 1 MG/5ML; MG/5ML
SYRUP ORAL
COMMUNITY

## 2022-08-11 RX ORDER — OMEPRAZOLE 10 MG/1
CAPSULE, DELAYED RELEASE ORAL
COMMUNITY

## 2022-08-17 ENCOUNTER — HOSPITAL ENCOUNTER (OUTPATIENT)
Dept: MAMMOGRAPHY | Age: 62
Discharge: HOME | End: 2022-08-17
Attending: OBSTETRICS & GYNECOLOGY
Payer: MEDICAID

## 2022-08-17 DIAGNOSIS — Z12.31 SCREENING MAMMOGRAM FOR BREAST CANCER: ICD-10-CM

## 2022-08-17 PROCEDURE — 77067 SCR MAMMO BI INCL CAD: CPT | Performed by: OBSTETRICS & GYNECOLOGY

## 2022-08-17 PROCEDURE — 77063 BREAST TOMOSYNTHESIS BI: CPT | Performed by: OBSTETRICS & GYNECOLOGY

## 2022-08-22 ENCOUNTER — HOSPITAL ENCOUNTER (OUTPATIENT)
Dept: ULTRASOUND IMAGING | Facility: HOSPITAL | Age: 62
Discharge: HOME | End: 2022-08-22
Attending: OBSTETRICS & GYNECOLOGY
Payer: MEDICAID

## 2022-08-22 DIAGNOSIS — R92.8 ABNORMAL MAMMOGRAM OF LEFT BREAST: Primary | ICD-10-CM

## 2022-08-22 DIAGNOSIS — R92.8 ABNORMAL MAMMOGRAM: ICD-10-CM

## 2022-08-22 PROCEDURE — 76882 US LMTD JT/FCL EVL NVASC XTR: CPT | Performed by: OBSTETRICS & GYNECOLOGY

## 2022-08-23 RX ORDER — AMLODIPINE BESYLATE 5 MG/1
TABLET ORAL
Qty: 90 TABLET | Refills: 1 | Status: SHIPPED | OUTPATIENT
Start: 2022-08-23

## 2022-08-23 RX ORDER — APIXABAN 5 MG/1
TABLET, FILM COATED ORAL
Qty: 180 TABLET | Refills: 0 | Status: SHIPPED | OUTPATIENT
Start: 2022-08-23

## 2022-08-24 ENCOUNTER — HOSPITAL ENCOUNTER (OUTPATIENT)
Age: 62
Discharge: HOME | End: 2022-08-24
Payer: MEDICAID

## 2022-08-24 VITALS
HEART RATE: 95 BPM | DIASTOLIC BLOOD PRESSURE: 64 MMHG | WEIGHT: 190 LBS | BODY MASS INDEX: 30.53 KG/M2 | OXYGEN SATURATION: 100 % | TEMPERATURE: 97 F | HEIGHT: 66 IN | SYSTOLIC BLOOD PRESSURE: 113 MMHG | RESPIRATION RATE: 18 BRPM

## 2022-08-24 DIAGNOSIS — J02.0 ACUTE STREPTOCOCCAL PHARYNGITIS: Primary | ICD-10-CM

## 2022-08-24 DIAGNOSIS — Z20.822 LAB TEST NEGATIVE FOR COVID-19 VIRUS: ICD-10-CM

## 2022-08-24 DIAGNOSIS — R51.9 ACUTE NONINTRACTABLE HEADACHE, UNSPECIFIED HEADACHE TYPE: ICD-10-CM

## 2022-08-24 LAB
S PYO AG THROAT QL: POSITIVE
SARS-COV-2 RNA RESP QL NAA+PROBE: NOT DETECTED

## 2022-08-24 PROCEDURE — 99214 OFFICE O/P EST MOD 30 MIN: CPT

## 2022-08-24 PROCEDURE — 87880 STREP A ASSAY W/OPTIC: CPT

## 2022-08-24 PROCEDURE — 99213 OFFICE O/P EST LOW 20 MIN: CPT

## 2022-08-24 RX ORDER — AMOXICILLIN 500 MG/1
500 TABLET, FILM COATED ORAL 2 TIMES DAILY
Qty: 20 TABLET | Refills: 0 | Status: SHIPPED | OUTPATIENT
Start: 2022-08-24 | End: 2022-09-03

## 2022-09-09 ENCOUNTER — OFFICE VISIT (OUTPATIENT)
Dept: INTERNAL MEDICINE CLINIC | Facility: CLINIC | Age: 62
End: 2022-09-09
Payer: MEDICAID

## 2022-09-09 VITALS
WEIGHT: 190 LBS | BODY MASS INDEX: 30.53 KG/M2 | SYSTOLIC BLOOD PRESSURE: 124 MMHG | DIASTOLIC BLOOD PRESSURE: 72 MMHG | TEMPERATURE: 98 F | RESPIRATION RATE: 17 BRPM | HEART RATE: 67 BPM | HEIGHT: 66 IN | OXYGEN SATURATION: 98 %

## 2022-09-09 DIAGNOSIS — R42 LIGHT HEADEDNESS: ICD-10-CM

## 2022-09-09 DIAGNOSIS — R22.32 AXILLARY LUMP, LEFT: ICD-10-CM

## 2022-09-09 DIAGNOSIS — J02.9 SORE THROAT: ICD-10-CM

## 2022-09-09 DIAGNOSIS — E11.9 TYPE 2 DIABETES MELLITUS WITHOUT RETINOPATHY (HCC): Primary | ICD-10-CM

## 2022-09-09 PROCEDURE — 3074F SYST BP LT 130 MM HG: CPT | Performed by: INTERNAL MEDICINE

## 2022-09-09 PROCEDURE — 99214 OFFICE O/P EST MOD 30 MIN: CPT | Performed by: INTERNAL MEDICINE

## 2022-09-09 PROCEDURE — 3008F BODY MASS INDEX DOCD: CPT | Performed by: INTERNAL MEDICINE

## 2022-09-09 PROCEDURE — 3078F DIAST BP <80 MM HG: CPT | Performed by: INTERNAL MEDICINE

## 2022-09-27 ENCOUNTER — OFFICE VISIT (OUTPATIENT)
Dept: ENDOCRINOLOGY CLINIC | Facility: CLINIC | Age: 62
End: 2022-09-27

## 2022-09-27 VITALS
WEIGHT: 190 LBS | SYSTOLIC BLOOD PRESSURE: 118 MMHG | HEART RATE: 99 BPM | HEIGHT: 66 IN | DIASTOLIC BLOOD PRESSURE: 65 MMHG | RESPIRATION RATE: 16 BRPM | BODY MASS INDEX: 30.53 KG/M2

## 2022-09-27 DIAGNOSIS — E11.9 TYPE 2 DIABETES MELLITUS WITHOUT RETINOPATHY (HCC): Primary | ICD-10-CM

## 2022-09-27 DIAGNOSIS — E78.2 MIXED HYPERLIPIDEMIA: ICD-10-CM

## 2022-09-27 DIAGNOSIS — I10 ESSENTIAL HYPERTENSION: ICD-10-CM

## 2022-09-27 LAB
CARTRIDGE LOT#: ABNORMAL NUMERIC
GLUCOSE BLOOD: 203
HEMOGLOBIN A1C: 6.8 % (ref 4.3–5.6)
TEST STRIP LOT #: NORMAL NUMERIC

## 2022-09-27 PROCEDURE — 3078F DIAST BP <80 MM HG: CPT | Performed by: INTERNAL MEDICINE

## 2022-09-27 PROCEDURE — 99214 OFFICE O/P EST MOD 30 MIN: CPT | Performed by: INTERNAL MEDICINE

## 2022-09-27 PROCEDURE — 3074F SYST BP LT 130 MM HG: CPT | Performed by: INTERNAL MEDICINE

## 2022-09-27 PROCEDURE — 3008F BODY MASS INDEX DOCD: CPT | Performed by: INTERNAL MEDICINE

## 2022-09-27 PROCEDURE — 3044F HG A1C LEVEL LT 7.0%: CPT | Performed by: INTERNAL MEDICINE

## 2022-09-27 PROCEDURE — 83036 HEMOGLOBIN GLYCOSYLATED A1C: CPT | Performed by: INTERNAL MEDICINE

## 2022-09-27 PROCEDURE — 82947 ASSAY GLUCOSE BLOOD QUANT: CPT | Performed by: INTERNAL MEDICINE

## 2022-10-12 DIAGNOSIS — E11.9 TYPE 2 DIABETES MELLITUS WITHOUT RETINOPATHY (HCC): ICD-10-CM

## 2022-10-17 ENCOUNTER — TELEPHONE (OUTPATIENT)
Dept: SURGERY | Facility: CLINIC | Age: 62
End: 2022-10-17

## 2022-10-18 ENCOUNTER — PROCEDURE (OUTPATIENT)
Dept: SURGERY | Facility: CLINIC | Age: 62
End: 2022-10-18
Payer: MEDICAID

## 2022-10-18 VITALS — SYSTOLIC BLOOD PRESSURE: 96 MMHG | DIASTOLIC BLOOD PRESSURE: 64 MMHG | HEART RATE: 64 BPM

## 2022-10-18 DIAGNOSIS — R31.29 MICROHEMATURIA: Primary | ICD-10-CM

## 2022-10-18 DIAGNOSIS — E11.9 TYPE 2 DIABETES MELLITUS WITHOUT RETINOPATHY (HCC): ICD-10-CM

## 2022-10-18 DIAGNOSIS — N39.0 RECURRENT UTI: ICD-10-CM

## 2022-10-18 PROCEDURE — 3078F DIAST BP <80 MM HG: CPT | Performed by: UROLOGY

## 2022-10-18 PROCEDURE — 52000 CYSTOURETHROSCOPY: CPT | Performed by: UROLOGY

## 2022-10-18 PROCEDURE — 3074F SYST BP LT 130 MM HG: CPT | Performed by: UROLOGY

## 2022-10-18 PROCEDURE — 99214 OFFICE O/P EST MOD 30 MIN: CPT | Performed by: UROLOGY

## 2022-10-18 RX ORDER — NITROFURANTOIN 25; 75 MG/1; MG/1
100 CAPSULE ORAL 2 TIMES DAILY
Qty: 14 CAPSULE | Refills: 2 | Status: SHIPPED | OUTPATIENT
Start: 2022-10-18 | End: 2022-10-25

## 2022-10-18 RX ORDER — CIPROFLOXACIN 500 MG/1
500 TABLET, FILM COATED ORAL ONCE
Status: COMPLETED | OUTPATIENT
Start: 2022-10-18 | End: 2022-10-18

## 2022-10-18 RX ADMIN — CIPROFLOXACIN 500 MG: 500 TABLET, FILM COATED ORAL at 13:37:00

## 2022-10-22 NOTE — TELEPHONE ENCOUNTER
LOV 9/2722  RTC 3 months  [facility] sent reminding patient to schedule a follow up appointment.

## 2022-10-23 RX ORDER — DULAGLUTIDE 0.75 MG/.5ML
INJECTION, SOLUTION SUBCUTANEOUS
Qty: 6 ML | Refills: 0 | Status: SHIPPED | OUTPATIENT
Start: 2022-10-23

## 2022-11-10 ENCOUNTER — TELEPHONE (OUTPATIENT)
Facility: CLINIC | Age: 62
End: 2022-11-10

## 2022-11-10 NOTE — TELEPHONE ENCOUNTER
Patient outreach message received:    Recall EGD in 1 year per julio césar Catherine. 2/2 tobacco usage and IM. Last done with Dr. Manish Rosenbaum 02/18/2022. Recall entered into Patient Outreach for 02/18/2023. Recall reminder letter mailed out to patient.

## 2022-11-16 ENCOUNTER — HOSPITAL ENCOUNTER (EMERGENCY)
Facility: HOSPITAL | Age: 62
Discharge: HOME | End: 2022-11-16
Attending: EMERGENCY MEDICINE
Payer: MEDICAID

## 2022-11-16 ENCOUNTER — APPOINTMENT (OUTPATIENT)
Dept: GENERAL RADIOLOGY | Facility: HOSPITAL | Age: 62
End: 2022-11-16
Attending: EMERGENCY MEDICINE
Payer: MEDICAID

## 2022-11-16 ENCOUNTER — NURSE TRIAGE (OUTPATIENT)
Dept: INTERNAL MEDICINE CLINIC | Facility: CLINIC | Age: 62
End: 2022-11-16

## 2022-11-16 VITALS
TEMPERATURE: 98 F | WEIGHT: 180 LBS | RESPIRATION RATE: 16 BRPM | HEART RATE: 89 BPM | OXYGEN SATURATION: 98 % | DIASTOLIC BLOOD PRESSURE: 75 MMHG | SYSTOLIC BLOOD PRESSURE: 100 MMHG | BODY MASS INDEX: 28.93 KG/M2 | HEIGHT: 66 IN

## 2022-11-16 DIAGNOSIS — R07.89 CHEST WALL PAIN: Primary | ICD-10-CM

## 2022-11-16 DIAGNOSIS — J44.1 COPD EXACERBATION (HCC): ICD-10-CM

## 2022-11-16 LAB
ANION GAP SERPL CALC-SCNC: 8 MMOL/L (ref 0–18)
ATRIAL RATE: 303 BPM
BASOPHILS # BLD AUTO: 0.02 X10(3) UL (ref 0–0.2)
BASOPHILS NFR BLD AUTO: 0.4 %
BUN BLD-MCNC: 8 MG/DL (ref 7–18)
BUN/CREAT SERPL: 10.3 (ref 10–20)
CALCIUM BLD-MCNC: 9.5 MG/DL (ref 8.5–10.1)
CHLORIDE SERPL-SCNC: 107 MMOL/L (ref 98–112)
CO2 SERPL-SCNC: 25 MMOL/L (ref 21–32)
CREAT BLD-MCNC: 0.78 MG/DL
D DIMER PPP FEU-MCNC: 0.3 UG/ML FEU (ref ?–0.61)
DEPRECATED RDW RBC AUTO: 51.6 FL (ref 35.1–46.3)
EOSINOPHIL # BLD AUTO: 0.08 X10(3) UL (ref 0–0.7)
EOSINOPHIL NFR BLD AUTO: 1.6 %
ERYTHROCYTE [DISTWIDTH] IN BLOOD BY AUTOMATED COUNT: 16.3 % (ref 11–15)
FLUAV + FLUBV RNA SPEC NAA+PROBE: NEGATIVE
FLUAV + FLUBV RNA SPEC NAA+PROBE: NEGATIVE
GFR SERPLBLD BASED ON 1.73 SQ M-ARVRAT: 86 ML/MIN/1.73M2 (ref 60–?)
GLUCOSE BLD-MCNC: 103 MG/DL (ref 70–99)
HCT VFR BLD AUTO: 42.9 %
HGB BLD-MCNC: 13.7 G/DL
IMM GRANULOCYTES # BLD AUTO: 0.01 X10(3) UL (ref 0–1)
IMM GRANULOCYTES NFR BLD: 0.2 %
LYMPHOCYTES # BLD AUTO: 2.07 X10(3) UL (ref 1–4)
LYMPHOCYTES NFR BLD AUTO: 40.3 %
MCH RBC QN AUTO: 27.4 PG (ref 26–34)
MCHC RBC AUTO-ENTMCNC: 31.9 G/DL (ref 31–37)
MCV RBC AUTO: 85.8 FL
MONOCYTES # BLD AUTO: 0.29 X10(3) UL (ref 0.1–1)
MONOCYTES NFR BLD AUTO: 5.6 %
NEUTROPHILS # BLD AUTO: 2.67 X10 (3) UL (ref 1.5–7.7)
NEUTROPHILS # BLD AUTO: 2.67 X10(3) UL (ref 1.5–7.7)
NEUTROPHILS NFR BLD AUTO: 51.9 %
OSMOLALITY SERPL CALC.SUM OF ELEC: 289 MOSM/KG (ref 275–295)
P AXIS: 59 DEGREES
PLATELET # BLD AUTO: 255 10(3)UL (ref 150–450)
POTASSIUM SERPL-SCNC: 4.1 MMOL/L (ref 3.5–5.1)
Q-T INTERVAL: 352 MS
QRS DURATION: 76 MS
QTC CALCULATION (BEZET): 456 MS
R AXIS: -8 DEGREES
RBC # BLD AUTO: 5 X10(6)UL
RSV RNA SPEC NAA+PROBE: NEGATIVE
SARS-COV-2 RNA RESP QL NAA+PROBE: NOT DETECTED
SODIUM SERPL-SCNC: 140 MMOL/L (ref 136–145)
T AXIS: 56 DEGREES
TROPONIN I HIGH SENSITIVITY: 24 NG/L
VENTRICULAR RATE: 101 BPM
WBC # BLD AUTO: 5.1 X10(3) UL (ref 4–11)

## 2022-11-16 PROCEDURE — 96374 THER/PROPH/DIAG INJ IV PUSH: CPT

## 2022-11-16 PROCEDURE — 99285 EMERGENCY DEPT VISIT HI MDM: CPT

## 2022-11-16 PROCEDURE — 93010 ELECTROCARDIOGRAM REPORT: CPT | Performed by: EMERGENCY MEDICINE

## 2022-11-16 PROCEDURE — 85025 COMPLETE CBC W/AUTO DIFF WBC: CPT | Performed by: EMERGENCY MEDICINE

## 2022-11-16 PROCEDURE — 96375 TX/PRO/DX INJ NEW DRUG ADDON: CPT

## 2022-11-16 PROCEDURE — 71045 X-RAY EXAM CHEST 1 VIEW: CPT | Performed by: EMERGENCY MEDICINE

## 2022-11-16 PROCEDURE — 93005 ELECTROCARDIOGRAM TRACING: CPT

## 2022-11-16 PROCEDURE — 0241U SARS-COV-2/FLU A AND B/RSV BY PCR (GENEXPERT): CPT | Performed by: EMERGENCY MEDICINE

## 2022-11-16 PROCEDURE — 85379 FIBRIN DEGRADATION QUANT: CPT | Performed by: EMERGENCY MEDICINE

## 2022-11-16 PROCEDURE — 84484 ASSAY OF TROPONIN QUANT: CPT | Performed by: EMERGENCY MEDICINE

## 2022-11-16 PROCEDURE — 80048 BASIC METABOLIC PNL TOTAL CA: CPT | Performed by: EMERGENCY MEDICINE

## 2022-11-16 PROCEDURE — 99284 EMERGENCY DEPT VISIT MOD MDM: CPT

## 2022-11-16 RX ORDER — ONDANSETRON 2 MG/ML
INJECTION INTRAMUSCULAR; INTRAVENOUS
Status: COMPLETED
Start: 2022-11-16 | End: 2022-11-16

## 2022-11-16 RX ORDER — ONDANSETRON 2 MG/ML
4 INJECTION INTRAMUSCULAR; INTRAVENOUS ONCE
Status: COMPLETED | OUTPATIENT
Start: 2022-11-16 | End: 2022-11-16

## 2022-11-16 RX ORDER — MORPHINE SULFATE 4 MG/ML
4 INJECTION, SOLUTION INTRAMUSCULAR; INTRAVENOUS ONCE
Status: COMPLETED | OUTPATIENT
Start: 2022-11-16 | End: 2022-11-16

## 2022-11-16 RX ORDER — PREDNISONE 20 MG/1
40 TABLET ORAL DAILY
Qty: 10 TABLET | Refills: 0 | Status: SHIPPED | OUTPATIENT
Start: 2022-11-16 | End: 2022-11-21

## 2022-11-19 NOTE — TELEPHONE ENCOUNTER
Refill passed per [facility] protocol. Requested Prescriptions   Pending Prescriptions Disp Refills    ATORVASTATIN 80 MG Oral Tab [Pharmacy Med Name: ATORVASTATIN 80MG TABLETS] 90 tablet 1     Sig: TAKE 1 TABLET(80 MG) BY MOUTH EVERY NIGHT        Cholesterol Medication Protocol Passed - 6/2/2022  1:07 PM        Passed - ALT in past 12 months        Passed - LDL in past 12 months        Passed - Last ALT < 80       Lab Results   Component Value Date    ALT 16 04/08/2022             Passed - Last LDL < 130     Lab Results   Component Value Date    LDL 90 12/21/2021               Passed - Appointment in past 12 or next 3 months               Recent Outpatient Visits              1 week ago Injury of right hand, initial encounter    [facility], [address], Shelia Taylor, [address]    Office Visit    2 weeks ago Encounter for gynecological examination with abnormal finding    [facility], [address], Walt Reed, [address],     Office Visit    1 month ago     [address] [facility], [address] [place] [address] Visit    1 month ago [facility] for [address], Dayo Arciniega MD    Office Visit    1 month ago Type 2 diabetes mellitus without retinopathy [facility])    [facility] Endocrinology Erlinda Noel MD    Office Visit            Future Appointments         Provider Department Appt Notes    In 1 week Maldonado Clinton, 137 [facility], [address] repeat EGD; Acid Reflux;  AM    In 4 weeks [facility] Having the test    In 1 month rElinda Noel MD [facility] 3 MONTH FU     In 1 month Vesna Steve, [address] [facility], 59 [facility] cysto (microscopic hematuria)    In 2 months MD Ace [address] 26, 60[address] [facility] 6 mo fu
Yes

## 2022-11-21 RX ORDER — METOPROLOL SUCCINATE 25 MG/1
25 TABLET, EXTENDED RELEASE ORAL DAILY
Qty: 90 TABLET | Refills: 1 | Status: SHIPPED | OUTPATIENT
Start: 2022-11-21

## 2022-11-21 NOTE — TELEPHONE ENCOUNTER
Refill passed per [facility] protocol.   Requested Prescriptions   Pending Prescriptions Disp Refills    METOPROLOL SUCCINATE ER 25 MG Oral Tablet 24 Hr [Pharmacy Med Name: METOPROLOL ER SUCCINATE 25MG TABS] 90 tablet 1     Sig: TAKE 1 TABLET(25 MG) BY MOUTH DAILY       Hypertensive Medications Protocol Passed - 11/21/2022  4:58 PM        Passed - In person appointment in the past 12 or next 3 months     Recent Outpatient Visits              1 month ago Type 2 diabetes mellitus without retinopathy [facility])    [facility] Endocrinology Navjot Davis MD    Office Visit    2 months ago Type 2 diabetes mellitus without retinopathy [facility])    [facility], [address], [place], Contreras Holloway MD    Office Visit    3 months ago [employer] tenderness    [address], [address] [facility], ABDULAZIZ    Office Visit    4 months ago Type 2 diabetes mellitus without retinopathy [facility])    [facility] Endocrinology Navjot Davis MD    Office Visit    5 months ago Personal history of colonic polyps    [facility], [address] [facility] [address] APRN    Office Visit                      Passed - Last BP reading less than 140/90     BP Readings from Last 1 Encounters:  11/16/22 : 100/75              Passed - CMP or BMP in past 6 months     Recent Results (from the past 4392 hour(s))   Basic Metabolic Panel (8)    Collection Time: 11/16/22 12:26 PM   Result Value Ref Range    Glucose 103 (H) 70 - 99 mg/dL    Sodium 140 136 - 145 mmol/L    Potassium 4.1 3.5 - 5.1 mmol/L    Chloride 107 98 - 112 mmol/L    CO2 25.0 21.0 - 32.0 mmol/L    Anion Gap 8 0 - 18 mmol/L    BUN 8 7 - 18 mg/dL    Creatinine 0.78 0.55 - 1.02 mg/dL    BUN/CREA Ratio 10.3 10.0 - 20.0    Calcium, Total 9.5 8.5 - 10.1 mg/dL    Calculated Osmolality 289 275 - 295 mOsm/kg    eGFR-Cr 86 >=60 mL/min/1.73m2     *Note: Due to a large number of results and/or encounters for the requested time period, some results have not been displayed. A complete set of results can be found in Results Review.                Passed - In person appointment or virtual visit in the past 6 months     Recent Outpatient Visits              1 month ago Type 2 diabetes mellitus without retinopathy [facility])    [address] MD    Office Visit    2 months ago Type 2 diabetes mellitus without retinopathy [facility])    [address], [address], [place], Tracy Mcbride MD    Office Visit    3 months ago [employer] [address], [address] [place], [place], [place]    Office Visit    4 months ago Type 2 diabetes mellitus without retinopathy [facility])    [address] MD    Office Visit    5 months ago Personal history of colonic polyps    [facility], [address] [place] [address] HAL, APRN    Office Visit                      Passed - EGFRCR or GFRAA > 50     GFR Evaluation  EGFRCR: 86 , resulted on 11/16/2022             Recent Outpatient Visits              1 month ago Type 2 diabetes mellitus without retinopathy [facility])    [address] MD    Office Visit    2 months ago Type 2 diabetes mellitus without retinopathy [facility])    [address] [place], [address], Benitez Ackerman MD    Office Visit    3 months ago [employer]    [address], [address], [facility], [facility], [place]    Office Visit    4 months ago Type 2 diabetes mellitus without retinopathy [facility])    [address] MD    Office Visit    5 months ago Personal history of colonic [address], [address] [place], [place] [address]Rosatanya Contreras, APRN    Office Visit

## 2022-12-02 RX ORDER — ATORVASTATIN CALCIUM 80 MG/1
80 TABLET, FILM COATED ORAL NIGHTLY
Qty: 90 TABLET | Refills: 1 | Status: SHIPPED | OUTPATIENT
Start: 2022-12-02

## 2022-12-02 NOTE — TELEPHONE ENCOUNTER
Refill passed per [address] [place] protocol.      Requested Prescriptions   Pending Prescriptions Disp Refills    ATORVASTATIN 80 MG Oral Tab [Pharmacy Med Name: ATORVASTATIN 80MG TABLETS] 90 tablet 1     Sig: TAKE 1 TABLET(80 MG) BY MOUTH EVERY NIGHT       Cholesterol Medication Protocol Passed - 12/1/2022  1:08 PM        Passed - ALT in past 12 months        Passed - LDL in past 12 months        Passed - Last ALT < 80     Lab Results   Component Value Date    ALT 16 04/08/2022             Passed - Last LDL < 130     Lab Results   Component Value Date    LDL 90 12/21/2021             Passed - In person appointment or virtual visit in the past 12 mos or appointment in next 3 mos     Recent Outpatient Visits              2 months ago Type 2 diabetes mellitus without retinopathy [facility])    [address] Endocrinology Cynthia Melgoza MD    Office Visit    2 months ago Type 2 diabetes mellitus without retinopathy [facility])    [address] [place], [address], Lonny Garcia MD    Office Visit    3 months ago [employer] [address], [address] [facility], [place]    Office Visit    4 months ago Type 2 diabetes mellitus without retinopathy [facility])    [address] MD    Office Visit    5 months ago Personal history of colonic [address], [address] [facility], [facility] E, APRN    Office Visit                                 Recent Outpatient Visits              2 months ago Type 2 diabetes mellitus without retinopathy [facility])    [address]za MD    Office Visit    2 months ago Type 2 diabetes mellitus without retinopathy [facility])    [address] [place], [address], Lonny Garcia MD    Office Visit    3 months ago [employer] [address], [address] [employer], [place]    Office Visit    4 months ago Type 2 diabetes mellitus without retinopathy [facility])    [facility], [facility] Endocrinology Cynthia Melgoza MD    Office Visit    5 months ago Personal history of colonic polyps    [facility], [facility], [address] [facility] [place], [place], RENAN Melgar    Office Visit

## 2022-12-27 RX ORDER — APIXABAN 5 MG/1
TABLET, FILM COATED ORAL
Qty: 60 TABLET | Refills: 0 | Status: SHIPPED | OUTPATIENT
Start: 2022-12-27

## 2022-12-28 ENCOUNTER — PATIENT MESSAGE (OUTPATIENT)
Dept: INTERNAL MEDICINE CLINIC | Facility: CLINIC | Age: 62
End: 2022-12-28

## 2022-12-29 NOTE — TELEPHONE ENCOUNTER
Spoke with [email or web address] techELENA verified  She stated pt already p/u her Eliquis 5 mg yesterday at [facility].

## 2022-12-30 ENCOUNTER — HOSPITAL ENCOUNTER (EMERGENCY)
Facility: HOSPITAL | Age: 62
Discharge: HOME | End: 2022-12-30
Attending: EMERGENCY MEDICINE
Payer: MEDICAID

## 2022-12-30 VITALS
HEIGHT: 66 IN | WEIGHT: 190 LBS | OXYGEN SATURATION: 97 % | BODY MASS INDEX: 30.53 KG/M2 | SYSTOLIC BLOOD PRESSURE: 123 MMHG | DIASTOLIC BLOOD PRESSURE: 79 MMHG | RESPIRATION RATE: 20 BRPM | HEART RATE: 86 BPM | TEMPERATURE: 99 F

## 2022-12-30 DIAGNOSIS — M54.12 CERVICAL RADICULOPATHY: Primary | ICD-10-CM

## 2022-12-30 DIAGNOSIS — M25.512 LEFT SHOULDER PAIN, UNSPECIFIED CHRONICITY: ICD-10-CM

## 2022-12-30 LAB — TROPONIN I HIGH SENSITIVITY: 26 NG/L

## 2022-12-30 PROCEDURE — 93010 ELECTROCARDIOGRAM REPORT: CPT

## 2022-12-30 PROCEDURE — 93005 ELECTROCARDIOGRAM TRACING: CPT

## 2022-12-30 PROCEDURE — 99283 EMERGENCY DEPT VISIT LOW MDM: CPT

## 2022-12-30 PROCEDURE — 84484 ASSAY OF TROPONIN QUANT: CPT | Performed by: EMERGENCY MEDICINE

## 2022-12-30 PROCEDURE — 99284 EMERGENCY DEPT VISIT MOD MDM: CPT

## 2022-12-30 PROCEDURE — 36415 COLL VENOUS BLD VENIPUNCTURE: CPT

## 2022-12-30 RX ORDER — LIDOCAINE 50 MG/G
1 PATCH TOPICAL EVERY 24 HOURS
Qty: 6 PATCH | Refills: 0 | Status: SHIPPED | OUTPATIENT
Start: 2022-12-30 | End: 2023-01-05

## 2022-12-30 RX ORDER — PREDNISONE 20 MG/1
TABLET ORAL
Qty: 7 TABLET | Refills: 0 | Status: SHIPPED | OUTPATIENT
Start: 2022-12-30 | End: 2023-01-05

## 2022-12-30 NOTE — ED INITIAL ASSESSMENT (HPI)
Patient arrives ambulatory through triage with complaints of left shoulder pain that radiates down her arm x2-3 days. Patient endorses her heart racing. Patient denies chest pain.

## 2022-12-31 LAB
ATRIAL RATE: 107 BPM
ATRIAL RATE: 110 BPM
P AXIS: 40 DEGREES
P-R INTERVAL: 150 MS
P-R INTERVAL: 168 MS
Q-T INTERVAL: 334 MS
Q-T INTERVAL: 356 MS
QRS DURATION: 78 MS
QRS DURATION: 80 MS
QTC CALCULATION (BEZET): 452 MS
QTC CALCULATION (BEZET): 475 MS
R AXIS: -11 DEGREES
R AXIS: 204 DEGREES
T AXIS: 114 DEGREES
T AXIS: 66 DEGREES
VENTRICULAR RATE: 107 BPM
VENTRICULAR RATE: 110 BPM

## 2023-01-13 ENCOUNTER — OFFICE VISIT (OUTPATIENT)
Dept: INTERNAL MEDICINE CLINIC | Facility: CLINIC | Age: 63
End: 2023-01-13

## 2023-01-13 VITALS
OXYGEN SATURATION: 98 % | BODY MASS INDEX: 31.5 KG/M2 | DIASTOLIC BLOOD PRESSURE: 70 MMHG | SYSTOLIC BLOOD PRESSURE: 106 MMHG | TEMPERATURE: 98 F | HEART RATE: 109 BPM | HEIGHT: 66 IN | WEIGHT: 196 LBS | RESPIRATION RATE: 20 BRPM

## 2023-01-13 DIAGNOSIS — R22.9 LOCALIZED SUPERFICIAL SWELLING, MASS, OR LUMP: Primary | ICD-10-CM

## 2023-01-13 DIAGNOSIS — R73.09 ELEVATED GLUCOSE: ICD-10-CM

## 2023-01-13 DIAGNOSIS — E11.9 TYPE 2 DIABETES MELLITUS WITHOUT RETINOPATHY (HCC): ICD-10-CM

## 2023-01-13 DIAGNOSIS — Z91.018 FOOD ALLERGY: ICD-10-CM

## 2023-01-13 DIAGNOSIS — R35.0 FREQUENT URINATION: ICD-10-CM

## 2023-01-13 LAB
APPEARANCE: CLEAR
BILIRUB UR QL: NEGATIVE
BILIRUBIN: NEGATIVE
CLARITY UR: CLEAR
COLOR UR: YELLOW
CREAT UR-SCNC: 77.8 MG/DL
GLUCOSE (URINE DIPSTICK): >=1000 MG/DL
GLUCOSE UR-MCNC: 500 MG/DL
KETONES (URINE DIPSTICK): NEGATIVE MG/DL
KETONES UR-MCNC: NEGATIVE MG/DL
LEUKOCYTE ESTERASE UR QL STRIP.AUTO: NEGATIVE
LEUKOCYTES: NEGATIVE
MICROALBUMIN UR-MCNC: 1.27 MG/DL
MICROALBUMIN/CREAT 24H UR-RTO: 16.3 UG/MG (ref ?–30)
MULTISTIX EXPIRATION DATE: ABNORMAL DATE
MULTISTIX LOT#: ABNORMAL NUMERIC
NITRITE UR QL STRIP.AUTO: NEGATIVE
NITRITE, URINE: NEGATIVE
PH UR: 5.5 [PH] (ref 5–8)
PH, URINE: 5.5 (ref 4.5–8)
PROT UR-MCNC: NEGATIVE MG/DL
PROTEIN (URINE DIPSTICK): NEGATIVE MG/DL
SP GR UR STRIP: 1.01 (ref 1–1.03)
SPECIFIC GRAVITY: 1.01 (ref 1–1.03)
URINE-COLOR: YELLOW
UROBILINOGEN UR STRIP-ACNC: 0.2
UROBILINOGEN,SEMI-QN: 1 MG/DL (ref 0–1.9)

## 2023-01-13 PROCEDURE — 3051F HG A1C>EQUAL 7.0%<8.0%: CPT | Performed by: INTERNAL MEDICINE

## 2023-01-13 PROCEDURE — 3061F NEG MICROALBUMINURIA REV: CPT | Performed by: INTERNAL MEDICINE

## 2023-01-13 PROCEDURE — 36415 COLL VENOUS BLD VENIPUNCTURE: CPT | Performed by: INTERNAL MEDICINE

## 2023-01-13 PROCEDURE — 3008F BODY MASS INDEX DOCD: CPT | Performed by: INTERNAL MEDICINE

## 2023-01-13 PROCEDURE — 3078F DIAST BP <80 MM HG: CPT | Performed by: INTERNAL MEDICINE

## 2023-01-13 PROCEDURE — 3074F SYST BP LT 130 MM HG: CPT | Performed by: INTERNAL MEDICINE

## 2023-01-13 RX ORDER — SULFAMETHOXAZOLE AND TRIMETHOPRIM 800; 160 MG/1; MG/1
1 TABLET ORAL 2 TIMES DAILY
Qty: 14 TABLET | Refills: 0 | Status: SHIPPED | OUTPATIENT
Start: 2023-01-13 | End: 2023-01-20

## 2023-01-13 RX ORDER — DULAGLUTIDE 1.5 MG/.5ML
1.5 INJECTION, SOLUTION SUBCUTANEOUS WEEKLY
Qty: 4 PEN | Refills: 2 | Status: SHIPPED
Start: 2023-01-13

## 2023-01-14 LAB
EST. AVERAGE GLUCOSE BLD GHB EST-MCNC: 169 MG/DL (ref 68–126)
HBA1C MFR BLD: 7.5 % (ref ?–5.7)

## 2023-01-16 LAB
CLAM IGE QN: <0.1 KUA/L (ref ?–0.1)
CODFISH IGE QN: <0.1 KUA/L (ref ?–0.1)
CORN IGE QN: <0.1 KUA/L (ref ?–0.1)
COW MILK IGE QN: <0.1 KUA/L (ref ?–0.1)
EGG WHITE IGE QN: 0.12 KUA/L (ref ?–0.1)
IGE SERPL-ACNC: 179 KU/L (ref 2–214)
PEANUT IGE QN: <0.1 KUA/L (ref ?–0.1)
SCALLOP IGE QN: <0.1 KUA/L (ref ?–0.1)
SESAME SEED IGE QN: <0.1 KUA/L (ref ?–0.1)
SHRIMP IGE QN: 0.15 KUA/L (ref ?–0.1)
SOYBEAN IGE QN: <0.1 KUA/L (ref ?–0.1)
WALNUT IGE QN: <0.1 KUA/L (ref ?–0.1)
WHEAT IGE QN: <0.1 KUA/L (ref ?–0.1)

## 2023-01-17 ENCOUNTER — TELEPHONE (OUTPATIENT)
Dept: ENDOCRINOLOGY CLINIC | Facility: CLINIC | Age: 63
End: 2023-01-17

## 2023-01-17 NOTE — TELEPHONE ENCOUNTER
Pt is calling to report that her blood sugars are over 200 and Dr Javid Mann instructed her to take her insulin. When she took the second dose of insulin it went down over 100. Wanted to verify if she should take insulin 0.75.

## 2023-01-17 NOTE — TELEPHONE ENCOUNTER
Spoke to patient and relayed Dr. Evelyn Greenberg message as shown below. Patient verbalized understanding and had no further questions at this time.

## 2023-01-17 NOTE — TELEPHONE ENCOUNTER
Dr. Nic Hou    Patient wants to know if she should still take Trulicity on Monday 3/37--MKLO is the day she usually takes Trulicity, PCP told her to take an additional dose last Friday. She booked an appointment to see you for next Tuesday 1/24.

## 2023-01-22 ENCOUNTER — HOSPITAL ENCOUNTER (EMERGENCY)
Facility: HOSPITAL | Age: 63
Discharge: HOME | End: 2023-01-22
Attending: EMERGENCY MEDICINE
Payer: MEDICAID

## 2023-01-22 VITALS
RESPIRATION RATE: 18 BRPM | HEART RATE: 99 BPM | DIASTOLIC BLOOD PRESSURE: 89 MMHG | SYSTOLIC BLOOD PRESSURE: 129 MMHG | TEMPERATURE: 98 F | HEIGHT: 66 IN | BODY MASS INDEX: 31.34 KG/M2 | WEIGHT: 195 LBS | OXYGEN SATURATION: 98 %

## 2023-01-22 DIAGNOSIS — E11.65 TYPE 2 DIABETES MELLITUS WITH HYPERGLYCEMIA, UNSPECIFIED WHETHER LONG TERM INSULIN USE (HCC): Primary | ICD-10-CM

## 2023-01-22 LAB
ANION GAP SERPL CALC-SCNC: 9 MMOL/L (ref 0–18)
BASOPHILS # BLD AUTO: 0.03 X10(3) UL (ref 0–0.2)
BASOPHILS NFR BLD AUTO: 0.5 %
BILIRUB UR QL: NEGATIVE
BUN BLD-MCNC: 10 MG/DL (ref 7–18)
BUN/CREAT SERPL: 11.1 (ref 10–20)
CALCIUM BLD-MCNC: 9.6 MG/DL (ref 8.5–10.1)
CHLORIDE SERPL-SCNC: 108 MMOL/L (ref 98–112)
CLARITY UR: CLEAR
CO2 SERPL-SCNC: 24 MMOL/L (ref 21–32)
COLOR UR: YELLOW
CREAT BLD-MCNC: 0.9 MG/DL
DEPRECATED RDW RBC AUTO: 52 FL (ref 35.1–46.3)
EOSINOPHIL # BLD AUTO: 0.11 X10(3) UL (ref 0–0.7)
EOSINOPHIL NFR BLD AUTO: 1.8 %
ERYTHROCYTE [DISTWIDTH] IN BLOOD BY AUTOMATED COUNT: 16.4 % (ref 11–15)
GFR SERPLBLD BASED ON 1.73 SQ M-ARVRAT: 73 ML/MIN/1.73M2 (ref 60–?)
GLUCOSE BLD-MCNC: 177 MG/DL (ref 70–99)
GLUCOSE BLDC GLUCOMTR-MCNC: 184 MG/DL (ref 70–99)
GLUCOSE UR-MCNC: >=500 MG/DL
HCT VFR BLD AUTO: 43.9 %
HGB BLD-MCNC: 13.8 G/DL
HGB UR QL STRIP.AUTO: NEGATIVE
IMM GRANULOCYTES # BLD AUTO: 0.01 X10(3) UL (ref 0–1)
IMM GRANULOCYTES NFR BLD: 0.2 %
KETONES UR-MCNC: NEGATIVE MG/DL
LEUKOCYTE ESTERASE UR QL STRIP.AUTO: NEGATIVE
LYMPHOCYTES # BLD AUTO: 2.77 X10(3) UL (ref 1–4)
LYMPHOCYTES NFR BLD AUTO: 46.6 %
MCH RBC QN AUTO: 27.1 PG (ref 26–34)
MCHC RBC AUTO-ENTMCNC: 31.4 G/DL (ref 31–37)
MCV RBC AUTO: 86.2 FL
MONOCYTES # BLD AUTO: 0.41 X10(3) UL (ref 0.1–1)
MONOCYTES NFR BLD AUTO: 6.9 %
NEUTROPHILS # BLD AUTO: 2.62 X10 (3) UL (ref 1.5–7.7)
NEUTROPHILS # BLD AUTO: 2.62 X10(3) UL (ref 1.5–7.7)
NEUTROPHILS NFR BLD AUTO: 44 %
NITRITE UR QL STRIP.AUTO: POSITIVE
OSMOLALITY SERPL CALC.SUM OF ELEC: 295 MOSM/KG (ref 275–295)
PH UR: 6 [PH] (ref 5–8)
PLATELET # BLD AUTO: 303 10(3)UL (ref 150–450)
POTASSIUM SERPL-SCNC: 4.1 MMOL/L (ref 3.5–5.1)
PROT UR-MCNC: NEGATIVE MG/DL
RBC # BLD AUTO: 5.09 X10(6)UL
SODIUM SERPL-SCNC: 141 MMOL/L (ref 136–145)
SP GR UR STRIP: 1.03 (ref 1–1.03)
UROBILINOGEN UR STRIP-ACNC: <2
VIT C UR-MCNC: NEGATIVE MG/DL
WBC # BLD AUTO: 6 X10(3) UL (ref 4–11)

## 2023-01-22 PROCEDURE — 85025 COMPLETE CBC W/AUTO DIFF WBC: CPT | Performed by: EMERGENCY MEDICINE

## 2023-01-22 PROCEDURE — 96360 HYDRATION IV INFUSION INIT: CPT

## 2023-01-22 PROCEDURE — 99284 EMERGENCY DEPT VISIT MOD MDM: CPT

## 2023-01-22 PROCEDURE — 82962 GLUCOSE BLOOD TEST: CPT

## 2023-01-22 PROCEDURE — 87086 URINE CULTURE/COLONY COUNT: CPT | Performed by: EMERGENCY MEDICINE

## 2023-01-22 PROCEDURE — 80048 BASIC METABOLIC PNL TOTAL CA: CPT | Performed by: EMERGENCY MEDICINE

## 2023-01-22 PROCEDURE — 87088 URINE BACTERIA CULTURE: CPT | Performed by: EMERGENCY MEDICINE

## 2023-01-22 PROCEDURE — 87186 SC STD MICRODIL/AGAR DIL: CPT | Performed by: EMERGENCY MEDICINE

## 2023-01-22 PROCEDURE — 81001 URINALYSIS AUTO W/SCOPE: CPT | Performed by: EMERGENCY MEDICINE

## 2023-01-22 NOTE — DISCHARGE INSTRUCTIONS
Continue medications as previously prescribed. Follow-up with your endocrinologist as scheduled on Tuesday. Return to the emergency department if fever, repeated vomiting, or other new symptoms develop.

## 2023-01-22 NOTE — ED QUICK NOTES
Er md at bedside to speak with patient. Plan is for patient to be discharged.  Awaiting discharge instructions

## 2023-01-22 NOTE — ED QUICK NOTES
Patient presents with:  Hyperglycemia    Patient aox3 to ed via private vehicle co of hyperglycemia x 2 weeks. Patient stated bs at home has been in the 200s which is abnormally high for her. Patient also co of \"bump\" on her rectal area. Denies bleeding.

## 2023-01-22 NOTE — ED INITIAL ASSESSMENT (HPI)
Pt c/o high BS at home since waking up this morning. Pt states 2 weeks ago she started taking 2 doses of home meds and since stopped taking the double dose last week has had trouble with high BS, pt states in the 500s at home. Pt  here.

## 2023-01-24 ENCOUNTER — TELEPHONE (OUTPATIENT)
Dept: ENDOCRINOLOGY CLINIC | Facility: CLINIC | Age: 63
End: 2023-01-24

## 2023-01-24 ENCOUNTER — OFFICE VISIT (OUTPATIENT)
Dept: ENDOCRINOLOGY CLINIC | Facility: CLINIC | Age: 63
End: 2023-01-24

## 2023-01-24 VITALS
HEART RATE: 103 BPM | WEIGHT: 192 LBS | BODY MASS INDEX: 31 KG/M2 | DIASTOLIC BLOOD PRESSURE: 78 MMHG | SYSTOLIC BLOOD PRESSURE: 112 MMHG

## 2023-01-24 DIAGNOSIS — E78.2 MIXED HYPERLIPIDEMIA: ICD-10-CM

## 2023-01-24 DIAGNOSIS — I10 ESSENTIAL HYPERTENSION: ICD-10-CM

## 2023-01-24 DIAGNOSIS — E11.9 TYPE 2 DIABETES MELLITUS WITHOUT RETINOPATHY (HCC): Primary | ICD-10-CM

## 2023-01-24 LAB
GLUCOSE BLOOD: 150
TEST STRIP LOT #: NORMAL NUMERIC

## 2023-01-24 PROCEDURE — 3074F SYST BP LT 130 MM HG: CPT | Performed by: INTERNAL MEDICINE

## 2023-01-24 PROCEDURE — 99214 OFFICE O/P EST MOD 30 MIN: CPT | Performed by: INTERNAL MEDICINE

## 2023-01-24 PROCEDURE — 3078F DIAST BP <80 MM HG: CPT | Performed by: INTERNAL MEDICINE

## 2023-01-24 PROCEDURE — 82947 ASSAY GLUCOSE BLOOD QUANT: CPT | Performed by: INTERNAL MEDICINE

## 2023-01-24 RX ORDER — GLIPIZIDE 5 MG/1
5 TABLET ORAL
Qty: 90 TABLET | Refills: 0 | Status: SHIPPED | OUTPATIENT
Start: 2023-01-24 | End: 2023-04-24

## 2023-01-24 RX ORDER — DULAGLUTIDE 1.5 MG/.5ML
1.5 INJECTION, SOLUTION SUBCUTANEOUS WEEKLY
Qty: 6 ML | Refills: 1 | Status: SHIPPED | OUTPATIENT
Start: 2023-01-24 | End: 2023-04-24

## 2023-01-24 NOTE — PATIENT INSTRUCTIONS
Please increase Trulicity to 5.4MG qweekly    Please contact us if you have elevated blood sugars so that we can ask you to take glipizide    Please stop the Jardiance 12.5mg for 1 week to see if this stops you from having to urinate all night    Please remember to have fasting labs before your next appt

## 2023-01-24 NOTE — TELEPHONE ENCOUNTER
Hi!    Can we prescribe patient with glucometer so that she can check blood sugars twice daily? Thank you!

## 2023-01-25 RX ORDER — BLOOD SUGAR DIAGNOSTIC
STRIP MISCELLANEOUS
Qty: 200 STRIP | Refills: 1 | Status: SHIPPED | OUTPATIENT
Start: 2023-01-25

## 2023-01-25 RX ORDER — BLOOD-GLUCOSE METER
EACH MISCELLANEOUS
Qty: 1 KIT | Refills: 0 | Status: SHIPPED | OUTPATIENT
Start: 2023-01-25

## 2023-01-25 RX ORDER — LANCETS 33 GAUGE
EACH MISCELLANEOUS
Qty: 200 EACH | Refills: 1 | Status: SHIPPED | OUTPATIENT
Start: 2023-01-25

## 2023-01-26 RX ORDER — ALBUTEROL SULFATE 90 UG/1
2 AEROSOL, METERED RESPIRATORY (INHALATION) EVERY 4 HOURS PRN
Qty: 8.5 G | Refills: 1 | Status: SHIPPED | OUTPATIENT
Start: 2023-01-26

## 2023-01-26 NOTE — TELEPHONE ENCOUNTER
Rx listed as external record/Pt reported    Requested Prescriptions   Pending Prescriptions Disp Refills    ALBUTEROL 108 (90 Base) MCG/ACT Inhalation Aero Soln [Pharmacy Med Name: ALBUTEROL HFA INH (200 PUFFS)8.5GM] 8.5 g 0     Sig: INHALE 2 PUFFS INTO THE LUNGS EVERY 4 HOURS AS NEEDED       Asthma & COPD Medication Protocol Passed - 1/25/2023  4:00 PM        Passed - In person appointment or virtual visit in the past 6 mos or appointment in next 3 mos     Recent Outpatient Visits              2 days ago Type 2 diabetes mellitus without retinopathy ([address]    [facility], [address] [place] [address] Cristian Eye, MD    Office Visit    1 week ago Localized superficial swelling, mass, or lump    [facility], [address], Dao Arzola MD    Office Visit    4 months ago Type 2 diabetes mellitus without retinopathy ([address]    Carolyn Diaz, [address], [facility] Cristian Eye, MD    Office Visit    4 months ago Type 2 diabetes mellitus without retinopathy ([address]    Carolyn Diaz, [address], [place], Jam Duran MD    Office Visit    5 months ago [facility], [address] [facility], [address], ABDULAZIZ    Office Visit          Future Appointments         Provider Department Appt Notes    In 1 week MD Carolyn Hayden, [address] [place] [address], [facility] [address] Feeling better    In 1 month MD Desiree Davis [place]  dm ee, policy informed    In 1 month Carolyn Manning, [address], [employer] Back pressure                    Recent Outpatient Visits              2 days ago Type 2 diabetes mellitus without retinopathy [facility])    Carolyn Diaz, [address] [place] [address] Cristian Eye, MD    Office Visit    1 week ago Localized superficial swelling, mass, or lump [address] [facility], HowesJanelle MD    Office Visit    4 months ago Type 2 diabetes mellitus without retinopathy [facility])    Manuela Gonzales, [address], [place], Drew Mckee MD    Office Visit    4 months ago Type 2 diabetes mellitus without retinopathy [facility])    Manuela Gonzales, [address], [place], Janelle Powers MD    Office Visit    5 months ago [facility], [address] [facility], [facility], [facility]    Office Visit            Future Appointments         Provider Department Appt Notes    In 1 week MD Manuela Lamar, [address] [facility] Feeling better    In 1 month Charlene Zacarias MD [address] [facility], [place] ep dm ee, policy informed    In 1 month DO Manuela Luis, [address], [facility] Back pressure

## 2023-01-27 ENCOUNTER — PATIENT MESSAGE (OUTPATIENT)
Dept: INTERNAL MEDICINE CLINIC | Facility: CLINIC | Age: 63
End: 2023-01-27

## 2023-01-28 RX ORDER — APIXABAN 5 MG/1
TABLET, FILM COATED ORAL
Qty: 60 TABLET | Refills: 0 | Status: SHIPPED | OUTPATIENT
Start: 2023-01-28

## 2023-01-28 NOTE — TELEPHONE ENCOUNTER
From: Bunny Cox  To: Werner Garsia MD  Sent: 1/27/2023 9:10 PM CST  Subject: Refill     Hello I am in need of a refill for my Eliquis. [facility] pharmacy said they reached out to you 2 days ago with no response.      Thank you,  Ivelisse Bond

## 2023-02-01 ENCOUNTER — TELEPHONE (OUTPATIENT)
Dept: ENDOCRINOLOGY CLINIC | Facility: CLINIC | Age: 63
End: 2023-02-01

## 2023-02-01 NOTE — TELEPHONE ENCOUNTER
Hi!    If she has not already started to take the glipizide 5mg qAM, have her start this. If she has already started the glipizide 5mg PO qAM, and her blood sugars are still elevated, then please ask her to increase to 10mg PO qAM and follow up with us in 1 week. Please ask her to stop the Jardiance from now on. Thank you!

## 2023-02-01 NOTE — TELEPHONE ENCOUNTER
Dr. Aby Vidal,    Please advise Jardiance vs. Glipizide    LOV: 6/57/96  - Increase Trulicity1.5mg qweekly 1st dose on Monday 1/30 - compliant  - I have asked her to stop Jardiance for one week to see if this helps her with the nocturia-- states that its getting better. Not waking up and urinating as frequent. BG generally 240-280 before today.      Yesterday- still waking up with high sugars 220     Today-   After breakfast 3 hour post prandial- 140  Before lunch -124

## 2023-02-02 NOTE — TELEPHONE ENCOUNTER
BG today:   AM fastin   post prandial BF- 185     Patient has Glipizide 5mg prescription at home. Patient verbalized understanding and acknowledged.  RN advised patient to call us if BG  <80 or persistently >300

## 2023-02-03 ENCOUNTER — OFFICE VISIT (OUTPATIENT)
Dept: PULMONOLOGY | Facility: CLINIC | Age: 63
End: 2023-02-03

## 2023-02-03 VITALS
DIASTOLIC BLOOD PRESSURE: 71 MMHG | HEIGHT: 66 IN | RESPIRATION RATE: 22 BRPM | HEART RATE: 102 BPM | SYSTOLIC BLOOD PRESSURE: 107 MMHG | OXYGEN SATURATION: 100 % | WEIGHT: 194.81 LBS | BODY MASS INDEX: 31.31 KG/M2

## 2023-02-03 DIAGNOSIS — J44.9 CHRONIC OBSTRUCTIVE PULMONARY DISEASE, UNSPECIFIED COPD TYPE (HCC): Primary | ICD-10-CM

## 2023-02-03 DIAGNOSIS — I26.99 OTHER PULMONARY EMBOLISM WITHOUT ACUTE COR PULMONALE, UNSPECIFIED CHRONICITY (HCC): ICD-10-CM

## 2023-02-03 DIAGNOSIS — Z87.891 PERSONAL HISTORY OF TOBACCO USE, PRESENTING HAZARDS TO HEALTH: ICD-10-CM

## 2023-02-03 PROCEDURE — 3008F BODY MASS INDEX DOCD: CPT | Performed by: INTERNAL MEDICINE

## 2023-02-03 PROCEDURE — 3078F DIAST BP <80 MM HG: CPT | Performed by: INTERNAL MEDICINE

## 2023-02-03 PROCEDURE — 3074F SYST BP LT 130 MM HG: CPT | Performed by: INTERNAL MEDICINE

## 2023-02-03 PROCEDURE — 99214 OFFICE O/P EST MOD 30 MIN: CPT | Performed by: INTERNAL MEDICINE

## 2023-02-03 RX ORDER — FLUTICASONE FUROATE, UMECLIDINIUM BROMIDE AND VILANTEROL TRIFENATATE 200; 62.5; 25 UG/1; UG/1; UG/1
1 POWDER RESPIRATORY (INHALATION) DAILY
Qty: 1 EACH | Refills: 5 | Status: SHIPPED | OUTPATIENT
Start: 2023-02-03

## 2023-02-07 NOTE — TELEPHONE ENCOUNTER
Glucose Blood (ONETOUCH VERIO) In Vitro Strip, TEST BLOOD SUGAR THREE TIMES DAILY, Disp: 300 strip, Rfl: 0

## 2023-02-09 RX ORDER — BLOOD SUGAR DIAGNOSTIC
STRIP MISCELLANEOUS
Qty: 200 STRIP | Refills: 1 | Status: SHIPPED | OUTPATIENT
Start: 2023-02-09

## 2023-02-10 ENCOUNTER — TELEPHONE (OUTPATIENT)
Dept: PULMONOLOGY | Facility: CLINIC | Age: 63
End: 2023-02-10

## 2023-02-10 NOTE — TELEPHONE ENCOUNTER
[phone] PA. ...very long wait time, will call back Monday. Spoke to pharmacy staff-could not state covered alternatives.

## 2023-02-10 NOTE — TELEPHONE ENCOUNTER
Received denial rationale from [facility], see statement below that is located on page 2 of the fax:     \"You are requesting a quantity over the amount allowed. This limit is set by your pharmacy plan. You must meet one of the following:   A. Your prescriber must tell us that you need to perform additional blood sugar testing. They must give support for their reasons. Mearl Rings must be currently taking a drug that may affect your blood sugar. These drug are:   1. Insulin  2. Prenatal vitamins  3. Antipsychotic drugs. 4. Oncology therapy. \"    Placed in brown folder in triage room. Dr Aj Aguilar-- see the above statement. Please advise.

## 2023-02-10 NOTE — TELEPHONE ENCOUNTER
Patient is calling because she is having pain in her back and she cannot lay down.  Was not able to sleep

## 2023-02-10 NOTE — TELEPHONE ENCOUNTER
Spoke to patient who is having severe pain to upper/mid back that comes and goes. Patient was unable to sleep last night r/t the pain. Patient plans on going to ED to be evaluated.      Dr. Glo Mckeon

## 2023-02-13 ENCOUNTER — LAB ENCOUNTER (OUTPATIENT)
Dept: LAB | Facility: HOSPITAL | Age: 63
End: 2023-02-13
Attending: INTERNAL MEDICINE
Payer: MEDICAID

## 2023-02-13 DIAGNOSIS — J44.9 CHRONIC OBSTRUCTIVE PULMONARY DISEASE, UNSPECIFIED COPD TYPE (HCC): ICD-10-CM

## 2023-02-13 LAB — SARS-COV-2 RNA RESP QL NAA+PROBE: NOT DETECTED

## 2023-02-14 ENCOUNTER — PATIENT MESSAGE (OUTPATIENT)
Dept: PULMONOLOGY | Facility: CLINIC | Age: 63
End: 2023-02-14

## 2023-02-16 ENCOUNTER — HOSPITAL ENCOUNTER (OUTPATIENT)
Dept: RESPIRATORY THERAPY | Facility: HOSPITAL | Age: 63
Discharge: HOME | End: 2023-02-16
Attending: INTERNAL MEDICINE
Payer: MEDICAID

## 2023-02-16 ENCOUNTER — HOSPITAL ENCOUNTER (OUTPATIENT)
Dept: CT IMAGING | Facility: HOSPITAL | Age: 63
Discharge: HOME | End: 2023-02-16
Attending: INTERNAL MEDICINE
Payer: MEDICAID

## 2023-02-16 DIAGNOSIS — J44.9 CHRONIC OBSTRUCTIVE PULMONARY DISEASE, UNSPECIFIED COPD TYPE (HCC): ICD-10-CM

## 2023-02-16 DIAGNOSIS — Z87.891 PERSONAL HISTORY OF TOBACCO USE, PRESENTING HAZARDS TO HEALTH: ICD-10-CM

## 2023-02-16 PROCEDURE — 94726 PLETHYSMOGRAPHY LUNG VOLUMES: CPT

## 2023-02-16 PROCEDURE — 94729 DIFFUSING CAPACITY: CPT

## 2023-02-16 PROCEDURE — 94060 EVALUATION OF WHEEZING: CPT

## 2023-02-16 PROCEDURE — 71271 CT THORAX LUNG CANCER SCR C-: CPT | Performed by: INTERNAL MEDICINE

## 2023-02-16 RX ORDER — IPRATROPIUM BROMIDE 17 UG/1
2 AEROSOL, METERED RESPIRATORY (INHALATION) EVERY 6 HOURS
Qty: 12.9 G | Refills: 2 | Status: CANCELLED | OUTPATIENT
Start: 2023-02-16

## 2023-02-16 RX ORDER — BUDESONIDE AND FORMOTEROL FUMARATE DIHYDRATE 160; 4.5 UG/1; UG/1
2 AEROSOL RESPIRATORY (INHALATION) 2 TIMES DAILY
Qty: 6 G | Refills: 3 | Status: CANCELLED | OUTPATIENT
Start: 2023-02-16

## 2023-02-16 RX ORDER — AMLODIPINE BESYLATE 5 MG/1
5 TABLET ORAL DAILY
Qty: 90 TABLET | Refills: 1 | Status: SHIPPED | OUTPATIENT
Start: 2023-02-16

## 2023-02-16 NOTE — TELEPHONE ENCOUNTER
Dr. Lyons denied. Preferred alternatives are  Symbicort, Spiriva and Atrovent HFA. Orders pended below.

## 2023-02-16 NOTE — TELEPHONE ENCOUNTER
Fax received from [address] [place] [address],[address] regarding Trelegy PA denial. Per denial letter, patient must have tried and failed preferred alternatives of atrovent HFA in combination with Symbicort OR Spiriva in combination with Symbicort.

## 2023-02-16 NOTE — TELEPHONE ENCOUNTER
Refill passed per [place] [address], [facility] protocol.       Requested Prescriptions   Pending Prescriptions Disp Refills    AMLODIPINE 5 MG Oral Tab [Pharmacy Med Name: AMLODIPINE BESYLATE 5MG TABLETS] 90 tablet 1     Sig: TAKE 1 TABLET(5 MG) BY MOUTH DAILY       Hypertensive Medications Protocol Passed - 2/16/2023  9:51 AM        Passed - In person appointment in the past 12 or next 3 months     Recent Outpatient Visits              1 week ago Chronic obstructive pulmonary disease, unspecified COPD type ([facility]    Frances Plummer MD    Office Visit    3 weeks ago Type 2 diabetes mellitus without retinopathy [facility])    [facility], Abdoul Bass MD    Office Visit    1 month ago Localized superficial swelling, mass, or lump    Alec Richardson, [address], Lonny Garcia MD    Office Visit    4 months ago Type 2 diabetes mellitus without retinopathy ([facility]    [facility] [place], [address], [place], Abdoul aBss MD    Office Visit    5 months ago Type 2 diabetes mellitus without retinopathy ([facility]    [facility], [address], Lonny Garcia MD    Office Visit          Future Appointments         Provider Department Appt Notes    Today [address] [facility] QA-KW Epic order from Dr. Janis Richard 2/3/23 - 214 [address]    Today [address] [place] [address] Respiratory Therapy QA-KW Epic order from Dr. Janis Richard 2/3/23 - 214 [address]    In 2 weeks MD Alec Buitrago, [address], [place] ep dm ee, policy informed    In 1 month DO Alec Haider, [address], [facility] Back pressure    In 2 months MD Danny [facility], [place] [address] [facility], [address] 2 mos               Passed - Last BP reading less than 140/90     BP Readings from Last 1 Encounters:  02/03/23 : 107/71 Passed - CMP or BMP in past 6 months     Recent Results (from the past 4392 hour(s))   Basic Metabolic Panel (8)    Collection Time: 01/22/23  2:54 PM   Result Value Ref Range    Glucose 177 (H) 70 - 99 mg/dL    Sodium 141 136 - 145 mmol/L    Potassium 4.1 3.5 - 5.1 mmol/L    Chloride 108 98 - 112 mmol/L    CO2 24.0 21.0 - 32.0 mmol/L    Anion Gap 9 0 - 18 mmol/L    BUN 10 7 - 18 mg/dL    Creatinine 0.90 0.55 - 1.02 mg/dL    BUN/CREA Ratio 11.1 10.0 - 20.0    Calcium, Total 9.6 8.5 - 10.1 mg/dL    Calculated Osmolality 295 275 - 295 mOsm/kg    eGFR-Cr 73 >=60 mL/min/1.73m2     *Note: Due to a large number of results and/or encounters for the requested time period, some results have not been displayed. A complete set of results can be found in Results Review.                Passed - In person appointment or virtual visit in the past 6 months     Recent Outpatient Visits              1 week ago Chronic obstructive pulmonary disease, unspecified COPD type [facility])    Mary Arguello MD    Office Visit    3 weeks ago Type 2 diabetes mellitus without retinopathy [facility])    [facility], Kasey Caballero MD    Office Visit    1 month ago Localized superficial swelling, mass, or lump    Regina Ortiz MD    Office Visit    4 months ago Type 2 diabetes mellitus without retinopathy [facility])    Kelvin Ortiz MD    Office Visit    5 months ago Type 2 diabetes mellitus without retinopathy ([facility]    Regina Ortiz MD    Office Visit          Future Appointments         Provider Department Appt Notes    Today [facility] [facility] QA-KW Epic order from Dr. Kitty Navarrete 2/3/23 - [address]    Today [address] [place] [address] PFT [place] [address] Respiratory Therapy QA-KW Epic order from Dr. Becky Lucas 2/3/23 - 214 [address]    In 2 weeks Krystin Galvin MD [address], [address], [place] ep dm ee, policy informed    In 1 month Rui Ryanman, DO [address], [address], [facility] Back pressure    In 2 months Eric Wray MD [facility], [address] [facility] 2 mos               Passed - EGFRCR or GFRAA > 50     GFR Evaluation  EGFRCR: 73 , resulted on 1/22/2023                Future Appointments         Provider Department Appt Notes    Today [address] [facility] CT QA-KW Epic order from Dr. Becky Lucas 2/3/23 - [address]    Today [address] [facility] [address] Respiratory Therapy QA-KW Epic order from Dr. Becky Lucas 2/3/23 - [address]    In 2 weeks Krystin Galvin MD [address], [address], [place] ep dm ee, policy informed    In 1 month Rui Jose Carlos, [address], [address], [facility] Back pressure    In 2 months Eric Wray MD [address], [address] [facility] 2 mos            Recent Outpatient Visits              1 week ago Chronic obstructive pulmonary disease, unspecified COPD type [facility])    Jayden Pierce MD    Office Visit    3 weeks ago Type 2 diabetes mellitus without retinopathy [facility])    Kenya Srivastava MD    Office Visit    1 month ago Localized superficial swelling, mass, or lump    [address] [facility], Valerie Rivero MD    Office Visit    4 months ago Type 2 diabetes mellitus without retinopathy [facility])    [address], Lexie Valencia MD    Office Visit    5 months ago Type 2 diabetes mellitus without retinopathy [facility])    [address], Valerie Rivero MD    Office Visit

## 2023-02-17 ENCOUNTER — TELEPHONE (OUTPATIENT)
Dept: PULMONOLOGY | Facility: CLINIC | Age: 63
End: 2023-02-17

## 2023-02-17 RX ORDER — BUDESONIDE AND FORMOTEROL FUMARATE DIHYDRATE 160; 4.5 UG/1; UG/1
2 AEROSOL RESPIRATORY (INHALATION) 2 TIMES DAILY
Qty: 1 EACH | Refills: 3 | Status: SHIPPED | OUTPATIENT
Start: 2023-02-17 | End: 2024-02-17

## 2023-02-17 NOTE — TELEPHONE ENCOUNTER
MD Pastor Keyur Dacosta RN  Caller: Unspecified (1 week ago, 10:29 AM)  I placed an order for Symbicort and Spiriva

## 2023-02-20 NOTE — TELEPHONE ENCOUNTER
Recieved fax from [address] stating Rx for Symbicort was approved. Sent to scanning.     Rec#: [id number]

## 2023-02-20 NOTE — TELEPHONE ENCOUNTER
Spoke with patient, advised that Kimberlyn PA was denied by insurance. Advised Dr. Marcell Salazar has prescribed Symbicort and Spiriva instead. Patient verbalized understanding. Dr. Marcell Salazar- patient is asking for results of PFT completed on 2/16/23.

## 2023-03-01 ENCOUNTER — TELEPHONE (OUTPATIENT)
Dept: PULMONOLOGY | Facility: CLINIC | Age: 63
End: 2023-03-01

## 2023-03-01 NOTE — TELEPHONE ENCOUNTER
Patient calling for refill request. Rodney Commons that it was already sent by pharmacy. She has been out of medication for 3 days. Medication pended. Routing for protocol.

## 2023-03-01 NOTE — TELEPHONE ENCOUNTER
Please review. Protocol failed or has no protocol.      Requested Prescriptions   Pending Prescriptions Disp Refills    ELIQUIS 5 MG Oral Tab [Pharmacy Med Name: Doretha Roldan 5MG TABLETS] 60 tablet 0     Sig: TAKE 1 TABLET(5 MG) BY MOUTH TWICE DAILY       There is no refill protocol information for this order          Recent Outpatient Visits              3 weeks ago Chronic obstructive pulmonary disease, unspecified COPD type ([facility]    [address], [facility], [place] Haim Cohn MD    Office Visit    1 month ago Type 2 diabetes mellitus without retinopathy [facility])    [facility], [address] [facility], Anne-Marie Vasquez MD    Office Visit    1 month ago Localized superficial swelling, mass, or lump    [facility], [address],[facility] [facility] Rachel Tang MD    Office Visit    5 months ago Type 2 diabetes mellitus without retinopathy [facility])    [address], [address],[facility], Anne-Marie Vasquez MD    Office Visit    5 months ago Type 2 diabetes mellitus without retinopathy ([facility]    [address], [address], Pauline Ag MD    Office Visit            Future Appointments         Provider Department Appt Notes    Tomorrow MD Сергей Muhammad, [facility] dm ee, policy informed    In 2 weeks Jeannette Mauricio, DO [address], [address], [facility] Back pressure    In 1 month aHim Cohn MD [address], [address] 2 mos

## 2023-03-01 NOTE — TELEPHONE ENCOUNTER
Patient received message to contact office for her test results, please call at [phone],thanks.

## 2023-03-02 ENCOUNTER — OFFICE VISIT (OUTPATIENT)
Dept: OPHTHALMOLOGY | Facility: CLINIC | Age: 63
End: 2023-03-02

## 2023-03-02 DIAGNOSIS — H25.13 AGE-RELATED NUCLEAR CATARACT OF BOTH EYES: ICD-10-CM

## 2023-03-02 DIAGNOSIS — E11.9 TYPE 2 DIABETES MELLITUS WITHOUT RETINOPATHY (HCC): Primary | ICD-10-CM

## 2023-03-02 PROCEDURE — 92015 DETERMINE REFRACTIVE STATE: CPT | Performed by: OPHTHALMOLOGY

## 2023-03-02 PROCEDURE — 92014 COMPRE OPH EXAM EST PT 1/>: CPT | Performed by: OPHTHALMOLOGY

## 2023-03-02 PROCEDURE — 2023F DILAT RTA XM W/O RTNOPTHY: CPT | Performed by: OPHTHALMOLOGY

## 2023-03-02 NOTE — PATIENT INSTRUCTIONS
Age-related nuclear cataract of both eyes  Discussed early cataracts with patient. Told patient that cataracts are age appropriate and they are not surgical at this time. No treatment recommended at this time. Type 2 diabetes mellitus without retinopathy ([facility]  Diabetes type II: no background of retinopathy, no signs of neovascularization noted. Discussed ocular and systemic benefits of blood sugar control. Diagnosis and treatment discussed in detail with patient.

## 2023-03-16 ENCOUNTER — PATIENT MESSAGE (OUTPATIENT)
Dept: ENDOCRINOLOGY CLINIC | Facility: CLINIC | Age: 63
End: 2023-03-16

## 2023-03-16 NOTE — TELEPHONE ENCOUNTER
Spoke to patient, verbalized understanding and acknowledged. Patient ate lunch--> 135  BG at 4:00pm--> 111 with headache   RN advised patient to snack if headache continues and update us in 3 days.

## 2023-03-16 NOTE — TELEPHONE ENCOUNTER
Hi!  Please ask patient to stop taking the glipizide 5mg. I would like her to follow up with us in 3 days with how this has helped her blood sugars. Tadicksonk you!

## 2023-03-28 ENCOUNTER — OFFICE VISIT (OUTPATIENT)
Dept: NEUROLOGY | Facility: CLINIC | Age: 63
End: 2023-03-28
Payer: MEDICAID

## 2023-03-28 VITALS — BODY MASS INDEX: 31 KG/M2 | HEIGHT: 66 IN

## 2023-03-28 DIAGNOSIS — R20.9 ABNORMAL SENSATION OF BUTTOCKS: Primary | ICD-10-CM

## 2023-03-28 PROCEDURE — 99214 OFFICE O/P EST MOD 30 MIN: CPT | Performed by: OTHER

## 2023-03-28 RX ORDER — DULOXETIN HYDROCHLORIDE 30 MG/1
30 CAPSULE, DELAYED RELEASE ORAL DAILY
Qty: 90 CAPSULE | Refills: 0 | Status: SHIPPED | OUTPATIENT
Start: 2023-03-28 | End: 2023-06-26

## 2023-04-11 DIAGNOSIS — E11.9 TYPE 2 DIABETES MELLITUS WITHOUT RETINOPATHY (HCC): ICD-10-CM

## 2023-04-11 NOTE — TELEPHONE ENCOUNTER
HPT:5/26/22    RTC: 3Months    FU: No FU Appt Scheduled    6 Month Supply Pending    Sent patient a Mychart reminder to schedule a follow up appointment.

## 2023-04-28 ENCOUNTER — OFFICE VISIT (OUTPATIENT)
Dept: INTERNAL MEDICINE CLINIC | Facility: CLINIC | Age: 63
End: 2023-04-28

## 2023-04-28 VITALS
BODY MASS INDEX: 31.18 KG/M2 | HEART RATE: 106 BPM | HEIGHT: 66 IN | SYSTOLIC BLOOD PRESSURE: 118 MMHG | RESPIRATION RATE: 19 BRPM | OXYGEN SATURATION: 97 % | WEIGHT: 194 LBS | DIASTOLIC BLOOD PRESSURE: 68 MMHG | TEMPERATURE: 99 F

## 2023-04-28 DIAGNOSIS — G89.29 CHRONIC BILATERAL LOW BACK PAIN WITH BILATERAL SCIATICA: Primary | ICD-10-CM

## 2023-04-28 DIAGNOSIS — M54.42 CHRONIC BILATERAL LOW BACK PAIN WITH BILATERAL SCIATICA: Primary | ICD-10-CM

## 2023-04-28 DIAGNOSIS — M53.3 SACRAL BACK PAIN: ICD-10-CM

## 2023-04-28 DIAGNOSIS — Z87.898 HISTORY OF ABNORMAL MAMMOGRAM: ICD-10-CM

## 2023-04-28 DIAGNOSIS — M54.41 CHRONIC BILATERAL LOW BACK PAIN WITH BILATERAL SCIATICA: Primary | ICD-10-CM

## 2023-04-28 PROCEDURE — 3074F SYST BP LT 130 MM HG: CPT | Performed by: INTERNAL MEDICINE

## 2023-04-28 PROCEDURE — 90715 TDAP VACCINE 7 YRS/> IM: CPT | Performed by: INTERNAL MEDICINE

## 2023-04-28 PROCEDURE — 90677 PCV20 VACCINE IM: CPT | Performed by: INTERNAL MEDICINE

## 2023-04-28 PROCEDURE — 3008F BODY MASS INDEX DOCD: CPT | Performed by: INTERNAL MEDICINE

## 2023-04-28 PROCEDURE — 90471 IMMUNIZATION ADMIN: CPT | Performed by: INTERNAL MEDICINE

## 2023-04-28 PROCEDURE — 3078F DIAST BP <80 MM HG: CPT | Performed by: INTERNAL MEDICINE

## 2023-04-28 PROCEDURE — 99214 OFFICE O/P EST MOD 30 MIN: CPT | Performed by: INTERNAL MEDICINE

## 2023-04-28 PROCEDURE — 90472 IMMUNIZATION ADMIN EACH ADD: CPT | Performed by: INTERNAL MEDICINE

## 2023-04-29 ENCOUNTER — HOSPITAL ENCOUNTER (OUTPATIENT)
Dept: GENERAL RADIOLOGY | Age: 63
Discharge: HOME | End: 2023-04-29
Attending: INTERNAL MEDICINE
Payer: MEDICAID

## 2023-04-29 DIAGNOSIS — M54.41 CHRONIC BILATERAL LOW BACK PAIN WITH BILATERAL SCIATICA: ICD-10-CM

## 2023-04-29 DIAGNOSIS — M53.3 SACRAL BACK PAIN: ICD-10-CM

## 2023-04-29 DIAGNOSIS — G89.29 CHRONIC BILATERAL LOW BACK PAIN WITH BILATERAL SCIATICA: ICD-10-CM

## 2023-04-29 DIAGNOSIS — M54.42 CHRONIC BILATERAL LOW BACK PAIN WITH BILATERAL SCIATICA: ICD-10-CM

## 2023-04-29 PROCEDURE — 72110 X-RAY EXAM L-2 SPINE 4/>VWS: CPT | Performed by: INTERNAL MEDICINE

## 2023-04-29 PROCEDURE — 72220 X-RAY EXAM SACRUM TAILBONE: CPT | Performed by: INTERNAL MEDICINE

## 2023-05-08 ENCOUNTER — PATIENT MESSAGE (OUTPATIENT)
Dept: INTERNAL MEDICINE CLINIC | Facility: CLINIC | Age: 63
End: 2023-05-08

## 2023-05-10 RX ORDER — PYRAZINAMIDE 500 MG/1
1 TABLET ORAL EVERY 8 HOURS PRN
Qty: 30 TABLET | Refills: 0 | Status: SHIPPED | OUTPATIENT
Start: 2023-05-10

## 2023-05-12 ENCOUNTER — OFFICE VISIT (OUTPATIENT)
Dept: PULMONOLOGY | Facility: CLINIC | Age: 63
End: 2023-05-12

## 2023-05-12 VITALS
HEART RATE: 106 BPM | WEIGHT: 194 LBS | BODY MASS INDEX: 31.18 KG/M2 | DIASTOLIC BLOOD PRESSURE: 64 MMHG | RESPIRATION RATE: 20 BRPM | HEIGHT: 66 IN | SYSTOLIC BLOOD PRESSURE: 103 MMHG

## 2023-05-12 DIAGNOSIS — J44.9 CHRONIC OBSTRUCTIVE PULMONARY DISEASE, UNSPECIFIED COPD TYPE (HCC): Primary | ICD-10-CM

## 2023-05-12 DIAGNOSIS — Z87.891 PERSONAL HISTORY OF TOBACCO USE, PRESENTING HAZARDS TO HEALTH: ICD-10-CM

## 2023-05-12 PROCEDURE — 3078F DIAST BP <80 MM HG: CPT | Performed by: INTERNAL MEDICINE

## 2023-05-12 PROCEDURE — 3008F BODY MASS INDEX DOCD: CPT | Performed by: INTERNAL MEDICINE

## 2023-05-12 PROCEDURE — 3074F SYST BP LT 130 MM HG: CPT | Performed by: INTERNAL MEDICINE

## 2023-05-12 PROCEDURE — 99214 OFFICE O/P EST MOD 30 MIN: CPT | Performed by: INTERNAL MEDICINE

## 2023-05-12 RX ORDER — BUDESONIDE AND FORMOTEROL FUMARATE DIHYDRATE 160; 4.5 UG/1; UG/1
2 AEROSOL RESPIRATORY (INHALATION) 2 TIMES DAILY
Qty: 1 EACH | Refills: 3 | Status: SHIPPED | OUTPATIENT
Start: 2023-05-12 | End: 2024-05-11

## 2023-05-12 RX ORDER — ALBUTEROL SULFATE 90 UG/1
2 AEROSOL, METERED RESPIRATORY (INHALATION) EVERY 4 HOURS PRN
Qty: 8.5 G | Refills: 1 | Status: SHIPPED | OUTPATIENT
Start: 2023-05-12

## 2023-05-18 RX ORDER — METOPROLOL SUCCINATE 25 MG/1
25 TABLET, EXTENDED RELEASE ORAL DAILY
Qty: 90 TABLET | Refills: 3 | Status: SHIPPED | OUTPATIENT
Start: 2023-05-18

## 2023-05-18 NOTE — TELEPHONE ENCOUNTER
Refill passed per [facility] protocol   Requested Prescriptions   Pending Prescriptions Disp Refills    METOPROLOL SUCCINATE ER 25 MG Oral Tablet 24 Hr [Pharmacy Med Name: METOPROLOL ER SUCCINATE 25MG TABS] 90 tablet 1     Sig: TAKE 1 TABLET(25 MG) BY MOUTH DAILY       Hypertensive Medications Protocol Passed - 5/17/2023  6:38 PM        Passed - In person appointment in the past 12 or next 3 months     Recent Outpatient Visits              6 days ago Chronic obstructive pulmonary disease, unspecified COPD type ([facility]    [address], [facility]    Office Visit    2 weeks ago Chronic bilateral low back pain with bilateral sciatica    [facility], [address],[facility] [facility], Eduardo Lambert MD    Office Visit    1 month ago Abnormal sensation of buttocks    [address], [facility], [facility] 183 [place], [place]    Office Visit    2 months ago Type 2 diabetes mellitus without retinopathy ([facility]    [address], [address] [facility] MD    Office Visit    3 months ago Chronic obstructive pulmonary disease, unspecified COPD type [facility])    [address], 602 [facility] [place] Lor Champagne MD    Office Visit          Future Appointments         Provider Department Appt Notes    In 5 days [facility] SYSTEM OF [address]     In 2 weeks Luan Aparicio MD [address], [address], [facility] physical    In 9 months Alex Hooker MD [address], [address], [facility] EP/ DM EE               Passed - Last BP reading less than 140/90     BP Readings from Last 1 Encounters:  05/12/23 : 103/64                Passed - CMP or BMP in past 6 months     Recent Results (from the past 4392 hour(s))   Basic Metabolic Panel (8)    Collection Time: 01/22/23  2:54 PM   Result Value Ref Range    Glucose 177 (H) 70 - 99 mg/dL    Sodium 141 136 - 145 mmol/L    Potassium 4.1 3.5 - 5.1 mmol/L    Chloride 108 98 - 112 mmol/L    CO2 24.0 21.0 - 32.0 mmol/L    Anion Gap 9 0 - 18 mmol/L    BUN 10 7 - 18 mg/dL    Creatinine 0.90 0.55 - 1.02 mg/dL    BUN/CREA Ratio 11.1 10.0 - 20.0    Calcium, Total 9.6 8.5 - 10.1 mg/dL    Calculated Osmolality 295 275 - 295 mOsm/kg    eGFR-Cr 73 >=60 mL/min/1.73m2     *Note: Due to a large number of results and/or encounters for the requested time period, some results have not been displayed. A complete set of results can be found in Results Review.                  Passed - In person appointment or virtual visit in the past 6 months     Recent Outpatient Visits              6 days ago Chronic obstructive pulmonary disease, unspecified COPD type ([facility]    [facility], [address], [place] Alma Mensah MD    Office Visit    2 weeks ago Chronic bilateral low back pain with bilateral sciatica    [facility], [address], [place], Tanika Gtz MD    Office Visit    1 month ago Abnormal sensation of buttocks    [address], [facility], [place], [place]    Office Visit    2 months ago Type 2 diabetes mellitus without retinopathy [facility])    [address], [address], [place], Mert Marti MD    Office Visit    3 months ago Chronic obstructive pulmonary disease, unspecified COPD type [facility])    [address], 602 [facility], [place] Alma Mensah MD    Office Visit          Future Appointments         Provider Department Appt Notes    In 5 days [facility] SYSTEM OF THE [address]     In 2 weeks Jeana Sheets MD [address], [address], [facility] physical    In 9 months Ervin Whipple MD [address], [facility] EP/ DM EE               Passed - EGFRCR or GFRAA > 50     GFR Evaluation  EGFRCR: 73 , resulted on 1/22/2023

## 2023-05-23 ENCOUNTER — LAB ENCOUNTER (OUTPATIENT)
Dept: LAB | Facility: HOSPITAL | Age: 63
End: 2023-05-23
Attending: OBSTETRICS & GYNECOLOGY
Payer: MEDICAID

## 2023-05-23 ENCOUNTER — HOSPITAL ENCOUNTER (OUTPATIENT)
Dept: ULTRASOUND IMAGING | Facility: HOSPITAL | Age: 63
Discharge: HOME | End: 2023-05-23
Attending: OBSTETRICS & GYNECOLOGY
Payer: MEDICAID

## 2023-05-23 ENCOUNTER — HOSPITAL ENCOUNTER (OUTPATIENT)
Dept: MAMMOGRAPHY | Facility: HOSPITAL | Age: 63
Discharge: HOME | End: 2023-05-23
Attending: OBSTETRICS & GYNECOLOGY
Payer: MEDICAID

## 2023-05-23 DIAGNOSIS — E78.2 MIXED HYPERLIPIDEMIA: ICD-10-CM

## 2023-05-23 DIAGNOSIS — R92.8 ABNORMAL MAMMOGRAM OF LEFT BREAST: ICD-10-CM

## 2023-05-23 DIAGNOSIS — E11.9 TYPE 2 DIABETES MELLITUS WITHOUT RETINOPATHY (HCC): ICD-10-CM

## 2023-05-23 LAB
ALBUMIN SERPL-MCNC: 3.6 G/DL (ref 3.4–5)
ALBUMIN/GLOB SERPL: 0.9 {RATIO} (ref 1–2)
ALP LIVER SERPL-CCNC: 85 U/L
ALT SERPL-CCNC: 20 U/L
ANION GAP SERPL CALC-SCNC: 6 MMOL/L (ref 0–18)
AST SERPL-CCNC: 14 U/L (ref 15–37)
BILIRUB SERPL-MCNC: 0.5 MG/DL (ref 0.1–2)
BUN BLD-MCNC: 10 MG/DL (ref 7–18)
BUN/CREAT SERPL: 16.1 (ref 10–20)
CALCIUM BLD-MCNC: 9.3 MG/DL (ref 8.5–10.1)
CHLORIDE SERPL-SCNC: 108 MMOL/L (ref 98–112)
CHOLEST SERPL-MCNC: 134 MG/DL (ref ?–200)
CO2 SERPL-SCNC: 24 MMOL/L (ref 21–32)
CREAT BLD-MCNC: 0.62 MG/DL
CREAT UR-SCNC: 120 MG/DL
FASTING PATIENT LIPID ANSWER: YES
FASTING STATUS PATIENT QL REPORTED: YES
GFR SERPLBLD BASED ON 1.73 SQ M-ARVRAT: 101 ML/MIN/1.73M2 (ref 60–?)
GLOBULIN PLAS-MCNC: 4 G/DL (ref 2.8–4.4)
GLUCOSE BLD-MCNC: 149 MG/DL (ref 70–99)
HDLC SERPL-MCNC: 35 MG/DL (ref 40–59)
LDLC SERPL CALC-MCNC: 78 MG/DL (ref ?–100)
MICROALBUMIN UR-MCNC: 2.33 MG/DL
MICROALBUMIN/CREAT 24H UR-RTO: 19.4 UG/MG (ref ?–30)
NONHDLC SERPL-MCNC: 99 MG/DL (ref ?–130)
OSMOLALITY SERPL CALC.SUM OF ELEC: 288 MOSM/KG (ref 275–295)
POTASSIUM SERPL-SCNC: 3.8 MMOL/L (ref 3.5–5.1)
PROT SERPL-MCNC: 7.6 G/DL (ref 6.4–8.2)
SODIUM SERPL-SCNC: 138 MMOL/L (ref 136–145)
T4 FREE SERPL-MCNC: 1.1 NG/DL (ref 0.8–1.7)
TRIGL SERPL-MCNC: 114 MG/DL (ref 30–149)
TSI SER-ACNC: 1.11 MIU/ML (ref 0.36–3.74)
VLDLC SERPL CALC-MCNC: 18 MG/DL (ref 0–30)

## 2023-05-23 PROCEDURE — 82043 UR ALBUMIN QUANTITATIVE: CPT

## 2023-05-23 PROCEDURE — 36415 COLL VENOUS BLD VENIPUNCTURE: CPT

## 2023-05-23 PROCEDURE — 82570 ASSAY OF URINE CREATININE: CPT

## 2023-05-23 PROCEDURE — 80053 COMPREHEN METABOLIC PANEL: CPT

## 2023-05-23 PROCEDURE — 80061 LIPID PANEL: CPT

## 2023-05-23 PROCEDURE — 3061F NEG MICROALBUMINURIA REV: CPT | Performed by: INTERNAL MEDICINE

## 2023-05-23 PROCEDURE — 84443 ASSAY THYROID STIM HORMONE: CPT

## 2023-05-23 PROCEDURE — 76642 ULTRASOUND BREAST LIMITED: CPT | Performed by: OBSTETRICS & GYNECOLOGY

## 2023-05-23 PROCEDURE — 77061 BREAST TOMOSYNTHESIS UNI: CPT | Performed by: OBSTETRICS & GYNECOLOGY

## 2023-05-23 PROCEDURE — 77065 DX MAMMO INCL CAD UNI: CPT | Performed by: OBSTETRICS & GYNECOLOGY

## 2023-05-23 PROCEDURE — 84439 ASSAY OF FREE THYROXINE: CPT

## 2023-05-25 DIAGNOSIS — R92.8 ABNORMAL MAMMOGRAM OF LEFT BREAST: Primary | ICD-10-CM

## 2023-05-28 NOTE — TELEPHONE ENCOUNTER
The orders were never signed by nursing staff for urinalysis and urine cultures but I just signed them. Please call the patient back. If she is still having symptoms have her drop off the urine samples and call us in 2 days for the results. all other ROS negative except as per HPI

## 2023-06-01 RX ORDER — ATORVASTATIN CALCIUM 80 MG/1
80 TABLET, FILM COATED ORAL NIGHTLY
Qty: 90 TABLET | Refills: 3 | Status: SHIPPED | OUTPATIENT
Start: 2023-06-01

## 2023-06-01 NOTE — TELEPHONE ENCOUNTER
Refill passed per [facility] protocol.     Requested Prescriptions   Pending Prescriptions Disp Refills    ATORVASTATIN 80 MG Oral Tab [Pharmacy Med Name: ATORVASTATIN 80MG TABLETS] 90 tablet 1     Sig: TAKE 1 TABLET(80 MG) BY MOUTH EVERY NIGHT       Cholesterol Medication Protocol Passed - 6/1/2023 11:38 AM        Passed - ALT in past 12 months        Passed - LDL in past 12 months        Passed - Last ALT < 80     Lab Results   Component Value Date    ALT 20 05/23/2023             Passed - Last LDL < 130     Lab Results   Component Value Date    LDL 78 05/23/2023               Passed - In person appointment or virtual visit in the past 12 mos or appointment in next 3 mos     Recent Outpatient Visits              2 weeks ago Chronic obstructive pulmonary disease, unspecified COPD type ([facility]    [address], [facility], Fayne Ganser, MD    Office Visit    1 month ago Chronic bilateral low back pain with bilateral sciatica    [facility], [address] [facility],[address], Chloé Veliz MD    Office Visit    2 months ago Abnormal sensation of buttocks    [address], [address], [address] [place], [place]    Office Visit    3 months ago Type 2 diabetes mellitus without retinopathy ([facility]    Pola Cole, Rony Sanchez MD    Office Visit    3 months ago Chronic obstructive pulmonary disease, unspecified COPD type [facility])    Sudarshan Villegas MD    Office Visit          Future Appointments         Provider Department Appt Notes    Tomorrow Jorge Luis Hankins MD [address], [address] [facility],[address], [place] **VERIFY INS**5/13/22 LAST [facility] - Annual physical    In 9 months Daria Cisse MD [address], [address], [place] EP/ DM EE                  Future Appointments         Provider Department Appt Notes    Tomorrow Jorge Luis Hankins MD [facility], [address], [place] **VERIFY INS**5/13/22 LAST [employer] - Annual physical    In 9 months Drew Ku MD [facility], [address], [place] EP/ DM EE          Recent Outpatient Visits              2 weeks ago Chronic obstructive pulmonary disease, unspecified COPD type [facility])    [address], [address], [place] Fei Srivastava MD    Office Visit    1 month ago Chronic bilateral low back pain with bilateral sciatica    [facility], [address], Iraida Gatica MD    Office Visit    2 months ago Abnormal sensation of buttocks    [address], [facility], [facility] 183 [place], [place]    Office Visit    3 months ago Type 2 diabetes mellitus without retinopathy [facility])    Pola Licea, Kenya Wells MD    Office Visit    3 months ago Chronic obstructive pulmonary disease, unspecified COPD type [facility])    Ulysses Reynaga MD    Office Visit

## 2023-06-02 ENCOUNTER — OFFICE VISIT (OUTPATIENT)
Dept: INTERNAL MEDICINE CLINIC | Facility: CLINIC | Age: 63
End: 2023-06-02

## 2023-06-02 VITALS
HEART RATE: 104 BPM | RESPIRATION RATE: 19 BRPM | HEIGHT: 66 IN | SYSTOLIC BLOOD PRESSURE: 108 MMHG | DIASTOLIC BLOOD PRESSURE: 76 MMHG | BODY MASS INDEX: 31.02 KG/M2 | OXYGEN SATURATION: 98 % | TEMPERATURE: 98 F | WEIGHT: 193 LBS

## 2023-06-02 DIAGNOSIS — H25.13 AGE-RELATED NUCLEAR CATARACT OF BOTH EYES: ICD-10-CM

## 2023-06-02 DIAGNOSIS — Z98.1 S/P CERVICAL SPINAL FUSION: ICD-10-CM

## 2023-06-02 DIAGNOSIS — Z00.00 ANNUAL PHYSICAL EXAM: Primary | ICD-10-CM

## 2023-06-02 DIAGNOSIS — E11.9 TYPE 2 DIABETES MELLITUS WITHOUT RETINOPATHY (HCC): ICD-10-CM

## 2023-06-02 DIAGNOSIS — G89.29 CHRONIC BILATERAL LOW BACK PAIN WITHOUT SCIATICA: ICD-10-CM

## 2023-06-02 DIAGNOSIS — Z87.898 HISTORY OF ABNORMAL MAMMOGRAM: ICD-10-CM

## 2023-06-02 DIAGNOSIS — G47.00 INSOMNIA, UNSPECIFIED TYPE: ICD-10-CM

## 2023-06-02 DIAGNOSIS — J42 CHRONIC BRONCHITIS, UNSPECIFIED CHRONIC BRONCHITIS TYPE (HCC): ICD-10-CM

## 2023-06-02 DIAGNOSIS — I10 ESSENTIAL HYPERTENSION: ICD-10-CM

## 2023-06-02 DIAGNOSIS — D51.8 OTHER VITAMIN B12 DEFICIENCY ANEMIA: ICD-10-CM

## 2023-06-02 DIAGNOSIS — N20.0 CALCULUS OF KIDNEY: ICD-10-CM

## 2023-06-02 DIAGNOSIS — M54.50 CHRONIC BILATERAL LOW BACK PAIN WITHOUT SCIATICA: ICD-10-CM

## 2023-06-02 DIAGNOSIS — Z71.6 ENCOUNTER FOR SMOKING CESSATION COUNSELING: ICD-10-CM

## 2023-06-02 DIAGNOSIS — R91.8 LUNG NODULES: ICD-10-CM

## 2023-06-02 DIAGNOSIS — Z80.0 FAMILY HISTORY OF COLON CANCER: ICD-10-CM

## 2023-06-02 DIAGNOSIS — E11.40 TYPE 2 DIABETES MELLITUS WITH DIABETIC NEUROPATHY, WITHOUT LONG-TERM CURRENT USE OF INSULIN (HCC): ICD-10-CM

## 2023-06-02 DIAGNOSIS — Z86.711 HISTORY OF PULMONARY EMBOLISM: ICD-10-CM

## 2023-06-02 DIAGNOSIS — E78.2 MIXED HYPERLIPIDEMIA: ICD-10-CM

## 2023-06-02 PROBLEM — E11.65 TYPE 2 DIABETES MELLITUS WITH HYPERGLYCEMIA (HCC): Status: RESOLVED | Noted: 2021-12-01 | Resolved: 2023-06-02

## 2023-06-02 PROBLEM — R51.9 HEADACHE, UNSPECIFIED HEADACHE TYPE: Status: RESOLVED | Noted: 2021-01-27 | Resolved: 2023-06-02

## 2023-06-02 PROBLEM — I26.99 ACUTE PULMONARY EMBOLISM WITHOUT ACUTE COR PULMONALE (HCC): Status: RESOLVED | Noted: 2021-03-25 | Resolved: 2023-06-02

## 2023-06-02 PROBLEM — I26.99 ACUTE PULMONARY EMBOLISM WITHOUT ACUTE COR PULMONALE, UNSPECIFIED PULMONARY EMBOLISM TYPE (HCC): Status: RESOLVED | Noted: 2021-03-25 | Resolved: 2023-06-02

## 2023-06-02 PROBLEM — R92.8 ABNORMAL MAMMOGRAM OF LEFT BREAST: Status: RESOLVED | Noted: 2022-08-22 | Resolved: 2023-06-02

## 2023-06-02 PROBLEM — N83.201 RIGHT OVARIAN CYST: Status: RESOLVED | Noted: 2022-05-17 | Resolved: 2023-06-02

## 2023-06-02 PROCEDURE — 3078F DIAST BP <80 MM HG: CPT | Performed by: INTERNAL MEDICINE

## 2023-06-02 PROCEDURE — 3074F SYST BP LT 130 MM HG: CPT | Performed by: INTERNAL MEDICINE

## 2023-06-02 PROCEDURE — 99396 PREV VISIT EST AGE 40-64: CPT | Performed by: INTERNAL MEDICINE

## 2023-06-02 PROCEDURE — 3008F BODY MASS INDEX DOCD: CPT | Performed by: INTERNAL MEDICINE

## 2023-06-02 RX ORDER — NICOTINE 21 MG/24HR
1 PATCH, TRANSDERMAL 24 HOURS TRANSDERMAL EVERY 24 HOURS
Qty: 30 PATCH | Refills: 2 | Status: SHIPPED | OUTPATIENT
Start: 2023-06-02

## 2023-06-02 RX ORDER — TRAZODONE HYDROCHLORIDE 50 MG/1
50 TABLET ORAL NIGHTLY
Qty: 30 TABLET | Refills: 1 | Status: SHIPPED | OUTPATIENT
Start: 2023-06-02

## 2023-06-29 RX ORDER — BLOOD SUGAR DIAGNOSTIC
STRIP MISCELLANEOUS
Qty: 200 STRIP | Refills: 1 | Status: SHIPPED | OUTPATIENT
Start: 2023-06-29

## 2023-07-13 DIAGNOSIS — E11.9 TYPE 2 DIABETES MELLITUS WITHOUT RETINOPATHY (HCC): ICD-10-CM

## 2023-07-18 DIAGNOSIS — E11.9 TYPE 2 DIABETES MELLITUS WITHOUT RETINOPATHY (HCC): ICD-10-CM

## 2023-07-18 RX ORDER — DULAGLUTIDE 1.5 MG/.5ML
1.5 INJECTION, SOLUTION SUBCUTANEOUS WEEKLY
Qty: 6 ML | Refills: 1 | Status: SHIPPED | OUTPATIENT
Start: 2023-07-18 | End: 2023-10-16

## 2023-07-21 RX ORDER — EMPAGLIFLOZIN 25 MG/1
0.5 TABLET, FILM COATED ORAL EVERY MORNING
Qty: 90 TABLET | Refills: 0 | Status: SHIPPED | OUTPATIENT
Start: 2023-07-21

## 2023-07-25 ENCOUNTER — HOSPITAL ENCOUNTER (EMERGENCY)
Facility: HOSPITAL | Age: 63
Discharge: HOME | End: 2023-07-25
Attending: EMERGENCY MEDICINE
Payer: MEDICAID

## 2023-07-25 ENCOUNTER — APPOINTMENT (OUTPATIENT)
Dept: GENERAL RADIOLOGY | Facility: HOSPITAL | Age: 63
End: 2023-07-25
Attending: EMERGENCY MEDICINE
Payer: MEDICAID

## 2023-07-25 ENCOUNTER — APPOINTMENT (OUTPATIENT)
Dept: CT IMAGING | Facility: HOSPITAL | Age: 63
End: 2023-07-25
Attending: EMERGENCY MEDICINE
Payer: MEDICAID

## 2023-07-25 VITALS
SYSTOLIC BLOOD PRESSURE: 135 MMHG | HEIGHT: 66.5 IN | DIASTOLIC BLOOD PRESSURE: 73 MMHG | TEMPERATURE: 98 F | RESPIRATION RATE: 16 BRPM | OXYGEN SATURATION: 94 % | WEIGHT: 199 LBS | BODY MASS INDEX: 31.6 KG/M2 | HEART RATE: 85 BPM

## 2023-07-25 DIAGNOSIS — R07.9 CHEST PAIN OF UNCERTAIN ETIOLOGY: Primary | ICD-10-CM

## 2023-07-25 PROBLEM — R73.9 HYPERGLYCEMIA: Status: ACTIVE | Noted: 2023-07-25

## 2023-07-25 LAB
ALBUMIN SERPL-MCNC: 3.6 G/DL (ref 3.4–5)
ALBUMIN/GLOB SERPL: 0.9 {RATIO} (ref 1–2)
ALP LIVER SERPL-CCNC: 88 U/L
ALT SERPL-CCNC: 20 U/L
ANION GAP SERPL CALC-SCNC: 7 MMOL/L (ref 0–18)
APTT PPP: 30 SECONDS (ref 23.3–35.6)
AST SERPL-CCNC: 16 U/L (ref 15–37)
ATRIAL RATE: 103 BPM
BASOPHILS # BLD AUTO: 0.02 X10(3) UL (ref 0–0.2)
BASOPHILS NFR BLD AUTO: 0.4 %
BILIRUB SERPL-MCNC: 0.6 MG/DL (ref 0.1–2)
BUN BLD-MCNC: 7 MG/DL (ref 7–18)
BUN/CREAT SERPL: 8.1 (ref 10–20)
CALCIUM BLD-MCNC: 9.3 MG/DL (ref 8.5–10.1)
CHLORIDE SERPL-SCNC: 111 MMOL/L (ref 98–112)
CO2 SERPL-SCNC: 23 MMOL/L (ref 21–32)
CREAT BLD-MCNC: 0.86 MG/DL
DEPRECATED RDW RBC AUTO: 50.4 FL (ref 35.1–46.3)
EGFRCR SERPLBLD CKD-EPI 2021: 76 ML/MIN/1.73M2 (ref 60–?)
EOSINOPHIL # BLD AUTO: 0.08 X10(3) UL (ref 0–0.7)
EOSINOPHIL NFR BLD AUTO: 1.5 %
ERYTHROCYTE [DISTWIDTH] IN BLOOD BY AUTOMATED COUNT: 16.5 % (ref 11–15)
GLOBULIN PLAS-MCNC: 4.1 G/DL (ref 2.8–4.4)
GLUCOSE BLD-MCNC: 107 MG/DL (ref 70–99)
HCT VFR BLD AUTO: 43.3 %
HGB BLD-MCNC: 13.7 G/DL
IMM GRANULOCYTES # BLD AUTO: 0.01 X10(3) UL (ref 0–1)
IMM GRANULOCYTES NFR BLD: 0.2 %
INR BLD: 1.1 (ref 0.85–1.16)
LYMPHOCYTES # BLD AUTO: 1.97 X10(3) UL (ref 1–4)
LYMPHOCYTES NFR BLD AUTO: 37 %
MCH RBC QN AUTO: 27 PG (ref 26–34)
MCHC RBC AUTO-ENTMCNC: 31.6 G/DL (ref 31–37)
MCV RBC AUTO: 85.4 FL
MONOCYTES # BLD AUTO: 0.33 X10(3) UL (ref 0.1–1)
MONOCYTES NFR BLD AUTO: 6.2 %
NEUTROPHILS # BLD AUTO: 2.92 X10 (3) UL (ref 1.5–7.7)
NEUTROPHILS # BLD AUTO: 2.92 X10(3) UL (ref 1.5–7.7)
NEUTROPHILS NFR BLD AUTO: 54.7 %
OSMOLALITY SERPL CALC.SUM OF ELEC: 290 MOSM/KG (ref 275–295)
P AXIS: 57 DEGREES
P-R INTERVAL: 154 MS
PLATELET # BLD AUTO: 307 10(3)UL (ref 150–450)
POTASSIUM SERPL-SCNC: 3.8 MMOL/L (ref 3.5–5.1)
PROT SERPL-MCNC: 7.7 G/DL (ref 6.4–8.2)
PROTHROMBIN TIME: 14.1 SECONDS (ref 11.6–14.8)
Q-T INTERVAL: 356 MS
QRS DURATION: 78 MS
QTC CALCULATION (BEZET): 466 MS
R AXIS: -10 DEGREES
RBC # BLD AUTO: 5.07 X10(6)UL
SODIUM SERPL-SCNC: 141 MMOL/L (ref 136–145)
T AXIS: 51 DEGREES
TROPONIN I HIGH SENSITIVITY: 28 NG/L
TROPONIN I HIGH SENSITIVITY: 30 NG/L
VENTRICULAR RATE: 103 BPM
WBC # BLD AUTO: 5.3 X10(3) UL (ref 4–11)

## 2023-07-25 PROCEDURE — 84484 ASSAY OF TROPONIN QUANT: CPT

## 2023-07-25 PROCEDURE — 99285 EMERGENCY DEPT VISIT HI MDM: CPT

## 2023-07-25 PROCEDURE — 85025 COMPLETE CBC W/AUTO DIFF WBC: CPT

## 2023-07-25 PROCEDURE — 99284 EMERGENCY DEPT VISIT MOD MDM: CPT

## 2023-07-25 PROCEDURE — 85730 THROMBOPLASTIN TIME PARTIAL: CPT | Performed by: EMERGENCY MEDICINE

## 2023-07-25 PROCEDURE — 80053 COMPREHEN METABOLIC PANEL: CPT

## 2023-07-25 PROCEDURE — 85610 PROTHROMBIN TIME: CPT | Performed by: EMERGENCY MEDICINE

## 2023-07-25 PROCEDURE — 84484 ASSAY OF TROPONIN QUANT: CPT | Performed by: EMERGENCY MEDICINE

## 2023-07-25 PROCEDURE — 71045 X-RAY EXAM CHEST 1 VIEW: CPT | Performed by: EMERGENCY MEDICINE

## 2023-07-25 PROCEDURE — 71260 CT THORAX DX C+: CPT | Performed by: EMERGENCY MEDICINE

## 2023-07-25 PROCEDURE — 36415 COLL VENOUS BLD VENIPUNCTURE: CPT

## 2023-07-25 PROCEDURE — 93010 ELECTROCARDIOGRAM REPORT: CPT

## 2023-07-25 PROCEDURE — 80053 COMPREHEN METABOLIC PANEL: CPT | Performed by: EMERGENCY MEDICINE

## 2023-07-25 PROCEDURE — 85025 COMPLETE CBC W/AUTO DIFF WBC: CPT | Performed by: EMERGENCY MEDICINE

## 2023-07-25 PROCEDURE — 93005 ELECTROCARDIOGRAM TRACING: CPT

## 2023-07-25 NOTE — ED INITIAL ASSESSMENT (HPI)
Pt presents to the ER with c/o pain under left breast x 3 days.  Pt states pain is worse when she takes a deep breath or coughs

## 2023-08-14 RX ORDER — AMLODIPINE BESYLATE 5 MG/1
5 TABLET ORAL DAILY
Qty: 90 TABLET | Refills: 3 | Status: SHIPPED | OUTPATIENT
Start: 2023-08-14

## 2023-08-14 NOTE — TELEPHONE ENCOUNTER
Refill passed per [facility] protocol.   Requested Prescriptions   Pending Prescriptions Disp Refills    AMLODIPINE 5 MG Oral Tab [Pharmacy Med Name: AMLODIPINE BESYLATE 5MG TABLETS] 90 tablet 1     Sig: TAKE 1 TABLET(5 MG) BY MOUTH DAILY       Hypertensive Medications Protocol Passed - 8/12/2023  4:00 PM        Passed - In person appointment in the past 12 or next 3 months     Recent Outpatient Visits              2 months ago Annual physical exam    [address], [address], Evan Alberto MD    Office Visit    3 months ago Chronic obstructive pulmonary disease, unspecified COPD type ([facility]    [address], [facility], [place] Heide Large, MD    Office Visit    3 months ago Chronic bilateral low back pain with bilateral sciatica    [facility], [address], [place], Eduardo Lambert MD    Office Visit    4 months ago Abnormal sensation of buttocks    [address], [address], [place], [place]    Office Visit    5 months ago Type 2 diabetes mellitus without retinopathy ([facility]    [address], [address] [facility],[address] Floor, Cristine Pena MD    Office Visit          Future Appointments         Provider Department Appt Notes    In 4 weeks Luan Aparicio  [facility], [employer] 3 month fu    In 1 month Marquez Robert MD [address], 602 [facility], [place], [employer] DM F/U    In 6 months Alex Hooker MD [address], [address] [facility],[address], [employer] EP/ DM EE               Passed - Last BP reading less than 140/90     BP Readings from Last 1 Encounters:  07/25/23 : 135/73              Passed - CMP or BMP in past 6 months     Recent Results (from the past 4392 hour(s))   Comp Metabolic Panel (14)    Collection Time: 07/25/23 12:38 PM   Result Value Ref Range    Glucose 107 (H) 70 - 99 mg/dL    Sodium 141 136 - 145 mmol/L    Potassium       Comment: Test not reported due to hemolysis. Test reordered by laboratory. Chloride 111 98 - 112 mmol/L    CO2 23.0 21.0 - 32.0 mmol/L    Anion Gap 7 0 - 18 mmol/L    BUN 7 7 - 18 mg/dL    Creatinine 0.86 0.55 - 1.02 mg/dL    BUN/CREA Ratio 8.1 (L) 10.0 - 20.0    Calcium, Total 9.3 8.5 - 10.1 mg/dL    Calculated Osmolality 290 275 - 295 mOsm/kg    eGFR-Cr 76 >=60 mL/min/1.73m2    ALT 20 13 - 56 U/L    AST       Comment: Test not reported due to hemolysis. Test reordered by laboratory. Alkaline Phosphatase 88 50 - 130 U/L    Bilirubin, Total 0.6 0.1 - 2.0 mg/dL    Total Protein 7.7 6.4 - 8.2 g/dL    Albumin 3.6 3.4 - 5.0 g/dL    Globulin  4.1 2.8 - 4.4 g/dL    A/G Ratio 0.9 (L) 1.0 - 2.0     *Note: Due to a large number of results and/or encounters for the requested time period, some results have not been displayed. A complete set of results can be found in Results Review.                Passed - In person appointment or virtual visit in the past 6 months     Recent Outpatient Visits              2 months ago Annual physical exam    [facility], [address], Dinah Sahu MD    Office Visit    3 months ago Chronic obstructive pulmonary disease, unspecified COPD type [facility])    [facility] [place] Kennedy Burow, MD    Office Visit    3 months ago Chronic bilateral low back pain with bilateral sciatica    [facility], [address], Dinah Sahu MD    Office Visit    4 months ago Abnormal sensation of buttocks    [facility], [address], [place], [place]    Office Visit    5 months ago Type 2 diabetes mellitus without retinopathy [facility])    Sebastien Walton MD    Office Visit          Future Appointments         Provider Department Appt Notes    In 4 weeks MD Norm [facility] [place] 3 month fu    In 1 month MD Wanda [place], [facility] DM F/U    In 6 months MD Wanda Dsouza, [address], [place] EP/ DM EE               Passed - [facility] or GFRAA > 50     GFR Evaluation  EGFRCR: 76 , resulted on 7/25/2023             Recent Outpatient Visits              2 months ago Annual physical exam    [place] [place], [address], [place], Harman Cordero MD    Office Visit    3 months ago Chronic obstructive pulmonary disease, unspecified COPD type ([facility]    [place] [place], [address], [place] Vanesa Orellana MD    Office Visit    3 months ago Chronic bilateral low back pain with bilateral sciatica    [facility], [address], [place], Harman Cordero MD    Office Visit    4 months ago Abnormal sensation of buttocks    [place] [place], [facility], [facility] 183 [facility], [place]    Office Visit    5 months ago Type 2 diabetes mellitus without retinopathy [facility])    [place] [place], [address], [place] Albert Waller MD    Office Visit          Future Appointments         Provider Department Appt Notes    In 4 weeks Brennan Dakins, MD Jeannetta [facility], [address], [place] 3 month fu    In 1 month MD Wanda [place], [facility], [place] DM F/U    In 6 months MD Wanda Dsouza, [address], [place] EP/ DM EE

## 2023-08-21 ENCOUNTER — PATIENT MESSAGE (OUTPATIENT)
Dept: ENDOCRINOLOGY CLINIC | Facility: CLINIC | Age: 63
End: 2023-08-21

## 2023-08-21 ENCOUNTER — PATIENT MESSAGE (OUTPATIENT)
Dept: SURGERY | Facility: CLINIC | Age: 63
End: 2023-08-21

## 2023-08-21 DIAGNOSIS — E11.9 TYPE 2 DIABETES MELLITUS WITHOUT RETINOPATHY (HCC): Primary | ICD-10-CM

## 2023-08-21 RX ORDER — BLOOD SUGAR DIAGNOSTIC
STRIP MISCELLANEOUS
Qty: 200 STRIP | Refills: 1 | Status: SHIPPED | OUTPATIENT
Start: 2023-08-21

## 2023-08-21 NOTE — TELEPHONE ENCOUNTER
Please review. Protocol failed/ No protocol      Requested Prescriptions   Pending Prescriptions Disp Refills    ELIQUIS 5 MG Oral Tab [Pharmacy Med Name: ELIQUIS 5MG TABLETS] 180 tablet 1     Sig: TAKE 1 TABLET(5 MG) BY MOUTH TWICE DAILY       There is no refill protocol information for this order          Future Appointments         Provider Department Appt Notes    In 3 weeks Luan Aparicio MD [facility], [address], [address] 3 month fu    In 3 weeks Marquez Robert MD [address], 602 [facility], [address] DM F/U    In 6 months Ethyl MD [place] [address], 59 [facility] EP/ DM EE             Recent Outpatient Visits              2 months ago Annual physical exam    [address], [address], [place], Eduardo Lambert MD    Office Visit    3 months ago Chronic obstructive pulmonary disease, unspecified COPD type [facility])    [address], [facility], [place] Champ[place] MD    Office Visit    3 months ago Chronic bilateral low back pain with bilateral sciatica    [facility], [address], Evan Alberto MD    Office Visit    4 months ago Abnormal sensation of buttocks    [address], [address], [address] [place], [place]    Office Visit    5 months ago Type 2 diabetes mellitus without retinopathy [facility])    [address] [place] [address], Cristine Pena MD    Office Visit

## 2023-08-21 NOTE — TELEPHONE ENCOUNTER
From: Di Puga  To: Floridalma Barahona MD  Sent: 8/21/2023 1:17 PM CDT  Subject: Refill    I need a refill on test strips. I requested this months ago and spoke with the nurse who said that you approved it but it never was refilled at [facility].      Thank you,  Di Puga

## 2023-08-22 ENCOUNTER — TELEPHONE (OUTPATIENT)
Dept: ENDOCRINOLOGY CLINIC | Facility: CLINIC | Age: 63
End: 2023-08-22

## 2023-08-22 NOTE — TELEPHONE ENCOUNTER
Karol from [facility] [employer] benefits is calling to clarify quantity on the following prescription    Medication Quantity Refills Start End   Glucose Blood (ONETOUCH VERIO) In Vitro Strip 200 strip 1 8/21/2023    Sig:   TEST BLOOD SUGAR TWICE DAILY.      Route:   (none)     Order #:   [id number]

## 2023-08-23 NOTE — TELEPHONE ENCOUNTER
[address] back and informed them of the qty. Per Drew Solorio, it should be going through if patient is not exceeding 102 strips per 30 days for NIDDM. Drew Solorio states test claim is going through.

## 2023-09-12 ENCOUNTER — OFFICE VISIT (OUTPATIENT)
Dept: INTERNAL MEDICINE CLINIC | Facility: CLINIC | Age: 63
End: 2023-09-12

## 2023-09-12 VITALS
SYSTOLIC BLOOD PRESSURE: 114 MMHG | DIASTOLIC BLOOD PRESSURE: 76 MMHG | BODY MASS INDEX: 31 KG/M2 | TEMPERATURE: 98 F | WEIGHT: 193 LBS | HEART RATE: 88 BPM | OXYGEN SATURATION: 97 %

## 2023-09-12 DIAGNOSIS — M54.6 CHRONIC BILATERAL THORACIC BACK PAIN: ICD-10-CM

## 2023-09-12 DIAGNOSIS — R35.0 URINARY FREQUENCY: ICD-10-CM

## 2023-09-12 DIAGNOSIS — G47.00 INSOMNIA, UNSPECIFIED TYPE: ICD-10-CM

## 2023-09-12 DIAGNOSIS — I10 ESSENTIAL HYPERTENSION: Primary | ICD-10-CM

## 2023-09-12 DIAGNOSIS — E11.9 TYPE 2 DIABETES MELLITUS WITHOUT RETINOPATHY (HCC): ICD-10-CM

## 2023-09-12 DIAGNOSIS — G89.29 CHRONIC BILATERAL THORACIC BACK PAIN: ICD-10-CM

## 2023-09-12 DIAGNOSIS — E78.2 MIXED HYPERLIPIDEMIA: ICD-10-CM

## 2023-09-12 LAB
BILIRUB UR QL: NEGATIVE
GLUCOSE UR-MCNC: >1000 MG/DL
KETONES UR-MCNC: NEGATIVE MG/DL
LEUKOCYTE ESTERASE UR QL STRIP.AUTO: 500
NITRITE UR QL STRIP.AUTO: NEGATIVE
PH UR: 5 [PH] (ref 5–8)
PROT UR-MCNC: NEGATIVE MG/DL
SP GR UR STRIP: >1.03 (ref 1–1.03)
UROBILINOGEN UR STRIP-ACNC: NORMAL

## 2023-09-12 PROCEDURE — 99214 OFFICE O/P EST MOD 30 MIN: CPT | Performed by: INTERNAL MEDICINE

## 2023-09-12 PROCEDURE — 3074F SYST BP LT 130 MM HG: CPT | Performed by: INTERNAL MEDICINE

## 2023-09-12 PROCEDURE — 3078F DIAST BP <80 MM HG: CPT | Performed by: INTERNAL MEDICINE

## 2023-09-13 ENCOUNTER — OFFICE VISIT (OUTPATIENT)
Dept: ENDOCRINOLOGY CLINIC | Facility: CLINIC | Age: 63
End: 2023-09-13

## 2023-09-13 VITALS
SYSTOLIC BLOOD PRESSURE: 118 MMHG | HEIGHT: 66.5 IN | BODY MASS INDEX: 30.49 KG/M2 | HEART RATE: 106 BPM | DIASTOLIC BLOOD PRESSURE: 72 MMHG | WEIGHT: 192 LBS

## 2023-09-13 DIAGNOSIS — E11.9 TYPE 2 DIABETES MELLITUS WITHOUT RETINOPATHY (HCC): Primary | ICD-10-CM

## 2023-09-13 DIAGNOSIS — E78.2 MIXED HYPERLIPIDEMIA: ICD-10-CM

## 2023-09-13 DIAGNOSIS — I10 ESSENTIAL HYPERTENSION: ICD-10-CM

## 2023-09-13 LAB
CARTRIDGE LOT#: ABNORMAL NUMERIC
GLUCOSE BLOOD: 145
HEMOGLOBIN A1C: 6.4 % (ref 4.3–5.6)
TEST STRIP LOT #: NORMAL NUMERIC

## 2023-09-13 PROCEDURE — 82947 ASSAY GLUCOSE BLOOD QUANT: CPT | Performed by: INTERNAL MEDICINE

## 2023-09-13 PROCEDURE — 3008F BODY MASS INDEX DOCD: CPT | Performed by: INTERNAL MEDICINE

## 2023-09-13 PROCEDURE — 3078F DIAST BP <80 MM HG: CPT | Performed by: INTERNAL MEDICINE

## 2023-09-13 PROCEDURE — 3044F HG A1C LEVEL LT 7.0%: CPT | Performed by: INTERNAL MEDICINE

## 2023-09-13 PROCEDURE — 83036 HEMOGLOBIN GLYCOSYLATED A1C: CPT | Performed by: INTERNAL MEDICINE

## 2023-09-13 PROCEDURE — 3074F SYST BP LT 130 MM HG: CPT | Performed by: INTERNAL MEDICINE

## 2023-09-13 PROCEDURE — 99214 OFFICE O/P EST MOD 30 MIN: CPT | Performed by: INTERNAL MEDICINE

## 2023-10-08 DIAGNOSIS — E11.9 TYPE 2 DIABETES MELLITUS WITHOUT RETINOPATHY (HCC): ICD-10-CM

## 2023-10-12 ENCOUNTER — PATIENT MESSAGE (OUTPATIENT)
Dept: ENDOCRINOLOGY CLINIC | Facility: CLINIC | Age: 63
End: 2023-10-12

## 2023-11-03 ENCOUNTER — APPOINTMENT (OUTPATIENT)
Dept: GENERAL RADIOLOGY | Facility: HOSPITAL | Age: 63
End: 2023-11-03
Payer: MEDICAID

## 2023-11-03 ENCOUNTER — HOSPITAL ENCOUNTER (EMERGENCY)
Facility: HOSPITAL | Age: 63
Discharge: HOME | End: 2023-11-03
Attending: EMERGENCY MEDICINE
Payer: MEDICAID

## 2023-11-03 VITALS
RESPIRATION RATE: 15 BRPM | OXYGEN SATURATION: 96 % | HEART RATE: 84 BPM | DIASTOLIC BLOOD PRESSURE: 67 MMHG | TEMPERATURE: 98 F | SYSTOLIC BLOOD PRESSURE: 119 MMHG

## 2023-11-03 DIAGNOSIS — R07.89 CHEST PAIN, ATYPICAL: ICD-10-CM

## 2023-11-03 DIAGNOSIS — R19.7 DIARRHEA, UNSPECIFIED TYPE: Primary | ICD-10-CM

## 2023-11-03 LAB
ALBUMIN SERPL-MCNC: 4.7 G/DL (ref 3.2–4.8)
ALBUMIN/GLOB SERPL: 1.6 {RATIO} (ref 1–2)
ALP LIVER SERPL-CCNC: 92 U/L
ALT SERPL-CCNC: 12 U/L
ANION GAP SERPL CALC-SCNC: 5 MMOL/L (ref 0–18)
AST SERPL-CCNC: 15 U/L (ref ?–34)
BASOPHILS # BLD AUTO: 0.02 X10(3) UL (ref 0–0.2)
BASOPHILS NFR BLD AUTO: 0.4 %
BILIRUB SERPL-MCNC: 0.5 MG/DL (ref 0.2–1.1)
BUN BLD-MCNC: 10 MG/DL (ref 9–23)
BUN/CREAT SERPL: 13 (ref 10–20)
CALCIUM BLD-MCNC: 9.7 MG/DL (ref 8.7–10.4)
CHLORIDE SERPL-SCNC: 107 MMOL/L (ref 98–112)
CO2 SERPL-SCNC: 26 MMOL/L (ref 21–32)
CREAT BLD-MCNC: 0.77 MG/DL
DEPRECATED RDW RBC AUTO: 51.7 FL (ref 35.1–46.3)
EGFRCR SERPLBLD CKD-EPI 2021: 87 ML/MIN/1.73M2 (ref 60–?)
EOSINOPHIL # BLD AUTO: 0.07 X10(3) UL (ref 0–0.7)
EOSINOPHIL NFR BLD AUTO: 1.3 %
ERYTHROCYTE [DISTWIDTH] IN BLOOD BY AUTOMATED COUNT: 16.5 % (ref 11–15)
GLOBULIN PLAS-MCNC: 2.9 G/DL (ref 2.8–4.4)
GLUCOSE BLD-MCNC: 147 MG/DL (ref 70–99)
HCT VFR BLD AUTO: 41.9 %
HGB BLD-MCNC: 13.5 G/DL
IMM GRANULOCYTES # BLD AUTO: 0.01 X10(3) UL (ref 0–1)
IMM GRANULOCYTES NFR BLD: 0.2 %
LYMPHOCYTES # BLD AUTO: 2.11 X10(3) UL (ref 1–4)
LYMPHOCYTES NFR BLD AUTO: 39.9 %
MCH RBC QN AUTO: 27.3 PG (ref 26–34)
MCHC RBC AUTO-ENTMCNC: 32.2 G/DL (ref 31–37)
MCV RBC AUTO: 84.8 FL
MONOCYTES # BLD AUTO: 0.31 X10(3) UL (ref 0.1–1)
MONOCYTES NFR BLD AUTO: 5.9 %
NEUTROPHILS # BLD AUTO: 2.77 X10 (3) UL (ref 1.5–7.7)
NEUTROPHILS # BLD AUTO: 2.77 X10(3) UL (ref 1.5–7.7)
NEUTROPHILS NFR BLD AUTO: 52.3 %
OSMOLALITY SERPL CALC.SUM OF ELEC: 288 MOSM/KG (ref 275–295)
PLATELET # BLD AUTO: 286 10(3)UL (ref 150–450)
POTASSIUM SERPL-SCNC: 3.8 MMOL/L (ref 3.5–5.1)
PROT SERPL-MCNC: 7.6 G/DL (ref 5.7–8.2)
RBC # BLD AUTO: 4.94 X10(6)UL
SODIUM SERPL-SCNC: 138 MMOL/L (ref 136–145)
TROPONIN I SERPL HS-MCNC: 10 NG/L
TROPONIN I SERPL HS-MCNC: 10 NG/L
WBC # BLD AUTO: 5.3 X10(3) UL (ref 4–11)

## 2023-11-03 PROCEDURE — 96360 HYDRATION IV INFUSION INIT: CPT

## 2023-11-03 PROCEDURE — 93010 ELECTROCARDIOGRAM REPORT: CPT

## 2023-11-03 PROCEDURE — 84484 ASSAY OF TROPONIN QUANT: CPT | Performed by: EMERGENCY MEDICINE

## 2023-11-03 PROCEDURE — 80053 COMPREHEN METABOLIC PANEL: CPT | Performed by: EMERGENCY MEDICINE

## 2023-11-03 PROCEDURE — 85025 COMPLETE CBC W/AUTO DIFF WBC: CPT | Performed by: EMERGENCY MEDICINE

## 2023-11-03 PROCEDURE — 99285 EMERGENCY DEPT VISIT HI MDM: CPT

## 2023-11-03 PROCEDURE — 93005 ELECTROCARDIOGRAM TRACING: CPT

## 2023-11-03 PROCEDURE — 85025 COMPLETE CBC W/AUTO DIFF WBC: CPT

## 2023-11-03 PROCEDURE — 71045 X-RAY EXAM CHEST 1 VIEW: CPT

## 2023-11-03 PROCEDURE — 84484 ASSAY OF TROPONIN QUANT: CPT

## 2023-11-03 PROCEDURE — 80053 COMPREHEN METABOLIC PANEL: CPT

## 2023-11-03 NOTE — ED INITIAL ASSESSMENT (HPI)
Patient presents to ED for \"pulling\" in chest, dyspnea on exertion, cough, and diarrhea.  Patient reporting BP is abnormally low for her

## 2023-11-04 LAB
ATRIAL RATE: 87 BPM
ATRIAL RATE: 94 BPM
P AXIS: 65 DEGREES
P AXIS: 73 DEGREES
P-R INTERVAL: 148 MS
P-R INTERVAL: 156 MS
Q-T INTERVAL: 378 MS
Q-T INTERVAL: 384 MS
QRS DURATION: 82 MS
QRS DURATION: 86 MS
QTC CALCULATION (BEZET): 462 MS
QTC CALCULATION (BEZET): 472 MS
R AXIS: 12 DEGREES
R AXIS: 19 DEGREES
T AXIS: 63 DEGREES
T AXIS: 72 DEGREES
VENTRICULAR RATE: 87 BPM
VENTRICULAR RATE: 94 BPM

## 2023-11-04 NOTE — DISCHARGE INSTRUCTIONS
Return if any increased chest pain or shortness of breath or if you develop abdominal pain fever vomiting  Clear liquids BRAT diet  May take Imodium  Follow-up with your doctor for further management

## 2023-11-10 ENCOUNTER — OFFICE VISIT (OUTPATIENT)
Dept: INTERNAL MEDICINE CLINIC | Facility: CLINIC | Age: 63
End: 2023-11-10

## 2023-11-10 VITALS
HEIGHT: 66.5 IN | SYSTOLIC BLOOD PRESSURE: 116 MMHG | TEMPERATURE: 98 F | BODY MASS INDEX: 30.81 KG/M2 | OXYGEN SATURATION: 98 % | HEART RATE: 76 BPM | DIASTOLIC BLOOD PRESSURE: 68 MMHG | RESPIRATION RATE: 17 BRPM | WEIGHT: 194 LBS

## 2023-11-10 DIAGNOSIS — Z09 ENCOUNTER FOR EXAMINATION FOLLOWING TREATMENT AT HOSPITAL: Primary | ICD-10-CM

## 2023-11-10 DIAGNOSIS — G89.29 CHRONIC BILATERAL LOW BACK PAIN WITHOUT SCIATICA: ICD-10-CM

## 2023-11-10 DIAGNOSIS — R07.9 CHEST PAIN, UNSPECIFIED TYPE: ICD-10-CM

## 2023-11-10 DIAGNOSIS — M54.50 CHRONIC BILATERAL LOW BACK PAIN WITHOUT SCIATICA: ICD-10-CM

## 2023-11-10 DIAGNOSIS — J43.9 PULMONARY EMPHYSEMA, UNSPECIFIED EMPHYSEMA TYPE (HCC): ICD-10-CM

## 2023-11-10 PROCEDURE — 3074F SYST BP LT 130 MM HG: CPT | Performed by: INTERNAL MEDICINE

## 2023-11-10 PROCEDURE — 99214 OFFICE O/P EST MOD 30 MIN: CPT | Performed by: INTERNAL MEDICINE

## 2023-11-10 PROCEDURE — 3008F BODY MASS INDEX DOCD: CPT | Performed by: INTERNAL MEDICINE

## 2023-11-10 PROCEDURE — 3078F DIAST BP <80 MM HG: CPT | Performed by: INTERNAL MEDICINE

## 2023-11-10 NOTE — PROGRESS NOTES
Subjective:   Patient ID: Colin Bradford is a 59year old female. HPI  Patient comes in today for follow-up after she was seen in the hospital presented with chest pain some discomfort on the left side going to the arm work-up in ER was negative patient continues to have on and off pain. Also she had some diarrhea but that is resolved patient did have some mild respiratory symptoms but feeling better she has COPD continues to smoke she says she has tried to cut down she says she does not take Advair every day only as needed    History/Other:   Review of Systems   Constitutional: Negative. HENT: Negative. Eyes: Negative. Respiratory: Negative. Negative for shortness of breath. Cardiovascular:  Positive for chest pain. Gastrointestinal: Negative. Genitourinary: Negative. Musculoskeletal: Negative. Skin: Negative. Neurological: Negative. Psychiatric/Behavioral: Negative. Current Outpatient Medications   Medication Sig Dispense Refill    metFORMIN HCl 1000 MG Oral Tab TAKE 1 TABLET(1000 MG) BY MOUTH TWICE DAILY WITH MEALS 180 tablet 0    apixaban (ELIQUIS) 5 MG Oral Tab Take 1 tablet (5 mg total) by mouth 2 (two) times daily. 180 tablet 1    Glucose Blood (ONETOUCH VERIO) In Vitro Strip TEST BLOOD SUGAR TWICE DAILY. 200 strip 1    amLODIPine 5 MG Oral Tab Take 1 tablet (5 mg total) by mouth daily. 90 tablet 3    traZODone 50 MG Oral Tab Take 1 tablet (50 mg total) by mouth nightly. 30 tablet 1    atorvastatin 80 MG Oral Tab Take 1 tablet (80 mg total) by mouth nightly. 90 tablet 3    metoprolol succinate ER 25 MG Oral Tablet 24 Hr Take 1 tablet (25 mg total) by mouth daily. 90 tablet 3    albuterol 108 (90 Base) MCG/ACT Inhalation Aero Soln Inhale 2 puffs into the lungs every 4 (four) hours as needed. 8.5 g 1    Budesonide-Formoterol Fumarate (SYMBICORT) 160-4.5 MCG/ACT Inhalation Aerosol Inhale 2 puffs into the lungs 2 (two) times daily.  1 each 3    Acetaminophen-Codeine (TYLENOL WITH CODEINE #3) 300-30 MG Oral Tab Take 1 tablet by mouth every 8 (eight) hours as needed for Pain. 30 tablet 0    Blood Glucose Monitoring Suppl (ONETOUCH VERIO FLEX SYSTEM) w/Device Does not apply Kit Use to check blood sugar two times a day 1 kit 0    OneTouch Delica Lancets 40Y Does not apply Misc Use to check blood sugar two times a day 200 each 1    Blood Glucose Monitoring Suppl (ONETOUCH ULTRA SYSTEM) W/DEVICE Does not apply Kit Test sugar twice daily 1 kit 0     Allergies: Allergies   Allergen Reactions    Ace Inhibitors ANAPHYLAXIS, ANGIOEDEMA and SWELLING     April 2020, hospitalized at [facility] with tongue swelling and throat closing sensation, on lisinopril at the time. Not intubated      Egg White (Diagnostic) NAUSEA ONLY and Tightness in Throat    Fish-Derived Products SWELLING    Lisinopril ANAPHYLAXIS    Peanuts SWELLING    Shrimp ITCHING, NAUSEA AND VOMITING, Tightness in Throat and WHEEZING    Tomato SWELLING    Tomatoes RASH       Objective:   Physical Exam  Vitals and nursing note reviewed. Constitutional:       Appearance: She is well-developed. HENT:      Head: Normocephalic and atraumatic. Right Ear: External ear normal.      Left Ear: External ear normal.      Nose: Nose normal.   Eyes:      Conjunctiva/sclera: Conjunctivae normal.      Pupils: Pupils are equal, round, and reactive to light. Cardiovascular:      Rate and Rhythm: Normal rate and regular rhythm. Heart sounds: Normal heart sounds. Pulmonary:      Effort: Pulmonary effort is normal.      Breath sounds: Normal breath sounds. Abdominal:      General: Bowel sounds are normal.      Palpations: Abdomen is soft. Genitourinary:     Vagina: Normal.   Musculoskeletal:         General: Normal range of motion. Cervical back: Normal range of motion and neck supple. Skin:     General: Skin is warm and dry. Neurological:      Mental Status: She is alert and oriented to person, place, and time. Deep Tendon Reflexes: Reflexes are normal and symmetric. Psychiatric:         Behavior: Behavior normal.         Thought Content: Thought content normal.         Judgment: Judgment normal.         Assessment & Plan:   1. Encounter for examination following treatment at hospital diarrhea resolved but continues to have chest discomfort we will order stress test we will follow results   2. Chest pain, unspecified type as above    3. Pulmonary emphysema, unspecified emphysema type ([address] encourage patient taking Symbicort every day   4. Chronic bilateral low back pain without sciatica as the medication for pain if not better let us know       No orders of the defined types were placed in this encounter.       Meds This Visit:  Requested Prescriptions      No prescriptions requested or ordered in this encounter       Imaging & Referrals:  CARD TREADMILL STRESS, ADULT (CPT=93017)

## 2023-11-13 ENCOUNTER — HOSPITAL ENCOUNTER (OUTPATIENT)
Dept: CV DIAGNOSTICS | Facility: HOSPITAL | Age: 63
Discharge: HOME | End: 2023-11-13
Attending: INTERNAL MEDICINE
Payer: MEDICAID

## 2023-11-13 DIAGNOSIS — R07.9 CHEST PAIN, UNSPECIFIED TYPE: ICD-10-CM

## 2023-11-13 LAB
% OF MAX PREDICTED HR: 100 %
MAX DIASTOLIC BP: 80 MMHG
MAX HEART RATE: 130 BPM
MAX PREDICTED HEART RATE: 158 BPM
MAX SYSTOLIC BP: 160 MMHG
MAX WORK LOAD: 60

## 2023-11-13 PROCEDURE — 93016 CV STRESS TEST SUPVJ ONLY: CPT | Performed by: INTERNAL MEDICINE

## 2023-11-13 PROCEDURE — 93017 CV STRESS TEST TRACING ONLY: CPT | Performed by: INTERNAL MEDICINE

## 2023-11-13 PROCEDURE — 93018 CV STRESS TEST I&R ONLY: CPT | Performed by: INTERNAL MEDICINE

## 2023-11-27 ENCOUNTER — HOSPITAL ENCOUNTER (OUTPATIENT)
Dept: MAMMOGRAPHY | Facility: HOSPITAL | Age: 63
Discharge: HOME | End: 2023-11-27
Attending: OBSTETRICS & GYNECOLOGY
Payer: MEDICAID

## 2023-11-27 DIAGNOSIS — R92.8 ABNORMAL MAMMOGRAM OF LEFT BREAST: ICD-10-CM

## 2023-11-27 PROCEDURE — 77062 BREAST TOMOSYNTHESIS BI: CPT | Performed by: OBSTETRICS & GYNECOLOGY

## 2023-11-27 PROCEDURE — 77066 DX MAMMO INCL CAD BI: CPT | Performed by: OBSTETRICS & GYNECOLOGY

## 2024-01-03 DIAGNOSIS — E11.9 TYPE 2 DIABETES MELLITUS WITHOUT RETINOPATHY (HCC): ICD-10-CM

## 2024-01-03 NOTE — TELEPHONE ENCOUNTER
LOV: 9/13/23     Next office visit: 1/9/24     Last filled: 7/13/23     Order pended and routed to Provider

## 2024-01-05 ENCOUNTER — NURSE TRIAGE (OUTPATIENT)
Dept: INTERNAL MEDICINE CLINIC | Facility: CLINIC | Age: 64
End: 2024-01-05

## 2024-01-05 RX ORDER — DULAGLUTIDE 1.5 MG/.5ML
1.5 INJECTION, SOLUTION SUBCUTANEOUS
Qty: 6 ML | Refills: 0 | Status: SHIPPED | OUTPATIENT
Start: 2024-01-05

## 2024-01-05 NOTE — TELEPHONE ENCOUNTER
Patient states that when she takes her blood pressure medications together she gets dizzy. She was advised by Dr. Price to take them separately. She states she tried that and she is still dizzy after the second blood pressure medication (amlodipine). She states her blood pressures have been normal. I asked her what it was today and yesterday and she admitted she has not been checking it because she didn't think she had to. She just thought she was supposed to take the medications separately. I advised her to check her blood pressure prior to taking her blood pressure medication to ensure that her blood pressure was not low and causing her dizziness. She verbalized understanding. Appointment made for blood pressure check and medication adjustment.       Metoprolol succinate 25 mg      Amlodipine 5 mg daily        Future Appointments   Date Time Provider Department Center   1/9/2024 10:45 AM Floridalma Barahona MD [id number]   1/9/2024  1:00 PM Rene Price MD [id number] [place] [id number]   2/16/2024 11:45 AM Rene Price MD [id number] [place] [address]   3/7/2024  1:15 PM Jamar Jeffers MD [facility]       Reason for Disposition  • Taking a medicine that could cause dizziness (e.g., blood pressure medications, diuretics)    Protocols used: Dizziness-A-OH

## 2024-01-09 ENCOUNTER — OFFICE VISIT (OUTPATIENT)
Dept: ENDOCRINOLOGY CLINIC | Facility: CLINIC | Age: 64
End: 2024-01-09
Payer: MEDICAID

## 2024-01-09 ENCOUNTER — OFFICE VISIT (OUTPATIENT)
Dept: INTERNAL MEDICINE CLINIC | Facility: CLINIC | Age: 64
End: 2024-01-09
Payer: MEDICAID

## 2024-01-09 ENCOUNTER — LAB ENCOUNTER (OUTPATIENT)
Dept: LAB | Facility: HOSPITAL | Age: 64
End: 2024-01-09
Attending: INTERNAL MEDICINE
Payer: MEDICAID

## 2024-01-09 VITALS
WEIGHT: 196 LBS | BODY MASS INDEX: 31.13 KG/M2 | HEART RATE: 94 BPM | DIASTOLIC BLOOD PRESSURE: 67 MMHG | HEIGHT: 66.5 IN | SYSTOLIC BLOOD PRESSURE: 99 MMHG

## 2024-01-09 VITALS
WEIGHT: 198 LBS | DIASTOLIC BLOOD PRESSURE: 74 MMHG | TEMPERATURE: 98 F | SYSTOLIC BLOOD PRESSURE: 121 MMHG | BODY MASS INDEX: 31.44 KG/M2 | OXYGEN SATURATION: 97 % | HEART RATE: 98 BPM | HEIGHT: 66.5 IN

## 2024-01-09 DIAGNOSIS — I10 ESSENTIAL HYPERTENSION: Primary | ICD-10-CM

## 2024-01-09 DIAGNOSIS — E11.9 TYPE 2 DIABETES MELLITUS WITHOUT RETINOPATHY (HCC): Primary | ICD-10-CM

## 2024-01-09 DIAGNOSIS — E11.9 TYPE 2 DIABETES MELLITUS WITHOUT RETINOPATHY (HCC): ICD-10-CM

## 2024-01-09 DIAGNOSIS — E78.2 MIXED HYPERLIPIDEMIA: ICD-10-CM

## 2024-01-09 DIAGNOSIS — Z71.6 ENCOUNTER FOR SMOKING CESSATION COUNSELING: ICD-10-CM

## 2024-01-09 PROBLEM — G90.9 AUTONOMIC NEUROPATHY: Status: ACTIVE | Noted: 2019-12-04

## 2024-01-09 PROBLEM — I16.0 HYPERTENSIVE URGENCY: Status: ACTIVE | Noted: 2020-01-30

## 2024-01-09 PROBLEM — R07.9 CHEST PAIN: Status: ACTIVE | Noted: 2019-12-04

## 2024-01-09 PROBLEM — K29.70 HELICOBACTER PYLORI GASTRITIS: Status: ACTIVE | Noted: 2020-01-22

## 2024-01-09 PROBLEM — R53.1 LEFT-SIDED WEAKNESS: Status: ACTIVE | Noted: 2018-12-07

## 2024-01-09 PROBLEM — E66.01 MORBID (SEVERE) OBESITY DUE TO EXCESS CALORIES (HCC): Status: ACTIVE | Noted: 2024-01-09

## 2024-01-09 PROBLEM — F17.200 SMOKER: Status: ACTIVE | Noted: 2018-12-06

## 2024-01-09 PROBLEM — R42 DIZZINESS: Status: ACTIVE | Noted: 2020-01-28

## 2024-01-09 PROBLEM — R91.8 LUNG MASS: Status: ACTIVE | Noted: 2020-05-20

## 2024-01-09 PROBLEM — B96.81 HELICOBACTER PYLORI GASTRITIS: Status: ACTIVE | Noted: 2020-01-22

## 2024-01-09 PROBLEM — R63.4 WEIGHT LOSS, ABNORMAL: Status: ACTIVE | Noted: 2024-01-09

## 2024-01-09 PROBLEM — F17.210 HEAVY CIGARETTE SMOKER: Status: ACTIVE | Noted: 2018-10-02

## 2024-01-09 PROBLEM — G43.409 HEMIPLEGIC MIGRAINE WITHOUT STATUS MIGRAINOSUS, NOT INTRACTABLE: Status: ACTIVE | Noted: 2020-08-22

## 2024-01-09 PROBLEM — R06.09 DYSPNEA ON EXERTION: Status: ACTIVE | Noted: 2024-01-09

## 2024-01-09 LAB
ALBUMIN SERPL-MCNC: 4.5 G/DL (ref 3.2–4.8)
ALBUMIN/GLOB SERPL: 1.5 {RATIO} (ref 1–2)
ALP LIVER SERPL-CCNC: 89 U/L
ALT SERPL-CCNC: 11 U/L
ANION GAP SERPL CALC-SCNC: 4 MMOL/L (ref 0–18)
AST SERPL-CCNC: 13 U/L (ref ?–34)
BILIRUB SERPL-MCNC: 0.7 MG/DL (ref 0.2–1.1)
BUN BLD-MCNC: 9 MG/DL (ref 9–23)
BUN/CREAT SERPL: 13.8 (ref 10–20)
CALCIUM BLD-MCNC: 9.3 MG/DL (ref 8.7–10.4)
CARTRIDGE EXPIRATION DATE: ABNORMAL DATE
CARTRIDGE LOT#: ABNORMAL NUMERIC
CHLORIDE SERPL-SCNC: 109 MMOL/L (ref 98–112)
CHOLEST SERPL-MCNC: 133 MG/DL (ref ?–200)
CO2 SERPL-SCNC: 25 MMOL/L (ref 21–32)
CREAT BLD-MCNC: 0.65 MG/DL
CREAT UR-SCNC: 200.1 MG/DL
EGFRCR SERPLBLD CKD-EPI 2021: 99 ML/MIN/1.73M2 (ref 60–?)
FASTING PATIENT LIPID ANSWER: YES
FASTING STATUS PATIENT QL REPORTED: YES
GLOBULIN PLAS-MCNC: 3.1 G/DL (ref 2.8–4.4)
GLUCOSE BLD-MCNC: 192 MG/DL (ref 70–99)
GLUCOSE BLOOD: 204
HDLC SERPL-MCNC: 35 MG/DL (ref 40–59)
HEMOGLOBIN A1C: 7.2 % (ref 4.3–5.6)
LDLC SERPL CALC-MCNC: 81 MG/DL (ref ?–100)
MICROALBUMIN UR-MCNC: 3.7 MG/DL
MICROALBUMIN/CREAT 24H UR-RTO: 18.5 UG/MG (ref ?–30)
NONHDLC SERPL-MCNC: 98 MG/DL (ref ?–130)
OSMOLALITY SERPL CALC.SUM OF ELEC: 290 MOSM/KG (ref 275–295)
POTASSIUM SERPL-SCNC: 3.6 MMOL/L (ref 3.5–5.1)
PROT SERPL-MCNC: 7.6 G/DL (ref 5.7–8.2)
SODIUM SERPL-SCNC: 138 MMOL/L (ref 136–145)
TEST STRIP EXPIRATION DATE: NORMAL DATE
TEST STRIP LOT #: NORMAL NUMERIC
TRIGL SERPL-MCNC: 86 MG/DL (ref 30–149)
VLDLC SERPL CALC-MCNC: 14 MG/DL (ref 0–30)

## 2024-01-09 PROCEDURE — 3008F BODY MASS INDEX DOCD: CPT | Performed by: INTERNAL MEDICINE

## 2024-01-09 PROCEDURE — 99214 OFFICE O/P EST MOD 30 MIN: CPT | Performed by: INTERNAL MEDICINE

## 2024-01-09 PROCEDURE — 80061 LIPID PANEL: CPT

## 2024-01-09 PROCEDURE — 90471 IMMUNIZATION ADMIN: CPT | Performed by: INTERNAL MEDICINE

## 2024-01-09 PROCEDURE — 3078F DIAST BP <80 MM HG: CPT | Performed by: INTERNAL MEDICINE

## 2024-01-09 PROCEDURE — 3061F NEG MICROALBUMINURIA REV: CPT | Performed by: INTERNAL MEDICINE

## 2024-01-09 PROCEDURE — 3051F HG A1C>EQUAL 7.0%<8.0%: CPT | Performed by: INTERNAL MEDICINE

## 2024-01-09 PROCEDURE — 82043 UR ALBUMIN QUANTITATIVE: CPT

## 2024-01-09 PROCEDURE — 82570 ASSAY OF URINE CREATININE: CPT

## 2024-01-09 PROCEDURE — 82947 ASSAY GLUCOSE BLOOD QUANT: CPT | Performed by: INTERNAL MEDICINE

## 2024-01-09 PROCEDURE — 83036 HEMOGLOBIN GLYCOSYLATED A1C: CPT | Performed by: INTERNAL MEDICINE

## 2024-01-09 PROCEDURE — 3074F SYST BP LT 130 MM HG: CPT | Performed by: INTERNAL MEDICINE

## 2024-01-09 PROCEDURE — 36415 COLL VENOUS BLD VENIPUNCTURE: CPT

## 2024-01-09 PROCEDURE — 90686 IIV4 VACC NO PRSV 0.5 ML IM: CPT | Performed by: INTERNAL MEDICINE

## 2024-01-09 PROCEDURE — 80053 COMPREHEN METABOLIC PANEL: CPT

## 2024-01-09 RX ORDER — GLIPIZIDE 2.5 MG/1
2.5 TABLET ORAL EVERY EVENING
Qty: 90 TABLET | Refills: 0 | Status: SHIPPED | OUTPATIENT
Start: 2024-01-09 | End: 2024-04-08

## 2024-01-09 NOTE — PROGRESS NOTES
Name: Di Puga  Date: 1/09/24    Referring Physician: Dr. Rene Price    HISTORY OF PRESENT ILLNESS   Di Puga is a 63 year old female who presents for follow-up of evaluaton and management of uncontrolled T2D. She was diagnosed more than 15 years ago on regular screening blood tests. She had been on insulin and then I transitioned her to oral medications. Prior to this visit, she had been having elevated blood sugars and was advised by her PCP to take an extra dose of Trulicity.     At the last visit, I had started patient on a small dose of glipizide to help with blood sugars. However, she had been having hypoglycemia and so this was stopped. I also asked her to decrease the Jardiance that she was taking so that we could see if it was causing nocturia.     She states that decreasing the Jardiance was helping prevent the nocturia, but she does have increased thirst, and has had 3 UTIs. I stopped this at the last visit.    Se has not been able to procure her Trulicity for the past 2 weeks.     Blood Glucose Today: 204  HbA1C or glycohemoglobin is 7.2 today (and it was 6.4 on 9/13/23 and it was 7.5 on 1/13/23 (and it was 6.8 on 9/27/22 and it was 6.7 on 7/12/22 and it was 6.9 on 4/12/22 and it was 7.9 on 12/01/22 and it was 7.7 on 12/01/21 and it was 8.2 % 1/04/21)  Type 1 or Type 2?: Type 2  Checking 3 times per day  Fasting- 121-140; 2-hour- 160s  Medications for DM : Metformin 1g PO bid; Trulicity 1.5mg qweekly  Episodes of hypoglycemia: none    Dietary compliance: eating healthier, and not as hungry; not snacking    Exercise: its difficult to walk because of spinal stenosis- this is getting better    Polyuria/polydipsia: yes    Blurred vision: none    Flu Vaccine This Season: does not get    Covid Vaccine- yes, got the booster    REVIEW OF SYSTEMS  Eyes: Diabetic retinopathy present: None            Most recent visit to eye doctor in last 12 months: has to make an appt    CV: Cardiovascular  disease present: none         Hypertension present: at goal, on meds         Hyperlipidemia present: yes, HDL low, on meds (1/09/24)         Peripheral Vascular Disease present: none    : Nephropathy present: none (1/09/24); creatinine- 0.65    Neuro: Neuropathy present: has the neuropathy, and sees the podiatrist for this and he checks her feet once a year    Skin: Infection or ulceration: none    Osteoporosis: none    Thyroid disease: TSH within normal limits on 5/23/23    Medications:     Current Outpatient Medications:     metFORMIN HCl 1000 MG Oral Tab, TAKE 1 TABLET(1000 MG) BY MOUTH TWICE DAILY WITH MEALS, Disp: 180 tablet, Rfl: 0    Dulaglutide (TRULICITY) 1.5 MG/0.5ML Subcutaneous Solution Pen-injector, ADMINISTER 1.5 MG UNDER THE SKIN 1 TIME A WEEK (Patient not taking: Reported on 1/9/2024), Disp: 6 mL, Rfl: 0    apixaban (ELIQUIS) 5 MG Oral Tab, Take 1 tablet (5 mg total) by mouth 2 (two) times daily., Disp: 180 tablet, Rfl: 1    Glucose Blood (ONETOUCH VERIO) In Vitro Strip, TEST BLOOD SUGAR TWICE DAILY., Disp: 200 strip, Rfl: 1    amLODIPine 5 MG Oral Tab, Take 1 tablet (5 mg total) by mouth daily., Disp: 90 tablet, Rfl: 3    traZODone 50 MG Oral Tab, Take 1 tablet (50 mg total) by mouth nightly., Disp: 30 tablet, Rfl: 1    atorvastatin 80 MG Oral Tab, Take 1 tablet (80 mg total) by mouth nightly., Disp: 90 tablet, Rfl: 3    metoprolol succinate ER 25 MG Oral Tablet 24 Hr, Take 1 tablet (25 mg total) by mouth daily., Disp: 90 tablet, Rfl: 3    albuterol 108 (90 Base) MCG/ACT Inhalation Aero Soln, Inhale 2 puffs into the lungs every 4 (four) hours as needed., Disp: 8.5 g, Rfl: 1    Budesonide-Formoterol Fumarate (SYMBICORT) 160-4.5 MCG/ACT Inhalation Aerosol, Inhale 2 puffs into the lungs 2 (two) times daily., Disp: 1 each, Rfl: 3    Acetaminophen-Codeine (TYLENOL WITH CODEINE #3) 300-30 MG Oral Tab, Take 1 tablet by mouth every 8 (eight) hours as needed for Pain., Disp: 30 tablet, Rfl: 0    Blood  Glucose Monitoring Suppl (ONETOUCH VERIO FLEX SYSTEM) w/Device Does not apply Kit, Use to check blood sugar two times a day, Disp: 1 kit, Rfl: 0    OneTouch Delica Lancets 33G Does not apply Misc, Use to check blood sugar two times a day, Disp: 200 each, Rfl: 1    Blood Glucose Monitoring Suppl (ONETOUCH ULTRA SYSTEM) W/DEVICE Does not apply Kit, Test sugar twice daily, Disp: 1 kit, Rfl: 0     Allergies:   Allergies   Allergen Reactions    Ace Inhibitors ANAPHYLAXIS, ANGIOEDEMA and SWELLING     April 2020, hospitalized at [facility] in [place] with tongue swelling and throat closing sensation, on lisinopril at the time. Not intubated      Egg White (Diagnostic) NAUSEA ONLY and Tightness in Throat    Fish-Derived Products SWELLING    Lisinopril ANAPHYLAXIS    Peanuts SWELLING    Shrimp ITCHING, NAUSEA AND VOMITING, Tightness in Throat and WHEEZING    Tomato SWELLING    Tomatoes RASH       Social History:   Social History     Socioeconomic History    Marital status: Single   Tobacco Use    Smoking status: Every Day     Packs/day: 1.00     Years: 20.00     Additional pack years: 0.00     Total pack years: 20.00     Types: Cigarettes    Smokeless tobacco: Never    Tobacco comments:     1 pack daily   Vaping Use    Vaping Use: Never used   Substance and Sexual Activity    Alcohol use: Not Currently    Drug use: No   Other Topics Concern    Caffeine Concern No    Exercise No   Social History Narrative    The patient does not use an assistive device..      The patient does live in a home with stairs.       Medical History:   Past Medical History:   Diagnosis Date    Arrhythmia     tachycardia    Asthma     B12 deficiency     Back pain     Back problem     Calculus of kidney 2017    Cancer (HCC)     1990- cervical    COPD (chronic obstructive pulmonary disease) ([facility])     Essential hypertension     High blood pressure     High cholesterol     History of abnormal cervical Pap smear     Muscle weakness     Neuropathy     Other and  unspecified hyperlipidemia     Palpitations     Pulmonary embolism (HCC)     Spinal stenosis     Stroke (HCC)     TIA a few years ago    TIA (transient ischemic attack)     Type II or unspecified type diabetes mellitus without mention of complication, not stated as uncontrolled        Surgical history:   Past Surgical History:   Procedure Laterality Date    CHOLECYSTECTOMY      COLONOSCOPY N/A 2017    Procedure: COLONOSCOPY, POSSIBLE BIOPSY, POSSIBLE POLYPECTOMY 00537;  Surgeon: Pedro Thomas MD;  [facility] [facility]    COLONOSCOPY N/A 2022    Procedure: COLONOSCOPY/ESOPHAGOGASTRODUODENOSCOPY (EGD);  Surgeon: Cortney Diaz MD;  [facility] ENDOSCOPY    COLONOSCOPY & POLYPECTOMY            OTHER      Lung biopsy    OTHER SURGICAL HISTORY      Cone biopsy    OTHER SURGICAL HISTORY      Cystoscopy    REMOVAL GALLBLADDER      TUBAL LIGATION           PHYSICAL EXAM  Vitals:    24 1043   BP: 99/67   Pulse: 94   Weight: 196 lb (88.9 kg)   Height: 5' 6.5\" (1.689 m)     General Appearance:  alert, well developed, in no acute distress  Eyes:  normal conjunctivae, sclera., normal sclera and normal pupils  Psychiatric:  oriented to time, self, and place  Nutritional:  no abnormal weight gain or loss  Feet: monofilament normal bilaterally, pulses felt  Bilateral barefoot skin diabetic exam is normal, visualized feet and the appearance is normal.  Bilateral monofilament/sensation of both feet is normal.  Pulsation pedal pulse exam of both lower legs/feet is normal as well.        Lab Data:   Lab Results   Component Value Date     (H) 2023    A1C 6.4 (A) 2023     Lab Results   Component Value Date     (H) 2023    BUN 10 2023    BUNCREA 13.0 2023    CREATSERUM 0.77 2023    ANIONGAP 5 2023    GFRNAA 105 2022    GFRAA 121 2022    CA 9.7 2023    OSMOCALC 288 2023    ALKPHO 92 2023    AST  15 11/03/2023    ALT 12 11/03/2023    ALKPHOS 72 01/16/2016    BILT 0.5 11/03/2023    TP 7.6 11/03/2023    ALB 4.7 11/03/2023    GLOBULIN 2.9 11/03/2023    AGRATIO 1.3 12/21/2021     11/03/2023    K 3.8 11/03/2023     11/03/2023    CO2 26.0 11/03/2023     Lab Results   Component Value Date    CHOLEST 134 05/23/2023    TRIG 114 05/23/2023    HDL 35 (L) 05/23/2023    LDL 78 05/23/2023    VLDL 18 05/23/2023    TCHDLRATIO 4.0 12/21/2021    NONHDLC 99 05/23/2023    CALCNONHDL 155 (H) 01/16/2016     Lab Results   Component Value Date    MALBP 2.33 05/23/2023    CREUR 120.00 05/23/2023    CREAURINE 107.7 02/16/2013    MIALBURINE 0.5 02/16/2013    MCRRATIOUR 4.6 02/16/2013    MICROALBUMIN 0.5 12/21/2021    CREAUR 82 12/21/2021    MALBCREACALC 6 12/21/2021         ASSESSMENT/PLAN:    This is a 62 year-old woman here for follow-up of management of uncontrolled type 2 diabetes. We discussed the ABCs of DM.     1.) Hyperglycemia Management- We discussed the importance of glycemic control to prevent complications of diabetes. We discussed the importance of SBGM. I offered and provided patient education materials and offered a blood glucose log book.   - Continue metformin 1g PO bid  - Continue Trulicity 1.5mg qweekly  - Start glipizide 2.5mg PO at bedtime and stop this once she is able to obtain the Trulicity  - Continue to check blood sugars fasting and pre-meals  - Contact us with blood sugars <70 and >300    2.) Management of Diabetic Complications- We discussed the complications of diabetes include retinopathy, neuropathy, nephropathy and cardiovascular disease.   - Ophthalmology- she is up to date  - Neuropathy- none  - Lipid panel- we will check in 6 months  - MAC- will check in 6 months  - CMP- will check in 6 months  - BP- at goal, on meds  - Vaccines, does not get the flu vaccine, up to date with booster    3.) Lifestyle Management for Diabetes- We discussed importance of a low CHO diet, and recommend 45gm  per meal or 135gm per day. We discussed the importance of trying to follow a Mediterranean diet, with an emphasis on vegetables at every meal, with lots whole grains, and protein from either plant-based sources, or poultry and fish.   - Patient has been eating more healthy foods  - She is not able to exercise, but she is going to try as much as possible    Return to the clinic in 6 months    Prior to this encounter, I spent over 15 minutes with preparing for the visit, including reviewing documents from other specialties as well as from PCP and going over test results. During the face to face encounter, I spent an additional 15 minutes which were determined for follow-up. Greater than 50% of the time was spent in counseling, anticipatory guidance, and coordination of care. Patient concerns were answered to the best of my knowledge.     1/09/24  Floridalma Barahona MD

## 2024-01-12 DIAGNOSIS — E11.9 TYPE 2 DIABETES MELLITUS WITHOUT RETINOPATHY (HCC): ICD-10-CM

## 2024-01-13 ENCOUNTER — PATIENT MESSAGE (OUTPATIENT)
Dept: ENDOCRINOLOGY CLINIC | Facility: CLINIC | Age: 64
End: 2024-01-13

## 2024-01-14 NOTE — PROGRESS NOTES
Subjective:     Patient ID: Di Puga is a 63 year old female.    HPI    Patient comes in for follow-up no new complaints , continues to smoke not  ready  to quit will let us know if she needs help  History/Other:       Review of Systems   Constitutional: Negative.    HENT: Negative.     Eyes: Negative.    Respiratory: Negative.     Cardiovascular: Negative.    Gastrointestinal: Negative.    Genitourinary: Negative.    Musculoskeletal: Negative.    Skin: Negative.    Neurological: Negative.    Psychiatric/Behavioral: Negative.       Current Outpatient Medications   Medication Sig Dispense Refill    glipiZIDE 2.5 MG Oral Tab Take 2.5 mg by mouth every evening. 90 tablet 0    Dulaglutide (TRULICITY) 1.5 MG/0.5ML Subcutaneous Solution Pen-injector ADMINISTER 1.5 MG UNDER THE SKIN 1 TIME A WEEK (Patient not taking: Reported on 1/9/2024) 6 mL 0    metFORMIN HCl 1000 MG Oral Tab TAKE 1 TABLET(1000 MG) BY MOUTH TWICE DAILY WITH MEALS 180 tablet 0    apixaban (ELIQUIS) 5 MG Oral Tab Take 1 tablet (5 mg total) by mouth 2 (two) times daily. 180 tablet 1    Glucose Blood (ONETOUCH VERIO) In Vitro Strip TEST BLOOD SUGAR TWICE DAILY. 200 strip 1    amLODIPine 5 MG Oral Tab Take 1 tablet (5 mg total) by mouth daily. 90 tablet 3    traZODone 50 MG Oral Tab Take 1 tablet (50 mg total) by mouth nightly. 30 tablet 1    atorvastatin 80 MG Oral Tab Take 1 tablet (80 mg total) by mouth nightly. 90 tablet 3    metoprolol succinate ER 25 MG Oral Tablet 24 Hr Take 1 tablet (25 mg total) by mouth daily. 90 tablet 3    albuterol 108 (90 Base) MCG/ACT Inhalation Aero Soln Inhale 2 puffs into the lungs every 4 (four) hours as needed. 8.5 g 1    Budesonide-Formoterol Fumarate (SYMBICORT) 160-4.5 MCG/ACT Inhalation Aerosol Inhale 2 puffs into the lungs 2 (two) times daily. 1 each 3    Acetaminophen-Codeine (TYLENOL WITH CODEINE #3) 300-30 MG Oral Tab Take 1 tablet by mouth every 8 (eight) hours as needed for Pain. 30 tablet 0    Blood  Glucose Monitoring Suppl (ONETOUCH VERIO FLEX SYSTEM) w/Device Does not apply Kit Use to check blood sugar two times a day 1 kit 0    OneTouch Delica Lancets 33G Does not apply Misc Use to check blood sugar two times a day 200 each 1    Blood Glucose Monitoring Suppl (ONETOUCH ULTRA SYSTEM) W/DEVICE Does not apply Kit Test sugar twice daily 1 kit 0     Allergies:  Allergies   Allergen Reactions    Ace Inhibitors ANAPHYLAXIS, ANGIOEDEMA and SWELLING     April 2020, hospitalized at [facility] in [place] with tongue swelling and throat closing sensation, on lisinopril at the time. Not intubated      Egg White (Diagnostic) NAUSEA ONLY and Tightness in Throat    Fish-Derived Products SWELLING    Lisinopril ANAPHYLAXIS    Peanuts SWELLING    Shrimp ITCHING, NAUSEA AND VOMITING, Tightness in Throat and WHEEZING    Tomato SWELLING and UNKNOWN    Tomatoes RASH       Past Medical History:   Diagnosis Date    Arrhythmia     tachycardia    Asthma     B12 deficiency     Back pain     Back problem     Calculus of kidney 2017    Cancer (HCC)     1990- cervical    COPD (chronic obstructive pulmonary disease) (HCC)     Essential hypertension     High blood pressure     High cholesterol     History of abnormal cervical Pap smear     Muscle weakness     Neuropathy     Other and unspecified hyperlipidemia     Palpitations     Pulmonary embolism (HCC)     Spinal stenosis     Stroke (HCC)     TIA a few years ago    TIA (transient ischemic attack)     Type II or unspecified type diabetes mellitus without mention of complication, not stated as uncontrolled       Past Surgical History:   Procedure Laterality Date    CHOLECYSTECTOMY  1994    COLONOSCOPY N/A 08/11/2017    Procedure: COLONOSCOPY, POSSIBLE BIOPSY, POSSIBLE POLYPECTOMY 96965;  Surgeon: Pedro Thomas MD;  Location: [facility]    COLONOSCOPY N/A 02/18/2022    Procedure: COLONOSCOPY/ESOPHAGOGASTRODUODENOSCOPY (EGD);  Surgeon: Cortney Diaz MD;  Location: [facility] ENDOSCOPY     COLONOSCOPY & POLYPECTOMY  2012          OTHER      Lung biopsy    OTHER SURGICAL HISTORY      Cone biopsy    OTHER SURGICAL HISTORY  2013    Cystoscopy    REMOVAL GALLBLADDER      TUBAL LIGATION  1986      Family History   Problem Relation Age of Onset    Diabetes Mother     Lung Disorder Mother         Emphysema    Heart Disease Mother     Asthma Mother     Cancer Father 51        Lung    Diabetes Sister     Thyroid Disorder Sister     Glaucoma Sister     Stroke Sister     Prostate Cancer Brother     Diabetes Brother     Diabetes Brother     Cancer Brother         Prostate cancer, copd    Diabetes Brother     Glaucoma Maternal Grandmother     Heart Disease Maternal Grandmother     Ovarian Cancer Maternal Grandmother 59    Cataracts Niece     Breast Cancer Niece 48    Macular degeneration Neg       Social History:   Social History     Socioeconomic History    Marital status: Single   Tobacco Use    Smoking status: Every Day     Packs/day: 1.00     Years: 20.00     Additional pack years: 0.00     Total pack years: 20.00     Types: Cigarettes    Smokeless tobacco: Never    Tobacco comments:     1 pack daily   Vaping Use    Vaping Use: Never used   Substance and Sexual Activity    Alcohol use: Not Currently    Drug use: No   Other Topics Concern    Caffeine Concern No    Exercise No   Social History Narrative    The patient does not use an assistive device..      The patient does live in a home with stairs.        Objective:   Physical Exam  Vitals and nursing note reviewed.   Constitutional:       Appearance: She is well-developed.   HENT:      Head: Normocephalic and atraumatic.      Right Ear: External ear normal.      Left Ear: External ear normal.      Nose: Nose normal.   Eyes:      Conjunctiva/sclera: Conjunctivae normal.      Pupils: Pupils are equal, round, and reactive to light.   Cardiovascular:      Rate and Rhythm: Normal rate and regular rhythm.      Heart sounds: Normal heart sounds.    Pulmonary:      Effort: Pulmonary effort is normal.      Breath sounds: Normal breath sounds.   Abdominal:      General: Bowel sounds are normal.      Palpations: Abdomen is soft.   Genitourinary:     Vagina: Normal.   Musculoskeletal:         General: Normal range of motion.      Cervical back: Normal range of motion and neck supple.   Skin:     General: Skin is warm and dry.   Neurological:      Mental Status: She is alert and oriented to person, place, and time.      Deep Tendon Reflexes: Reflexes are normal and symmetric.   Psychiatric:         Behavior: Behavior normal.         Thought Content: Thought content normal.         Judgment: Judgment normal.         Assessment & Plan:   1. Essential hypertension -blood pressure is okay continue current treatment   2. Type 2 diabetes mellitus without retinopathy (HCC) continue current treatment will refer to diabetic educator   3. Mixed hyperlipidemia continue current treatment watch diet take meds    4. Encounter for smoking cessation counseling make quit smoking patient not ready to let us know       Orders Placed This Encounter   Procedures    FLULAVAL INFLUENZA VACCINE QUAD PRESERVATIVE FREE 0.5 ML       Meds This Visit:  Requested Prescriptions      No prescriptions requested or ordered in this encounter       Imaging & Referrals:  FLULAVAL INFLUENZA VACCINE QUAD PRESERVATIVE FREE 0.5 ML  DIABETIC EDUCATION - INTERNAL

## 2024-01-15 NOTE — TELEPHONE ENCOUNTER
From: Di Puga  To: Floridalma Barahona  Sent: 1/13/2024 10:05 AM CST  Subject: Refill    Good morning, I need a refill on metformin. A request was sent on Wednesday and the pharmacy has not had a response. I am out of this prescription.      Thank you  Di Puga

## 2024-01-18 ENCOUNTER — OFFICE VISIT (OUTPATIENT)
Dept: OBGYN CLINIC | Facility: CLINIC | Age: 64
End: 2024-01-18
Payer: MEDICAID

## 2024-01-18 VITALS
WEIGHT: 194.31 LBS | SYSTOLIC BLOOD PRESSURE: 111 MMHG | BODY MASS INDEX: 31 KG/M2 | DIASTOLIC BLOOD PRESSURE: 74 MMHG | HEART RATE: 101 BPM

## 2024-01-18 DIAGNOSIS — N76.2 ACUTE VULVITIS: Primary | ICD-10-CM

## 2024-01-18 DIAGNOSIS — N89.8 VAGINAL DISCHARGE: ICD-10-CM

## 2024-01-18 DIAGNOSIS — N95.1 SYMPTOMATIC MENOPAUSAL OR FEMALE CLIMACTERIC STATES: ICD-10-CM

## 2024-01-18 DIAGNOSIS — N83.201 RIGHT OVARIAN CYST: ICD-10-CM

## 2024-01-18 PROCEDURE — 99213 OFFICE O/P EST LOW 20 MIN: CPT | Performed by: OBSTETRICS & GYNECOLOGY

## 2024-01-18 RX ORDER — CLOBETASOL PROPIONATE 0.5 MG/G
1 OINTMENT TOPICAL 2 TIMES DAILY
Qty: 30 G | Refills: 1 | Status: SHIPPED | OUTPATIENT
Start: 2024-01-18 | End: 2024-01-28

## 2024-01-19 LAB
BV BACTERIA DNA VAG QL NAA+PROBE: NEGATIVE
C GLABRATA DNA VAG QL NAA+PROBE: POSITIVE
C KRUSEI DNA VAG QL NAA+PROBE: NEGATIVE
CANDIDA DNA VAG QL NAA+PROBE: NEGATIVE
T VAGINALIS DNA VAG QL NAA+PROBE: NEGATIVE

## 2024-01-24 ENCOUNTER — PATIENT MESSAGE (OUTPATIENT)
Dept: ENDOCRINOLOGY CLINIC | Facility: CLINIC | Age: 64
End: 2024-01-24

## 2024-01-26 ENCOUNTER — APPOINTMENT (OUTPATIENT)
Dept: MRI IMAGING | Facility: HOSPITAL | Age: 64
End: 2024-01-26
Attending: EMERGENCY MEDICINE
Payer: MEDICAID

## 2024-01-26 ENCOUNTER — APPOINTMENT (OUTPATIENT)
Dept: CT IMAGING | Facility: HOSPITAL | Age: 64
End: 2024-01-26
Attending: EMERGENCY MEDICINE
Payer: MEDICAID

## 2024-01-26 ENCOUNTER — HOSPITAL ENCOUNTER (EMERGENCY)
Facility: HOSPITAL | Age: 64
Discharge: HOME | End: 2024-01-26
Attending: EMERGENCY MEDICINE
Payer: MEDICAID

## 2024-01-26 VITALS
DIASTOLIC BLOOD PRESSURE: 59 MMHG | BODY MASS INDEX: 31.34 KG/M2 | OXYGEN SATURATION: 96 % | TEMPERATURE: 99 F | SYSTOLIC BLOOD PRESSURE: 105 MMHG | HEART RATE: 74 BPM | WEIGHT: 195 LBS | RESPIRATION RATE: 16 BRPM | HEIGHT: 66 IN

## 2024-01-26 DIAGNOSIS — H65.93 BILATERAL SEROUS OTITIS MEDIA, UNSPECIFIED CHRONICITY: ICD-10-CM

## 2024-01-26 DIAGNOSIS — H91.93 BILATERAL CHANGE IN HEARING: Primary | ICD-10-CM

## 2024-01-26 DIAGNOSIS — R42 DIZZINESS: ICD-10-CM

## 2024-01-26 LAB
ANION GAP SERPL CALC-SCNC: 10 MMOL/L (ref 0–18)
BASOPHILS # BLD AUTO: 0.01 X10(3) UL (ref 0–0.2)
BASOPHILS NFR BLD AUTO: 0.2 %
BUN BLD-MCNC: 8 MG/DL (ref 9–23)
BUN/CREAT SERPL: 11.4 (ref 10–20)
CALCIUM BLD-MCNC: 8.8 MG/DL (ref 8.7–10.4)
CHLORIDE SERPL-SCNC: 108 MMOL/L (ref 98–112)
CO2 SERPL-SCNC: 21 MMOL/L (ref 21–32)
CREAT BLD-MCNC: 0.7 MG/DL
DEPRECATED RDW RBC AUTO: 47.2 FL (ref 35.1–46.3)
EGFRCR SERPLBLD CKD-EPI 2021: 98 ML/MIN/1.73M2 (ref 60–?)
EOSINOPHIL # BLD AUTO: 0.1 X10(3) UL (ref 0–0.7)
EOSINOPHIL NFR BLD AUTO: 2 %
ERYTHROCYTE [DISTWIDTH] IN BLOOD BY AUTOMATED COUNT: 15.5 % (ref 11–15)
GLUCOSE BLD-MCNC: 247 MG/DL (ref 70–99)
GLUCOSE BLDC GLUCOMTR-MCNC: 323 MG/DL (ref 70–99)
HCT VFR BLD AUTO: 38.7 %
HGB BLD-MCNC: 12.4 G/DL
IMM GRANULOCYTES # BLD AUTO: 0.01 X10(3) UL (ref 0–1)
IMM GRANULOCYTES NFR BLD: 0.2 %
LYMPHOCYTES # BLD AUTO: 1.97 X10(3) UL (ref 1–4)
LYMPHOCYTES NFR BLD AUTO: 39.7 %
MCH RBC QN AUTO: 27 PG (ref 26–34)
MCHC RBC AUTO-ENTMCNC: 32 G/DL (ref 31–37)
MCV RBC AUTO: 84.1 FL
MONOCYTES # BLD AUTO: 0.27 X10(3) UL (ref 0.1–1)
MONOCYTES NFR BLD AUTO: 5.4 %
NEUTROPHILS # BLD AUTO: 2.6 X10 (3) UL (ref 1.5–7.7)
NEUTROPHILS # BLD AUTO: 2.6 X10(3) UL (ref 1.5–7.7)
NEUTROPHILS NFR BLD AUTO: 52.5 %
OSMOLALITY SERPL CALC.SUM OF ELEC: 295 MOSM/KG (ref 275–295)
PLATELET # BLD AUTO: 251 10(3)UL (ref 150–450)
POTASSIUM SERPL-SCNC: 3.4 MMOL/L (ref 3.5–5.1)
RBC # BLD AUTO: 4.6 X10(6)UL
SODIUM SERPL-SCNC: 139 MMOL/L (ref 136–145)
WBC # BLD AUTO: 5 X10(3) UL (ref 4–11)

## 2024-01-26 PROCEDURE — 82962 GLUCOSE BLOOD TEST: CPT

## 2024-01-26 PROCEDURE — 70551 MRI BRAIN STEM W/O DYE: CPT | Performed by: EMERGENCY MEDICINE

## 2024-01-26 PROCEDURE — 99285 EMERGENCY DEPT VISIT HI MDM: CPT

## 2024-01-26 PROCEDURE — 85025 COMPLETE CBC W/AUTO DIFF WBC: CPT | Performed by: EMERGENCY MEDICINE

## 2024-01-26 PROCEDURE — 70496 CT ANGIOGRAPHY HEAD: CPT | Performed by: EMERGENCY MEDICINE

## 2024-01-26 PROCEDURE — 99284 EMERGENCY DEPT VISIT MOD MDM: CPT

## 2024-01-26 PROCEDURE — 96375 TX/PRO/DX INJ NEW DRUG ADDON: CPT

## 2024-01-26 PROCEDURE — 96374 THER/PROPH/DIAG INJ IV PUSH: CPT

## 2024-01-26 PROCEDURE — 70498 CT ANGIOGRAPHY NECK: CPT | Performed by: EMERGENCY MEDICINE

## 2024-01-26 PROCEDURE — 80048 BASIC METABOLIC PNL TOTAL CA: CPT | Performed by: EMERGENCY MEDICINE

## 2024-01-26 RX ORDER — POTASSIUM CHLORIDE 20 MEQ/1
40 TABLET, EXTENDED RELEASE ORAL ONCE
Status: COMPLETED | OUTPATIENT
Start: 2024-01-26 | End: 2024-01-26

## 2024-01-26 RX ORDER — ONDANSETRON 4 MG/1
4 TABLET, ORALLY DISINTEGRATING ORAL EVERY 8 HOURS PRN
Qty: 15 TABLET | Refills: 0 | Status: SHIPPED | OUTPATIENT
Start: 2024-01-26 | End: 2024-01-31

## 2024-01-26 RX ORDER — METOCLOPRAMIDE HYDROCHLORIDE 5 MG/ML
10 INJECTION INTRAMUSCULAR; INTRAVENOUS ONCE
Status: COMPLETED | OUTPATIENT
Start: 2024-01-26 | End: 2024-01-26

## 2024-01-26 RX ORDER — LORATADINE 10 MG/1
10 TABLET ORAL DAILY
Qty: 30 TABLET | Refills: 0 | Status: SHIPPED | OUTPATIENT
Start: 2024-01-26 | End: 2024-02-25

## 2024-01-26 RX ORDER — MECLIZINE HYDROCHLORIDE 25 MG/1
25 TABLET ORAL ONCE
Status: COMPLETED | OUTPATIENT
Start: 2024-01-26 | End: 2024-01-26

## 2024-01-26 RX ORDER — DIAZEPAM 5 MG/ML
2.5 INJECTION, SOLUTION INTRAMUSCULAR; INTRAVENOUS ONCE
Status: COMPLETED | OUTPATIENT
Start: 2024-01-26 | End: 2024-01-26

## 2024-01-26 RX ORDER — DIPHENHYDRAMINE HYDROCHLORIDE 50 MG/ML
25 INJECTION INTRAMUSCULAR; INTRAVENOUS ONCE
Status: COMPLETED | OUTPATIENT
Start: 2024-01-26 | End: 2024-01-26

## 2024-01-26 RX ORDER — MECLIZINE HYDROCHLORIDE 25 MG/1
25 TABLET ORAL 3 TIMES DAILY PRN
Qty: 15 TABLET | Refills: 0 | Status: SHIPPED | OUTPATIENT
Start: 2024-01-26 | End: 2024-01-31

## 2024-01-26 RX ORDER — FLUTICASONE PROPIONATE 50 MCG
2 SPRAY, SUSPENSION (ML) NASAL DAILY
Qty: 16 G | Refills: 0 | Status: SHIPPED | OUTPATIENT
Start: 2024-01-26 | End: 2024-02-25

## 2024-01-26 RX ORDER — DIAZEPAM 5 MG/ML
2.5 INJECTION, SOLUTION INTRAMUSCULAR; INTRAVENOUS ONCE
Status: DISCONTINUED | OUTPATIENT
Start: 2024-01-26 | End: 2024-01-26

## 2024-01-26 NOTE — ED PROVIDER NOTES
Patient Seen in: [facility] Emergency Department    History     Chief Complaint   Patient presents with    Headache    Ringing In Ear     Stated Complaint: ear and headpain     HPI    63-year-old female with past medical history of COPD, hypertension, dyslipidemia, PE on Xarelto, TIA manifesting as dizziness and headache presenting for evaluation with 6 weeks of bilateral ear ringing described as \"white noise\" associated with cephalgia and dizziness with nausea/vomiting.  Noting headache to originate in lateral neck and with radiation superiorly into head without preceding traumatic/infectious complaints. No vision loss or focal weakness/paresthesias.      Past Medical History:   Diagnosis Date    Arrhythmia     tachycardia    Asthma     B12 deficiency     Back pain     Back problem     Calculus of kidney 2017    Cancer (HCC)     - cervical    COPD (chronic obstructive pulmonary disease) (HCC)     Essential hypertension     High blood pressure     High cholesterol     History of abnormal cervical Pap smear     Muscle weakness     Neuropathy     Other and unspecified hyperlipidemia     Palpitations     Pulmonary embolism (HCC)     Spinal stenosis     Stroke (HCC)     TIA a few years ago    TIA (transient ischemic attack)     Type II or unspecified type diabetes mellitus without mention of complication, not stated as uncontrolled        Past Surgical History:   Procedure Laterality Date    CHOLECYSTECTOMY      COLONOSCOPY N/A 2017    Procedure: COLONOSCOPY, POSSIBLE BIOPSY, POSSIBLE POLYPECTOMY 94866;  Surgeon: Pedro Thomas MD;  Location: [facility]    COLONOSCOPY N/A 2022    Procedure: COLONOSCOPY/ESOPHAGOGASTRODUODENOSCOPY (EGD);  Surgeon: Cortney Diaz MD;  Location: [facility] ENDOSCOPY    COLONOSCOPY & POLYPECTOMY            OTHER      Lung biopsy    OTHER SURGICAL HISTORY      Cone biopsy    OTHER SURGICAL HISTORY      Cystoscopy    REMOVAL GALLBLADDER       TUBAL LIGATION  1986            Family History   Problem Relation Age of Onset    Diabetes Mother     Lung Disorder Mother         Emphysema    Heart Disease Mother     Asthma Mother     Cancer Father 51        Lung    Diabetes Sister     Thyroid Disorder Sister     Glaucoma Sister     Stroke Sister     Prostate Cancer Brother     Diabetes Brother     Diabetes Brother     Cancer Brother         Prostate cancer, copd    Diabetes Brother     Glaucoma Maternal Grandmother     Heart Disease Maternal Grandmother     Ovarian Cancer Maternal Grandmother 59    Cataracts Niece     Breast Cancer Niece 48    Macular degeneration Neg        Social History     Socioeconomic History    Marital status: Single   Tobacco Use    Smoking status: Every Day     Packs/day: 1.00     Years: 20.00     Additional pack years: 0.00     Total pack years: 20.00     Types: Cigarettes    Smokeless tobacco: Never    Tobacco comments:     1 pack daily   Vaping Use    Vaping Use: Never used   Substance and Sexual Activity    Alcohol use: Not Currently    Drug use: No   Other Topics Concern    Caffeine Concern No    Exercise No   Social History Narrative    The patient does not use an assistive device..      The patient does live in a home with stairs.       Review of Systems :  Constitutional: As per HPI  HENT: Negative for ear pain and rhinorrhea.  (+) hearing change.  Respiratory: Negative for cough and shortness of breath.    Cardiovascular: Negative for chest pain and palpitations.   Gastrointestinal: Negative for vomiting and abdominal pain.   Genitourinary: Negative for dysuria and hematuria.   Musculoskeletal: Negative for joint swelling and arthralgias.   Skin: Negative for rash. No itching.    Neurological: Negative for syncope and headaches. (+) dizziness.    Positive for stated complaint: ear and headpain  Other systems are as noted in HPI.  Constitutional and vital signs reviewed.      All other systems reviewed and negative except as  noted above.    PSFH elements reviewed from today and agreed except as otherwise stated in HPI.    Physical Exam     ED Triage Vitals [01/26/24 1312]   /84   Pulse 110   Resp 20   Temp 98.9 °F (37.2 °C)   Temp src Oral   SpO2 98 %   O2 Device None (Room air)       Current:/84   Pulse 110   Temp 98.9 °F (37.2 °C) (Oral)   Resp 20   Ht 167.6 cm (5' 6\")   Wt 88.5 kg   SpO2 98%   BMI 31.47 kg/m²         Physical Exam   Constitutional: No distress.   HEENT: MMM. BL TMs intact though bulging without erythema/dullness.  Head: Normocephalic. Atraumatic, no temporal tenderness.  Eyes: No injection. No photophobia. Pupils midrange and equally reactive.  Neck: Neck supple. No meningismus.  Cardiovascular: RRR.   Pulmonary/Chest: Effort normal. CTAB.  Abdominal: Soft. Nontender.  Musculoskeletal: No gross deformity.  Neurological: Alert. CN II-XII grossly intact. BUE/BLE proximally and distally with 5/5 strength.  Skin: Skin is warm.   Psychiatric: Cooperative.  Nursing note and vitals reviewed.        ED Course     Labs Reviewed   BASIC METABOLIC PANEL (8) - Abnormal; Notable for the following components:       Result Value    Glucose 247 (*)     Potassium 3.4 (*)     BUN 8 (*)     All other components within normal limits   POCT GLUCOSE - Abnormal; Notable for the following components:    POC Glucose  323 (*)     All other components within normal limits   CBC W/ DIFFERENTIAL - Abnormal; Notable for the following components:    RDW-SD 47.2 (*)     RDW 15.5 (*)     All other components within normal limits   CBC WITH DIFFERENTIAL WITH PLATELET    Narrative:     The following orders were created for panel order CBC With Differential With Platelet.  Procedure                               Abnormality         Status                     ---------                               -----------         ------                     CBC W/ DIFFERENTIAL[[id number]]          Abnormal            Final result                  Please view results for these tests on the individual orders.     CTA BRAIN + CTA CAROTIDS (CPT=70496/95221)    Result Date: 1/26/2024  PROCEDURE: CTA BRAIN + CTA CAROTIDS (CPT=70496/22707)  COMPARISON: [facility], CT CHEST PE AORTA (IV ONLY) (CPT=71260), 7/25/2023, 2:41 PM.  [facility], MRI BRAIN WO ACUTE (3) SEQUENCE(CPT=70551), 1/26/2024, 3:52 PM.  [facility], CT BRAIN HEAD WO CONTRAST, 3/02/2011, 9:55 PM.  INDICATIONS: Headache and right-sided tinnitus.  TECHNIQUE: CT images of the neck and brain were obtained with non-ionic intravenous contrast material. Multi-planar reformatted images were created. Automated exposure control for dose reduction was used. Evaluation of internal carotid stenosis is based on NASCET Criteria.     No 3D volume rendered reformatted images were created.  FINDINGS: HEAD CT:  CSF SPACES: The ventricles, cisterns, and sulci are commensurate in caliber and appropriate for age. No hydrocephalus, subarachnoid hemorrhage, or effacement of the basal cisterns is appreciated. There is no extra-axial fluid collection. CEREBRUM: No acute intraparenchymal hemorrhage, edema, or cortical sulcal effacement is apparent. There is no space-occupying lesion, mass effect, or shift of midline structures. The gray-white matter junction is preserved and bilaterally symmetric in appearance. CEREBELLUM: No edema, hemorrhage, mass, acute infarction, or significant atrophy.  BRAINSTEM: No edema, hemorrhage, mass, acute infarction, or significant atrophy.  CALVARIUM: There is no apparent depressed fracture, mass, or other significant visible lesion.  SINUSES: There are probable retention cysts in the right maxillary sinus.  A subcentimeter probable retention cyst is noted in the superior left maxillary sinus. ORBITS: No gross abnormalities.  OTHER: The mastoid and middle ear cavities are clear.   CT ANGIOGRAPHY OF THE NECK:  CAROTID ARTERIES: RIGHT COMMON  CAROTID: Mild calcific atherosclerosis involving the carotid bifurcation.  No hemodynamically significant stenosis or dissection. RIGHT INTERNAL CAROTID: No hemodynamically significant stenosis or dissection.  LEFT COMMON CAROTID: No hemodynamically significant stenosis or dissection. LEFT INTERNAL CAROTID: No hemodynamically significant stenosis or dissection.  VERTEBRAL ARTERIES: RIGHT VERTEBRAL ARTERY: No hemodynamically significant stenosis or dissection. LEFT VERTEBRAL ARTERY: No hemodynamically significant stenosis or dissection.  AORTIC ARCH (Limited): No aneurysm or dissection is identified in the imaged volume.  Mild calcific atherosclerosis. MEDIASTINUM/APICES (Limited): No mass or adenopathy.  There is stable paraseptal emphysema at the visualized lung apices.  There is unchanged subpleural scarring at the left lung apex.  OTHER: Postoperative changes are noted from a previous ACDF at C4 through C7.   CT ANGIOGRAPHY OF THE BRAIN:  ANTERIOR CIRCULATION: DISTAL INTERNAL CAROTIDS: Flow identified.  Mild calcific atherosclerosis on the right.  No significant stenosis. ANTERIOR CEREBRALS: Flow identified. No significant stenosis. MIDDLE CEREBRALS: Flow identified. No significant stenosis.  POSTERIOR CIRCULATION: RIGHT VERTEBRAL: Flow identified. No significant stenosis. LEFT VERTEBRAL: Flow identified. No significant stenosis. BASILAR: Flow identified. No significant stenosis. POSTERIOR CEREBRALS: Flow identified. No significant stenosis.  ANEURYSMS: None. VASCULAR MALFORMATIONS: None. OTHER: Negative.         CONCLUSION:  1. No acute intracranial process by noncontrast CT technique. 2. No flow limiting stenosis, occlusion or aneurysm of the major intracranial arteries. 3. No flow limiting stenosis, occlusion or dissection of the major cervical arteries. 4. Status post ACDF at C4-C7. 5. Lesser incidental findings as above.    Dictated by (CST): Lasha Robbins MD on 1/26/2024 at 5:40 PM     Finalized by  (CST): Lasha Robbins MD on 1/26/2024 at 5:47 PM          MRI BRAIN WO ACUTE (3) SEQUENCE (CPT=70551)    Result Date: 1/26/2024  PROCEDURE: MRI BRAIN WO ACUTE (3) SEQUENCE(CPT=70551)  COMPARISON: [facility], CT BRAIN HEAD WO CONTRAST, 3/02/2011, 9:55 PM.  INDICATIONS: ear and headpain  TECHNIQUE: A variety of imaging planes and parameters were utilized for visualization of suspected pathology.  Images were performed without contrast.  FINDINGS:  No acute infarct  No chronic ischemia  No hydrocephalus, hemorrhage, midline shift  No extra-axial collection         CONCLUSION: No acute infarct    Dictated by (CST): Jose Sevilla MD on 1/26/2024 at 4:09 PM     Finalized by (CST): Jose Sevilla MD on 1/26/2024 at 4:10 PM           ED Course as of 01/26/24 1732  ------------------------------------------------------------  Time: 01/26 1718  Comment: Resting comfortably with improving symptoms - await CTA results with anticipation of discharge in setting of nonacute MRI.     MDM   DIFFERENTIAL DIAGNOSIS: After history and physical exam differential diagnosis includes but is not limited to vertigo, CVA, cervical artery dissection.    Pulse ox: 98%:Normal on RA, as interpreted by myself    Medical Decision Making  Evaluation for subacute cephalgia/dizziness in neurologically intact and anticoagulated patient with horizontal nystagmus noted -MRI nonacute, labs grossly unremarkable; CTA obtained for possible cervical arteriopathy and nonacute, will discharge with symptomatic care including decongestants for serous OM and neurology/ENT followup.    Problems Addressed:  Bilateral change in hearing: acute illness or injury  Bilateral serous otitis media, unspecified chronicity: acute illness or injury  Dizziness: acute illness or injury    Amount and/or Complexity of Data Reviewed  External Data Reviewed: labs, radiology, ECG and notes.     Details: 11/3/2023 ED notes/CXR/EKG/labs reviewed  Labs: ordered.  Decision-making details documented in ED Course.  Radiology: ordered and independent interpretation performed. Decision-making details documented in ED Course.     Details: MRI without obvious mass lesion as independently interpreted by myself    Risk  Prescription drug management.  Parenteral controlled substances.        I was wearing at minimum a facemask and eye protection throughout this encounter with handwashing performed prior and after patient evaluation without personal hand/facial/oropharyngeal contact and gloves worn throughout encounter. See note and/or contact this provider for further PPE details.    Disposition and Plan     Clinical Impression:  1. Bilateral change in hearing    2. Dizziness    3. Bilateral serous otitis media, unspecified chronicity        Disposition:  Discharge    Follow-up:  Rene Price MD  [address] [id number]  [phone]    Call  For followup and re-evaluation.    Benjamin Vee DO  [address] [facility] [address]  [place] [address]  [phone]    Call  For followup and re-evaluation.    Gianluca Lr MD  [address] [facility] [address]  [place] [address]  [phone]    Call  For ENT followup and re-evaluation.      Medications Prescribed:  Current Discharge Medication List        START taking these medications    Details   fluticasone propionate 50 MCG/ACT Nasal Suspension 2 sprays by Nasal route daily.  Qty: 16 g, Refills: 0      loratadine 10 MG Oral Tab Take 1 tablet (10 mg total) by mouth daily.  Qty: 30 tablet, Refills: 0      meclizine 25 MG Oral Tab Take 1 tablet (25 mg total) by mouth 3 (three) times daily as needed for Dizziness.  Qty: 15 tablet, Refills: 0      ondansetron 4 MG Oral Tablet Dispersible Take 1 tablet (4 mg total) by mouth every 8 (eight) hours as needed for Nausea.  Qty: 15 tablet, Refills: 0

## 2024-01-26 NOTE — ED INITIAL ASSESSMENT (HPI)
C/o tinnitus and HA x 1 month. Hx TIA   Patient reports unable to fill insulin the past month as well. Accucheck obtained, see results.

## 2024-01-27 NOTE — TELEPHONE ENCOUNTER
SY - please advise, ty!    [phone] spoke to pt, last injection of Trulicity was 6 weeks ago. Pt recently was started on glipizide and that dosage has also been increased, pt reports it is barely making a dent, her morning BS are sometimes over 300.     Pt cannot find Trulicity within a 10-mile radius of her home, says there is an outpatient program through [place] for helping pt's obtain the Trulicity.     Informed pt this information will be forwarded to the doctor.

## 2024-01-27 NOTE — TELEPHONE ENCOUNTER
From: Di Puga  To: Floridalma Barahona  Sent: 1/24/2024 5:16 PM CST  Subject: Trulicity refill    Good afternoon, I have not been able to obtain medication refill. The young lady I spoke with stated that you could get an outside prescription from [facility] for the Trulicity. Please advise it has been a month and my sugars are ranging from 275 at wake up and 450 after dinner.    Thank you,   Di Puga

## 2024-01-29 NOTE — TELEPHONE ENCOUNTER
Hi!  Please ask patient if she would be willing to try the 0.75mg qweekly or 3.0mg weekly? Thank you!

## 2024-02-02 NOTE — TELEPHONE ENCOUNTER
Spoke to the pharmacist at [facility] - they are now able to order trulicity - will order 1.5mg dose (RX sent 1/5/24)  for Monday and contact patient for pickup  MC sent updating patient

## 2024-02-06 ENCOUNTER — OFFICE VISIT (OUTPATIENT)
Dept: OTOLARYNGOLOGY | Facility: CLINIC | Age: 64
End: 2024-02-06
Payer: MEDICAID

## 2024-02-06 ENCOUNTER — OFFICE VISIT (OUTPATIENT)
Dept: AUDIOLOGY | Facility: CLINIC | Age: 64
End: 2024-02-06
Payer: MEDICAID

## 2024-02-06 VITALS — BODY MASS INDEX: 31.34 KG/M2 | WEIGHT: 195 LBS | HEIGHT: 66 IN

## 2024-02-06 DIAGNOSIS — H90.3 SENSORINEURAL HEARING LOSS, BILATERAL: Primary | ICD-10-CM

## 2024-02-06 DIAGNOSIS — H90.3 SENSORINEURAL HEARING LOSS (SNHL) OF BOTH EARS: ICD-10-CM

## 2024-02-06 DIAGNOSIS — H93.19 TINNITUS, UNSPECIFIED LATERALITY: Primary | ICD-10-CM

## 2024-02-06 PROCEDURE — 92557 COMPREHENSIVE HEARING TEST: CPT | Performed by: AUDIOLOGIST

## 2024-02-06 PROCEDURE — 99203 OFFICE O/P NEW LOW 30 MIN: CPT | Performed by: STUDENT IN AN ORGANIZED HEALTH CARE EDUCATION/TRAINING PROGRAM

## 2024-02-06 PROCEDURE — 92504 EAR MICROSCOPY EXAMINATION: CPT | Performed by: STUDENT IN AN ORGANIZED HEALTH CARE EDUCATION/TRAINING PROGRAM

## 2024-02-06 PROCEDURE — 92567 TYMPANOMETRY: CPT | Performed by: AUDIOLOGIST

## 2024-02-06 NOTE — PROGRESS NOTES
Di Puga is a 63 year old female.   Chief Complaint   Patient presents with    Ringing In Ear     Ringing in the ears and imbalance    Ear Problem     Fluid in both ears       ASSESSMENT AND PLAN:   1. Tinnitus, unspecified laterality  63-year-old presents with recent onset of bilateral nonpulsatile tinnitus.  This is quite bothersome to her and affects some of her daily activities such as TV and Zoom calls.  Denies any significant change in her hearing.  She was told by an outside provider she had fluid in her ears.  She had a CTA of her brain and an MRI of her brain that were normal on January 26.  She had an ACDF at C4-C7    She had a normal otologic exam without any effusion or otitis    Audiogram largely normal she had a slight nerve loss in both ears at 2000 Hz and also in the highest frequencies.  Normal tympanograms    I reassured her of the largely normal hearing test and ear exam.  She does have a history of a cervical fusion which could alter posture and indirectly cause tinnitus over time.  Discussed cutting out caffeine if she can buy a little bit.  She drinks 1 cup of coffee a day and 2 cans of soda a day.  This has been going on for about 1 to 2 months.  There is still time for the brain to adapt to the sound to possibly dampen the sound with time.  Discussed tinnitus precautions and coping methods with her in detail.  She can follow-up as needed.    Consult from Dr. Cleary regarding tinnitus    - OP REFERRAL TO AUDIOLOGY    2. Sensorineural hearing loss (SNHL) of both ears        The patient indicates understanding of these issues and agrees to the plan.      EXAM:   Ht 5' 6\" (1.676 m)   Wt 195 lb (88.5 kg)   BMI 31.47 kg/m²     Pertinent exam findings may also be noted above in assessment and plan     System Details   Skin Inspection - Normal.   Constitutional Overall appearance - Normal.   Head/Face Symmetric, TMJ tenderness not present    Eyes EOMI, PERRL   Right ear:  Canal clear, TM  intact, no TALIB   Left ear:  Canal clear, TM intact, no TALIB   Nose: Septum midline, inferior turbinates not enlarged, nasal valves without collapse    Oral cavity/Oropharynx: No lesions or masses on inspection or palpation, tonsils symmetric    Neck: Soft without LAD, thyroid not enlarged  Voice clear/ no stridor   Other:      Scopes and Procedures:             Current Outpatient Medications   Medication Sig Dispense Refill    fluticasone propionate 50 MCG/ACT Nasal Suspension 2 sprays by Nasal route daily. 16 g 0    loratadine 10 MG Oral Tab Take 1 tablet (10 mg total) by mouth daily. 30 tablet 0    glipiZIDE 5 MG Oral Tab Take 2 tablets (10 mg total) by mouth 2 (two) times daily before meals. 360 tablet 0    metFORMIN HCl 1000 MG Oral Tab Take 1 tablet (1,000 mg total) by mouth 2 (two) times daily with meals. 180 tablet 0    glipiZIDE 2.5 MG Oral Tab Take 2.5 mg by mouth every evening. (Patient not taking: Reported on 1/18/2024) 90 tablet 0    Dulaglutide (TRULICITY) 1.5 MG/0.5ML Subcutaneous Solution Pen-injector ADMINISTER 1.5 MG UNDER THE SKIN 1 TIME A WEEK 6 mL 0    apixaban (ELIQUIS) 5 MG Oral Tab Take 1 tablet (5 mg total) by mouth 2 (two) times daily. 180 tablet 1    Glucose Blood (ONETOUCH VERIO) In Vitro Strip TEST BLOOD SUGAR TWICE DAILY. 200 strip 1    amLODIPine 5 MG Oral Tab Take 1 tablet (5 mg total) by mouth daily. 90 tablet 3    traZODone 50 MG Oral Tab Take 1 tablet (50 mg total) by mouth nightly. (Patient not taking: Reported on 1/18/2024) 30 tablet 1    atorvastatin 80 MG Oral Tab Take 1 tablet (80 mg total) by mouth nightly. 90 tablet 3    metoprolol succinate ER 25 MG Oral Tablet 24 Hr Take 1 tablet (25 mg total) by mouth daily. 90 tablet 3    albuterol 108 (90 Base) MCG/ACT Inhalation Aero Soln Inhale 2 puffs into the lungs every 4 (four) hours as needed. 8.5 g 1    Budesonide-Formoterol Fumarate (SYMBICORT) 160-4.5 MCG/ACT Inhalation Aerosol Inhale 2 puffs into the lungs 2 (two) times daily.  1 each 3    Acetaminophen-Codeine (TYLENOL WITH CODEINE #3) 300-30 MG Oral Tab Take 1 tablet by mouth every 8 (eight) hours as needed for Pain. 30 tablet 0    Blood Glucose Monitoring Suppl (ONETOUCH VERIO FLEX SYSTEM) w/Device Does not apply Kit Use to check blood sugar two times a day 1 kit 0    OneTouch Delica Lancets 33G Does not apply Misc Use to check blood sugar two times a day 200 each 1    Blood Glucose Monitoring Suppl (ONETOUCH ULTRA SYSTEM) W/DEVICE Does not apply Kit Test sugar twice daily 1 kit 0      Past Medical History:   Diagnosis Date    Arrhythmia     tachycardia    Asthma     B12 deficiency     Back pain     Back problem     Calculus of kidney 2017    Cancer (HCC)     1990- cervical    COPD (chronic obstructive pulmonary disease) ([facility])     Essential hypertension     High blood pressure     High cholesterol     History of abnormal cervical Pap smear     Muscle weakness     Neuropathy     Other and unspecified hyperlipidemia     Palpitations     Pulmonary embolism ([facility])     Spinal stenosis     Stroke ([facility])     TIA a few years ago    TIA (transient ischemic attack)     Type II or unspecified type diabetes mellitus without mention of complication, not stated as uncontrolled       Social History:  Social History     Socioeconomic History    Marital status: Single   Tobacco Use    Smoking status: Every Day     Packs/day: 1.00     Years: 20.00     Additional pack years: 0.00     Total pack years: 20.00     Types: Cigarettes    Smokeless tobacco: Never    Tobacco comments:     1 pack daily   Vaping Use    Vaping Use: Never used   Substance and Sexual Activity    Alcohol use: Not Currently    Drug use: No   Other Topics Concern    Caffeine Concern No    Exercise No   Social History Narrative    The patient does not use an assistive device..      The patient does live in a home with stairs.          Gianluca Lr MD  2/6/2024  1:44 PM

## 2024-02-16 NOTE — TELEPHONE ENCOUNTER
Please review.  Has no protocol.    Requested Prescriptions   Pending Prescriptions Disp Refills    ELIQUIS 5 MG Oral Tab [Pharmacy Med Name: ELIQUIS 5MG TABLETS] 180 tablet 1     Sig: TAKE 1 TABLET(5 MG) BY MOUTH TWICE DAILY       There is no refill protocol information for this order        Future Appointments         Provider Department Appt Notes    In 2 weeks [address] Ultrasound The test    In 2 weeks Jamar Jeffers MD [facility] EP/ DM EE    In 1 month Rene Price MD [facility] 3 mos f/u          Recent Outpatient Visits              1 week ago Sensorineural hearing loss, bilateral    [facility] Kathi Moyer Au.D    Office Visit    1 week ago Tinnitus, unspecified laterality    [facility] Gianluca Lr MD    Office Visit    4 weeks ago Acute vulvitis    [facility] [address], [place] - OB/GYN Valdo Norman DO    Office Visit    1 month ago Essential hypertension    [facility] Rene Price MD    Office Visit    1 month ago Type 2 diabetes mellitus without retinopathy (HCC)    [facility] Floridalma Barahona MD    Office Visit

## 2024-02-25 PROBLEM — N95.1 SYMPTOMATIC MENOPAUSAL OR FEMALE CLIMACTERIC STATES: Status: ACTIVE | Noted: 2024-02-25

## 2024-02-25 PROBLEM — N89.8 VAGINAL DISCHARGE: Status: ACTIVE | Noted: 2024-02-25

## 2024-02-26 NOTE — PROGRESS NOTES
Subjective:   Patient ID: Di Puga is a 63 year old female.    HPI   and  with LMP around age 41. Never used HRT. She actually has worsening flashes in recent months. She is here today c/o 1 week of external pruritus especially post urination. Known ovarian cyst in  time last imaged in 2022.     History/Other:   Review of Systems   Constitutional:  Negative for appetite change, fatigue and unexpected weight change.   Eyes:  Negative for visual disturbance.   Respiratory:  Negative for cough and shortness of breath.    Cardiovascular:  Negative for chest pain, palpitations and leg swelling.   Gastrointestinal:  Negative for abdominal distention, abdominal pain, blood in stool, constipation and diarrhea.   Genitourinary:  Negative for dysuria, frequency, pelvic pain and urgency.   Musculoskeletal:  Negative for arthralgias and myalgias.   Skin:  Negative for rash.   Neurological:  Negative for weakness, numbness and headaches.   Psychiatric/Behavioral:  Negative for dysphoric mood. The patient is not nervous/anxious.      Current Outpatient Medications   Medication Sig Dispense Refill    glipiZIDE 5 MG Oral Tab Take 2 tablets (10 mg total) by mouth 2 (two) times daily before meals. 360 tablet 0    metFORMIN HCl 1000 MG Oral Tab Take 1 tablet (1,000 mg total) by mouth 2 (two) times daily with meals. 180 tablet 0    Dulaglutide (TRULICITY) 1.5 MG/0.5ML Subcutaneous Solution Pen-injector ADMINISTER 1.5 MG UNDER THE SKIN 1 TIME A WEEK 6 mL 0    Glucose Blood (ONETOUCH VERIO) In Vitro Strip TEST BLOOD SUGAR TWICE DAILY. 200 strip 1    amLODIPine 5 MG Oral Tab Take 1 tablet (5 mg total) by mouth daily. 90 tablet 3    atorvastatin 80 MG Oral Tab Take 1 tablet (80 mg total) by mouth nightly. 90 tablet 3    metoprolol succinate ER 25 MG Oral Tablet 24 Hr Take 1 tablet (25 mg total) by mouth daily. 90 tablet 3    albuterol 108 (90 Base) MCG/ACT Inhalation Aero Soln Inhale 2 puffs into the lungs  every 4 (four) hours as needed. 8.5 g 1    Budesonide-Formoterol Fumarate (SYMBICORT) 160-4.5 MCG/ACT Inhalation Aerosol Inhale 2 puffs into the lungs 2 (two) times daily. 1 each 3    Acetaminophen-Codeine (TYLENOL WITH CODEINE #3) 300-30 MG Oral Tab Take 1 tablet by mouth every 8 (eight) hours as needed for Pain. 30 tablet 0    Blood Glucose Monitoring Suppl (ONETOUCH VERIO FLEX SYSTEM) w/Device Does not apply Kit Use to check blood sugar two times a day 1 kit 0    OneTouch Delica Lancets 33G Does not apply Misc Use to check blood sugar two times a day 200 each 1    Blood Glucose Monitoring Suppl (ONETOUCH ULTRA SYSTEM) W/DEVICE Does not apply Kit Test sugar twice daily 1 kit 0    apixaban (ELIQUIS) 5 MG Oral Tab Take 1 tablet (5 mg total) by mouth 2 (two) times daily. 180 tablet 3    fluticasone propionate 50 MCG/ACT Nasal Suspension 2 sprays by Nasal route daily. 16 g 0    loratadine 10 MG Oral Tab Take 1 tablet (10 mg total) by mouth daily. 30 tablet 0    glipiZIDE 2.5 MG Oral Tab Take 2.5 mg by mouth every evening. (Patient not taking: Reported on 1/18/2024) 90 tablet 0    traZODone 50 MG Oral Tab Take 1 tablet (50 mg total) by mouth nightly. (Patient not taking: Reported on 1/18/2024) 30 tablet 1     Allergies:  Allergies   Allergen Reactions    Ace Inhibitors ANAPHYLAXIS, ANGIOEDEMA and SWELLING     April 2020, hospitalized at [facility] in [place] with tongue swelling and throat closing sensation, on lisinopril at the time. Not intubated      Egg White (Diagnostic) NAUSEA ONLY and Tightness in Throat    Fish-Derived Products SWELLING    Lisinopril ANAPHYLAXIS    Peanuts SWELLING    Shrimp ITCHING, NAUSEA AND VOMITING, Tightness in Throat and WHEEZING    Tomato SWELLING and UNKNOWN    Tomatoes RASH       Objective:   Physical Exam  Genitourinary:     Comments: EG show symmetric erythema and slight desquamation. The vagina and cervix w/o lesions.Moderate mucoid DC. Uterus NSSC and no adnexal mass / tenderness.              Assessment & Plan:   1. Acute vulvitis    2. Vaginal discharge    3. Right ovarian cyst    4. Symptomatic menopausal or female climacteric states        Orders Placed This Encounter   Procedures    FSH    Estradiol    Vaginitis Vaginosis PCR Panel       Meds This Visit:  Requested Prescriptions     Signed Prescriptions Disp Refills    clobetasol 0.05 % External Ointment 30 g 1     Sig: Apply 1 Application topically 2 (two) times daily for 10 days.       Imaging & Referrals:  US PELVIS W EV (CPT=76856/92982)

## 2024-02-28 RX ORDER — ATORVASTATIN CALCIUM 80 MG/1
80 TABLET, FILM COATED ORAL NIGHTLY
Qty: 90 TABLET | Refills: 3 | Status: SHIPPED | OUTPATIENT
Start: 2024-02-28

## 2024-02-28 NOTE — TELEPHONE ENCOUNTER
Refill passed per [facility] protocol.  Requested Prescriptions   Pending Prescriptions Disp Refills    ATORVASTATIN 80 MG Oral Tab [Pharmacy Med Name: ATORVASTATIN 80MG TABLETS] 90 tablet 3     Sig: TAKE 1 TABLET(80 MG) BY MOUTH EVERY NIGHT       Cholesterol Medication Protocol Passed - 2/28/2024  8:04 AM        Passed - ALT < 80     Lab Results   Component Value Date    ALT 11 01/09/2024             Passed - ALT resulted within past year        Passed - Lipid panel within past 12 months     Lab Results   Component Value Date    CHOLEST 133 01/09/2024    TRIG 86 01/09/2024    HDL 35 (L) 01/09/2024    LDL 81 01/09/2024    VLDL 14 01/09/2024    TCHDLRATIO 4.0 12/21/2021    NONHDLC 98 01/09/2024             Passed - In person appointment or virtual visit in the past 12 mos or appointment in next 3 mos     Recent Outpatient Visits              3 weeks ago Sensorineural hearing loss, bilateral    [facility] Kathi Moyer Au.D    Office Visit    3 weeks ago Tinnitus, unspecified laterality    [facility] Gianluca Lr MD    Office Visit    1 month ago Acute vulvitis    [facility], [address], [place] - OB/GYN Valdo Norman DO    Office Visit    1 month ago Essential hypertension    [facility] Rene Price MD    Office Visit    1 month ago Type 2 diabetes mellitus without retinopathy (HCC)    [facility] Floridalma Barahona MD    Office Visit          Future Appointments         Provider Department Appt Notes    In 1 week [address] Ultrasound The test    In 1 week Jamar Jeffers MD [facility] EP/ DM EE    In 1 month Rene Price MD [facility] 3 mos f/u                  Recent Outpatient Visits              3  weeks ago Sensorineural hearing loss, bilateral    [facility] Kathi Moyer Au.D    Office Visit    3 weeks ago Tinnitus, unspecified laterality    [facility] Gianluca Lr MD    Office Visit    1 month ago Acute vulvitis    [facility], [address], [place] - OB/GYN Valdo Norman DO    Office Visit    1 month ago Essential hypertension    [facility] Rene Price MD    Office Visit    1 month ago Type 2 diabetes mellitus without retinopathy (HCC)    [facility] Floridalma Barahona MD    Office Visit          Future Appointments         Provider Department Appt Notes    In 1 week [address] Ultrasound The test    In 1 week Jamar Jeffers MD [facility] EP/ DM EE    In 1 month Rene Price MD [facility] 3 mos f/u

## 2024-03-06 ENCOUNTER — HOSPITAL ENCOUNTER (OUTPATIENT)
Dept: ULTRASOUND IMAGING | Facility: HOSPITAL | Age: 64
Discharge: HOME | End: 2024-03-06
Attending: OBSTETRICS & GYNECOLOGY
Payer: MEDICAID

## 2024-03-06 DIAGNOSIS — N83.201 RIGHT OVARIAN CYST: ICD-10-CM

## 2024-03-06 PROCEDURE — 76856 US EXAM PELVIC COMPLETE: CPT | Performed by: OBSTETRICS & GYNECOLOGY

## 2024-03-06 PROCEDURE — 76830 TRANSVAGINAL US NON-OB: CPT | Performed by: OBSTETRICS & GYNECOLOGY

## 2024-03-07 ENCOUNTER — OFFICE VISIT (OUTPATIENT)
Dept: OPHTHALMOLOGY | Facility: CLINIC | Age: 64
End: 2024-03-07
Payer: MEDICAID

## 2024-03-07 DIAGNOSIS — E11.9 TYPE 2 DIABETES MELLITUS WITHOUT RETINOPATHY (HCC): Primary | ICD-10-CM

## 2024-03-07 DIAGNOSIS — H25.13 AGE-RELATED NUCLEAR CATARACT OF BOTH EYES: ICD-10-CM

## 2024-03-07 NOTE — PROGRESS NOTES
Di Puga is a 64 year old female.    HPI:     HPI    Pt is here for diabetic eye exam.  Pt states distance vision has gotten worse with glasses on.     Pt has been a diabetic for 19 years       Pt's diabetes is currently controlled by pills and injectable  Pt checks BS daily  Last blood sugar was 150 this morning  Last HA1C was 7.2 on 24  Endocrinologist:     Last edited by Florecita Kwok OT on 3/7/2024  1:48 PM.        Patient History:  Past Medical History:   Diagnosis Date    Arrhythmia     tachycardia    Asthma (HCC)     B12 deficiency     Back pain     Back problem     Calculus of kidney 2017    Cancer (HCC)     - cervical    COPD (chronic obstructive pulmonary disease) (HCC)     Essential hypertension     High blood pressure     High cholesterol     History of abnormal cervical Pap smear     Muscle weakness     Neuropathy     Other and unspecified hyperlipidemia     Palpitations     Pulmonary embolism (HCC)     Spinal stenosis     Stroke (HCC)     TIA a few years ago    TIA (transient ischemic attack)     Type II or unspecified type diabetes mellitus without mention of complication, not stated as uncontrolled        Surgical History: Di Puga has a past surgical history that includes cholecystectomy (); tubal ligation (); colonoscopy & polypectomy (); other surgical history () (Cone biopsy); other surgical history () (Cystoscopy); colonoscopy (N/A, 2017) (Procedure: COLONOSCOPY, POSSIBLE BIOPSY, POSSIBLE POLYPECTOMY 60726;  Surgeon: Pedro Thomas MD;  Location: [facility] SURGICAL [facility]); other (Lung biopsy); removal gallbladder; colonoscopy (N/A, 2022) (Procedure: COLONOSCOPY/ESOPHAGOGASTRODUODENOSCOPY (EGD);  Surgeon: Cortney Diaz MD;  Location: [facility] ENDOSCOPY); and .    Family History   Problem Relation Age of Onset    Diabetes Mother     Lung Disorder Mother         Emphysema    Heart Disease Mother     Asthma Mother      Cancer Father 51        Lung    Diabetes Sister     Thyroid Disorder Sister     Glaucoma Sister     Stroke Sister     Prostate Cancer Brother     Diabetes Brother     Diabetes Brother     Cancer Brother         Prostate cancer, copd    Diabetes Brother     Glaucoma Maternal Grandmother     Heart Disease Maternal Grandmother     Ovarian Cancer Maternal Grandmother 59    Cataracts Niece     Breast Cancer Niece 48    Macular degeneration Neg        Social History:   Social History     Socioeconomic History    Marital status: Single   Tobacco Use    Smoking status: Every Day     Packs/day: 1.00     Years: 20.00     Additional pack years: 0.00     Total pack years: 20.00     Types: Cigarettes    Smokeless tobacco: Never    Tobacco comments:     1 pack daily   Vaping Use    Vaping Use: Never used   Substance and Sexual Activity    Alcohol use: Not Currently    Drug use: No   Other Topics Concern    Caffeine Concern No    Exercise No   Social History Narrative    The patient does not use an assistive device..      The patient does live in a home with stairs.       Medications:  Current Outpatient Medications   Medication Sig Dispense Refill    atorvastatin 80 MG Oral Tab Take 1 tablet (80 mg total) by mouth nightly. 90 tablet 3    apixaban (ELIQUIS) 5 MG Oral Tab Take 1 tablet (5 mg total) by mouth 2 (two) times daily. 180 tablet 3    glipiZIDE 5 MG Oral Tab Take 2 tablets (10 mg total) by mouth 2 (two) times daily before meals. 360 tablet 0    metFORMIN HCl 1000 MG Oral Tab Take 1 tablet (1,000 mg total) by mouth 2 (two) times daily with meals. 180 tablet 0    Dulaglutide (TRULICITY) 1.5 MG/0.5ML Subcutaneous Solution Pen-injector ADMINISTER 1.5 MG UNDER THE SKIN 1 TIME A WEEK 6 mL 0    Glucose Blood (ONETOUCH VERIO) In Vitro Strip TEST BLOOD SUGAR TWICE DAILY. 200 strip 1    amLODIPine 5 MG Oral Tab Take 1 tablet (5 mg total) by mouth daily. 90 tablet 3    traZODone 50 MG Oral Tab Take 1 tablet (50 mg total) by mouth  nightly. 30 tablet 1    metoprolol succinate ER 25 MG Oral Tablet 24 Hr Take 1 tablet (25 mg total) by mouth daily. 90 tablet 3    albuterol 108 (90 Base) MCG/ACT Inhalation Aero Soln Inhale 2 puffs into the lungs every 4 (four) hours as needed. 8.5 g 1    Budesonide-Formoterol Fumarate (SYMBICORT) 160-4.5 MCG/ACT Inhalation Aerosol Inhale 2 puffs into the lungs 2 (two) times daily. 1 each 3    Acetaminophen-Codeine (TYLENOL WITH CODEINE #3) 300-30 MG Oral Tab Take 1 tablet by mouth every 8 (eight) hours as needed for Pain. 30 tablet 0    Blood Glucose Monitoring Suppl (ONETOUCH VERIO FLEX SYSTEM) w/Device Does not apply Kit Use to check blood sugar two times a day 1 kit 0    OneTouch Delica Lancets 33G Does not apply Misc Use to check blood sugar two times a day 200 each 1    Blood Glucose Monitoring Suppl (ONETOUCH ULTRA SYSTEM) W/DEVICE Does not apply Kit Test sugar twice daily 1 kit 0    glipiZIDE 2.5 MG Oral Tab Take 2.5 mg by mouth every evening. (Patient not taking: Reported on 1/18/2024) 90 tablet 0       Allergies:  Allergies   Allergen Reactions    Ace Inhibitors ANAPHYLAXIS, ANGIOEDEMA and SWELLING     April 2020, hospitalized at [facility] in [place] with tongue swelling and throat closing sensation, on lisinopril at the time. Not intubated      Egg White (Diagnostic) NAUSEA ONLY and Tightness in Throat    Fish-Derived Products SWELLING    Lisinopril ANAPHYLAXIS    Peanuts SWELLING    Shrimp ITCHING, NAUSEA AND VOMITING, Tightness in Throat and WHEEZING    Tomato SWELLING and UNKNOWN    Tomatoes RASH       ROS:     ROS    Positive for: Endocrine, Eyes  Negative for: Constitutional, Gastrointestinal, Neurological, Skin, Genitourinary, Musculoskeletal, HENT, Cardiovascular, Respiratory, Psychiatric, Allergic/Imm, Heme/Lymph  Last edited by Selina Hu, O.T. on 3/7/2024  1:02 PM.          PHYSICAL EXAM:     Base Eye Exam       Visual Acuity (Snellen - Linear)         Right Left    Dist cc 20/20 20/20     Near cc 20/20 20/20      Correction: Glasses   Did not bring glasses.  [facility] was checked with most recent prescription in phoropter.             Tonometry ([facility], 1:17 PM)         Right Left    Pressure 16 15              Pupils         Pupils    Right PERRL    Left PERRL              Visual Fields         Left Right     Full Full              Extraocular Movement         Right Left     Full, Ortho Full, Ortho              Neuro/Psych       Oriented x3: Yes    Mood/Affect: Normal              Dilation       Both eyes: 1.0% Mydriacyl and 2.5% Ramana Synephrine @ 1:17 PM              Dilation #2       Both eyes: 1.0% Mydriacyl and 2.5% Ramana Synephrine @ 1:17 PM                  Slit Lamp and Fundus Exam       Slit Lamp Exam         Right Left    Lids/Lashes Dermatochalasis, Meibomian gland dysfunction Dermatochalasis, Meibomian gland dysfunction    Conjunctiva/Sclera Normal Normal    Cornea Clear Clear    Anterior Chamber Deep and quiet Deep and quiet    Iris No transillumination defects No transillumination defects    Lens 1+ Nuclear sclerosis 1+ Nuclear sclerosis    Vitreous Clear Clear              Fundus Exam         Right Left    Disc Good rim, Temporal crescent Good rim, Temporal crescent    C/D Ratio 0.4 0.4    Macula Normal- no BDR Normal- no BDR    Vessels Normal Normal    Periphery Normal Normal                  Refraction       Wearing Rx       Type: Forgot glasses              Manifest Refraction (Auto)         Sphere Cylinder [place] Dist VA Add Near VA    Right          Left -1.75 +0.50 010                 Manifest Refraction #2         Sphere Cylinder [place] Dist VA Add Near VA    Right -1.50 +1.25 180 20/20 +2.50 20/20    Left -2.00 +0.50 010 20/20 +2.50 20/20              Manifest Refraction Comments    Unable to get Auto on right eye.              Final Rx         Sphere Cylinder [place] Dist VA Add Near VA    Right -1.50 +1.25 180 20/20 +2.50 20/20    Left -2.00 +0.50 010 20/20 +2.50 20/20      Type: Progressive  bifocal                     ASSESSMENT/PLAN:     Diagnoses and Plan:     Type 2 diabetes mellitus without retinopathy (HCC)  Diabetes type II: no background of retinopathy, no signs of neovascularization noted.  Discussed ocular and systemic benefits of blood sugar control.  Diagnosis and treatment discussed in detail with patient.    Will see patient in 1 year for a diabetic exam    Age-related nuclear cataract of both eyes  Discussed early cataracts with patient. Told patient that cataracts are age appropriate and they are not surgical at this time.  No treatment recommended at this time.     No orders of the defined types were placed in this encounter.      Meds This Visit:  Requested Prescriptions      No prescriptions requested or ordered in this encounter        Follow up instructions:  Return in about 1 year (around 3/7/2025) for Diabetic eye exam.    3/7/2024  Scribed by: Jamar Jeffers MD

## 2024-03-07 NOTE — ASSESSMENT & PLAN NOTE
Discussed early cataracts with patient. Told patient that cataracts are age appropriate and they are not surgical at this time.  No treatment recommended at this time.

## 2024-03-07 NOTE — PATIENT INSTRUCTIONS
Type 2 diabetes mellitus without retinopathy (HCC)  Diabetes type II: no background of retinopathy, no signs of neovascularization noted.  Discussed ocular and systemic benefits of blood sugar control.  Diagnosis and treatment discussed in detail with patient.    Will see patient in 1 year for a diabetic exam    Age-related nuclear cataract of both eyes  Discussed early cataracts with patient. Told patient that cataracts are age appropriate and they are not surgical at this time.  No treatment recommended at this time.

## 2024-03-14 NOTE — TELEPHONE ENCOUNTER
Prior authorization approved   Case ID: [id number]      Payer: [employer] [facility]    [phone]   The case has been Approved from   to   Electronic appeal: Not supported   View History     Medication Being Authorized     Glucose Blood (ONETOUCH VERIO) In Vitro Strip    TEST BLOOD SUGAR TWICE DAILY.    Dispense: 200 strip Refills: 1     Start: 8/21/2023      Class: Normal Diagnoses: Type 2 diabetes mellitus without retinopathy (HCC)     This order has been released to its destination.

## 2024-04-02 ENCOUNTER — OFFICE VISIT (OUTPATIENT)
Dept: INTERNAL MEDICINE CLINIC | Facility: CLINIC | Age: 64
End: 2024-04-02

## 2024-04-02 VITALS
SYSTOLIC BLOOD PRESSURE: 118 MMHG | HEIGHT: 66 IN | WEIGHT: 197.81 LBS | HEART RATE: 105 BPM | BODY MASS INDEX: 31.79 KG/M2 | DIASTOLIC BLOOD PRESSURE: 72 MMHG | TEMPERATURE: 98 F

## 2024-04-02 DIAGNOSIS — E78.2 MIXED HYPERLIPIDEMIA: Primary | ICD-10-CM

## 2024-04-02 DIAGNOSIS — E11.9 TYPE 2 DIABETES MELLITUS WITHOUT RETINOPATHY (HCC): ICD-10-CM

## 2024-04-02 DIAGNOSIS — Z91.018 FOOD ALLERGY: ICD-10-CM

## 2024-04-02 DIAGNOSIS — I10 ESSENTIAL HYPERTENSION: ICD-10-CM

## 2024-04-02 DIAGNOSIS — J43.9 PULMONARY EMPHYSEMA, UNSPECIFIED EMPHYSEMA TYPE (HCC): ICD-10-CM

## 2024-04-02 PROCEDURE — 99214 OFFICE O/P EST MOD 30 MIN: CPT | Performed by: INTERNAL MEDICINE

## 2024-04-02 PROCEDURE — 36415 COLL VENOUS BLD VENIPUNCTURE: CPT | Performed by: INTERNAL MEDICINE

## 2024-04-02 RX ORDER — DULAGLUTIDE 1.5 MG/.5ML
1.5 INJECTION, SOLUTION SUBCUTANEOUS WEEKLY
Qty: 6 ML | Refills: 0 | Status: SHIPPED | OUTPATIENT
Start: 2024-04-02

## 2024-04-02 NOTE — PROGRESS NOTES
Subjective:     Patient ID: Di Puga is a 64 year old female.    HPI  Patient comes in today for follow-up denies any new complaints she still taking the treatments her sugars have been better for a while there she could not take the Trulicity because of pharmacy shortage patient says she is taking glipizide only 1 tablet at night because of hypoglycemic episodes few times during the night.  Patient would like to see an allergist due to her constant problems with food allergies.      History/Other:   Review of Systems   Constitutional: Negative.    HENT: Negative.     Eyes: Negative.    Respiratory: Negative.     Cardiovascular: Negative.    Gastrointestinal: Negative.    Genitourinary: Negative.    Musculoskeletal: Negative.    Skin: Negative.    Neurological: Negative.    Psychiatric/Behavioral: Negative.       Current Outpatient Medications   Medication Sig Dispense Refill    metFORMIN HCl 1000 MG Oral Tab Take 1 tablet (1,000 mg total) by mouth 2 (two) times daily with meals. 180 tablet 0    Dulaglutide (TRULICITY) 1.5 MG/0.5ML Subcutaneous Solution Pen-injector Inject 1.5 mg into the skin once a week. 6 mL 0    atorvastatin 80 MG Oral Tab Take 1 tablet (80 mg total) by mouth nightly. 90 tablet 3    apixaban (ELIQUIS) 5 MG Oral Tab Take 1 tablet (5 mg total) by mouth 2 (two) times daily. 180 tablet 3    glipiZIDE 5 MG Oral Tab Take 2 tablets (10 mg total) by mouth 2 (two) times daily before meals. 360 tablet 0    Glucose Blood (ONETOUCH VERIO) In Vitro Strip TEST BLOOD SUGAR TWICE DAILY. 200 strip 1    amLODIPine 5 MG Oral Tab Take 1 tablet (5 mg total) by mouth daily. 90 tablet 3    traZODone 50 MG Oral Tab Take 1 tablet (50 mg total) by mouth nightly. 30 tablet 1    metoprolol succinate ER 25 MG Oral Tablet 24 Hr Take 1 tablet (25 mg total) by mouth daily. 90 tablet 3    albuterol 108 (90 Base) MCG/ACT Inhalation Aero Soln Inhale 2 puffs into the lungs every 4 (four) hours as needed. 8.5 g 1     Budesonide-Formoterol Fumarate (SYMBICORT) 160-4.5 MCG/ACT Inhalation Aerosol Inhale 2 puffs into the lungs 2 (two) times daily. 1 each 3    Acetaminophen-Codeine (TYLENOL WITH CODEINE #3) 300-30 MG Oral Tab Take 1 tablet by mouth every 8 (eight) hours as needed for Pain. 30 tablet 0    Blood Glucose Monitoring Suppl (ONETOUCH VERIO FLEX SYSTEM) w/Device Does not apply Kit Use to check blood sugar two times a day 1 kit 0    OneTouch Delica Lancets 33G Does not apply Misc Use to check blood sugar two times a day 200 each 1    Blood Glucose Monitoring Suppl (ONETOUCH ULTRA SYSTEM) W/DEVICE Does not apply Kit Test sugar twice daily 1 kit 0     Allergies:  Allergies   Allergen Reactions    Ace Inhibitors ANAPHYLAXIS, ANGIOEDEMA and SWELLING     April 2020, hospitalized at [facility] in [place] with tongue swelling and throat closing sensation, on lisinopril at the time. Not intubated      Egg White (Diagnostic) NAUSEA ONLY and Tightness in Throat    Fish-Derived Products SWELLING    Lisinopril ANAPHYLAXIS    Peanuts SWELLING    Shrimp ITCHING, NAUSEA AND VOMITING, Tightness in Throat and WHEEZING    Tomato SWELLING and UNKNOWN    Tomatoes RASH       Past Medical History:   Diagnosis Date    Arrhythmia     tachycardia    Asthma (HCC)     B12 deficiency     Back pain     Back problem     Calculus of kidney 2017    Cancer (HCC)     1990- cervical    COPD (chronic obstructive pulmonary disease) (HCC)     Essential hypertension     High blood pressure     High cholesterol     History of abnormal cervical Pap smear     Muscle weakness     Neuropathy     Other and unspecified hyperlipidemia     Palpitations     Pulmonary embolism (HCC)     Spinal stenosis     Stroke (HCC)     TIA a few years ago    TIA (transient ischemic attack)     Type II or unspecified type diabetes mellitus without mention of complication, not stated as uncontrolled       Past Surgical History:   Procedure Laterality Date    CHOLECYSTECTOMY  1994    COLONOSCOPY  N/A 2017    Procedure: COLONOSCOPY, POSSIBLE BIOPSY, POSSIBLE POLYPECTOMY 62886;  Surgeon: Pedro Thomas MD;  Location: [facility] [place]    COLONOSCOPY N/A 2022    Procedure: COLONOSCOPY/ESOPHAGOGASTRODUODENOSCOPY (EGD);  Surgeon: Cortney Diaz MD;  Location: [facility] ENDOSCOPY    COLONOSCOPY & POLYPECTOMY            OTHER      Lung biopsy    OTHER SURGICAL HISTORY      Cone biopsy    OTHER SURGICAL HISTORY      Cystoscopy    REMOVAL GALLBLADDER      TUBAL LIGATION  1986      Family History   Problem Relation Age of Onset    Diabetes Mother     Lung Disorder Mother         Emphysema    Heart Disease Mother     Asthma Mother     Cancer Father 51        Lung    Diabetes Sister     Thyroid Disorder Sister     Glaucoma Sister     Stroke Sister     Prostate Cancer Brother     Diabetes Brother     Diabetes Brother     Cancer Brother         Prostate cancer, copd    Diabetes Brother     Glaucoma Maternal Grandmother     Heart Disease Maternal Grandmother     Ovarian Cancer Maternal Grandmother 59    Cataracts Niece     Breast Cancer Niece 48    Macular degeneration Neg       Social History:   Social History     Socioeconomic History    Marital status: Single   Tobacco Use    Smoking status: Every Day     Packs/day: 1.00     Years: 20.00     Additional pack years: 0.00     Total pack years: 20.00     Types: Cigarettes    Smokeless tobacco: Never    Tobacco comments:     1 pack daily   Vaping Use    Vaping Use: Never used   Substance and Sexual Activity    Alcohol use: Not Currently    Drug use: No   Other Topics Concern    Caffeine Concern No    Exercise No   Social History Narrative    The patient does not use an assistive device..      The patient does live in a home with stairs.        Objective:   Physical Exam  Vitals and nursing note reviewed.   Constitutional:       Appearance: She is well-developed.   HENT:      Head: Normocephalic and atraumatic.      Right Ear: External ear  normal.      Left Ear: External ear normal.      Nose: Nose normal.   Eyes:      Conjunctiva/sclera: Conjunctivae normal.      Pupils: Pupils are equal, round, and reactive to light.   Cardiovascular:      Rate and Rhythm: Normal rate and regular rhythm.      Heart sounds: Normal heart sounds.   Pulmonary:      Effort: Pulmonary effort is normal.      Breath sounds: Normal breath sounds.   Abdominal:      General: Bowel sounds are normal.      Palpations: Abdomen is soft.   Genitourinary:     Vagina: Normal.   Musculoskeletal:         General: Normal range of motion.      Cervical back: Normal range of motion and neck supple.   Skin:     General: Skin is warm and dry.   Neurological:      Mental Status: She is alert and oriented to person, place, and time.      Deep Tendon Reflexes: Reflexes are normal and symmetric.   Psychiatric:         Behavior: Behavior normal.         Thought Content: Thought content normal.         Judgment: Judgment normal.         Assessment & Plan:   1. Mixed hyperlipidemia continue current treatment watch diet take medication   2. Type 2 diabetes mellitus without retinopathy (HCC) will recheck A1c follow-up with endocrine watch diet take medication   3. Essential hypertension well-controlled continue current treatment   4. Pulmonary emphysema, unspecified emphysema type (HCC) patient continues to smoke has only been taking Symbicort as needed told her to start taking it every day and encouraged her again to stop smoking she follows with pulmonary she is due soon for CT lungs ordered in the chart by pulmonary   5. Food allergy -will refer to allergist       Orders Placed This Encounter   Procedures    Hemoglobin A1C       Meds This Visit:  Requested Prescriptions     Signed Prescriptions Disp Refills    metFORMIN HCl 1000 MG Oral Tab 180 tablet 0     Sig: Take 1 tablet (1,000 mg total) by mouth 2 (two) times daily with meals.    Dulaglutide (TRULICITY) 1.5 MG/0.5ML Subcutaneous Solution  Pen-injector 6 mL 0     Sig: Inject 1.5 mg into the skin once a week.       Imaging & Referrals:  ALLERGY - INTERNAL

## 2024-04-03 LAB
EST. AVERAGE GLUCOSE BLD GHB EST-MCNC: 200 MG/DL (ref 68–126)
HBA1C MFR BLD: 8.6 % (ref ?–5.7)

## 2024-04-05 ENCOUNTER — APPOINTMENT (OUTPATIENT)
Dept: GENERAL RADIOLOGY | Facility: HOSPITAL | Age: 64
End: 2024-04-05
Payer: MEDICAID

## 2024-04-05 ENCOUNTER — HOSPITAL ENCOUNTER (EMERGENCY)
Facility: HOSPITAL | Age: 64
Discharge: HOME | End: 2024-04-05
Attending: EMERGENCY MEDICINE
Payer: MEDICAID

## 2024-04-05 ENCOUNTER — TELEPHONE (OUTPATIENT)
Dept: PULMONOLOGY | Facility: CLINIC | Age: 64
End: 2024-04-05

## 2024-04-05 VITALS
BODY MASS INDEX: 32 KG/M2 | SYSTOLIC BLOOD PRESSURE: 143 MMHG | OXYGEN SATURATION: 97 % | RESPIRATION RATE: 21 BRPM | WEIGHT: 196 LBS | TEMPERATURE: 98 F | HEART RATE: 90 BPM | DIASTOLIC BLOOD PRESSURE: 92 MMHG

## 2024-04-05 DIAGNOSIS — R42 VERTIGO: Primary | ICD-10-CM

## 2024-04-05 DIAGNOSIS — R07.9 CHEST PAIN OF UNCERTAIN ETIOLOGY: ICD-10-CM

## 2024-04-05 LAB
ALBUMIN SERPL-MCNC: 4.5 G/DL (ref 3.2–4.8)
ALBUMIN/GLOB SERPL: 1.5 {RATIO} (ref 1–2)
ALP LIVER SERPL-CCNC: 98 U/L
ALT SERPL-CCNC: 10 U/L
ANION GAP SERPL CALC-SCNC: 6 MMOL/L (ref 0–18)
AST SERPL-CCNC: 15 U/L (ref ?–34)
BASOPHILS # BLD AUTO: 0.03 X10(3) UL (ref 0–0.2)
BASOPHILS NFR BLD AUTO: 0.5 %
BILIRUB SERPL-MCNC: 0.4 MG/DL (ref 0.2–1.1)
BUN BLD-MCNC: 11 MG/DL (ref 9–23)
BUN/CREAT SERPL: 16.9 (ref 10–20)
CALCIUM BLD-MCNC: 9.7 MG/DL (ref 8.7–10.4)
CHLORIDE SERPL-SCNC: 112 MMOL/L (ref 98–112)
CO2 SERPL-SCNC: 24 MMOL/L (ref 21–32)
CREAT BLD-MCNC: 0.65 MG/DL
DEPRECATED RDW RBC AUTO: 46.2 FL (ref 35.1–46.3)
EGFRCR SERPLBLD CKD-EPI 2021: 99 ML/MIN/1.73M2 (ref 60–?)
EOSINOPHIL # BLD AUTO: 0.1 X10(3) UL (ref 0–0.7)
EOSINOPHIL NFR BLD AUTO: 1.7 %
ERYTHROCYTE [DISTWIDTH] IN BLOOD BY AUTOMATED COUNT: 15.4 % (ref 11–15)
GLOBULIN PLAS-MCNC: 3 G/DL (ref 2.8–4.4)
GLUCOSE BLD-MCNC: 108 MG/DL (ref 70–99)
HCT VFR BLD AUTO: 38.5 %
HGB BLD-MCNC: 13.2 G/DL
IMM GRANULOCYTES # BLD AUTO: 0.01 X10(3) UL (ref 0–1)
IMM GRANULOCYTES NFR BLD: 0.2 %
LYMPHOCYTES # BLD AUTO: 2.43 X10(3) UL (ref 1–4)
LYMPHOCYTES NFR BLD AUTO: 42.5 %
MCH RBC QN AUTO: 28 PG (ref 26–34)
MCHC RBC AUTO-ENTMCNC: 34.3 G/DL (ref 31–37)
MCV RBC AUTO: 81.6 FL
MONOCYTES # BLD AUTO: 0.34 X10(3) UL (ref 0.1–1)
MONOCYTES NFR BLD AUTO: 5.9 %
NEUTROPHILS # BLD AUTO: 2.81 X10 (3) UL (ref 1.5–7.7)
NEUTROPHILS # BLD AUTO: 2.81 X10(3) UL (ref 1.5–7.7)
NEUTROPHILS NFR BLD AUTO: 49.2 %
OSMOLALITY SERPL CALC.SUM OF ELEC: 294 MOSM/KG (ref 275–295)
PLATELET # BLD AUTO: 294 10(3)UL (ref 150–450)
POTASSIUM SERPL-SCNC: 3.9 MMOL/L (ref 3.5–5.1)
PROT SERPL-MCNC: 7.5 G/DL (ref 5.7–8.2)
RBC # BLD AUTO: 4.72 X10(6)UL
SODIUM SERPL-SCNC: 142 MMOL/L (ref 136–145)
TROPONIN I SERPL HS-MCNC: 15 NG/L
WBC # BLD AUTO: 5.7 X10(3) UL (ref 4–11)

## 2024-04-05 PROCEDURE — 84484 ASSAY OF TROPONIN QUANT: CPT | Performed by: EMERGENCY MEDICINE

## 2024-04-05 PROCEDURE — 36415 COLL VENOUS BLD VENIPUNCTURE: CPT

## 2024-04-05 PROCEDURE — 80053 COMPREHEN METABOLIC PANEL: CPT

## 2024-04-05 PROCEDURE — 85025 COMPLETE CBC W/AUTO DIFF WBC: CPT

## 2024-04-05 PROCEDURE — 99284 EMERGENCY DEPT VISIT MOD MDM: CPT

## 2024-04-05 PROCEDURE — 71045 X-RAY EXAM CHEST 1 VIEW: CPT

## 2024-04-05 PROCEDURE — 99285 EMERGENCY DEPT VISIT HI MDM: CPT

## 2024-04-05 PROCEDURE — 84484 ASSAY OF TROPONIN QUANT: CPT

## 2024-04-05 PROCEDURE — 93005 ELECTROCARDIOGRAM TRACING: CPT

## 2024-04-05 PROCEDURE — 93010 ELECTROCARDIOGRAM REPORT: CPT

## 2024-04-05 PROCEDURE — 80053 COMPREHEN METABOLIC PANEL: CPT | Performed by: EMERGENCY MEDICINE

## 2024-04-05 PROCEDURE — 85025 COMPLETE CBC W/AUTO DIFF WBC: CPT | Performed by: EMERGENCY MEDICINE

## 2024-04-05 RX ORDER — FLUTICASONE PROPIONATE 50 MCG
2 SPRAY, SUSPENSION (ML) NASAL DAILY
Qty: 16 G | Refills: 0 | Status: SHIPPED | OUTPATIENT
Start: 2024-04-05 | End: 2024-05-05

## 2024-04-05 RX ORDER — DIAZEPAM 2 MG/1
2 TABLET ORAL EVERY 12 HOURS PRN
Qty: 20 TABLET | Refills: 0 | Status: SHIPPED | OUTPATIENT
Start: 2024-04-05 | End: 2024-04-15

## 2024-04-05 NOTE — TELEPHONE ENCOUNTER
Received fax from [facility] requesting for chart notes and medication list. Faxed over chart notes and medication list to fax #[phone] with confirmation.

## 2024-04-06 NOTE — ED INITIAL ASSESSMENT (HPI)
Pt presents to ed with c/o chest pain and dizziness since 2pm. Pt reports the chest pain is intermittent and her right hand is tingling. States she feels SOB with exertion.  Denies chest pain while in triage

## 2024-04-06 NOTE — ED PROVIDER NOTES
Patient Seen in: [facility] Emergency Department    History     Chief Complaint   Patient presents with    Chest Pain Angina     Stated Complaint: Chest pain, dizziness    HPI    Patient complains of dizziness that started yesterday. Patient states it feels like room is spinning.  no recent URI symptoms.  no headache.  + white noise in ears, no  fever or chills.  no visual changes.    Alleviating factors: not moving  Exacerbating factors: meclizine    Also this a.m. l sided chest dsicomfort, gone now, no sob    Past Medical History:   Diagnosis Date    Arrhythmia     tachycardia    Asthma (HCC)     B12 deficiency     Back pain     Back problem     Calculus of kidney 2017    Cancer (HCC)     - cervical    COPD (chronic obstructive pulmonary disease) (HCC)     Essential hypertension     High blood pressure     High cholesterol     History of abnormal cervical Pap smear     Muscle weakness     Neuropathy     Other and unspecified hyperlipidemia     Palpitations     Pulmonary embolism (HCC)     Spinal stenosis     Stroke (HCC)     TIA a few years ago    TIA (transient ischemic attack)     Type II or unspecified type diabetes mellitus without mention of complication, not stated as uncontrolled        Past Surgical History:   Procedure Laterality Date    CHOLECYSTECTOMY      COLONOSCOPY N/A 2017    Procedure: COLONOSCOPY, POSSIBLE BIOPSY, POSSIBLE POLYPECTOMY 05803;  Surgeon: Pedro Thomas MD;  [facility] [facility]    COLONOSCOPY N/A 2022    Procedure: COLONOSCOPY/ESOPHAGOGASTRODUODENOSCOPY (EGD);  Surgeon: Cortney Diaz MD;  Location: [facility] ENDOSCOPY    COLONOSCOPY & POLYPECTOMY            OTHER      Lung biopsy    OTHER SURGICAL HISTORY      Cone biopsy    OTHER SURGICAL HISTORY      Cystoscopy    REMOVAL GALLBLADDER      TUBAL LIGATION  1986            Family History   Problem Relation Age of Onset    Diabetes Mother     Lung Disorder Mother         Emphysema     Heart Disease Mother     Asthma Mother     Cancer Father 51        Lung    Diabetes Sister     Thyroid Disorder Sister     Glaucoma Sister     Stroke Sister     Prostate Cancer Brother     Diabetes Brother     Diabetes Brother     Cancer Brother         Prostate cancer, copd    Diabetes Brother     Glaucoma Maternal Grandmother     Heart Disease Maternal Grandmother     Ovarian Cancer Maternal Grandmother 59    Cataracts Niece     Breast Cancer Niece 48    Macular degeneration Neg        Social History     Socioeconomic History    Marital status: Single   Tobacco Use    Smoking status: Every Day     Packs/day: 1.00     Years: 20.00     Additional pack years: 0.00     Total pack years: 20.00     Types: Cigarettes    Smokeless tobacco: Never    Tobacco comments:     1 pack daily   Vaping Use    Vaping Use: Never used   Substance and Sexual Activity    Alcohol use: Not Currently    Drug use: No   Other Topics Concern    Caffeine Concern No    Exercise No   Social History Narrative    The patient does not use an assistive device..      The patient does live in a home with stairs.       Review of Systems    Positive for stated complaint: Chest pain, dizziness  Other systems are as noted in HPI.  Constitutional and vital signs reviewed.      All other systems reviewed and negative except as noted above.    PSFH elements reviewed from today and agreed except as otherwise stated in HPI.    Physical Exam     ED Triage Vitals [04/05/24 2021]   /82   Pulse 98   Resp 22   Temp 97.8 °F (36.6 °C)   Temp src Temporal   SpO2 98 %   O2 Device None (Room air)       Current:/80   Pulse 93   Temp 97.8 °F (36.6 °C) (Temporal)   Resp 17   Wt 88.9 kg   SpO2 97%   BMI 31.64 kg/m²    PULSE OX nl  GENERAL: awake alert no distress  HEAD: normocephalic, atraumatic,   EYES: PERRLA, EOMI, conj sclera clear, nonystagmus  THROAT: mmm, no lesions  NECK: supple, no meningeal signs  LUNGS: no resp distress, cta bilateral  CARDIO:  RRR without murmur  GI: abdomen is soft and non tender, no masses, nl bowel sounds   EXTREMITIES: from, 5/5 strength in all 4 ext, no edema  NEURO: alert and oiented *3, 2-12 intact, no focal deficit noted  SKIN: good skin turgor, no  rashes  PSYCH: calm, cooperative,    Differential includes: peripheral vertigo vs. Central vertigo vs. orthostasis with chest pain resolved consider angina    ED Course     Labs Reviewed   COMP METABOLIC PANEL (14) - Abnormal; Notable for the following components:       Result Value    Glucose 108 (*)     All other components within normal limits   CBC W/ DIFFERENTIAL - Abnormal; Notable for the following components:    RDW 15.4 (*)     All other components within normal limits   TROPONIN I HIGH SENSITIVITY - Normal   CBC WITH DIFFERENTIAL WITH PLATELET    Narrative:     The following orders were created for panel order CBC With Differential With Platelet.  Procedure                               Abnormality         Status                     ---------                               -----------         ------                     CBC W/ DIFFERENTIAL[[id number]]          Abnormal            Final result                 Please view results for these tests on the individual orders.     EKG    Rate, intervals and axes as noted on EKG Report.  Rate: 99  Rhythm: Sinus Rhythm  Reading: nsr with non spec changes and long qt           MDM       Cardiac Monitor:   Pulse Readings from Last 1 Encounters:   04/05/24 93   , sinus, 99     Radiology findings: XR CHEST AP PORTABLE  (CPT=71045)    Result Date: 4/5/2024  CONCLUSION:  1. No acute cardiopulmonary finding.    Dictated by (CST): Jayden Alegria MD on 4/05/2024 at 9:13 PM     Finalized by (CST): Jayden Alegria MD on 4/05/2024 at 9:14 PM           I reviewed xray noted no infiltrates no pneumothorax    Medical Decision Making  Problems Addressed:  Chest pain of uncertain etiology: acute illness or injury  Vertigo: acute illness or injury    Amount  and/or Complexity of Data Reviewed  Labs: ordered. Decision-making details documented in ED Course.  Radiology: ordered and independent interpretation performed. Decision-making details documented in ED Course.  ECG/medicine tests: ordered and independent interpretation performed. Decision-making details documented in ED Course.    Risk  Prescription drug management.          Disposition and Plan     Clinical Impression:  1. Vertigo    2. Chest pain of uncertain etiology        Disposition:  Discharge    Follow-up:  Rene Price MD  [address] [place] [id number]  [phone]    Follow up        Medications Prescribed:  Current Discharge Medication List        START taking these medications    Details   diazePAM 2 MG Oral Tab Take 1 tablet (2 mg total) by mouth every 12 (twelve) hours as needed (dizziness).  Qty: 20 tablet, Refills: 0    Associated Diagnoses: Vertigo

## 2024-04-07 LAB
ATRIAL RATE: 99 BPM
P AXIS: 68 DEGREES
P-R INTERVAL: 166 MS
Q-T INTERVAL: 370 MS
QRS DURATION: 80 MS
QTC CALCULATION (BEZET): 474 MS
R AXIS: -1 DEGREES
T AXIS: 47 DEGREES
VENTRICULAR RATE: 99 BPM

## 2024-04-12 ENCOUNTER — LAB ENCOUNTER (OUTPATIENT)
Dept: LAB | Facility: HOSPITAL | Age: 64
End: 2024-04-12
Attending: INTERNAL MEDICINE
Payer: MEDICAID

## 2024-04-12 ENCOUNTER — HOSPITAL ENCOUNTER (OUTPATIENT)
Dept: CT IMAGING | Facility: HOSPITAL | Age: 64
Discharge: HOME | End: 2024-04-12
Attending: INTERNAL MEDICINE
Payer: MEDICAID

## 2024-04-12 DIAGNOSIS — Z87.891 PERSONAL HISTORY OF TOBACCO USE, PRESENTING HAZARDS TO HEALTH: ICD-10-CM

## 2024-04-12 DIAGNOSIS — N95.1 SYMPTOMATIC MENOPAUSAL OR FEMALE CLIMACTERIC STATES: ICD-10-CM

## 2024-04-12 LAB
ESTRADIOL SERPL-MCNC: <11.8 PG/ML
FSH SERPL-ACNC: 51.6 MIU/ML

## 2024-04-12 PROCEDURE — 71271 CT THORAX LUNG CANCER SCR C-: CPT | Performed by: INTERNAL MEDICINE

## 2024-04-12 PROCEDURE — 82670 ASSAY OF TOTAL ESTRADIOL: CPT

## 2024-04-12 PROCEDURE — 83001 ASSAY OF GONADOTROPIN (FSH): CPT

## 2024-04-12 PROCEDURE — 36415 COLL VENOUS BLD VENIPUNCTURE: CPT

## 2024-04-17 DIAGNOSIS — Z87.891 PERSONAL HISTORY OF TOBACCO USE, PRESENTING HAZARDS TO HEALTH: Primary | ICD-10-CM

## 2024-05-14 RX ORDER — METOPROLOL SUCCINATE 25 MG/1
25 TABLET, EXTENDED RELEASE ORAL DAILY
Qty: 90 TABLET | Refills: 3 | Status: SHIPPED | OUTPATIENT
Start: 2024-05-14

## 2024-05-14 NOTE — TELEPHONE ENCOUNTER
Please review. Protocol Failed; No Protocol    Requested Prescriptions   Pending Prescriptions Disp Refills    METOPROLOL SUCCINATE ER 25 MG Oral Tablet 24 Hr [Pharmacy Med Name: METOPROLOL ER SUCCINATE 25MG TABS] 90 tablet 3     Sig: TAKE 1 TABLET(25 MG) BY MOUTH DAILY       Hypertension Medications Protocol Failed - 5/12/2024 11:03 AM        Failed - Last BP reading less than 140/90     BP Readings from Last 1 Encounters:   04/05/24 (!) 143/92               Passed - CMP or BMP in past 12 months        Passed - In person appointment or virtual visit in the past 12 mos or appointment in next 3 mos     Recent Outpatient Visits              [facility]    [facility]    2 months ago Type 2 diabetes mellitus without retinopathy (HCC)    [facility] [place]    Office Visit    3 months ago Sensorineural hearing loss, bilateral    [facility] [place] [facility]    Office Visit    3 months ago Tinnitus, unspecified laterality    [facility] [place]    Office Visit    3 months ago Acute vulvitis    [facility] [address], [place] - OB/GYN Valdo Norman DO    Office Visit          Future Appointments         Provider Department Appt Notes    In 1 month Papo Ibarra MD [facility] Referred by Dr Price  Find out what she is allergic to    In 10 months Jamar Jeffers MD [facility] EP/ DM EE                    Passed - EGFRCR or GFRAA > 50     GFR Evaluation  EGFRCR: 99 , resulted on 4/5/2024                 Future Appointments         Provider Department Appt Notes    In 1 month Papo Ibarra MD [facility] Referred by  Dr Price  Find out what she is allergic to    In 10 months Jamar Jeffers MD [facility] [place] EP/ DM EE          Recent Outpatient Visits              1 month ago [facility]    [facility] Visit    2 months ago Type 2 diabetes mellitus without retinopathy (HCC)    [facility]    Office Visit    3 months ago Sensorineural hearing loss, bilateral    [facility] [place] [place] [facility]    Office Visit    3 months ago Tinnitus, unspecified laterality    [facility] [place]    Office Visit    3 months ago Acute vulvitis    [facility] [address], [place] - OB/GYN Valdo Norman DO    Office Visit

## 2024-06-13 ENCOUNTER — OFFICE VISIT (OUTPATIENT)
Dept: INTERNAL MEDICINE CLINIC | Facility: CLINIC | Age: 64
End: 2024-06-13

## 2024-06-13 VITALS
HEIGHT: 66 IN | RESPIRATION RATE: 17 BRPM | OXYGEN SATURATION: 98 % | DIASTOLIC BLOOD PRESSURE: 80 MMHG | WEIGHT: 195 LBS | HEART RATE: 76 BPM | BODY MASS INDEX: 31.34 KG/M2 | SYSTOLIC BLOOD PRESSURE: 124 MMHG | TEMPERATURE: 98 F

## 2024-06-13 DIAGNOSIS — M54.50 CHRONIC BILATERAL LOW BACK PAIN WITHOUT SCIATICA: ICD-10-CM

## 2024-06-13 DIAGNOSIS — G47.00 INSOMNIA, UNSPECIFIED TYPE: ICD-10-CM

## 2024-06-13 DIAGNOSIS — G89.29 CHRONIC BILATERAL LOW BACK PAIN WITHOUT SCIATICA: ICD-10-CM

## 2024-06-13 DIAGNOSIS — E11.9 TYPE 2 DIABETES MELLITUS WITHOUT RETINOPATHY (HCC): ICD-10-CM

## 2024-06-13 DIAGNOSIS — I10 ESSENTIAL HYPERTENSION: ICD-10-CM

## 2024-06-13 DIAGNOSIS — E78.2 MIXED HYPERLIPIDEMIA: ICD-10-CM

## 2024-06-13 DIAGNOSIS — H25.13 AGE-RELATED NUCLEAR CATARACT OF BOTH EYES: ICD-10-CM

## 2024-06-13 DIAGNOSIS — J42 CHRONIC BRONCHITIS, UNSPECIFIED CHRONIC BRONCHITIS TYPE (HCC): ICD-10-CM

## 2024-06-13 DIAGNOSIS — Z00.00 ANNUAL PHYSICAL EXAM: Primary | ICD-10-CM

## 2024-06-13 DIAGNOSIS — R07.81 RIB PAIN ON RIGHT SIDE: ICD-10-CM

## 2024-06-13 DIAGNOSIS — Z71.6 ENCOUNTER FOR SMOKING CESSATION COUNSELING: ICD-10-CM

## 2024-06-13 PROCEDURE — 99396 PREV VISIT EST AGE 40-64: CPT | Performed by: INTERNAL MEDICINE

## 2024-06-13 NOTE — PROGRESS NOTES
Subjective:     Patient ID: Di Puga is a 64 year old female.    HPI    Patient comes in for annual physical she is complaining today of right rib pain this is tender to touch just started she reports not sure if she slept wrong no injury no falls not been sick lately patient continues to smoke follows with pulmonary up-to-date with CT lung complains today also of any trouble sleeping in the past was given trazodone which helped initially but then stopped working so she is not taking it  Denies any chest pain shortness of breath patient continues to smoke says she is And working on hopefully stopping completely      History/Other:   Review of Systems   Constitutional: Negative.    HENT: Negative.     Eyes: Negative.    Respiratory: Negative.     Cardiovascular: Negative.         Rt side rib pain    Gastrointestinal: Negative.    Genitourinary: Negative.    Musculoskeletal: Negative.    Skin: Negative.    Neurological: Negative.    Psychiatric/Behavioral:  Positive for sleep disturbance.      Current Outpatient Medications   Medication Sig Dispense Refill    metoprolol succinate ER 25 MG Oral Tablet 24 Hr Take 1 tablet (25 mg total) by mouth daily. 90 tablet 3    metFORMIN HCl 1000 MG Oral Tab Take 1 tablet (1,000 mg total) by mouth 2 (two) times daily with meals. 180 tablet 0    Dulaglutide (TRULICITY) 1.5 MG/0.5ML Subcutaneous Solution Pen-injector Inject 1.5 mg into the skin once a week. 6 mL 0    atorvastatin 80 MG Oral Tab Take 1 tablet (80 mg total) by mouth nightly. 90 tablet 3    apixaban (ELIQUIS) 5 MG Oral Tab Take 1 tablet (5 mg total) by mouth 2 (two) times daily. 180 tablet 3    Glucose Blood (ONETOUCH VERIO) In Vitro Strip TEST BLOOD SUGAR TWICE DAILY. 200 strip 1    amLODIPine 5 MG Oral Tab Take 1 tablet (5 mg total) by mouth daily. 90 tablet 3    albuterol 108 (90 Base) MCG/ACT Inhalation Aero Soln Inhale 2 puffs into the lungs every 4 (four) hours as needed. 8.5 g 1     Acetaminophen-Codeine (TYLENOL WITH CODEINE #3) 300-30 MG Oral Tab Take 1 tablet by mouth every 8 (eight) hours as needed for Pain. 30 tablet 0    OneTouch Delica Lancets 33G Does not apply Misc Use to check blood sugar two times a day 200 each 1    Blood Glucose Monitoring Suppl (ONETOUCH ULTRA SYSTEM) W/DEVICE Does not apply Kit Test sugar twice daily 1 kit 0     Allergies:  Allergies   Allergen Reactions    Ace Inhibitors ANAPHYLAXIS, ANGIOEDEMA and SWELLING     April 2020, hospitalized at [facility] in [place] with tongue swelling and throat closing sensation, on lisinopril at the time. Not intubated      Egg White (Diagnostic) NAUSEA ONLY and Tightness in Throat    Fish-Derived Products SWELLING    Lisinopril ANAPHYLAXIS    Peanuts SWELLING    Shrimp ITCHING, NAUSEA AND VOMITING, Tightness in Throat and WHEEZING    Tomato SWELLING and UNKNOWN    Tomatoes RASH       Past Medical History:    Arrhythmia    tachycardia    Asthma (HCC)    B12 deficiency    Back pain    Back problem    Calculus of kidney    Cancer (HCC)    1990- cervical    COPD (chronic obstructive pulmonary disease) (HCC)    Essential hypertension    High blood pressure    High cholesterol    History of abnormal cervical Pap smear    Muscle weakness    Neuropathy    Other and unspecified hyperlipidemia    Palpitations    Pulmonary embolism (HCC)    Spinal stenosis    Stroke (HCC)    TIA a few years ago    TIA (transient ischemic attack)    Type II or unspecified type diabetes mellitus without mention of complication, not stated as uncontrolled      Past Surgical History:   Procedure Laterality Date    Cholecystectomy  1994    Colonoscopy N/A 08/11/2017    Procedure: COLONOSCOPY, POSSIBLE BIOPSY, POSSIBLE POLYPECTOMY 08646;  Surgeon: Pedro Thomas MD;  Location: [facility]    Colonoscopy N/A 02/18/2022    Procedure: COLONOSCOPY/ESOPHAGOGASTRODUODENOSCOPY (EGD);  Surgeon: Cortney Diaz MD;  Location: [facility] ENDOSCOPY    Colonoscopy &  polypectomy  2012          Other      Lung biopsy    Other surgical history      Cone biopsy    Other surgical history  2013    Cystoscopy    Removal gallbladder      Tubal ligation  1986      Family History   Problem Relation Age of Onset    Diabetes Mother     Lung Disorder Mother         Emphysema    Heart Disease Mother     Asthma Mother     Cancer Father 51        Lung    Diabetes Sister     Thyroid Disorder Sister     Glaucoma Sister     Stroke Sister     Prostate Cancer Brother     Diabetes Brother     Diabetes Brother     Cancer Brother         Prostate cancer, copd    Diabetes Brother     Glaucoma Maternal Grandmother     Heart Disease Maternal Grandmother     Ovarian Cancer Maternal Grandmother 59    Cataracts Niece     Breast Cancer Niece 48    Macular degeneration Neg       Social History:   Social History     Socioeconomic History    Marital status: Single   Tobacco Use    Smoking status: Every Day     Current packs/day: 1.00     Average packs/day: 1 pack/day for 20.0 years (20.0 ttl pk-yrs)     Types: Cigarettes    Smokeless tobacco: Never    Tobacco comments:     1 pack daily   Vaping Use    Vaping status: Never Used   Substance and Sexual Activity    Alcohol use: Not Currently    Drug use: No   Other Topics Concern    Caffeine Concern No    Exercise No   Social History Narrative    The patient does not use an assistive device..      The patient does live in a home with stairs.     Social Determinants of Health      [facility]    Social Connections    Received from [facility], [facility]    Housing Stability        Objective:   Physical Exam  Constitutional:       Appearance: She is well-developed.   HENT:      Head: Normocephalic and atraumatic.      Right Ear: External ear normal.      Left Ear: External ear normal.      Nose: Nose normal.   Eyes:      Conjunctiva/sclera: Conjunctivae  normal.      Pupils: Pupils are equal, round, and reactive to light.   Cardiovascular:      Rate and Rhythm: Normal rate and regular rhythm.      Heart sounds: Normal heart sounds.   Pulmonary:      Effort: Pulmonary effort is normal.      Breath sounds: Normal breath sounds.      Comments: Rt side rib pain   Chest:      Chest wall: Tenderness present.   Abdominal:      General: Bowel sounds are normal.      Palpations: Abdomen is soft.   Genitourinary:     Vagina: Normal.   Musculoskeletal:         General: Normal range of motion.      Cervical back: Normal range of motion and neck supple.   Skin:     General: Skin is warm and dry.   Neurological:      Mental Status: She is alert and oriented to person, place, and time.      Deep Tendon Reflexes: Reflexes are normal and symmetric.   Psychiatric:         Behavior: Behavior normal.         Thought Content: Thought content normal.         Judgment: Judgment normal.         Assessment & Plan:   1. Annual physical exam exam as above we will order labs   2. Type 2 diabetes mellitus without retinopathy (HCC) -st will retest watch diet take medication   3. Chronic bilateral low back pain without sciatica stable as the medication for pain   4. Insomnia, unspecified type try melatonin let us know if   5. Mixed hyperlipidemia we will retest watch diet take medication well-controlled   6. Essential hypertension    7. Age-related nuclear cataract of both eyes follows with eye specialist   8. Encounter for smoking cessation counseling patient is cut down trying to quit will let us know if she needs help   9. Chronic bronchitis, unspecified chronic bronchitis type (HCC) continue current treatment follows with lung specialist   10. Rib pain on right side warm compression as the medication for pain let us know if not better       No orders of the defined types were placed in this encounter.      Meds This Visit:  Requested Prescriptions      No prescriptions requested or ordered in  this encounter       Imaging & Referrals:  None

## 2024-06-18 ENCOUNTER — NURSE ONLY (OUTPATIENT)
Dept: ALLERGY | Facility: CLINIC | Age: 64
End: 2024-06-18

## 2024-06-18 ENCOUNTER — TELEPHONE (OUTPATIENT)
Dept: ALLERGY | Facility: CLINIC | Age: 64
End: 2024-06-18

## 2024-06-18 ENCOUNTER — OFFICE VISIT (OUTPATIENT)
Dept: ALLERGY | Facility: CLINIC | Age: 64
End: 2024-06-18

## 2024-06-18 VITALS
SYSTOLIC BLOOD PRESSURE: 119 MMHG | RESPIRATION RATE: 18 BRPM | OXYGEN SATURATION: 97 % | HEART RATE: 82 BPM | DIASTOLIC BLOOD PRESSURE: 81 MMHG

## 2024-06-18 DIAGNOSIS — Z91.018 FOOD ALLERGY: Primary | ICD-10-CM

## 2024-06-18 DIAGNOSIS — F17.200 SMOKER: ICD-10-CM

## 2024-06-18 DIAGNOSIS — Z23 NEED FOR COVID-19 VACCINE: ICD-10-CM

## 2024-06-18 DIAGNOSIS — Z23 FLU VACCINE NEED: ICD-10-CM

## 2024-06-18 DIAGNOSIS — J45.20 MILD INTERMITTENT EXTRINSIC ASTHMA WITHOUT COMPLICATION (HCC): ICD-10-CM

## 2024-06-18 DIAGNOSIS — J30.2 SEASONAL AND PERENNIAL ALLERGIC RHINOCONJUNCTIVITIS: ICD-10-CM

## 2024-06-18 DIAGNOSIS — H10.10 SEASONAL AND PERENNIAL ALLERGIC RHINOCONJUNCTIVITIS: ICD-10-CM

## 2024-06-18 DIAGNOSIS — J30.89 SEASONAL AND PERENNIAL ALLERGIC RHINOCONJUNCTIVITIS: ICD-10-CM

## 2024-06-18 PROCEDURE — 99204 OFFICE O/P NEW MOD 45 MIN: CPT | Performed by: ALLERGY & IMMUNOLOGY

## 2024-06-18 PROCEDURE — 95004 PERQ TESTS W/ALRGNC XTRCS: CPT | Performed by: ALLERGY & IMMUNOLOGY

## 2024-06-18 RX ORDER — ALBUTEROL SULFATE 90 UG/1
2 AEROSOL, METERED RESPIRATORY (INHALATION) EVERY 6 HOURS PRN
Qty: 1 EACH | Refills: 0 | Status: SHIPPED | OUTPATIENT
Start: 2024-06-18

## 2024-06-18 RX ORDER — GLIPIZIDE 2.5 MG/1
2.5 TABLET, EXTENDED RELEASE ORAL
COMMUNITY

## 2024-06-18 RX ORDER — EPINEPHRINE 0.3 MG/.3ML
INJECTION SUBCUTANEOUS
Qty: 1 EACH | Refills: 0 | Status: SHIPPED | OUTPATIENT
Start: 2024-06-18

## 2024-06-18 RX ORDER — DIPHENHYDRAMINE HCL 25 MG
25 TABLET ORAL EVERY 6 HOURS PRN
COMMUNITY

## 2024-06-18 RX ORDER — LEVOCETIRIZINE DIHYDROCHLORIDE 5 MG/1
5 TABLET, FILM COATED ORAL EVERY EVENING
Qty: 90 TABLET | Refills: 1 | Status: SHIPPED | OUTPATIENT
Start: 2024-06-18

## 2024-06-18 NOTE — TELEPHONE ENCOUNTER
Pt here for Consult and skin testing today to various foods.    Pt was tested to tree nuts, shellfish, finfish, tomato and egg white.  Has had prior reactions to peanuts, tomatoes and catfish where she went to ER.  Pt has not eaten then in the past year.  Blood testing was done by previous provider and was positive to egg white.    Per pt she used to tolerate egg white without issue but has been avoiding it since blood testing done back in January 2023.    Skin testing was all negative today.    Pt wanted to clarify with Dr. Ibarra which foods she can re-introduce.  RN advised that they will ask Dr. Ibarra and will contact her this afternoon.    RN spoke with Dr. Ibarra.  He advised that as long as pt has not had any prior reactions to specific food and skin testing was negative, they may try re-introducing at home.    If pt has had a prior reaction and skin testing was negative, it would be safest to do oral challenge in office since it the safest, most controlled setting.    RN called pt with update.  Pt verbalized understanding.  Pt requested to schedule oral challenge to catfish and peanuts.  Tomato oral challenge scheduled while pt was in office for consult.    Pt may try egg white at home since she previously tolerated in the past per Dr. Ibarra.    Pt verbalized understanding and denies any further questions or concerns.

## 2024-06-18 NOTE — PATIENT INSTRUCTIONS
#1 Food allergy  See above clinical history.  Prior symptoms with peanuts fish and shellfish and tomato.  Patient tolerating eggs without issues in the past.  Patient has been avoiding eggs based upon prior testing back in January 2023.  See above skin testing to select foods based on clinical history  Recommend to avoid those foods are positive on skin testing  May consider oral challenge to those foods that are negative on skin testing if prior allergy symptoms in the past with those foods  EpiPen and Benadryl as needed based on symptom severity per Food allergy action plan  May try introducing eggs as patient had been tolerating them before hand without allergy issues or symptoms.   EpiPen prescription placed  Reviewed gold standard is oral challenge      2.  Flu vaccine recommended in the fall high-dose        3.  COVID vaccines reviewed.  Recommend booster.  Most recent booster available is in September 2023.  Reviewed I do not have the vaccine in my office.  Please check with local pharmacy.    #4 allergic rhinitis  See above skin testing to screen for allergic triggers  Reviewed avoidance measures and potential treatment option for therapy  Trial of Xyzal, levocetirizine 5 mg once a day as an antihistamine if having significant runny nose sneezing itchy watery eyes  May add Flonase or Nasacort 2 sprays per nostril once a day if having prominent nasal congestion or postnasal drip    #5 asthma  Mild intermittent by history.  Denies current or active symptoms more than 2 days/week  Albuterol 2 puffs every 4-6 hours if having active coughing wheezing or shortness of breath             Orders This Visit:  No orders of the defined types were placed in this encounter.      Meds This Visit:  Requested Prescriptions     Signed Prescriptions Disp Refills    EPINEPHrine (EPIPEN 2-ROBERTH) 0.3 MG/0.3ML Injection Solution Auto-injector 1 each 0     Sig: Inject IM in event of  allergic reaction    levocetirizine 5 MG Oral  Tab 90 tablet 1     Sig: Take 1 tablet (5 mg total) by mouth every evening.    albuterol (PROAIR HFA) 108 (90 Base) MCG/ACT Inhalation Aero Soln 1 each 0     Sig: Inhale 2 puffs into the lungs every 6 (six) hours as needed for Wheezing.

## 2024-06-18 NOTE — PROGRESS NOTES
Di Puga is a 64 year old female.    HPI:     Chief Complaint   Patient presents with    Food Allergy     Pt had an allergic reaction to peanuts, tomatoes and fish at different times. PCP tested for additional food allergies and has a hx of egg white allergy as well. PCP recommended pt see allergist.     Patient is a 64-year-old female who presents for allergy consultation upon referral of her PCP, Dr. Price with a chief complaint of allergies  Prior notes visit with PCP from 2024 reviewed and appreciated.  Reviewed prior serum IgE testing from 2023 showing sensitization to egg white 0.12, shrimp 0.15 with a total IgE level of 179    Medication list include albuterol    Immunizations reviewed.  COVID-vaccine x 4 doses last in 2022  Pneumonia vaccine up-to-date from 2023  Flu vaccine up-to-date from 2024    Today patient reports      Allergies : foods?   Duration: 3 years   Timing: intermittent   Symptoms: rash, globus with peanut butter  and tomato in past   Severity: moderate   Status:no change   Triggers: allergies?  Tomato . Peanut , shrimp, catfish , cod   + ed, visit   No prior epi usage   Tried: benadryl , epipen ()   Pets : no pets'  + smoker   Avoids all nuts tomato, eggs, fish/sf     Hx of asthma, ad, or ar:   + hx of asthma  Denies current symptoms in office or symptoms> 2 days per week       Tolerates eggs prior to testing , no reactions   Does not eat shrimp, no prior allergic reaction when she did eat     Food: peanut tomato fish as triggers? Hive    Off ace-I x years      + ar: SEASONAL in spring  and fall   Rn, sz ie we   Benadryl         Component  Ref Range & Units 23 12:35 PM   Allergen Clam  <0.10 kUA/L <0.10   Allergen Corn  <0.10 kUA/L <0.10   Allergen Egg White  <0.10 kUA/L 0.12 High    Allergen Fish  <0.10 kUA/L <0.10   Allergen Milk  <0.10 kUA/L <0.10   Allergen Peanut  <0.10 kUA/L <0.10   Allergen Scallop  <0.10 kUA/L <0.10    Allergen Sesame Seed  <0.10 kUA/L <0.10   Allergen Shrimp  <0.10 kUA/L 0.15 High    Allergen Soybean  <0.10 kUA/L <0.10   Allergen Vaughn  <0.10 kUA/L <0.10   Allergen Wheat  <0.10 kUA/L <0.10   Immunoglobulin E  2.00 - 214.00 kU/L 179.00   Resulting Agency [facility] ([facility])                        HISTORY:  Past Medical History:    Arrhythmia    tachycardia    Asthma (HCC)    B12 deficiency    Back pain    Back problem    Calculus of kidney    Cancer (HCC)    - cervical    COPD (chronic obstructive pulmonary disease) (HCC)    Essential hypertension    High blood pressure    High cholesterol    History of abnormal cervical Pap smear    Muscle weakness    Neuropathy    Other and unspecified hyperlipidemia    Palpitations    Pulmonary embolism (HCC)    Spinal stenosis    Stroke (HCC)    TIA a few years ago    TIA (transient ischemic attack)    Type II or unspecified type diabetes mellitus without mention of complication, not stated as uncontrolled      Past Surgical History:   Procedure Laterality Date    Cholecystectomy      Colonoscopy N/A 2017    Procedure: COLONOSCOPY, POSSIBLE BIOPSY, POSSIBLE POLYPECTOMY 11668;  Surgeon: Pedro Thomas MD;  Location: [facility]    Colonoscopy N/A 2022    Procedure: COLONOSCOPY/ESOPHAGOGASTRODUODENOSCOPY (EGD);  Surgeon: Cortney Diaz MD;  Location: [facility] ENDOSCOPY    Colonoscopy & polypectomy            Other      Lung biopsy    Other surgical history      Cone biopsy    Other surgical history      Cystoscopy    Removal gallbladder      Tubal ligation  1986      Family History   Problem Relation Age of Onset    Diabetes Mother     Lung Disorder Mother         Emphysema    Heart Disease Mother     Asthma Mother     Cancer Father 51        Lung    Diabetes Sister     Thyroid Disorder Sister     Glaucoma Sister     Stroke Sister     Prostate Cancer Brother     Diabetes Brother     Diabetes Brother     Cancer Brother          Prostate cancer, copd    Diabetes Brother     Glaucoma Maternal Grandmother     Heart Disease Maternal Grandmother     Ovarian Cancer Maternal Grandmother 59    Cataracts Niece     Breast Cancer Niece 48    Macular degeneration Neg       Social History:   Social History     Socioeconomic History    Marital status: Single   Tobacco Use    Smoking status: Every Day     Current packs/day: 1.00     Average packs/day: 1 pack/day for 20.0 years (20.0 ttl pk-yrs)     Types: Cigarettes    Smokeless tobacco: Never    Tobacco comments:     1 pack daily   Vaping Use    Vaping status: Never Used   Substance and Sexual Activity    Alcohol use: Not Currently    Drug use: No   Other Topics Concern    Caffeine Concern No    Exercise No   Social History Narrative    The patient does not use an assistive device..      The patient does live in a home with stairs.     Social Determinants of Health      [facility]    Social Connections    [facility]    Housing Stability        Medications (Active prior to today's visit):  Current Outpatient Medications   Medication Sig Dispense Refill    glipiZIDE ER 2.5 MG Oral Tablet 24 Hr Take 1 tablet (2.5 mg total) by mouth daily with breakfast.      diphenhydrAMINE HCl 25 MG Oral Tab Take 1 tablet (25 mg total) by mouth every 6 (six) hours as needed for Itching.      EPINEPHrine (EPIPEN 2-ROBERTH) 0.3 MG/0.3ML Injection Solution Auto-injector Inject IM in event of  allergic reaction 1 each 0    levocetirizine 5 MG Oral Tab Take 1 tablet (5 mg total) by mouth every evening. 90 tablet 1    albuterol (PROAIR HFA) 108 (90 Base) MCG/ACT Inhalation Aero Soln Inhale 2 puffs into the lungs every 6 (six) hours as needed for Wheezing. 1 each 0    metoprolol succinate ER 25 MG Oral Tablet 24 Hr Take 1 tablet (25 mg total) by mouth daily. 90 tablet 3    metFORMIN HCl 1000 MG Oral Tab  Take 1 tablet (1,000 mg total) by mouth 2 (two) times daily with meals. 180 tablet 0    Dulaglutide (TRULICITY) 1.5 MG/0.5ML Subcutaneous Solution Pen-injector Inject 1.5 mg into the skin once a week. 6 mL 0    atorvastatin 80 MG Oral Tab Take 1 tablet (80 mg total) by mouth nightly. 90 tablet 3    apixaban (ELIQUIS) 5 MG Oral Tab Take 1 tablet (5 mg total) by mouth 2 (two) times daily. 180 tablet 3    Glucose Blood (ONETOUCH VERIO) In Vitro Strip TEST BLOOD SUGAR TWICE DAILY. 200 strip 1    amLODIPine 5 MG Oral Tab Take 1 tablet (5 mg total) by mouth daily. 90 tablet 3    albuterol 108 (90 Base) MCG/ACT Inhalation Aero Soln Inhale 2 puffs into the lungs every 4 (four) hours as needed. 8.5 g 1    OneTouch Delica Lancets 33G Does not apply Misc Use to check blood sugar two times a day 200 each 1    Blood Glucose Monitoring Suppl (ONETOUCH ULTRA SYSTEM) W/DEVICE Does not apply Kit Test sugar twice daily 1 kit 0    Acetaminophen-Codeine (TYLENOL WITH CODEINE #3) 300-30 MG Oral Tab Take 1 tablet by mouth every 8 (eight) hours as needed for Pain. (Patient not taking: Reported on 6/18/2024) 30 tablet 0       Allergies:  Allergies   Allergen Reactions    Ace Inhibitors ANAPHYLAXIS, ANGIOEDEMA and SWELLING     April 2020, hospitalized at [facility] in [place] with tongue swelling and throat closing sensation, on lisinopril at the time. Not intubated      Egg White (Diagnostic) NAUSEA ONLY and Tightness in Throat    Fish-Derived Products SWELLING    Lisinopril ANAPHYLAXIS    Peanuts SWELLING    Shrimp ITCHING, NAUSEA AND VOMITING, Tightness in Throat and WHEEZING    Tomato SWELLING and UNKNOWN    Tomatoes RASH         ROS:     Allergic/Immuno:  See HPI  Cardiovascular:  Negative for irregular heartbeat/palpitations, chest pain, edema  Constitutional:  Negative night sweats,weight loss, irritability and lethargy  Endocrine:  Negative for cold intolerance, polydipsia and polyphagia  ENMT:  Negative for ear drainage, hearing loss and  nasal drainage  Eyes:  Negative for eye discharge and vision loss  Gastrointestinal:  Negative for abdominal pain, diarrhea and vomiting  Genitourinary:  Negative for dysuria and hematuria  Hema/Lymph:  Negative for easy bleeding and easy bruising  Integumentary:   see hpi   Musculoskeletal:  Negative for joint symptoms  Neurological:  Negative for dizziness, seizures  Psychiatric:  Negative for inappropriate interaction and psychiatric symptoms  Respiratory:  Negative for cough, dyspnea and wheezing      PHYSICAL EXAM:   Constitutional: responsive, no acute distress noted  Head/Face: NC/Atraumatic  Eyes/Vision: conjunctiva and lids are normal extraocular motion is intact   Ears/Audiometry: tympanic membranes are normal bilaterally hearing is grossly intact  Nose/Mouth/Throat: nose and throat are clear mucous membranes are moist   Neck/Thyroid: neck is supple without adenopathy  Lymphatic: no abnormal cervical, supraclavicular or axillary adenopathy is noted  Respiratory: normal to inspection lungs are clear to auscultation bilaterally normal respiratory effort   Cardiovascular: regular rate and rhythm no murmurs, gallups, or rubs  Abdomen: soft non-tender non-distended  Skin/Hair: no unusual rashes present  Extremities: no edema, cyanosis, or clubbing  Neurological:Oriented to time, place, person & situation       ASSESSMENT/PLAN:   Assessment   Encounter Diagnoses   Name Primary?    Food allergy Yes    Flu vaccine need     Need for COVID-19 vaccine     Seasonal and perennial allergic rhinoconjunctivitis     Mild intermittent extrinsic asthma without complication (HCC)     Smoker      Skin testing today to common indoor and outdoor environmental allergies was negative     Skin testing today to select foods including fish panel shellfish panel peanut tree nut panel tomato and egg white was negative         #1 Food allergy  See above clinical history.  Prior symptoms with peanuts fish and shellfish and tomato.   Patient tolerating eggs without issues in the past.  Patient has been avoiding eggs based upon prior testing back in January 2023.  See above skin testing to select foods based on clinical history  Recommend to avoid those foods are positive on skin testing  May consider oral challenge to those foods that are negative on skin testing if prior allergy symptoms in the past with those foods  EpiPen and Benadryl as needed based on symptom severity per Food allergy action plan  May try introducing eggs as patient had been tolerating them before hand without allergy issues or symptoms.   EpiPen prescription placed  Reviewed gold standard is oral challenge      2.  Flu vaccine recommended in the fall high-dose        3.  COVID vaccines reviewed.  Recommend booster.  Most recent booster available is in September 2023.  Reviewed I do not have the vaccine in my office.  Please check with local pharmacy.    #4 allergic rhinitis  See above skin testing to screen for allergic triggers  Reviewed avoidance measures and potential treatment option for therapy  Trial of Xyzal, levocetirizine 5 mg once a day as an antihistamine if having significant runny nose sneezing itchy watery eyes  May add Flonase or Nasacort 2 sprays per nostril once a day if having prominent nasal congestion or postnasal drip    #5 asthma  Mild intermittent by history.  Denies current or active symptoms more than 2 days/week  Albuterol 2 puffs every 4-6 hours if having active coughing wheezing or shortness of breath             Orders This Visit:  No orders of the defined types were placed in this encounter.      Meds This Visit:  Requested Prescriptions     Signed Prescriptions Disp Refills    EPINEPHrine (EPIPEN 2-ROBERTH) 0.3 MG/0.3ML Injection Solution Auto-injector 1 each 0     Sig: Inject IM in event of  allergic reaction    levocetirizine 5 MG Oral Tab 90 tablet 1     Sig: Take 1 tablet (5 mg total) by mouth every evening.    albuterol (PROAIR HFA) 108 (90  Base) MCG/ACT Inhalation Aero Soln 1 each 0     Sig: Inhale 2 puffs into the lungs every 6 (six) hours as needed for Wheezing.       Imaging & Referrals:  None     6/18/2024  Papo Ibarra MD      If medication samples were provided today, they were provided solely for patient education and training related to self administration of these medications.  Teaching, instruction and sample was provided to the patient by myself.  Teaching included  a review of potential adverse side effects as well as potential efficacy.  Patient's questions were answered in regards to medication administration and dosing and potential side effects. Teaching was provided via the teach back method

## 2024-07-01 DIAGNOSIS — E11.9 TYPE 2 DIABETES MELLITUS WITHOUT RETINOPATHY (HCC): ICD-10-CM

## 2024-07-04 NOTE — TELEPHONE ENCOUNTER
Refill Per Protocol     Requested Prescriptions   Pending Prescriptions Disp Refills    METFORMIN HCL 1000 MG Oral Tab [Pharmacy Med Name: METFORMIN 1000MG TABLETS] 180 tablet 0     Sig: TAKE 1 TABLET(1000 MG) BY MOUTH TWICE DAILY WITH MEALS       Diabetes Medication Protocol Passed - 7/1/2024 12:09 PM        Passed - Last A1C < 7.5 and within past 6 months     Lab Results   Component Value Date    A1C 8.6 (H) 04/02/2024             Passed - In person appointment or virtual visit in the past 6 mos or appointment in next 3 mos     Recent Outpatient Visits              2 weeks ago Food allergy    [facility]    Nurse Only    2 weeks ago Food allergy    [facility] Papo Ibarra MD    Office Visit    3 weeks ago [facility]    [facility]    3 months ago [facility] hyper[facility]    Office Visit    3 months ago Type 2 diabetes mellitus without retinopathy (HCC)    [facility] Jamar Jeffers MD    Office Visit          Future Appointments         Provider Department Appt Notes    In 3 weeks Shaye Flanagan MD [facility] Tightness in back    In 3 months Papo Ibarra MD [facility] Oral challenge to tomatoes    In 3 months Papo Ibarra MD [facility] Oral challenge to catfish    In 4 months Papo Ibarra MD [facility] Oral Challenge to Peanuts    In 8 months Jamar Jeffers MD [facility] EP/ DM EE                    Passed - Microalbumin procedure in past 12 months or taking ACE/ARB        Passed -  EGFRCR or GFRAA > 50     GFR Evaluation  EGFRCR: 99 , resulted on 4/5/2024          Passed - GFR in the past 12 months               Future Appointments         Provider Department Appt Notes    In 3 weeks Shaye Flanagan MD [facility] [place] Tightness in back    In 3 months Papo Ibarra MD [facility] Oral challenge to tomatoes    In 3 months Papo Ibarra MD [facility] Oral challenge to catfish    In 4 months Papo Ibarra MD [facility] Oral Challenge to Peanuts    In 8 months [facility]          Recent Outpatient Visits              2 weeks ago Food allergy    [facility]    Nurse Only    2 weeks ago Food allergy    [facility] Papo Ibarra MD    Office Visit    3 weeks ago Annual physical exam    [facility] Rene Fernandez MD    Office Visit    3 months ago [facility]    Office Visit    3 months ago Type 2 diabetes mellitus without retinopathy (HCC)    [facility]    Office Visit

## 2024-07-08 DIAGNOSIS — E11.9 TYPE 2 DIABETES MELLITUS WITHOUT RETINOPATHY (HCC): ICD-10-CM

## 2024-07-11 RX ORDER — DULAGLUTIDE 1.5 MG/.5ML
1.5 INJECTION, SOLUTION SUBCUTANEOUS WEEKLY
Qty: 6 ML | Refills: 0 | Status: SHIPPED | OUTPATIENT
Start: 2024-07-11

## 2024-07-11 NOTE — TELEPHONE ENCOUNTER
Please Review. Protocol Failed; No Protocol   Lab Results   Component Value Date     A1C 8.6 (H) 04/02/2024     Requested Prescriptions   Pending Prescriptions Disp Refills    TRULICITY 1.5 MG/0.5ML Subcutaneous Solution Pen-injector [Pharmacy Med Name: TRULICITY 1.5MG/0.5ML INJ (4 PENS)] 6 mL 0     Sig: ADMINISTER 1.5 MG UNDER THE SKIN 1 TIME A WEEK       Diabetes Medication Protocol Failed - 7/8/2024  6:32 PM        Failed - Last A1C < 7.5 and within past 6 months     Lab Results   Component Value Date    A1C 8.6 (H) 04/02/2024             Passed - In person appointment or virtual visit in the past 6 mos or appointment in next 3 mos     Recent Outpatient Visits              3 weeks ago Food allergy    [facility]    Nurse Only    3 weeks ago Food allergy    [facility] Papo Ibarra MD    Office Visit    4 weeks ago [facility]    3 months ago [facility]    Office Visit    4 months ago Type 2 diabetes mellitus without retinopathy (HCC)    [facility] Jamar Jeffers MD    Office Visit          Future Appointments         Provider Department Appt Notes    In 2 weeks Shaye Flanagan MD [facility] Tightness in back    In 3 months Papo Ibarra MD [facility] Oral challenge to tomatoes    In 3 months Papo Ibarra MD [facility] Oral challenge to catfish    In 3 months Papo Ibarra MD [facility] Oral Challenge to Peanuts    In 8 months Jamar Jeffers MD [facility]  EP/ DM EE                    Passed - Microalbumin procedure in past 12 months or taking ACE/ARB        Passed - EGFRCR or GFRAA > 50     GFR Evaluation  EGFRCR: 99 , resulted on 4/5/2024          Passed - GFR in the past 12 months               Future Appointments         Provider Department Appt Notes    In 2 weeks Shaye Flanagan MD [facility] Tightness in back    In 3 months Papo Ibarra MD [facility] Oral challenge to tomatoes    In 3 months Papo Ibarra MD [facility] Oral challenge to catfish    In 3 months Papo Ibarra MD [facility] Oral Challenge to Peanuts    In 8 months Jamar Jeffers MD [facility] EP/ DM EE          Recent Outpatient Visits              3 weeks ago Food allergy    [facility]    Nurse Only    3 weeks ago Food allergy    [facility] Papo Ibarra MD    Office Visit    4 weeks ago Annual physical exam    [facility] Rene Fernandez MD    Office Visit    3 months ago Mixed hyperlipidemia    [facility] Rene Fernandez MD    Office Visit    4 months ago Type 2 diabetes mellitus without retinopathy (HCC)    [facility] Jamar Jeffers MD    Office Visit           Patient/Caregiver provided printed discharge information.

## 2024-07-19 DIAGNOSIS — E11.9 TYPE 2 DIABETES MELLITUS WITHOUT RETINOPATHY (HCC): ICD-10-CM

## 2024-07-19 NOTE — TELEPHONE ENCOUNTER
Endocrine Refill protocol for Glucose testing supplies       Protocol Criteria:pass  Appointment with Endocrinology completed in the last 12 months or scheduled in the next 6 months     Verify appointment has been completed or scheduled in the appropriate timeline. If so can send a 90 day supply with 1 refill.        Last completed office visit: 1/9/2024 Floridalma Barahona MD   Next scheduled Follow up: no future appt mcm sent

## 2024-07-21 RX ORDER — BLOOD SUGAR DIAGNOSTIC
STRIP MISCELLANEOUS
Qty: 200 STRIP | Refills: 1 | Status: SHIPPED | OUTPATIENT
Start: 2024-07-21

## 2024-07-27 ENCOUNTER — APPOINTMENT (OUTPATIENT)
Dept: GENERAL RADIOLOGY | Facility: HOSPITAL | Age: 64
End: 2024-07-27
Attending: STUDENT IN AN ORGANIZED HEALTH CARE EDUCATION/TRAINING PROGRAM
Payer: MEDICAID

## 2024-07-27 ENCOUNTER — HOSPITAL ENCOUNTER (EMERGENCY)
Facility: HOSPITAL | Age: 64
Discharge: HOME | End: 2024-07-27
Attending: STUDENT IN AN ORGANIZED HEALTH CARE EDUCATION/TRAINING PROGRAM
Payer: MEDICAID

## 2024-07-27 VITALS
RESPIRATION RATE: 19 BRPM | WEIGHT: 192 LBS | TEMPERATURE: 99 F | HEIGHT: 66 IN | DIASTOLIC BLOOD PRESSURE: 68 MMHG | OXYGEN SATURATION: 98 % | BODY MASS INDEX: 30.86 KG/M2 | SYSTOLIC BLOOD PRESSURE: 131 MMHG | HEART RATE: 96 BPM

## 2024-07-27 DIAGNOSIS — U07.1 COVID-19: Primary | ICD-10-CM

## 2024-07-27 DIAGNOSIS — R10.13 DYSPEPSIA: ICD-10-CM

## 2024-07-27 DIAGNOSIS — S39.012A BACK STRAIN, INITIAL ENCOUNTER: ICD-10-CM

## 2024-07-27 LAB
FLUAV + FLUBV RNA SPEC NAA+PROBE: NEGATIVE
FLUAV + FLUBV RNA SPEC NAA+PROBE: NEGATIVE
GLUCOSE BLDC GLUCOMTR-MCNC: 190 MG/DL (ref 70–99)
RSV RNA SPEC NAA+PROBE: NEGATIVE
SARS-COV-2 RNA RESP QL NAA+PROBE: DETECTED

## 2024-07-27 PROCEDURE — 0241U SARS-COV-2/FLU A AND B/RSV BY PCR (GENEXPERT): CPT | Performed by: STUDENT IN AN ORGANIZED HEALTH CARE EDUCATION/TRAINING PROGRAM

## 2024-07-27 PROCEDURE — 99284 EMERGENCY DEPT VISIT MOD MDM: CPT

## 2024-07-27 PROCEDURE — 82962 GLUCOSE BLOOD TEST: CPT

## 2024-07-27 PROCEDURE — 71046 X-RAY EXAM CHEST 2 VIEWS: CPT | Performed by: STUDENT IN AN ORGANIZED HEALTH CARE EDUCATION/TRAINING PROGRAM

## 2024-07-27 PROCEDURE — 93005 ELECTROCARDIOGRAM TRACING: CPT

## 2024-07-27 PROCEDURE — 93010 ELECTROCARDIOGRAM REPORT: CPT

## 2024-07-27 RX ORDER — BENZONATATE 100 MG/1
100 CAPSULE ORAL 3 TIMES DAILY PRN
Qty: 30 CAPSULE | Refills: 0 | Status: SHIPPED | OUTPATIENT
Start: 2024-07-27 | End: 2024-08-26

## 2024-07-27 RX ORDER — PREDNISONE 20 MG/1
40 TABLET ORAL DAILY
Qty: 10 TABLET | Refills: 0 | Status: SHIPPED | OUTPATIENT
Start: 2024-07-27 | End: 2024-08-01

## 2024-07-27 RX ORDER — MAGNESIUM HYDROXIDE/ALUMINUM HYDROXICE/SIMETHICONE 120; 1200; 1200 MG/30ML; MG/30ML; MG/30ML
30 SUSPENSION ORAL ONCE
Status: COMPLETED | OUTPATIENT
Start: 2024-07-27 | End: 2024-07-27

## 2024-07-27 RX ORDER — CYCLOBENZAPRINE HCL 10 MG
10 TABLET ORAL 3 TIMES DAILY PRN
Qty: 10 TABLET | Refills: 0 | Status: SHIPPED | OUTPATIENT
Start: 2024-07-27 | End: 2024-08-03

## 2024-07-27 RX ORDER — MAGNESIUM HYDROXIDE/ALUMINUM HYDROXICE/SIMETHICONE 120; 1200; 1200 MG/30ML; MG/30ML; MG/30ML
10 SUSPENSION ORAL 4 TIMES DAILY PRN
Qty: 355 ML | Refills: 0 | Status: SHIPPED | OUTPATIENT
Start: 2024-07-27

## 2024-07-27 NOTE — ED PROVIDER NOTES
History     Chief Complaint   Patient presents with    Difficulty Breathing    Sore Throat       HPI    64 year old female with history of COPD, hypertension, VTE on Eliquis, diabetes presents with 3 days of cough, fatigue, chest tightness, diarrhea, sore throat.  Recently returned from [place].  She used her rescue inhaler at home which helped. No f/v.  With coughing she gets pain in her back worse on the right and worsening indigestion.            Past Medical History:    Arrhythmia    tachycardia    Asthma (HCC)    B12 deficiency    Back pain    Back problem    Calculus of kidney    Cancer (HCC)    - cervical    COPD (chronic obstructive pulmonary disease) (HCC)    Essential hypertension    High blood pressure    High cholesterol    History of abnormal cervical Pap smear    Muscle weakness    Neuropathy    Other and unspecified hyperlipidemia    Palpitations    Pulmonary embolism (HCC)    Spinal stenosis    Stroke (HCC)    TIA a few years ago    TIA (transient ischemic attack)    Type II or unspecified type diabetes mellitus without mention of complication, not stated as uncontrolled       Past Surgical History:   Procedure Laterality Date    Cholecystectomy      Colonoscopy N/A 2017    Procedure: COLONOSCOPY, POSSIBLE BIOPSY, POSSIBLE POLYPECTOMY 49136;  Surgeon: Pedro Thomas MD;  Location: [facility]    Colonoscopy N/A 2022    Procedure: COLONOSCOPY/ESOPHAGOGASTRODUODENOSCOPY (EGD);  Surgeon: Cortney Diaz MD;  Location: [facility] ENDOSCOPY    Colonoscopy & polypectomy            Other      Lung biopsy    Other surgical history      Cone biopsy    Other surgical history      Cystoscopy    Removal gallbladder      Tubal ligation  1986       Social History     Socioeconomic History    Marital status: Single   Tobacco Use    Smoking status: Every Day     Current packs/day: 1.00     Average packs/day: 1 pack/day for 20.0 years (20.0 ttl pk-yrs)     Types:  Cigarettes    Smokeless tobacco: Never    Tobacco comments:     1 pack daily   Vaping Use    Vaping status: Never Used   Substance and Sexual Activity    Alcohol use: Not Currently    Drug use: No   Other Topics Concern    Caffeine Concern No    Exercise No   Social History Narrative    The patient does not use an assistive device..      The patient does live in a home with stairs.     Social Determinants of Health      [facility]    Social Connections    [facility]    Housing Stability                   Physical Exam     ED Triage Vitals [07/27/24 1117]   /87   Pulse 106   Resp 18   Temp 99.2 °F (37.3 °C)   Temp src Oral   SpO2 97 %   O2 Device None (Room air)       Physical Exam  Constitutional:       General: She is not in acute distress.  Eyes:      Extraocular Movements: Extraocular movements intact.   Cardiovascular:      Rate and Rhythm: Normal rate and regular rhythm.      Pulses: Normal pulses.   Pulmonary:      Effort: Pulmonary effort is normal. No respiratory distress.      Breath sounds: No wheezing.   Abdominal:      General: Abdomen is flat. There is no distension.      Tenderness: There is no abdominal tenderness.   Musculoskeletal:         General: No swelling or deformity.      Cervical back: Normal range of motion.      Comments: No midline spinal tenderness.  There is right parathoracic muscle spasm   Skin:     General: Skin is warm.   Neurological:      General: No focal deficit present.      Mental Status: She is alert.              ED Course     Labs Reviewed   POCT GLUCOSE - Abnormal; Notable for the following components:       Result Value    POC Glucose  190 (*)     All other components within normal limits   SARS-COV-2/FLU A AND B/RSV BY PCR (GENEXPERT) - Abnormal; Notable for the following components:    SARS-CoV-2 (COVID-19) - (GeneXpert) Detected (*)      All other components within normal limits    Narrative:     This test is intended for the qualitative detection and differentiation of SARS-CoV-2, influenza A, influenza B, and respiratory syncytial virus (RSV) viral RNA in nasopharyngeal or nares swabs from individuals suspected of respiratory viral infection consistent with COVID-19 by their healthcare provider. Signs and symptoms of respiratory viral infection due to SARS-CoV-2, influenza, and RSV can be similar.    Test performed using the Xpert Xpress SARS-CoV-2/FLU/RSV (real time RT-PCR)  assay on the [employer] instrument, [place], [place] [address].   This test is being used under the Food and Drug Administration's Emergency Use Authorization.    The authorized Fact Sheet for Healthcare Providers for this assay is available upon request from the laboratory.     XR CHEST PA + LAT CHEST (CPT=71046)    Result Date: 7/27/2024  CONCLUSION: No acute cardiopulmonary abnormality.   Dictated by (CST): Tom Antunez MD on 7/27/2024 at 12:40 PM     Finalized by (CST): Tom Antunez MD on 7/27/2024 at 12:40 PM               MDM     Vitals:    07/27/24 1117 07/27/24 1200 07/27/24 1300   BP: 137/87 122/76 131/68   Pulse: 106 95 96   Resp: 18 19 19   Temp: 99.2 °F (37.3 °C)     TempSrc: Oral     SpO2: 97% 98% 98%   Weight: 87.1 kg     Height: 167.6 cm (5' 6\")         Viral syndrome, COVID, bronchitis, pneumonia on differential.  Moving air well here with no significant wheezing.  Patient has not been using her maintenance Symbicort, discussed importance.  Muscle spasm on examination and likely component of gastritis/esophagitis from excessive coughing.    ED Course as of 07/27/24 1411  ------------------------------------------------------------  Time: 07/27 1145  Comment: EKG interpretation by me: EKG sinus rhythm at a rate of 101, axis nomal, no concerning acute ischemic ST changes, appears similar to prior  EKGs    ------------------------------------------------------------  Time: 07/27 1226  Comment: Covid+  ------------------------------------------------------------  Time: 07/27 1236  Comment: CXR interpretation by me with no concerning acute findings    ------------------------------------------------------------  Time: 07/27 1254  Comment: I discussed risks of steroid burst including worsening gastritis and elevated sugar, Accu-Chek is reassuring, if her sugars rise patient can take an extra dose of her metformin in the evening time.  Continue to use her albuterol inhaler as needed, restart her daily maintenance inhaler.  Will give a prescription for Mylanta, steroid burst, muscle relaxer for her back strain.  Supportive measures for COVID and return precautions. Pcp f/u         Disposition and Plan     Clinical Impression:  1. COVID-19    2. Back strain, initial encounter    3. Dyspepsia        Disposition:  Discharge    Follow-up:  Rene Price MD  [address] [place], [place] [id number]  [phone]    Follow up        Medications Prescribed:  Discharge Medication List as of 7/27/2024  1:02 PM        START taking these medications    Details   benzonatate 100 MG Oral Cap Take 1 capsule (100 mg total) by mouth 3 (three) times daily as needed for cough., Normal, Disp-30 capsule, R-0      alum-mag hydroxide-simethicone 200-200-20 MG/5ML Oral Suspension Take 10 mL by mouth 4 (four) times daily as needed for Indigestion., Normal, Disp-355 mL, R-0      predniSONE 20 MG Oral Tab Take 2 tablets (40 mg total) by mouth daily for 5 days., Normal, Disp-10 tablet, R-0      cyclobenzaprine 10 MG Oral Tab Take 1 tablet (10 mg total) by mouth 3 (three) times daily as needed for Muscle spasms., Normal, Disp-10 tablet, R-0

## 2024-07-28 LAB
ATRIAL RATE: 101 BPM
P AXIS: 68 DEGREES
P-R INTERVAL: 148 MS
Q-T INTERVAL: 364 MS
QRS DURATION: 86 MS
QTC CALCULATION (BEZET): 471 MS
R AXIS: 13 DEGREES
T AXIS: 63 DEGREES
VENTRICULAR RATE: 101 BPM

## 2024-07-29 ENCOUNTER — PATIENT MESSAGE (OUTPATIENT)
Dept: ENDOCRINOLOGY CLINIC | Facility: CLINIC | Age: 64
End: 2024-07-29

## 2024-07-29 ENCOUNTER — PATIENT MESSAGE (OUTPATIENT)
Dept: PULMONOLOGY | Facility: CLINIC | Age: 64
End: 2024-07-29

## 2024-07-30 ENCOUNTER — TELEPHONE (OUTPATIENT)
Dept: PULMONOLOGY | Facility: CLINIC | Age: 64
End: 2024-07-30

## 2024-07-30 RX ORDER — DULAGLUTIDE 3 MG/.5ML
3 INJECTION, SOLUTION SUBCUTANEOUS WEEKLY
Qty: 2 ML | Refills: 0 | Status: SHIPPED | OUTPATIENT
Start: 2024-07-30

## 2024-07-30 RX ORDER — GLIPIZIDE 10 MG/1
10 TABLET ORAL
Qty: 60 TABLET | Refills: 2 | Status: SHIPPED | OUTPATIENT
Start: 2024-07-30

## 2024-07-30 NOTE — TELEPHONE ENCOUNTER
----- Message from [employer]  sent at 7/29/2024 10:22 PM CDT -----  Regarding: Earlier appointment   Contact: [phone]  I was scheduling an appointment because I had Covid today for my visit and had to cancel it. Your next available appointment is not until November. Please let me know if you can squeeze me in after next. Also they did a chest x ray can you look it over to see any changes.    Thank you,  Di Puga

## 2024-07-30 NOTE — TELEPHONE ENCOUNTER
Left voice message for pt to call back. Sent pt [facility] message with instructions as written below by Theo URRUTIA.

## 2024-07-30 NOTE — TELEPHONE ENCOUNTER
Dr. Mcduffie and Rakan Darby please review patient information below. Advised patient if symptoms worsen to go the ER. Thank you.    Hyperglycemia     Patient's LOV was 1/9/2024. Last A1C 4/2/2024: 8.6    Onset of hyperglycemia:   For the past month her blood sugars have been ranging over 300. She is not able to provide her readings for the past week. Requested her to send her blood sugars for the past week as soon as possible.    BG levels (please print out CGM and/or pump report if patient is wearing one):   Today's fasting is 368.    Symptoms (RN only: N/V, abdominal pain and/or radiating to the back, blurry vision, increased urinary frequency, blurred vision, CP, SOB):  Denies nausea and vomiting.  Denies abdominal pain.  Positive for blurry vision.  Positive for urinary frequency  Denies chest pain  Denies shortness of breath.    Pattern of hyperglycemia:   Daily for the past month.    Steroid therapy:   Currently on Prednisone 40 mg daily for 5 days. Tomorrow is her last dose.    Acute Illness:   COVID, Diagnosed 7/27/24    Change in Diet:   Denies any changes.    List DM Medications/Compliance:  Voices compliance of taking her medication  Metformin 1 Gm twice a day  Trulicity 1.5 mg weekly  Has stopped the Glipizide as per ordered once she received her Trulicity.

## 2024-07-30 NOTE — TELEPHONE ENCOUNTER
From: Di Puga  To: Shaye Flanagan  Sent: 7/29/2024 10:22 PM CDT  Subject: Earlier appointment     I was scheduling an appointment because I had Covid today for my visit and had to cancel it. Your next available appointment is not until November. Please let me know if you can squeeze me in after next. Also they did a chest x ray can you look it over to see any changes.    Thank you,  Di Puga

## 2024-07-30 NOTE — TELEPHONE ENCOUNTER
Spoke with patient and informed of Dr. Flanagan's message below. Appointment scheduled for 8/19/24 at 12:15PM. Confirmed date, time, place, and where to park. Patient verbalized understanding.

## 2024-07-30 NOTE — TELEPHONE ENCOUNTER
Patient has read the my chart message sent earlier by RN. Also, called patient to reinforce medication changes and to call office or send my chart message with updated blood sugars tomorrow.

## 2024-07-30 NOTE — TELEPHONE ENCOUNTER
From: Di Puga  To: Floridalma Barahona  Sent: 7/29/2024 10:19 PM CDT  Subject: Good morning, I need an earlier appointment     My sugar levels are up to close to 300. No appointments available until after September 10th.

## 2024-07-30 NOTE — TELEPHONE ENCOUNTER
Dr. Flanagan: Patient went to the emergency department on 7/27/24 and was diagnosed with COVID. Patient requesting you to review chest x-ray results.

## 2024-08-05 ENCOUNTER — TELEPHONE (OUTPATIENT)
Dept: INTERNAL MEDICINE CLINIC | Facility: CLINIC | Age: 64
End: 2024-08-05

## 2024-08-05 NOTE — TELEPHONE ENCOUNTER
Patient was in ER on 7/27/24. She was diagnosed with Covid. She has been fever free for 24 hours. She is still slightly fatigued. She feels a lot better. She is calling to schedule an ER follow up.     Future Appointments   Date Time Provider Department Center   8/9/2024 11:15 AM Rene Price MD [id number] [id number]   8/19/2024 12:15 PM Shaye Flanagan MD [facility]   9/3/2024 11:45 AM Floridalma Barahona MD [facility]   10/21/2024  1:30 PM Papo Ibarra MD [facility]   10/28/2024  1:30 PM Papo Ibarra MD [facility]   11/5/2024  1:30 PM [facility]   11/6/2024  3:30 PM Shaye Flanagan MD [facility]   3/11/2025  1:15 PM Jamar Jeffers MD [facility]

## 2024-08-09 ENCOUNTER — OFFICE VISIT (OUTPATIENT)
Dept: INTERNAL MEDICINE CLINIC | Facility: CLINIC | Age: 64
End: 2024-08-09
Payer: MEDICAID

## 2024-08-09 VITALS
SYSTOLIC BLOOD PRESSURE: 128 MMHG | RESPIRATION RATE: 20 BRPM | HEIGHT: 66 IN | OXYGEN SATURATION: 98 % | DIASTOLIC BLOOD PRESSURE: 70 MMHG | WEIGHT: 196 LBS | BODY MASS INDEX: 31.5 KG/M2 | HEART RATE: 110 BPM | TEMPERATURE: 98 F

## 2024-08-09 DIAGNOSIS — R00.2 PALPITATIONS: ICD-10-CM

## 2024-08-09 DIAGNOSIS — U07.1 COVID-19 VIRUS INFECTION: Primary | ICD-10-CM

## 2024-08-09 DIAGNOSIS — R00.0 TACHYCARDIA: ICD-10-CM

## 2024-08-09 LAB
ATRIAL RATE: 102 BPM
P AXIS: 58 DEGREES
P-R INTERVAL: 154 MS
Q-T INTERVAL: 344 MS
QRS DURATION: 76 MS
QTC CALCULATION (BEZET): 448 MS
R AXIS: 2 DEGREES
T AXIS: 56 DEGREES
VENTRICULAR RATE: 102 BPM

## 2024-08-09 PROCEDURE — 99214 OFFICE O/P EST MOD 30 MIN: CPT | Performed by: INTERNAL MEDICINE

## 2024-08-09 PROCEDURE — 93000 ELECTROCARDIOGRAM COMPLETE: CPT | Performed by: INTERNAL MEDICINE

## 2024-08-09 RX ORDER — METOPROLOL SUCCINATE 50 MG/1
50 TABLET, EXTENDED RELEASE ORAL DAILY
Qty: 90 TABLET | Refills: 1 | Status: SHIPPED | OUTPATIENT
Start: 2024-08-09

## 2024-08-09 NOTE — PROGRESS NOTES
Subjective:   Patient ID: Di Puga is a 64 year old female.    HPI  Pt comes in for follow-up after she was diagnosed with COVID 727 she went to ER no treatment history she says overall doing better with breathing but that she is noticing her heart rate is fast she been having some palpitations the other day her heart rate went really fast she thinks in 130s and so she felt dizzy  No chest pain no shortness of breath but since he had COVID little more short of breath than usual    History/Other:   Review of Systems   Constitutional: Negative.    HENT: Negative.     Eyes: Negative.    Respiratory: Negative.     Cardiovascular:  Positive for palpitations.   Gastrointestinal: Negative.    Genitourinary: Negative.    Musculoskeletal: Negative.    Skin: Negative.    Neurological:  Positive for dizziness.   Psychiatric/Behavioral: Negative.       Current Outpatient Medications   Medication Sig Dispense Refill    Dulaglutide (TRULICITY) 3 MG/0.5ML Subcutaneous Solution Pen-injector Inject 3 mg into the skin once a week. 2 mL 0    glipiZIDE 10 MG Oral Tab Take 1 tablet (10 mg total) by mouth 2 (two) times daily before meals. 60 tablet 2    alum-mag hydroxide-simethicone 200-200-20 MG/5ML Oral Suspension Take 10 mL by mouth 4 (four) times daily as needed for Indigestion. 355 mL 0    Glucose Blood (ONETOUCH VERIO) In Vitro Strip TEST TWICE DAILY. 200 strip 1    metFORMIN HCl 1000 MG Oral Tab Take 1 tablet (1,000 mg total) by mouth 2 (two) times daily with meals. 180 tablet 3    diphenhydrAMINE HCl 25 MG Oral Tab Take 1 tablet (25 mg total) by mouth every 6 (six) hours as needed for Itching.      EPINEPHrine (EPIPEN 2-ROBERTH) 0.3 MG/0.3ML Injection Solution Auto-injector Inject IM in event of  allergic reaction 1 each 0    levocetirizine 5 MG Oral Tab Take 1 tablet (5 mg total) by mouth every evening. 90 tablet 1    metoprolol succinate ER 25 MG Oral Tablet 24 Hr Take 1 tablet (25 mg total) by mouth daily. 90 tablet 3     atorvastatin 80 MG Oral Tab Take 1 tablet (80 mg total) by mouth nightly. 90 tablet 3    apixaban (ELIQUIS) 5 MG Oral Tab Take 1 tablet (5 mg total) by mouth 2 (two) times daily. 180 tablet 3    amLODIPine 5 MG Oral Tab Take 1 tablet (5 mg total) by mouth daily. 90 tablet 3    albuterol 108 (90 Base) MCG/ACT Inhalation Aero Soln Inhale 2 puffs into the lungs every 4 (four) hours as needed. 8.5 g 1    OneTouch Delica Lancets 33G Does not apply Misc Use to check blood sugar two times a day 200 each 1    Blood Glucose Monitoring Suppl (ONETOUCH ULTRA SYSTEM) W/DEVICE Does not apply Kit Test sugar twice daily 1 kit 0    benzonatate 100 MG Oral Cap Take 1 capsule (100 mg total) by mouth 3 (three) times daily as needed for cough. (Patient not taking: Reported on 8/9/2024) 30 capsule 0    albuterol (PROAIR HFA) 108 (90 Base) MCG/ACT Inhalation Aero Soln Inhale 2 puffs into the lungs every 6 (six) hours as needed for Wheezing. 1 each 0    Acetaminophen-Codeine (TYLENOL WITH CODEINE #3) 300-30 MG Oral Tab Take 1 tablet by mouth every 8 (eight) hours as needed for Pain. (Patient not taking: Reported on 6/18/2024) 30 tablet 0     Allergies:  Allergies   Allergen Reactions    Ace Inhibitors ANAPHYLAXIS, ANGIOEDEMA and SWELLING     April 2020, hospitalized at [facility] in [place] with tongue swelling and throat closing sensation, on lisinopril at the time. Not intubated      Egg White (Diagnostic) NAUSEA ONLY and Tightness in Throat    Fish-Derived Products SWELLING    Lisinopril ANAPHYLAXIS    Peanuts SWELLING    Shrimp ITCHING, NAUSEA AND VOMITING, Tightness in Throat and WHEEZING    Tomato SWELLING and UNKNOWN    Tomatoes RASH       Objective:   Physical Exam  Constitutional:       Appearance: She is well-developed.   HENT:      Head: Normocephalic and atraumatic.      Right Ear: External ear normal.      Left Ear: External ear normal.      Nose: Nose normal.   Eyes:      Conjunctiva/sclera: Conjunctivae normal.      Pupils:  Pupils are equal, round, and reactive to light.   Cardiovascular:      Rate and Rhythm: Normal rate and regular rhythm.      Heart sounds: Normal heart sounds.   Pulmonary:      Effort: Pulmonary effort is normal.      Breath sounds: Normal breath sounds.   Abdominal:      General: Bowel sounds are normal.      Palpations: Abdomen is soft.   Genitourinary:     Vagina: Normal.   Musculoskeletal:         General: Normal range of motion.      Cervical back: Normal range of motion and neck supple.   Skin:     General: Skin is warm and dry.   Neurological:      Mental Status: She is alert and oriented to person, place, and time.      Deep Tendon Reflexes: Reflexes are normal and symmetric.   Psychiatric:         Behavior: Behavior normal.         Thought Content: Thought content normal.         Judgment: Judgment normal.         Assessment & Plan:   1. COVID-19 virus infection overall is doing better lungs clear oxygen is good monitor   2. Tachycardia ?  With palpitations possible other arrhythmia EKG with sinus rhythm order Holter monitor also will increase metoprolol to 50 mg   3. Palpitations as above we will also give referral to cardiology       No orders of the defined types were placed in this encounter.      Meds This Visit:  Requested Prescriptions      No prescriptions requested or ordered in this encounter       Imaging & Referrals:  ELECTROCARDIOGRAM, COMPLETE

## 2024-08-19 ENCOUNTER — HOSPITAL ENCOUNTER (OUTPATIENT)
Dept: CV DIAGNOSTICS | Facility: HOSPITAL | Age: 64
Discharge: HOME | End: 2024-08-19
Attending: INTERNAL MEDICINE
Payer: MEDICAID

## 2024-08-19 DIAGNOSIS — R00.2 PALPITATIONS: ICD-10-CM

## 2024-08-19 PROCEDURE — 93242 EXT ECG>48HR<7D RECORDING: CPT | Performed by: INTERNAL MEDICINE

## 2024-08-19 PROCEDURE — 93244 EXT ECG>48HR<7D REV&INTERPJ: CPT | Performed by: INTERNAL MEDICINE

## 2024-08-19 PROCEDURE — 93243 EXT ECG>48HR<7D SCAN A/R: CPT | Performed by: INTERNAL MEDICINE

## 2024-08-19 RX ORDER — AMLODIPINE BESYLATE 5 MG/1
5 TABLET ORAL DAILY
Qty: 90 TABLET | Refills: 3 | Status: SHIPPED | OUTPATIENT
Start: 2024-08-19

## 2024-08-19 NOTE — TELEPHONE ENCOUNTER
Refill Per Protocol     Requested Prescriptions   Pending Prescriptions Disp Refills    AMLODIPINE 5 MG Oral Tab [Pharmacy Med Name: AMLODIPINE BESYLATE 5MG TABLETS] 90 tablet 3     Sig: TAKE 1 TABLET(5 MG) BY MOUTH DAILY       Hypertension Medications Protocol Passed - 8/14/2024 12:48 PM        Passed - CMP or BMP in past 12 months        Passed - Last BP reading less than 140/90     BP Readings from Last 1 Encounters:   08/09/24 128/70               Passed - In person appointment or virtual visit in the past 12 mos or appointment in next 3 mos     Recent Outpatient Visits              [facility]    Office Visit    2 months ago Food allergy    [facility]    Nurse Only    2 months ago Food allergy    [facility] Papo Ibarra MD    Office Visit    2 months ago Annual physical exam    [facility] Rene Fernandez MD    Office Visit    4 months ago Mixed hyperlipidemia    [facility]    [facility] Visit          Future Appointments         Provider Department Appt Notes    Tomorrow Floridalma Barahona MD [facility]    In 4 days Shaye Flanagan MD [facility] 8/19 reschedule,  COPD    In 2 months Papo Ibarra MD [facility] Oral challenge to tomatoes    In 2 months Papo Ibarra MD [facility] Oral challenge to catfish    In 2 months [facility] Peanuts    In 6 months Jamar Jeffers MD [facility] EP/ [facility] EE                     Passed - EGFRCR or GFRAA > 50     GFR Evaluation  EGFRCR: 99 , resulted on 4/5/2024                 Future Appointments         Provider Department Appt Notes    Tomorrow Floridalma Barahona MD [facility]    In 4 days Shaye Flanagan MD [facility] 8/19 reschedule,  COPD    In 2 months Papo Ibarra MD [facility] Oral challenge to tomatoes    In 2 months Papo Ibarra MD [facility] Oral challenge to catfish    In 2 months Papo Ibarra MD [facility] Oral Challenge to Peanuts    In 6 months Jamar Jeffers MD [facility] EP/ DM EE          Recent Outpatient Visits              1 week ago COVID-19 virus infection    [facility], Rene Fernandez MD    Office Visit    2 months ago Food allergy    [facility]    Nurse Only    2 months ago Food allergy    [facility] Papo Ibarra MD    Office Visit    2 months ago Annual physical exam    [facility], Rene Fernandez MD    Office Visit    4 months ago Mixed hyperlipidemia    [facility], Rene Fernandez MD    Office Visit

## 2024-08-19 NOTE — PROGRESS NOTES
Name: Di Puga  Date: 8/20/24    Referring Physician: Dr. Rene Price    HISTORY OF PRESENT ILLNESS   Di Puga is a 64 year old female who presents for follow-up of evaluaton and management of uncontrolled T2D. She was diagnosed more than 15 years ago on regular screening blood tests. She had been on insulin and then I transitioned her to oral medications. Prior to this visit, she had been having elevated blood sugars and was advised by her PCP to take an extra dose of Trulicity.     Since the last visit, she has been having elevated blood sugars and so she has been taking the Trulicity 3mg, metformin 1g bid and glipizide 10mg PO bid. Despite this, her blood sugars are still high.     Blood Glucose Today: 155  HbA1C or glycohemoglobin is 8.2 today (and it was 6.4 on 9/13/23 and it was 7.5 on 1/13/23 (and it was 6.8 on 9/27/22 and it was 6.7 on 7/12/22 and it was 6.9 on 4/12/22 and it was 7.9 on 12/01/22 and it was 7.7 on 12/01/21 and it was 8.2 % 1/04/21)  Type 1 or Type 2?: Type 2  Checking 3 times per day  Fasting- 140-200; 2-hour- 210-220  Medications for DM : Metformin 1g PO bid; Trulicity 3.0mg qweekly; Glipizide 10mg PO bid  Episodes of hypoglycemia: none    Dietary compliance: eating healthier, and not as hungry; not snacking    Exercise: its difficult to walk because of spinal stenosis- this is getting better    Polyuria/polydipsia: yes    Blurred vision: none    Flu Vaccine This Season: does not get    Covid Vaccine- yes, got the booster    REVIEW OF SYSTEMS  Eyes: Diabetic retinopathy present: None            Most recent visit to eye doctor in last 12 months: has to make an appt    CV: Cardiovascular disease present: none         Hypertension present: at goal, on meds         Hyperlipidemia present: yes, HDL low, on meds (8/20/24)         Peripheral Vascular Disease present: none    : Nephropathy present: none (8/20/24); creatinine- 0.65    Neuro: Neuropathy present: has the  neuropathy, and sees the podiatrist for this and he checks her feet once a year    Skin: Infection or ulceration: none    Osteoporosis: none; Vitamin D- 4.9 (8/20/24)    Thyroid disease: TSH within normal limits on 8/20/24    Medications:     Current Outpatient Medications:     Dulaglutide (TRULICITY) 3 MG/0.5ML Subcutaneous Solution Pen-injector, Inject 3 mg into the skin once a week., Disp: 2 mL, Rfl: 0    glipiZIDE 10 MG Oral Tab, Take 1 tablet (10 mg total) by mouth 2 (two) times daily before meals., Disp: 60 tablet, Rfl: 2    metFORMIN HCl 1000 MG Oral Tab, Take 1 tablet (1,000 mg total) by mouth 2 (two) times daily with meals., Disp: 180 tablet, Rfl: 3    amLODIPine 5 MG Oral Tab, Take 1 tablet (5 mg total) by mouth daily., Disp: 90 tablet, Rfl: 3    metoprolol succinate ER 50 MG Oral Tablet 24 Hr, Take 1 tablet (50 mg total) by mouth daily., Disp: 90 tablet, Rfl: 1    alum-mag hydroxide-simethicone 200-200-20 MG/5ML Oral Suspension, Take 10 mL by mouth 4 (four) times daily as needed for Indigestion., Disp: 355 mL, Rfl: 0    Glucose Blood (ONETOUCH VERIO) In Vitro Strip, TEST TWICE DAILY., Disp: 200 strip, Rfl: 1    diphenhydrAMINE HCl 25 MG Oral Tab, Take 1 tablet (25 mg total) by mouth every 6 (six) hours as needed for Itching., Disp: , Rfl:     EPINEPHrine (EPIPEN 2-ROBERTH) 0.3 MG/0.3ML Injection Solution Auto-injector, Inject IM in event of  allergic reaction, Disp: 1 each, Rfl: 0    levocetirizine 5 MG Oral Tab, Take 1 tablet (5 mg total) by mouth every evening., Disp: 90 tablet, Rfl: 1    atorvastatin 80 MG Oral Tab, Take 1 tablet (80 mg total) by mouth nightly., Disp: 90 tablet, Rfl: 3    apixaban (ELIQUIS) 5 MG Oral Tab, Take 1 tablet (5 mg total) by mouth 2 (two) times daily., Disp: 180 tablet, Rfl: 3    albuterol 108 (90 Base) MCG/ACT Inhalation Aero Soln, Inhale 2 puffs into the lungs every 4 (four) hours as needed., Disp: 8.5 g, Rfl: 1    OneTouch Delica Lancets 33G Does not apply Misc, Use to check  blood sugar two times a day, Disp: 200 each, Rfl: 1    Blood Glucose Monitoring Suppl (ONETOUCH ULTRA SYSTEM) W/DEVICE Does not apply Kit, Test sugar twice daily, Disp: 1 kit, Rfl: 0     Allergies:   Allergies   Allergen Reactions    Ace Inhibitors ANAPHYLAXIS, ANGIOEDEMA and SWELLING     April 2020, hospitalized at [facility] in [place] with tongue swelling and throat closing sensation, on lisinopril at the time. Not intubated      Egg White (Diagnostic) NAUSEA ONLY and Tightness in Throat    Fish-Derived Products SWELLING    Lisinopril ANAPHYLAXIS    Peanuts SWELLING    Shrimp ITCHING, NAUSEA AND VOMITING, Tightness in Throat and WHEEZING    Tomato SWELLING and UNKNOWN    Tomatoes RASH       Social History:   Social History     Socioeconomic History    Marital status: Single   Tobacco Use    Smoking status: Every Day     Current packs/day: 1.00     Average packs/day: 1 pack/day for 20.0 years (20.0 ttl pk-yrs)     Types: Cigarettes    Smokeless tobacco: Never    Tobacco comments:     1 pack daily   Vaping Use    Vaping status: Never Used   Substance and Sexual Activity    Alcohol use: Not Currently    Drug use: No   Other Topics Concern    Caffeine Concern No    Exercise No   Social History Narrative    The patient does not use an assistive device..      The patient does live in a home with stairs.     Social Determinants of Health      [facility]    Social Connections    [facility]    Housing Stability       Medical History:   Past Medical History:    Arrhythmia    tachycardia    Asthma ([facility])    B12 deficiency    Back pain    Back problem    Calculus of kidney    Cancer ([facility])    1990- cervical    COPD (chronic obstructive pulmonary disease) ([facility])    Essential hypertension    High blood pressure    High cholesterol    History of abnormal cervical Pap smear    Muscle weakness    Neuropathy     Other and unspecified hyperlipidemia    Palpitations    Pulmonary embolism (HCC)    Spinal stenosis    Stroke (HCC)    TIA a few years ago    TIA (transient ischemic attack)    Type II or unspecified type diabetes mellitus without mention of complication, not stated as uncontrolled       Surgical history:   Past Surgical History:   Procedure Laterality Date    Cholecystectomy      Colonoscopy N/A 2017    Procedure: COLONOSCOPY, POSSIBLE BIOPSY, POSSIBLE POLYPECTOMY 33783;  Surgeon: Pedro Thomas MD;  [facility]    Colonoscopy N/A 2022    Procedure: COLONOSCOPY/ESOPHAGOGASTRODUODENOSCOPY (EGD);  Surgeon: Cortney Diaz MD;  Location: [facility] ENDOSCOPY    Colonoscopy & polypectomy            Other      Lung biopsy    Other surgical history      Cone biopsy    Other surgical history      Cystoscopy    Removal gallbladder      Tubal ligation           PHYSICAL EXAM  Vitals:    24 0942   BP: 111/70   Pulse: 95   Weight: 196 lb 3.2 oz (89 kg)   Height: 5' 6\" (1.676 m)       General Appearance:  alert, well developed, in no acute distress  Eyes:  normal conjunctivae, sclera., normal sclera and normal pupils  Psychiatric:  oriented to time, self, and place  Nutritional:  no abnormal weight gain or loss  Feet: monofilament normal bilaterally, pulses felt  Bilateral barefoot skin diabetic exam is normal, visualized feet and the appearance is normal.  Bilateral monofilament/sensation of both feet is normal.  Pulsation pedal pulse exam of both lower legs/feet is normal as well.        Lab Data:   Lab Results   Component Value Date     (H) 2024    A1C 8.2 (A) 2024     Lab Results   Component Value Date     (H) 2024    BUN 11 2024    BUNCREA 16.9 2024    CREATSERUM 0.65 2024    ANIONGAP 6 2024    GFRNAA 105 2022    GFRAA 121 2022    CA 9.7 2024    OSMOCALC 294 2024    ALKPHO 98  04/05/2024    AST 15 04/05/2024    ALT 10 04/05/2024    ALKPHOS 72 01/16/2016    BILT 0.4 04/05/2024    TP 7.5 04/05/2024    ALB 4.5 04/05/2024    GLOBULIN 3.0 04/05/2024    AGRATIO 1.3 12/21/2021     04/05/2024    K 3.9 04/05/2024     04/05/2024    CO2 24.0 04/05/2024     Lab Results   Component Value Date    CHOLEST 133 01/09/2024    TRIG 86 01/09/2024    HDL 35 (L) 01/09/2024    LDL 81 01/09/2024    VLDL 14 01/09/2024    TCHDLRATIO 4.0 12/21/2021    NONHDLC 98 01/09/2024    CALCNONHDL 155 (H) 01/16/2016     Lab Results   Component Value Date    MALBP 3.70 01/09/2024    CREUR 200.10 01/09/2024    CREAURINE 107.7 02/16/2013    MIALBURINE 0.5 02/16/2013    MCRRATIOUR 4.6 02/16/2013    MICROALBUMIN 0.5 12/21/2021    CREAUR 82 12/21/2021    MALBCREACALC 6 12/21/2021         ASSESSMENT/PLAN:    This is a 64 year-old woman here for follow-up of management of uncontrolled type 2 diabetes. We discussed the ABCs of DM.     1.) Hyperglycemia Management- We discussed the importance of glycemic control to prevent complications of diabetes. We discussed the importance of SBGM. I offered and provided patient education materials and offered a blood glucose log book.   - Continue metformin 1g PO bid  - Continue Trulicity 3.0mg qweekly, but try Mounjaro 5mg qweekly since she is still having elevated blood sugars  - Continue glipizide 10mg PO bid   - Continue to check blood sugars fasting and pre-meals  - Contact us with blood sugars <70 and >300    2.) Management of Diabetic Complications- We discussed the complications of diabetes include retinopathy, neuropathy, nephropathy and cardiovascular disease.   - Ophthalmology- she is up to date  - Neuropathy- none  - Lipid panel- we will check in 3 months  - MAC- will check in 3 months  - CMP- will check in 3 months  - BP- at goal, on meds  - Vaccines, does not get the flu vaccine, up to date with booster    3.) Lifestyle Management for Diabetes- We discussed importance of  a low CHO diet, and recommend 45gm per meal or 135gm per day. We discussed the importance of trying to follow a Mediterranean diet, with an emphasis on vegetables at every meal, with lots whole grains, and protein from either plant-based sources, or poultry and fish.   - Patient has been eating more healthy foods  - She is not able to exercise, but she is going to try as much as possible    4.) Vitamin D deficiency  - Start ergocalciferol 50,000 every week  - Check vitamin D in 3 months    Return to the clinic in 3 months    Prior to this encounter, I spent over 15 minutes with preparing for the visit, including reviewing documents from other specialties as well as from PCP and going over test results. During the face to face encounter, I spent an additional 15 minutes which were determined for follow-up. Greater than 50% of the time was spent in counseling, anticipatory guidance, and coordination of care. Patient concerns were answered to the best of my knowledge.     8/20/24  Floridalma Barahona MD

## 2024-08-20 ENCOUNTER — OFFICE VISIT (OUTPATIENT)
Dept: ENDOCRINOLOGY CLINIC | Facility: CLINIC | Age: 64
End: 2024-08-20
Payer: MEDICAID

## 2024-08-20 ENCOUNTER — LAB ENCOUNTER (OUTPATIENT)
Dept: LAB | Facility: HOSPITAL | Age: 64
End: 2024-08-20
Attending: INTERNAL MEDICINE
Payer: MEDICAID

## 2024-08-20 ENCOUNTER — TELEPHONE (OUTPATIENT)
Dept: ENDOCRINOLOGY CLINIC | Facility: CLINIC | Age: 64
End: 2024-08-20

## 2024-08-20 VITALS
BODY MASS INDEX: 31.53 KG/M2 | SYSTOLIC BLOOD PRESSURE: 111 MMHG | DIASTOLIC BLOOD PRESSURE: 70 MMHG | HEIGHT: 66 IN | WEIGHT: 196.19 LBS | HEART RATE: 95 BPM

## 2024-08-20 DIAGNOSIS — E78.2 MIXED HYPERLIPIDEMIA: ICD-10-CM

## 2024-08-20 DIAGNOSIS — E11.9 TYPE 2 DIABETES MELLITUS WITHOUT RETINOPATHY (HCC): Primary | ICD-10-CM

## 2024-08-20 DIAGNOSIS — Z00.00 ANNUAL PHYSICAL EXAM: ICD-10-CM

## 2024-08-20 DIAGNOSIS — E11.9 TYPE 2 DIABETES MELLITUS WITHOUT RETINOPATHY (HCC): ICD-10-CM

## 2024-08-20 DIAGNOSIS — I10 ESSENTIAL HYPERTENSION: ICD-10-CM

## 2024-08-20 DIAGNOSIS — Z71.6 ENCOUNTER FOR SMOKING CESSATION COUNSELING: ICD-10-CM

## 2024-08-20 DIAGNOSIS — G47.00 INSOMNIA, UNSPECIFIED TYPE: ICD-10-CM

## 2024-08-20 DIAGNOSIS — R31.9 HEMATURIA, UNSPECIFIED TYPE: Primary | ICD-10-CM

## 2024-08-20 DIAGNOSIS — J42 CHRONIC BRONCHITIS, UNSPECIFIED CHRONIC BRONCHITIS TYPE (HCC): ICD-10-CM

## 2024-08-20 DIAGNOSIS — G89.29 CHRONIC BILATERAL LOW BACK PAIN WITHOUT SCIATICA: ICD-10-CM

## 2024-08-20 DIAGNOSIS — M54.50 CHRONIC BILATERAL LOW BACK PAIN WITHOUT SCIATICA: ICD-10-CM

## 2024-08-20 DIAGNOSIS — H25.13 AGE-RELATED NUCLEAR CATARACT OF BOTH EYES: ICD-10-CM

## 2024-08-20 DIAGNOSIS — R07.81 RIB PAIN ON RIGHT SIDE: ICD-10-CM

## 2024-08-20 DIAGNOSIS — E55.9 VITAMIN D DEFICIENCY: ICD-10-CM

## 2024-08-20 LAB
ALBUMIN SERPL-MCNC: 4.5 G/DL (ref 3.2–4.8)
ALBUMIN/GLOB SERPL: 1.5 {RATIO} (ref 1–2)
ALP LIVER SERPL-CCNC: 94 U/L
ALT SERPL-CCNC: 10 U/L
ANION GAP SERPL CALC-SCNC: 5 MMOL/L (ref 0–18)
AST SERPL-CCNC: 14 U/L (ref ?–34)
BASOPHILS # BLD AUTO: 0.02 X10(3) UL (ref 0–0.2)
BASOPHILS NFR BLD AUTO: 0.3 %
BILIRUB SERPL-MCNC: 0.7 MG/DL (ref 0.2–1.1)
BILIRUB UR QL: NEGATIVE
BUN BLD-MCNC: 8 MG/DL (ref 9–23)
BUN/CREAT SERPL: 12.9 (ref 10–20)
CALCIUM BLD-MCNC: 9.7 MG/DL (ref 8.7–10.4)
CARTRIDGE EXPIRATION DATE: ABNORMAL DATE
CHLORIDE SERPL-SCNC: 109 MMOL/L (ref 98–112)
CHOLEST SERPL-MCNC: 137 MG/DL (ref ?–200)
CLARITY UR: CLEAR
CO2 SERPL-SCNC: 27 MMOL/L (ref 21–32)
COLOR UR: YELLOW
CREAT BLD-MCNC: 0.62 MG/DL
CREAT UR-SCNC: 191.6 MG/DL
DEPRECATED RDW RBC AUTO: 48.9 FL (ref 35.1–46.3)
EGFRCR SERPLBLD CKD-EPI 2021: 100 ML/MIN/1.73M2 (ref 60–?)
EOSINOPHIL # BLD AUTO: 0.07 X10(3) UL (ref 0–0.7)
EOSINOPHIL NFR BLD AUTO: 1.2 %
ERYTHROCYTE [DISTWIDTH] IN BLOOD BY AUTOMATED COUNT: 16.5 % (ref 11–15)
EST. AVERAGE GLUCOSE BLD GHB EST-MCNC: 197 MG/DL (ref 68–126)
FASTING PATIENT LIPID ANSWER: YES
FASTING STATUS PATIENT QL REPORTED: YES
GLOBULIN PLAS-MCNC: 3.1 G/DL (ref 2–3.5)
GLUCOSE BLD-MCNC: 130 MG/DL (ref 70–99)
GLUCOSE BLOOD: 155
GLUCOSE UR-MCNC: NORMAL MG/DL
HBA1C MFR BLD: 8.5 % (ref ?–5.7)
HCT VFR BLD AUTO: 39.3 %
HDLC SERPL-MCNC: 31 MG/DL (ref 40–59)
HEMOGLOBIN A1C: 8.2 % (ref 4.3–5.6)
HGB BLD-MCNC: 12.9 G/DL
IMM GRANULOCYTES # BLD AUTO: 0.01 X10(3) UL (ref 0–1)
IMM GRANULOCYTES NFR BLD: 0.2 %
KETONES UR-MCNC: NEGATIVE MG/DL
LDLC SERPL CALC-MCNC: 87 MG/DL (ref ?–100)
LEUKOCYTE ESTERASE UR QL STRIP.AUTO: NEGATIVE
LYMPHOCYTES # BLD AUTO: 1.98 X10(3) UL (ref 1–4)
LYMPHOCYTES NFR BLD AUTO: 32.9 %
MCH RBC QN AUTO: 26.8 PG (ref 26–34)
MCHC RBC AUTO-ENTMCNC: 32.8 G/DL (ref 31–37)
MCV RBC AUTO: 81.5 FL
MICROALBUMIN UR-MCNC: 1.4 MG/DL
MICROALBUMIN/CREAT 24H UR-RTO: 7.3 UG/MG (ref ?–30)
MONOCYTES # BLD AUTO: 0.33 X10(3) UL (ref 0.1–1)
MONOCYTES NFR BLD AUTO: 5.5 %
NEUTROPHILS # BLD AUTO: 3.61 X10 (3) UL (ref 1.5–7.7)
NEUTROPHILS # BLD AUTO: 3.61 X10(3) UL (ref 1.5–7.7)
NEUTROPHILS NFR BLD AUTO: 59.9 %
NITRITE UR QL STRIP.AUTO: NEGATIVE
NONHDLC SERPL-MCNC: 106 MG/DL (ref ?–130)
OSMOLALITY SERPL CALC.SUM OF ELEC: 292 MOSM/KG (ref 275–295)
PH UR: 6 [PH] (ref 5–8)
PLATELET # BLD AUTO: 283 10(3)UL (ref 150–450)
POTASSIUM SERPL-SCNC: 3.6 MMOL/L (ref 3.5–5.1)
PROT SERPL-MCNC: 7.6 G/DL (ref 5.7–8.2)
RBC # BLD AUTO: 4.82 X10(6)UL
SODIUM SERPL-SCNC: 141 MMOL/L (ref 136–145)
SP GR UR STRIP: 1.02 (ref 1–1.03)
T4 FREE SERPL-MCNC: 1.2 NG/DL (ref 0.8–1.7)
TEST STRIP EXPIRATION DATE: NORMAL DATE
TEST STRIP LOT #: NORMAL NUMERIC
TRIGL SERPL-MCNC: 102 MG/DL (ref 30–149)
TSI SER-ACNC: 1.29 MIU/ML (ref 0.55–4.78)
UROBILINOGEN UR STRIP-ACNC: 4
VIT D+METAB SERPL-MCNC: 4.9 NG/ML (ref 30–100)
VLDLC SERPL CALC-MCNC: 16 MG/DL (ref 0–30)
WBC # BLD AUTO: 6 X10(3) UL (ref 4–11)

## 2024-08-20 PROCEDURE — 36415 COLL VENOUS BLD VENIPUNCTURE: CPT

## 2024-08-20 PROCEDURE — 82570 ASSAY OF URINE CREATININE: CPT

## 2024-08-20 PROCEDURE — 99214 OFFICE O/P EST MOD 30 MIN: CPT | Performed by: INTERNAL MEDICINE

## 2024-08-20 PROCEDURE — 83036 HEMOGLOBIN GLYCOSYLATED A1C: CPT

## 2024-08-20 PROCEDURE — 82947 ASSAY GLUCOSE BLOOD QUANT: CPT | Performed by: INTERNAL MEDICINE

## 2024-08-20 PROCEDURE — 83036 HEMOGLOBIN GLYCOSYLATED A1C: CPT | Performed by: INTERNAL MEDICINE

## 2024-08-20 PROCEDURE — 84443 ASSAY THYROID STIM HORMONE: CPT

## 2024-08-20 PROCEDURE — 81001 URINALYSIS AUTO W/SCOPE: CPT

## 2024-08-20 PROCEDURE — 80061 LIPID PANEL: CPT

## 2024-08-20 PROCEDURE — 85025 COMPLETE CBC W/AUTO DIFF WBC: CPT

## 2024-08-20 PROCEDURE — 82043 UR ALBUMIN QUANTITATIVE: CPT

## 2024-08-20 PROCEDURE — 80053 COMPREHEN METABOLIC PANEL: CPT

## 2024-08-20 PROCEDURE — 82306 VITAMIN D 25 HYDROXY: CPT

## 2024-08-20 PROCEDURE — 84439 ASSAY OF FREE THYROXINE: CPT

## 2024-08-20 RX ORDER — TIRZEPATIDE 5 MG/.5ML
5 INJECTION, SOLUTION SUBCUTANEOUS WEEKLY
Qty: 2 ML | Refills: 2 | Status: SHIPPED | OUTPATIENT
Start: 2024-08-20 | End: 2024-09-19

## 2024-08-20 NOTE — TELEPHONE ENCOUNTER
Tirzepatide (MOUNJARO) 5 MG/0.5ML Subcutaneous Solution Pen-injector, Inject 5 mg into the skin once a week., Disp: 2 mL, Rfl: 2  Key: [id number]

## 2024-08-20 NOTE — TELEPHONE ENCOUNTER
Medication PA Requested:  (MOUNJARO) 5 MG/0.5ML Subcutaneous Solution Pen                                                         CoverMyMeds Used:  Key:  Quantity: 2 mL   Day Supply: 30  Sig: Inject 5 mg into the skin once a week   DX Code:   E11.9

## 2024-08-21 ENCOUNTER — TELEPHONE (OUTPATIENT)
Dept: ENDOCRINOLOGY CLINIC | Facility: CLINIC | Age: 64
End: 2024-08-21

## 2024-08-21 NOTE — TELEPHONE ENCOUNTER
Tirzepatide (MOUNJARO) 5 MG/0.5ML Subcutaneous Solution Pen-injector, Inject 5 mg into the skin once a week., Disp: 2 mL, Rfl: 2    Plan does not cover med. Please call pharmacy to change med. Patient ID:[id number]

## 2024-08-21 NOTE — TELEPHONE ENCOUNTER
Dr. Barahona, please advise on medication patient should be on. Mounjaro Rx was sent yesterday, but it is not mentioned in OV note from 8/20/24.

## 2024-08-22 ENCOUNTER — PATIENT MESSAGE (OUTPATIENT)
Dept: ENDOCRINOLOGY CLINIC | Facility: CLINIC | Age: 64
End: 2024-08-22

## 2024-08-22 DIAGNOSIS — E11.9 TYPE 2 DIABETES MELLITUS WITHOUT RETINOPATHY (HCC): Primary | ICD-10-CM

## 2024-08-22 PROBLEM — E55.9 VITAMIN D DEFICIENCY: Status: ACTIVE | Noted: 2024-08-22

## 2024-08-22 RX ORDER — ERGOCALCIFEROL 1.25 MG/1
CAPSULE ORAL
Qty: 18 CAPSULE | Refills: 0 | Status: SHIPPED | OUTPATIENT
Start: 2024-08-22 | End: 2025-02-18

## 2024-08-22 NOTE — TELEPHONE ENCOUNTER
Hi!    Please let patient know that I have prescribed ergocalciferol for her to be taken once weekly for 3 months and every 14 days to be taken for the next 3 months. All other blood tests are within normal limits.    I would like to refill the Trulicity 3mg, but I wanted to prescribe the Mounjaro, because on her existing regimen, blood sugars are still elevated. Thank you!

## 2024-08-23 ENCOUNTER — OFFICE VISIT (OUTPATIENT)
Dept: PULMONOLOGY | Facility: CLINIC | Age: 64
End: 2024-08-23
Payer: MEDICAID

## 2024-08-23 VITALS
WEIGHT: 194.19 LBS | SYSTOLIC BLOOD PRESSURE: 113 MMHG | HEIGHT: 66 IN | OXYGEN SATURATION: 98 % | BODY MASS INDEX: 31.21 KG/M2 | HEART RATE: 109 BPM | DIASTOLIC BLOOD PRESSURE: 79 MMHG

## 2024-08-23 DIAGNOSIS — Z87.891 PERSONAL HISTORY OF TOBACCO USE, PRESENTING HAZARDS TO HEALTH: ICD-10-CM

## 2024-08-23 DIAGNOSIS — G47.33 OSA (OBSTRUCTIVE SLEEP APNEA): ICD-10-CM

## 2024-08-23 DIAGNOSIS — J44.9 CHRONIC OBSTRUCTIVE PULMONARY DISEASE, UNSPECIFIED COPD TYPE (HCC): Primary | ICD-10-CM

## 2024-08-23 PROCEDURE — 99215 OFFICE O/P EST HI 40 MIN: CPT | Performed by: INTERNAL MEDICINE

## 2024-08-23 RX ORDER — PREDNISONE 10 MG/1
TABLET ORAL
Qty: 18 TABLET | Refills: 0 | Status: SHIPPED | OUTPATIENT
Start: 2024-08-23

## 2024-08-23 RX ORDER — FLUTICASONE FUROATE, UMECLIDINIUM BROMIDE AND VILANTEROL TRIFENATATE 100; 62.5; 25 UG/1; UG/1; UG/1
1 POWDER RESPIRATORY (INHALATION) DAILY
Qty: 1 EACH | Refills: 5 | Status: SHIPPED | OUTPATIENT
Start: 2024-08-23

## 2024-08-23 RX ORDER — NICOTINE 21 MG/24HR
PATCH, TRANSDERMAL 24 HOURS TRANSDERMAL
Qty: 14 PATCH | Refills: 0 | Status: SHIPPED | OUTPATIENT
Start: 2024-08-23

## 2024-08-23 RX ORDER — ALBUTEROL SULFATE 90 UG/1
2 AEROSOL, METERED RESPIRATORY (INHALATION) EVERY 4 HOURS PRN
Qty: 8.5 G | Refills: 1 | Status: SHIPPED | OUTPATIENT
Start: 2024-08-23

## 2024-08-23 NOTE — TELEPHONE ENCOUNTER
Dr. Barahona,     Per LOV 8/20:  Continue Trulicity 3.0mg qweekly, but try Mounjaro 5mg qweekly since she is still having elevated blood sugars.     Per pt insurance, pt needs to t/f: Trulicity, Victoza, Rybelsus. Should pt only continue with Trulicity or try the two other options?    Pt notified of the above and below message.     Please let patient know that I have prescribed ergocalciferol for her to be taken once weekly for 3 months and every 14 days to be taken for the next 3 months. All other blood tests are within normal limits.

## 2024-08-23 NOTE — TELEPHONE ENCOUNTER
Per pt insurance, they will not cover Mosheila momin pt has t/f: Trulicity, Rybelsus, Victoza.     Please advise. MC sent with the below message.

## 2024-08-23 NOTE — PROGRESS NOTES
Subjective:   Patient ID: Di Puga is a 64 year old female.    HPI  Last visit in April 2023  Continues to smoke 1 pack a day and had COVID-19 about a month ago now still with cough and wheezes and chest tightness  No cough or sputum  Mild sore throat  No fever or chills  No lower extremity edema or pain or calf tenderness  Reported possible snoring with daytime sleepiness or fatigue  Chart and notes and labs and records are reviewed  40 minutes with patient today and reviewing her chart  History/Other:   Review of Systems   Constitutional:  Positive for fatigue.   HENT:  Negative for congestion.    Respiratory:  Positive for cough, chest tightness and wheezing. Negative for shortness of breath and stridor.    Cardiovascular: Negative.    Gastrointestinal: Negative.    Musculoskeletal: Negative.    Skin: Negative.    Neurological: Negative.    Hematological: Negative.    Psychiatric/Behavioral: Negative.       Current Outpatient Medications   Medication Sig Dispense Refill    Dulaglutide (TRULICITY) 3 MG/0.5ML Subcutaneous Solution Pen-injector ADMINISTER 3 MG UNDER THE SKIN 1 TIME A WEEK 6 mL 0    ergocalciferol 1.25 MG (64788 UT) Oral Cap Take 1 capsule (50,000 Units total) by mouth once a week for 90 days, THEN 1 capsule (50,000 Units total) every 14 (fourteen) days. 18 capsule 0    Tirzepatide (MOUNJARO) 5 MG/0.5ML Subcutaneous Solution Pen-injector Inject 5 mg into the skin once a week. 2 mL 2    amLODIPine 5 MG Oral Tab Take 1 tablet (5 mg total) by mouth daily. 90 tablet 3    metoprolol succinate ER 50 MG Oral Tablet 24 Hr Take 1 tablet (50 mg total) by mouth daily. 90 tablet 1    glipiZIDE 10 MG Oral Tab Take 1 tablet (10 mg total) by mouth 2 (two) times daily before meals. 60 tablet 2    alum-mag hydroxide-simethicone 200-200-20 MG/5ML Oral Suspension Take 10 mL by mouth 4 (four) times daily as needed for Indigestion. 355 mL 0    Glucose Blood (ONETOUCH VERIO) In Vitro Strip TEST TWICE DAILY. 200  strip 1    metFORMIN HCl 1000 MG Oral Tab Take 1 tablet (1,000 mg total) by mouth 2 (two) times daily with meals. 180 tablet 3    diphenhydrAMINE HCl 25 MG Oral Tab Take 1 tablet (25 mg total) by mouth every 6 (six) hours as needed for Itching.      EPINEPHrine (EPIPEN 2-ROBERTH) 0.3 MG/0.3ML Injection Solution Auto-injector Inject IM in event of  allergic reaction 1 each 0    levocetirizine 5 MG Oral Tab Take 1 tablet (5 mg total) by mouth every evening. 90 tablet 1    atorvastatin 80 MG Oral Tab Take 1 tablet (80 mg total) by mouth nightly. 90 tablet 3    apixaban (ELIQUIS) 5 MG Oral Tab Take 1 tablet (5 mg total) by mouth 2 (two) times daily. 180 tablet 3    albuterol 108 (90 Base) MCG/ACT Inhalation Aero Soln Inhale 2 puffs into the lungs every 4 (four) hours as needed. 8.5 g 1    OneTouch Delica Lancets 33G Does not apply Misc Use to check blood sugar two times a day 200 each 1    Blood Glucose Monitoring Suppl (ONETOUCH ULTRA SYSTEM) W/DEVICE Does not apply Kit Test sugar twice daily 1 kit 0     Allergies:  Allergies   Allergen Reactions    Ace Inhibitors ANAPHYLAXIS, ANGIOEDEMA and SWELLING     April 2020, hospitalized at [facility] in [place] with tongue swelling and throat closing sensation, on lisinopril at the time. Not intubated      Egg White (Diagnostic) NAUSEA ONLY and Tightness in Throat    Fish-Derived Products SWELLING    Lisinopril ANAPHYLAXIS    Peanuts SWELLING    Shrimp ITCHING, NAUSEA AND VOMITING, Tightness in Throat and WHEEZING    Tomato SWELLING and UNKNOWN    Tomatoes RASH       Objective:   Physical Exam  Constitutional:       General: She is not in acute distress.     Appearance: She is obese.   HENT:      Head: Normocephalic and atraumatic.      Nose: Nose normal.      Mouth/Throat:      Mouth: Mucous membranes are moist.      Comments: Class III upper airway Mallampati score  Eyes:      General: No scleral icterus.  Cardiovascular:      Rate and Rhythm: Normal rate.      Heart sounds:      No  gallop.   Pulmonary:      Comments: Good air exchange to both lungs  Bilateral expiratory wheezes especially in the bases  No rales or rhonchi  Abdominal:      General: Abdomen is flat. Bowel sounds are normal.      Palpations: Abdomen is soft.      Tenderness: There is no right CVA tenderness or left CVA tenderness.   Musculoskeletal:      Cervical back: Normal range of motion.      Right lower leg: No edema.      Left lower leg: No edema.   Skin:     General: Skin is dry.   Neurological:      Mental Status: She is oriented to person, place, and time.               Chest ct LDCT 4/12/2024 reviewed   Impression   CONCLUSION:  1. Lung-RADS Category  1:  Negative.  No evidence of primary lung cancer.    2. Lung-RADS Category S:   Negative.    3. Other incidental findings:  Mild emphysema.  Mild thyromegaly.  Partially imaged postoperative changes related to a previous ACDF.     RECOMMENDATIONS:    LUNG-RADS 1: Continued routine annual LDCT lung screening.  Suggest next exam on or around 04/12/2025.         Assessment & Plan:   1. Chronic obstructive pulmonary disease, unspecified COPD type (HCC)    2. Personal history of tobacco use, presenting hazards to health        1- COPD with acute exacerbation  COVID-19 about a month ago overall better but still with wheezes  ongoing tobacco abuse ,Continue to smoke 1 pack a day .  CXR 7/27/24 clear         Plan :   Taper course of steroid  ICS/LAMA/LAMA with Trelegy inhaler  Albuterol as needed  Complete smoking cessation /extensive counseling provided an order for nicotine patch and quit line info given to the patient    2- history of bilateral pulmonary embolism in March/25/2021   On DOAC      3-screening for lung cancer   Over 45 pack-year history of smoking  / continue to smoke   LDCT 4/12/2024 negative   Next LDCT in April /2025 and order was given to the pt    4-suspected ALICIA  BMI 31  Reported snoring and daytime sleepiness and fatigue    Home sleep study  Avoid driving  if sleepy    Orders for today ;  Taper steroid  Trelegy and albuterol  Low-dose CT in April 2025  Quit line info  Home sleep study    Follow-up in 3 months             Meds This Visit:  Requested Prescriptions      No prescriptions requested or ordered in this encounter       Imaging & Referrals:  None

## 2024-08-29 ENCOUNTER — TELEPHONE (OUTPATIENT)
Dept: PULMONOLOGY | Facility: CLINIC | Age: 64
End: 2024-08-29

## 2024-08-29 RX ORDER — TIRZEPATIDE 5 MG/.5ML
5 INJECTION, SOLUTION SUBCUTANEOUS WEEKLY
Qty: 2 ML | Refills: 2 | Status: SHIPPED | OUTPATIENT
Start: 2024-08-29 | End: 2024-09-28

## 2024-08-29 RX ORDER — MOMETASONE FUROATE AND FORMOTEROL FUMARATE DIHYDRATE 100; 5 UG/1; UG/1
1 AEROSOL RESPIRATORY (INHALATION) 2 TIMES DAILY
Qty: 1 EACH | Refills: 2 | Status: SHIPPED | OUTPATIENT
Start: 2024-08-29

## 2024-08-29 NOTE — TELEPHONE ENCOUNTER
Current Outpatient Medications   Medication Sig Dispense Refill      2 mL 2     KEY: [id number]  18 tablet 0      8.5 g 1    fluticasone-umeclidin-vilant (TRELEGY ELLIPTA) 100-62.5-25 MCG/ACT Inhalation Aerosol Powder, Breath Activated Inhale 1 puff into the lungs daily. 1 each 5      14 patch 0      6 mL 0      18 capsule 0      2 mL 2      90 tablet 3      90 tablet 1      60 tablet 2

## 2024-08-30 ENCOUNTER — TELEPHONE (OUTPATIENT)
Dept: ENDOCRINOLOGY CLINIC | Facility: CLINIC | Age: 64
End: 2024-08-30

## 2024-08-30 NOTE — TELEPHONE ENCOUNTER
Current Outpatient Medications   Medication Sig Dispense Refill    Tirzepatide (MOUNJARO) 5 MG/0.5ML Subcutaneous Solution Pen-injector Inject 5 mg into the skin once a week. 2 mL 2            Key:[id number]

## 2024-09-05 NOTE — TELEPHONE ENCOUNTER
PA submitted for Mounjaro 5 mg. May be denied since pt has only t/f Trulicity, not RybelsusElizabethza.

## 2024-09-11 NOTE — TELEPHONE ENCOUNTER
See closed [facility] 8/22. Pt would like to stay on Trulicity.   Prescription for 3 month supply sent on 8/22.

## 2024-09-11 NOTE — TELEPHONE ENCOUNTER
Please find out what other medications they want patient to try before letting us use Mounjaro. Thank you!

## 2024-09-11 NOTE — TELEPHONE ENCOUNTER
Received fax from [facility] in regards to Mounjaro 5MG/0.5ML. Medication has been denied because \"you must have tried and had a poor response to three preferred drugs that are used to treat your condition. They must have tried for at least 2 weeks. You have tried one. The preferred drugs are Rybelsus and Victoza\". Language Systemst message sent to pt, denial letter placed in denial folder, and message routed to provider.    Case # - [id number]

## 2024-09-29 ENCOUNTER — PATIENT MESSAGE (OUTPATIENT)
Dept: ENDOCRINOLOGY CLINIC | Facility: CLINIC | Age: 64
End: 2024-09-29

## 2024-09-30 NOTE — TELEPHONE ENCOUNTER
Please see report below. Reporting hypoglycemia mostly in the middle of the night and sometimes morning. She has been having diarrhea on trulicity 3 mg dose. 3-4 watery stools per day. Of note, she takes glipizide and metformin with an evening snack and not dinner. Follow up scheduled tomorrow but patient requesting recommendations so she does not go low again tonight. Thanks.     Hypoglycemia    Onset of hypoglycemia: 6 days ago.     BG levels:    Monday 9/30    - 0100 BG of 123. Feeling symptomatic so drank 1 oz of orange juice and ate 1 glucose tablet.     - 0751 fasting 123    Sunday 9/29    - 0207 BG of 64. Had 4 oz of orange juice, a glucose tablet, and 1 slice of cheese.     - 0228 BG of 100    - 0259 BG of 179    - 1200 BG of 123    - 1342 BG of 98    - 2144 BG of 148    - 2300 BG of 116 prior to bed    Saturday 9/28    - 1309 BG of 177    - 1832 BG of 155    - 1950 BG of 206 (after dinner)    - 2149 BG of 157         Pattern of hypoglycemia: patient states low blood sugars typically happen in the middle of the night. Sometimes will have low sugars after breakfast as well.     Symptoms: Patient states she will wake up feeling shaky, anxious    Acute illness: none    Hypoglycemia Mx/Rule of 15: discussed rule of 15s and options with 15 grams of sugar. Stressed importance of calling office (even after hours) to report hypoglycemia right away.     Change in Diet: She reports no change in diet except that her appetite is decreased while on Trulicity 3 mg dose. She will typically have a lighter breakfast between 8-9 am. Example being 1 piece of cruz, 1/2 hash brown, and 2 oz of apple juice. She has a light lunch between 12-1pm. A light dinner 6:30-7:30pm. She will have a snack before she goes to bed. Typically cheese with saltine crackers.     List Medications/Compliance:   Trulicity 3 mg weekly - Confirmed  Glipizide 10 mg BID - SHe takes 10 mg at 9 am with breakfast and 10 mg 9pm with snack.   Metformin 1000  mg BID - SHe takes 1000 mg at 9 am with breakfast and 1000 mg 9pm with snack.

## 2024-09-30 NOTE — TELEPHONE ENCOUNTER
I called the patient and provided her with Dr. Barahona's instructions below. She agreed with the plan of care and will discontinue metformin. She will keep her scheduled appointment tomorrow. Rule of 15s was discussed this morning. Med list updated.

## 2024-09-30 NOTE — TELEPHONE ENCOUNTER
From: Di Puga  To: Floridalma Barahona  Sent: 9/29/2024 11:17 AM CDT  Subject: Sugar levels drop     Good afternoon, my sugar levels keep dropping in the middle of the night. When I check my numbers it’s usually 60 to 62 and the good thing is it wakes me up. It usually take 30 to 40 minutes to get it back to at least 100. Should I stop taking one of the night medication?     I know I have an appointment on Tuesday but I really scared of the sugar levels dropping.    Thank you,  Di Puga

## 2024-09-30 NOTE — TELEPHONE ENCOUNTER
Hi!  Please ask patient to stop the metformin altogether. Let us see if this helps with the diarrhea as well as low blood sugars. Thank you!

## 2024-10-01 ENCOUNTER — OFFICE VISIT (OUTPATIENT)
Dept: SLEEP CENTER | Age: 64
End: 2024-10-01
Attending: INTERNAL MEDICINE
Payer: MEDICAID

## 2024-10-01 ENCOUNTER — OFFICE VISIT (OUTPATIENT)
Dept: ENDOCRINOLOGY CLINIC | Facility: CLINIC | Age: 64
End: 2024-10-01
Payer: MEDICAID

## 2024-10-01 VITALS
HEIGHT: 66 IN | WEIGHT: 191.19 LBS | DIASTOLIC BLOOD PRESSURE: 70 MMHG | SYSTOLIC BLOOD PRESSURE: 110 MMHG | BODY MASS INDEX: 30.73 KG/M2

## 2024-10-01 DIAGNOSIS — E78.2 MIXED HYPERLIPIDEMIA: ICD-10-CM

## 2024-10-01 DIAGNOSIS — E11.9 TYPE 2 DIABETES MELLITUS WITHOUT RETINOPATHY (HCC): Primary | ICD-10-CM

## 2024-10-01 DIAGNOSIS — E55.9 VITAMIN D DEFICIENCY: ICD-10-CM

## 2024-10-01 DIAGNOSIS — G47.33 OSA (OBSTRUCTIVE SLEEP APNEA): ICD-10-CM

## 2024-10-01 LAB
GLUCOSE BLOOD: 243
TEST STRIP LOT #: NORMAL NUMERIC

## 2024-10-01 PROCEDURE — 99214 OFFICE O/P EST MOD 30 MIN: CPT | Performed by: INTERNAL MEDICINE

## 2024-10-01 PROCEDURE — 95806 SLEEP STUDY UNATT&RESP EFFT: CPT

## 2024-10-01 PROCEDURE — 82947 ASSAY GLUCOSE BLOOD QUANT: CPT | Performed by: INTERNAL MEDICINE

## 2024-10-01 NOTE — PROGRESS NOTES
Name: Di Puga  Date: 10/01/24    Referring Physician: Dr. Rene Price    HISTORY OF PRESENT ILLNESS   Di Puga is a 64 year old female who presents for follow-up of evaluaton and management of uncontrolled T2D. She was diagnosed more than 15 years ago on regular screening blood tests. She had been on insulin and then I transitioned her to oral medications.     At the last visit, she had been having elevated blood sugars and so she has been taking the Trulicity 3mg, metformin 1g bid and glipizide 10mg PO bid. Despite this, her blood sugars were still high.     Since the last visit, she has been having some low blood sugars. I have stopped her metformin, also due to the fact she was also having diarrhea. She states that she is still having low blood sugars, but not as low as before.     Blood Glucose Today: 243  HbA1C or glycohemoglobin is 8.2 on 8/20/24 and it was 6.4 on 9/13/23 and it was 7.5 on 1/13/23 (and it was 6.8 on 9/27/22 and it was 6.7 on 7/12/22 and it was 6.9 on 4/12/22 and it was 7.9 on 12/01/22 and it was 7.7 on 12/01/21 and it was 8.2 % 1/04/21)  Type 1 or Type 2?: Type 2  Checking 3 times per day  Fasting- 140-200; 2-hour- 210-220  Medications for DM : Trulicity 3.0mg qweekly; Glipizide 10mg PO bid  Episodes of hypoglycemia: none    Dietary compliance: eating healthier, and not as hungry; not snacking    Exercise: its difficult to walk because of spinal stenosis- this is getting better    Polyuria/polydipsia: yes    Blurred vision: none    Flu Vaccine This Season: does not get    Covid Vaccine- yes, got the booster    REVIEW OF SYSTEMS  Eyes: Diabetic retinopathy present: None            Most recent visit to eye doctor in last 12 months: has to make an appt    CV: Cardiovascular disease present: none         Hypertension present: at goal, on meds         Hyperlipidemia present: yes, HDL low, on meds (8/20/24)         Peripheral Vascular Disease present: none    : Nephropathy  present: none (8/20/24); creatinine- 0.65    Neuro: Neuropathy present: has the neuropathy, and sees the podiatrist for this and he checks her feet once a year    Skin: Infection or ulceration: none    Osteoporosis: none; Vitamin D- 4.9 (8/20/24)    Thyroid disease: TSH within normal limits on 8/20/24    Medications:     Current Outpatient Medications:     Dulaglutide (TRULICITY) 3 MG/0.5ML Subcutaneous Solution Pen-injector, ADMINISTER 3 MG UNDER THE SKIN 1 TIME A WEEK, Disp: 6 mL, Rfl: 0    glipiZIDE 10 MG Oral Tab, Take 1 tablet (10 mg total) by mouth 2 (two) times daily before meals., Disp: 60 tablet, Rfl: 2    Mometasone Furo-Formoterol Fum (DULERA) 100-5 MCG/ACT Inhalation Aerosol, Inhale 1 puff into the lungs in the morning and 1 puff before bedtime., Disp: 1 each, Rfl: 2    predniSONE 10 MG Oral Tab, Take 3 tabs (30mg) daily for 3 days, then take 2 tabs (20mg) daily for 3 days, then take 1 tab (10mg) daily for 3 days., Disp: 18 tablet, Rfl: 0    albuterol 108 (90 Base) MCG/ACT Inhalation Aero Soln, Inhale 2 puffs into the lungs every 4 (four) hours as needed., Disp: 8.5 g, Rfl: 1    nicotine 21 MG/24HR Transdermal Patch 24 Hr, Apply 1 patch (21mg) daily for 2 weeks, then apply 1 patch (14mg) daily for 2 weeks, then apply 1 patch (7mg) daily for 2 weeks, Disp: 14 patch, Rfl: 0    ergocalciferol 1.25 MG (88405 UT) Oral Cap, Take 1 capsule (50,000 Units total) by mouth once a week for 90 days, THEN 1 capsule (50,000 Units total) every 14 (fourteen) days., Disp: 18 capsule, Rfl: 0    amLODIPine 5 MG Oral Tab, Take 1 tablet (5 mg total) by mouth daily., Disp: 90 tablet, Rfl: 3    metoprolol succinate ER 50 MG Oral Tablet 24 Hr, Take 1 tablet (50 mg total) by mouth daily., Disp: 90 tablet, Rfl: 1    alum-mag hydroxide-simethicone 200-200-20 MG/5ML Oral Suspension, Take 10 mL by mouth 4 (four) times daily as needed for Indigestion., Disp: 355 mL, Rfl: 0    Glucose Blood (ONETOUCH VERIO) In Vitro Strip, TEST TWICE  DAILY., Disp: 200 strip, Rfl: 1    diphenhydrAMINE HCl 25 MG Oral Tab, Take 1 tablet (25 mg total) by mouth every 6 (six) hours as needed for Itching., Disp: , Rfl:     EPINEPHrine (EPIPEN 2-ROBERTH) 0.3 MG/0.3ML Injection Solution Auto-injector, Inject IM in event of  allergic reaction, Disp: 1 each, Rfl: 0    levocetirizine 5 MG Oral Tab, Take 1 tablet (5 mg total) by mouth every evening., Disp: 90 tablet, Rfl: 1    atorvastatin 80 MG Oral Tab, Take 1 tablet (80 mg total) by mouth nightly., Disp: 90 tablet, Rfl: 3    apixaban (ELIQUIS) 5 MG Oral Tab, Take 1 tablet (5 mg total) by mouth 2 (two) times daily., Disp: 180 tablet, Rfl: 3    OneTouch Delica Lancets 33G Does not apply Misc, Use to check blood sugar two times a day, Disp: 200 each, Rfl: 1    Blood Glucose Monitoring Suppl (ONETOUCH ULTRA SYSTEM) W/DEVICE Does not apply Kit, Test sugar twice daily, Disp: 1 kit, Rfl: 0     Allergies:   Allergies   Allergen Reactions    Ace Inhibitors ANAPHYLAXIS, ANGIOEDEMA and SWELLING     April 2020, hospitalized at [facility] in [place] with tongue swelling and throat closing sensation, on lisinopril at the time. Not intubated      Egg White (Diagnostic) NAUSEA ONLY and Tightness in Throat    Fish-Derived Products SWELLING    Lisinopril ANAPHYLAXIS    Peanuts SWELLING    Shrimp ITCHING, NAUSEA AND VOMITING, Tightness in Throat and WHEEZING    Tomato SWELLING and UNKNOWN    Tomatoes RASH       Social History:   Social History     Socioeconomic History    Marital status: Single   Tobacco Use    Smoking status: Every Day     Current packs/day: 1.00     Average packs/day: 1 pack/day for 20.0 years (20.0 ttl pk-yrs)     Types: Cigarettes    Smokeless tobacco: Never    Tobacco comments:     1 pack daily   Vaping Use    Vaping status: Never Used   Substance and Sexual Activity    Alcohol use: Not Currently    Drug use: No   Other Topics Concern    Caffeine Concern No    Exercise No   Social History Narrative    The patient does not use an  assistive device..      The patient does live in a home with stairs.     Social Determinants of Health      [facility], [facility]    Social Connections    Received from [facility], [facility]    Housing Stability       Medical History:   Past Medical History:    Arrhythmia    tachycardia    Asthma (HCC)    B12 deficiency    Back pain    Back problem    Calculus of kidney    Cancer (HCC)    - cervical    COPD (chronic obstructive pulmonary disease) (HCC)    Essential hypertension    High blood pressure    High cholesterol    History of abnormal cervical Pap smear    Irregular heart beat    Muscle weakness    Neuropathy    Other and unspecified hyperlipidemia    Palpitations    Pulmonary embolism (HCC)    Spinal stenosis    Stroke (HCC)    TIA a few years ago    TIA (transient ischemic attack)    Type II or unspecified type diabetes mellitus without mention of complication, not stated as uncontrolled       Surgical history:   Past Surgical History:   Procedure Laterality Date    Cholecystectomy      Colonoscopy N/A 2017    Procedure: COLONOSCOPY, POSSIBLE BIOPSY, POSSIBLE POLYPECTOMY 00197;  Surgeon: Pedro Thomas MD;  Location: [facility]    Colonoscopy N/A 2022    Procedure: COLONOSCOPY/ESOPHAGOGASTRODUODENOSCOPY (EGD);  Surgeon: Cortney Diaz MD;  Location: [facility] ENDOSCOPY    Colonoscopy & polypectomy            Other      Lung biopsy    Other surgical history      Cone biopsy    Other surgical history      Cystoscopy    Removal gallbladder      Tubal ligation           PHYSICAL EXAM  Vitals:    10/01/24 1315   BP: 110/70   Weight: 191 lb 3.2 oz (86.7 kg)   Height: 5' 6\" (1.676 m)         General Appearance:  alert, well developed, in no acute distress  Eyes:  normal conjunctivae, sclera., normal sclera and normal pupils  Psychiatric:  oriented to time, self, and  place  Nutritional:  no abnormal weight gain or loss  Feet: monofilament normal bilaterally, pulses felt  Bilateral barefoot skin diabetic exam is normal, visualized feet and the appearance is normal.  Bilateral monofilament/sensation of both feet is normal.  Pulsation pedal pulse exam of both lower legs/feet is normal as well.        Lab Data:   Lab Results   Component Value Date     (H) 08/20/2024    A1C 8.5 (H) 08/20/2024     Lab Results   Component Value Date     (H) 08/20/2024    BUN 8 (L) 08/20/2024    BUNCREA 12.9 08/20/2024    CREATSERUM 0.62 08/20/2024    ANIONGAP 5 08/20/2024    GFRNAA 105 04/30/2022    GFRAA 121 04/30/2022    CA 9.7 08/20/2024    OSMOCALC 292 08/20/2024    ALKPHO 94 08/20/2024    AST 14 08/20/2024    ALT 10 08/20/2024    ALKPHOS 72 01/16/2016    BILT 0.7 08/20/2024    TP 7.6 08/20/2024    ALB 4.5 08/20/2024    GLOBULIN 3.1 08/20/2024    AGRATIO 1.3 12/21/2021     08/20/2024    K 3.6 08/20/2024     08/20/2024    CO2 27.0 08/20/2024     Lab Results   Component Value Date    CHOLEST 137 08/20/2024    TRIG 102 08/20/2024    HDL 31 (L) 08/20/2024    LDL 87 08/20/2024    VLDL 16 08/20/2024    TCHDLRATIO 4.0 12/21/2021    NONHDLC 106 08/20/2024    CALCNONHDL 155 (H) 01/16/2016     Lab Results   Component Value Date    MALBP 1.40 08/20/2024    CREUR 191.60 08/20/2024    CREAURINE 107.7 02/16/2013    MIALBURINE 0.5 02/16/2013    MCRRATIOUR 4.6 02/16/2013    MICROALBUMIN 0.5 12/21/2021    CREAUR 82 12/21/2021    MALBCREACALC 6 12/21/2021         ASSESSMENT/PLAN:    This is a 64 year-old woman here for follow-up of management of uncontrolled type 2 diabetes. We discussed the ABCs of DM.     1.) Hyperglycemia Management- We discussed the importance of glycemic control to prevent complications of diabetes. We discussed the importance of SBGM. I offered and provided patient education materials and offered a blood glucose log book.   - Continue Trulicity 3.0mg qweekly,   -  Continue glipizide 10mg PO bid   - Tonight if the blood sugar is low in the middle of the night, then take glipizide tomorrow morning and hold tomorrow night's dose.   - Continue to check blood sugars fasting and pre-meals  - Contact us with blood sugars <70 and >300    2.) Management of Diabetic Complications- We discussed the complications of diabetes include retinopathy, neuropathy, nephropathy and cardiovascular disease.   - Ophthalmology- she is up to date  - Neuropathy- none  - Lipid panel- we will check in 3 months  - MAC- will check in 3 months  - CMP- will check in 3 months  - BP- at goal, on meds  - Vaccines, does not get the flu vaccine, up to date with booster    3.) Lifestyle Management for Diabetes- We discussed importance of a low CHO diet, and recommend 45gm per meal or 135gm per day. We discussed the importance of trying to follow a Mediterranean diet, with an emphasis on vegetables at every meal, with lots whole grains, and protein from either plant-based sources, or poultry and fish.   - Patient has been eating more healthy foods  - She is not able to exercise, but she is going to try as much as possible    4.) Vitamin D deficiency  - Continue ergocalciferol 50,000 every week  - Check vitamin D in 3 months    Return to the clinic in 3 months    Prior to this encounter, I spent over 15 minutes with preparing for the visit, including reviewing documents from other specialties as well as from PCP and going over test results. During the face to face encounter, I spent an additional 15 minutes which were determined for follow-up. Greater than 50% of the time was spent in counseling, anticipatory guidance, and coordination of care. Patient concerns were answered to the best of my knowledge.     10/01/24  Floridalma Barahona MD

## 2024-10-01 NOTE — PATIENT INSTRUCTIONS
Continue off of the metformin    Tonight if the blood sugar is low in the middle of the night, then take glipizide tomorrow morning and hold tomorrow night's dose.

## 2024-10-02 ENCOUNTER — PATIENT MESSAGE (OUTPATIENT)
Dept: ENDOCRINOLOGY CLINIC | Facility: CLINIC | Age: 64
End: 2024-10-02

## 2024-10-02 DIAGNOSIS — E11.9 TYPE 2 DIABETES MELLITUS WITHOUT RETINOPATHY (HCC): Primary | ICD-10-CM

## 2024-10-03 RX ORDER — METFORMIN HCL 500 MG
1000 TABLET, EXTENDED RELEASE 24 HR ORAL
Qty: 180 TABLET | Refills: 0 | Status: SHIPPED | OUTPATIENT
Start: 2024-10-03 | End: 2025-01-01

## 2024-10-03 NOTE — PROCEDURES
[facility]  Accredited by the American Academy of Sleep Medicine (AASM)    PATIENT'S NAME: JUAN QUISPE   ATTENDING PHYSICIAN: Rene Price MD   REFERRING PHYSICIAN: Shaye Flanagan MD   PATIENT ACCOUNT #: [id number] LOCATION: Sleep Center   MEDICAL RECORD #: [id number] YOB: 1960   DATE OF STUDY: 10/01/2024       SLEEP STUDY REPORT    STUDY TYPE:  Home sleep test.    INDICATION:  Suspected obstructive sleep apnea (ICD-10 code G47.33) in patient with snoring, daytime somnolence, and fatigue, with an [place] Sleepiness Scale score of 2/24, and a body mass index 31.6.    RESULTS:  The patient underwent home sleep test with measurement of her nasal air flow, nasal air pressure, snoring, chest and abdominal wall motion, oximetry, and body position.  I have reviewed the entirety of the raw data of this study.  During this study, the total recording time is 556 minutes.  The lights-out clock time is 10:17 p.m., the lights-on clock time is 7:34 a.m.  The apnea plus hypopnea index is 4.7 events per hour. The supine apnea plus hypopnea index is 13.3 events per hour.  The average oxygen saturation is 95%, the lowest oxygen saturation is 64%, and the patient spent 0.2% of the test with saturations 88% or less.  The average heart rate is 84 beats per minute, and the patient spent approximately 5% of the test in the supine position.    INTERPRETATION:  The data generated from this study is consistent with mild positional obstructive sleep apnea occurring while in the supine position.  It is notable, however, that for the entirety of this study, the apnea plus hypopnea index was within normal limits.    RECOMMENDATIONS:    1.   The patient should avoid the supine position.  Would consider use of a long side-lying pillow or use of one of the proprietary devices to assist with side-lying positioning such as the Rematee or the Zzoma.    2.   Weight loss.  3.   Avoid alcohol.  4.   Avoid  sedating drug.  5.   Patient should not drive if at all sleepy.    Please do not hesitate to contact me if there is any question whatsoever regarding interpretation of this study.    Dictated By Abimael Rios MD  d: 10/02/2024 19:01:37  t: 10/02/2024 19:34:36  [facility] [id number]  PJC/    cc: MD Shaye Burgess MD

## 2024-10-03 NOTE — TELEPHONE ENCOUNTER
Hi!  Please ask patient to restart the metformin, but I have changed the metformin to the ER form.     She should start with one tab in the morning. If post dinner blood sugars do not decrease to <200, then she can increase to 2 tablets.     Please ask her to have a cup of metamucil every morning. Thank you!

## 2024-10-03 NOTE — TELEPHONE ENCOUNTER
Dr Barahona,    Patient was seen in the office two days ago and reports elevated blood sugars since stopping metformin.    Per LOV on 8/20:    \"Continue metformin 1g PO bid  - Continue Trulicity 3.0mg qweekly, but try Mounjaro 5mg qweekly since she is still having elevated blood sugars  - Continue glipizide 10mg PO bid \"     Hyperglycemia     Onset of hyperglycemia: Consistently elevated    BG levels      before meals:    10/3   10/2                   dinner 233  (2 hrs after) 193 bedtime 228  10/1               lunch 129  dinner 200                           bedtime 209  09/30                                                              bedtime 126     Symptoms constipated since stopping metformin (before was having 2-3 BMs per day) Last BM was two days ago.    Pattern of hyperglycemia: Consistently elevated    Steroid therapy: No     Acute Illness: No    Change in Diet: No     List DM Medications/Compliance:  Truclity 3 mg   weekly            - taking  Glipizide 10 mg BID               -taking  Metformin  1000 mg BID        -stopped on 10/1/24      RN advised to increase water intake and fiber in diet as well as increasing physical activity to help with constipation. Also advised to contact PCP for OTC stool softeners.

## 2024-10-21 ENCOUNTER — NURSE ONLY (OUTPATIENT)
Dept: ALLERGY | Facility: CLINIC | Age: 64
End: 2024-10-21

## 2024-10-21 ENCOUNTER — OFFICE VISIT (OUTPATIENT)
Dept: ALLERGY | Facility: CLINIC | Age: 64
End: 2024-10-21
Payer: MEDICAID

## 2024-10-21 DIAGNOSIS — Z91.018 FOOD ALLERGY: Primary | ICD-10-CM

## 2024-10-21 PROCEDURE — 95076 INGEST CHALLENGE INI 120 MIN: CPT | Performed by: ALLERGY & IMMUNOLOGY

## 2024-10-21 NOTE — TELEPHONE ENCOUNTER
Endocrine Refill protocol for oral and injectable diabetic medications    Protocol Criteria:  FAILED  Reason: Elevated A1C    If all below requirements are met, send a 90-day supply with 1 refill per provider protocol.    Verify appointment with Endocrinology completed in the last 6 months or scheduled in the next 3 months.  Verify A1C has been completed within the last 6 months and is below 8.5%     Last completed office visit: 10/1/2024 Floridalma Barahona MD   Next scheduled Follow up:   Future Appointments   Date Time Provider Department [facility]      [facility]      [facility]      [facility]   1/7/2025 11:45 AM Floridalma Barahona MD [facility]      Last A1c result: Last A1c value was 8.5% done 8/20/2024.

## 2024-10-21 NOTE — PROGRESS NOTES
Di Puga is a 64 year old female.    HPI:     Chief Complaint   Patient presents with    Food Allergy     Patient here for oral challenge to tomatoes--brought pizza. Reports that she last took her antihistamines last Thursday.      Patient is a 64-year-old female who presents for follow-up.      Patient last seen by me on 2024.  Prior clinical history of rash and globus in the past with concern for tomatoes as triggers  Skin testing to tomato  at last visit was negative  Patient presents for oral challenge to tomato to further evaluate as an allergic trigger    Patient denies any active issues today.  No fevers rashes or respiratory issues.    No pets    Avoiding eggs. IC 2024 ew 0.12   Ok with baked  eggs           HISTORY:  Past Medical History:    Arrhythmia    tachycardia    Asthma (HCC)    B12 deficiency    Back pain    Back problem    Calculus of kidney    Cancer (HCC)    - cervical    COPD (chronic obstructive pulmonary disease) (HCC)    Essential hypertension    High blood pressure    High cholesterol    History of abnormal cervical Pap smear    Irregular heart beat    Muscle weakness    Neuropathy    Other and unspecified hyperlipidemia    Palpitations    Pulmonary embolism (HCC)    Spinal stenosis    Stroke (HCC)    TIA a few years ago    TIA (transient ischemic attack)    Type II or unspecified type diabetes mellitus without mention of complication, not stated as uncontrolled      Past Surgical History:   Procedure Laterality Date    Cholecystectomy      Colonoscopy N/A 2017    Procedure: COLONOSCOPY, POSSIBLE BIOPSY, POSSIBLE POLYPECTOMY 39780;  Surgeon: Pedro Thomas MD;  Location: [facility]    Colonoscopy N/A 2022    Procedure: COLONOSCOPY/ESOPHAGOGASTRODUODENOSCOPY (EGD);  Surgeon: Cortney Diaz MD;  Location: [facility] ENDOSCOPY    Colonoscopy & polypectomy            Other      Lung biopsy    Other surgical history      Cone biopsy     Other surgical history  2013    Cystoscopy    Removal gallbladder      Tubal ligation  1986      Family History   Problem Relation Age of Onset    Diabetes Mother     Lung Disorder Mother         Emphysema    Heart Disease Mother     Asthma Mother     Cancer Father 51        Lung    Diabetes Sister     Thyroid Disorder Sister     Glaucoma Sister     Stroke Sister     Prostate Cancer Brother     Diabetes Brother     Diabetes Brother     Cancer Brother         Prostate cancer, copd    Diabetes Brother     Glaucoma Maternal Grandmother     Heart Disease Maternal Grandmother     Ovarian Cancer Maternal Grandmother 59    Cataracts Niece     Breast Cancer Niece 48    Macular degeneration Neg       Social History:   Social History     Socioeconomic History    Marital status: Single   Tobacco Use    Smoking status: Every Day     Current packs/day: 1.00     Average packs/day: 1 pack/day for 20.0 years (20.0 ttl pk-yrs)     Types: Cigarettes    Smokeless tobacco: Never    Tobacco comments:     1 pack daily   Vaping Use    Vaping status: Never Used   Substance and Sexual Activity    Alcohol use: Not Currently    Drug use: No   Other Topics Concern    Caffeine Concern No    Exercise No   Social History Narrative    The patient does not use an assistive device..      The patient does live in a home with stairs.     Social Drivers of Health      [facility]    Social Connections    [facility]    Housing Stability        Medications (Active prior to today's visit):  Current Outpatient Medications   Medication Sig Dispense Refill    metFORMIN  MG Oral Tablet 24 Hr Take 2 tablets (1,000 mg total) by mouth daily with breakfast. Start with 1 tablet in the morning. If blood sugars 2 hours after dinner do not decrease to < 200, may increase to 2 tablets 180 tablet 0    Mometasone Furo-Formoterol Fum  (DULERA) 100-5 MCG/ACT Inhalation Aerosol Inhale 1 puff into the lungs in the morning and 1 puff before bedtime. 1 each 2    albuterol 108 (90 Base) MCG/ACT Inhalation Aero Soln Inhale 2 puffs into the lungs every 4 (four) hours as needed. 8.5 g 1    nicotine 21 MG/24HR Transdermal Patch 24 Hr Apply 1 patch (21mg) daily for 2 weeks, then apply 1 patch (14mg) daily for 2 weeks, then apply 1 patch (7mg) daily for 2 weeks 14 patch 0    Dulaglutide (TRULICITY) 3 MG/0.5ML Subcutaneous Solution Pen-injector ADMINISTER 3 MG UNDER THE SKIN 1 TIME A WEEK 6 mL 0    ergocalciferol 1.25 MG (13697 UT) Oral Cap Take 1 capsule (50,000 Units total) by mouth once a week for 90 days, THEN 1 capsule (50,000 Units total) every 14 (fourteen) days. 18 capsule 0    amLODIPine 5 MG Oral Tab Take 1 tablet (5 mg total) by mouth daily. 90 tablet 3    metoprolol succinate ER 50 MG Oral Tablet 24 Hr Take 1 tablet (50 mg total) by mouth daily. 90 tablet 1    glipiZIDE 10 MG Oral Tab Take 1 tablet (10 mg total) by mouth 2 (two) times daily before meals. 60 tablet 2    alum-mag hydroxide-simethicone 200-200-20 MG/5ML Oral Suspension Take 10 mL by mouth 4 (four) times daily as needed for Indigestion. 355 mL 0    Glucose Blood (ONETOUCH VERIO) In Vitro Strip TEST TWICE DAILY. 200 strip 1    diphenhydrAMINE HCl 25 MG Oral Tab Take 1 tablet (25 mg total) by mouth every 6 (six) hours as needed for Itching.      EPINEPHrine (EPIPEN 2-ROBERTH) 0.3 MG/0.3ML Injection Solution Auto-injector Inject IM in event of  allergic reaction 1 each 0    levocetirizine 5 MG Oral Tab Take 1 tablet (5 mg total) by mouth every evening. 90 tablet 1    atorvastatin 80 MG Oral Tab Take 1 tablet (80 mg total) by mouth nightly. 90 tablet 3    apixaban (ELIQUIS) 5 MG Oral Tab Take 1 tablet (5 mg total) by mouth 2 (two) times daily. 180 tablet 3    OneTouch Delica Lancets 33G Does not apply Misc Use to check blood sugar two times a day 200 each 1    Blood Glucose Monitoring Suppl  (ONETOUCH ULTRA SYSTEM) W/DEVICE Does not apply Kit Test sugar twice daily 1 kit 0    predniSONE 10 MG Oral Tab Take 3 tabs (30mg) daily for 3 days, then take 2 tabs (20mg) daily for 3 days, then take 1 tab (10mg) daily for 3 days. 18 tablet 0       Allergies:  Allergies[1]      ROS:   Allergic/Immuno:  See hpi  Cardiovascular:  Negative for irregular heartbeat/palpitations, chest pain, edema  Constitutional:  Negative night sweats,weight loss, irritability and lethargy  ENMT:  Negative for ear drainage, hearing loss and nasal drainage  Eyes:  Negative for eye discharge and vision loss  Gastrointestinal:  Negative for abdominal pain, diarrhea and vomiting  Integumentary:  Negative for pruritus and rash  Respiratory:  Negative for cough, dyspnea and wheezing    PHYSICAL EXAM:   Constitutional: responsive, no acute distress noted  Head/Face: NC/Atraumatic  Eyes/Vision: conjunctiva and lids are normal extraocular motion is intact   Ears/Audiometry: tympanic membranes are normal bilaterally hearing is grossly intact  Nose/Mouth/Throat: nose and throat are clear mucous membranes are moist   Neck/Thyroid: neck is supple without adenopathy  Lymphatic: no abnormal cervical, supraclavicular or axillary adenopathy is noted  Respiratory: normal to inspection lungs are clear to auscultation bilaterally normal respiratory effort   Cardiovascular: regular rate and rhythm no murmurs, gallups, or rubs  Abdomen: soft non-tender non-distended  Skin/Hair: no unusual rashes present   Extremities: no edema, cyanosis, or clubbing     ASSESSMENT/PLAN:   Assessment   Encounter Diagnosis   Name Primary?    Food allergy Yes       Reviewed potential adverse  with oral challenge to tomato including local reaction systemic reactions including anaphylaxis as well as food intolerances.  Patient acknowledges inherent risks and provides consents for oral challenge to tomato      Patient underwent graded oral challenge with tomato.   Each dose was followed by 15 minutes of observation.  The fourth and final dose was followed by 70 minutes of observation    Goal cumulative dose was approximately 1/2 cup of tomato      Oral challenge to tomato was negative with no signs of allergic process      Recs: Given patient's negative oral challenge in office today patient additionally showing no signs of allergic symptoms with tomatoes.  This does not rule out a food intolerance.  Handouts provided.  Okay to consume tomatoes from an allergy point of view.  Patient can be posted of any issues in the future.         Orders This Visit:  No orders of the defined types were placed in this encounter.      Meds This Visit:  Requested Prescriptions      No prescriptions requested or ordered in this encounter       Imaging & Referrals:  None     10/21/2024  Papo Ibarra MD    If medication samples were provided today, they were provided solely for patient education and training related to self administration of these medications.  Teaching, instruction and sample was provided to the patient by myself.  Teaching included  a review of potential adverse side effects as well as potential efficacy.  Patient's questions were answered in regards to medication administration and dosing and potential side effects. Teaching was provided via the teach back method           [1]   Allergies  Allergen Reactions    Ace Inhibitors ANAPHYLAXIS, ANGIOEDEMA and SWELLING     April 2020, hospitalized at [facility] in [place] with tongue swelling and throat closing sensation, on lisinopril at the time. Not intubated      Egg White (Diagnostic) NAUSEA ONLY and Tightness in Throat    Fish-Derived Products SWELLING    Lisinopril ANAPHYLAXIS    Peanuts SWELLING    Shrimp ITCHING, NAUSEA AND VOMITING, Tightness in Throat and WHEEZING    Tomato SWELLING and UNKNOWN    Tomatoes RASH

## 2024-10-22 ENCOUNTER — PATIENT MESSAGE (OUTPATIENT)
Dept: ENDOCRINOLOGY CLINIC | Facility: CLINIC | Age: 64
End: 2024-10-22

## 2024-10-22 DIAGNOSIS — E11.9 TYPE 2 DIABETES MELLITUS WITHOUT RETINOPATHY (HCC): Primary | ICD-10-CM

## 2024-10-23 ENCOUNTER — PATIENT MESSAGE (OUTPATIENT)
Dept: ENDOCRINOLOGY CLINIC | Facility: CLINIC | Age: 64
End: 2024-10-23

## 2024-10-23 RX ORDER — GLIPIZIDE 10 MG/1
10 TABLET ORAL
Qty: 60 TABLET | Refills: 2 | Status: SHIPPED | OUTPATIENT
Start: 2024-10-23 | End: 2025-01-20

## 2024-10-23 RX ORDER — DULAGLUTIDE 3 MG/.5ML
3 INJECTION, SOLUTION SUBCUTANEOUS WEEKLY
Qty: 6 ML | Refills: 1 | Status: SHIPPED | OUTPATIENT
Start: 2024-10-23 | End: 2024-11-05

## 2024-10-23 NOTE — TELEPHONE ENCOUNTER
Hyperglycemia     Onset of hyperglycemia: on going    BG levels:   Will send log via  once she has her meter  BG this AM fastin  Went up to 250 after BF    Symptoms:  Has headaches, blurry vision, feeling \"jittery\"    Pattern of hyperglycemia: constant, no pattern    Steroid therapy: no    Acute Illness: has had 100-101 fever for last week. Has congestion/cloudy sputum but states that is her norm. Advised to consult PCP for fever    Change in Diet: has decreased appetite  This AM had 1 turkey sausage link and 1 tbsp breakfast potatoes  Does not snack before bed    List DM Medications/Compliance:  Glipizide 10mg BID- took this AM  MTF 500mg ER with BF- took this AM  Will take 1 extra tablet 2 hours after BF if BG is not <200  Trulicity 3mg weekly- takes on       Pt needs refill of Trulicity    Endocrine Refill protocol for oral and injectable diabetic medications    Protocol Criteria:  PASSED  Reason: N/A    If all below requirements are met, send a 90-day supply with 1 refill per provider protocol.    Verify appointment with Endocrinology completed in the last 6 months or scheduled in the next 3 months.  Verify A1C has been completed within the last 6 months and is below 8.5%     Last completed office visit: 10/1/2024 Floridalma Barahona MD   Next scheduled Follow up:   Future Appointments   Date Time Provider Department Center   10/28/2024  1:30 PM Papo Ibarra MD [facility]   2024  1:30 PM Papo bIarra MD [facility]   2025 11:45 [facility]      Last A1c result: Last A1c value was 8.5% done 2024.

## 2024-10-23 NOTE — TELEPHONE ENCOUNTER
Pt sent following values via :    10/20/2024   11:14 am 217  3:00pm 240  6:03pm 209  6:53 pm 228  9:03pm 200     10/21/2024  9:29am 177  11:30 am 205  6:46 pm 199  9:43 pm 204     10/22/2024  10:06am 186  10:47 am 244  12:12pm 217  2:20pm 225  5:30pm 208

## 2024-10-24 NOTE — TELEPHONE ENCOUNTER
Dr. Barahona,     Pt states that she was not able to pick Trulicity due to backoreder. Advised pt to call other local pharmacies to locate Trulicity. Pt states she will call.     Pt states that her BG have been higher since starting MTF ER and has noted that they jittering occurs when BG are less than 180. Advised pt that BG will need to be treated slowly. Pt verbalized understanding and agreeable.

## 2024-10-24 NOTE — TELEPHONE ENCOUNTER
Hi!    Please ask her if she was able to  her refills. Also ask her why she thinks that she becomes jittery when she goes below 180. Because if this is the case, I cannot treat her blood sugars so that they go below 200. We need to slowly bring them below 200. Thank you!

## 2024-10-28 ENCOUNTER — NURSE ONLY (OUTPATIENT)
Dept: ALLERGY | Facility: CLINIC | Age: 64
End: 2024-10-28

## 2024-10-28 ENCOUNTER — OFFICE VISIT (OUTPATIENT)
Dept: ALLERGY | Facility: CLINIC | Age: 64
End: 2024-10-28
Payer: MEDICAID

## 2024-10-28 DIAGNOSIS — Z91.018 FOOD ALLERGY: Primary | ICD-10-CM

## 2024-10-28 RX ORDER — DIPHENHYDRAMINE HYDROCHLORIDE 12.5 MG/5ML
25 SOLUTION ORAL ONCE
Status: COMPLETED | OUTPATIENT
Start: 2024-10-28 | End: 2024-10-28

## 2024-10-28 RX ADMIN — DIPHENHYDRAMINE HYDROCHLORIDE 25 MG: 12.5 SOLUTION ORAL at 14:20:00

## 2024-10-28 NOTE — PROGRESS NOTES
Di Puga is a 64 year old female.    HPI:     Chief Complaint   Patient presents with    Food Allergy     Pt here for oral challenge to catfish. Reports that she took a benadryl last night.        Patient is a 64 old female who presents for follow-up  Patient presents for oral challenge to Fish to further evaluate his allergic trigger.    Reviewed records from prior skin testing being negative to fish panel including catfish, shellfish panel peanut tree nut egg whites and tomatoes    Patient passed oral challenge to tomatoes last week  Skin testing to environmental allergens was negative    Patient denies any active issues today including fevers rashes or respiratory issues.          HISTORY:  Past Medical History:    Arrhythmia    tachycardia    Asthma (HCC)    B12 deficiency    Back pain    Back problem    Calculus of kidney    Cancer (HCC)    - cervical    COPD (chronic obstructive pulmonary disease) (HCC)    Essential hypertension    High blood pressure    High cholesterol    History of abnormal cervical Pap smear    Irregular heart beat    Muscle weakness    Neuropathy    Other and unspecified hyperlipidemia    Palpitations    Pulmonary embolism (HCC)    Spinal stenosis    Stroke (HCC)    TIA a few years ago    TIA (transient ischemic attack)    Type II or unspecified type diabetes mellitus without mention of complication, not stated as uncontrolled      Past Surgical History:   Procedure Laterality Date    Cholecystectomy      Colonoscopy N/A 2017    Procedure: COLONOSCOPY, POSSIBLE BIOPSY, POSSIBLE POLYPECTOMY 33685;  Surgeon: Pedro Thomas MD;  Location: [facility]    Colonoscopy N/A 2022    Procedure: COLONOSCOPY/ESOPHAGOGASTRODUODENOSCOPY (EGD);  Surgeon: Cortney Diaz MD;  Location: [facility] ENDOSCOPY    Colonoscopy & polypectomy            Other      Lung biopsy    Other surgical history      Cone biopsy    Other surgical history      Cystoscopy     Removal gallbladder      Tubal ligation  1986      Family History   Problem Relation Age of Onset    Diabetes Mother     Lung Disorder Mother         Emphysema    Heart Disease Mother     Asthma Mother     Cancer Father 51        Lung    Diabetes Sister     Thyroid Disorder Sister     Glaucoma Sister     Stroke Sister     Prostate Cancer Brother     Diabetes Brother     Diabetes Brother     Cancer Brother         Prostate cancer, copd    Diabetes Brother     Glaucoma Maternal Grandmother     Heart Disease Maternal Grandmother     Ovarian Cancer Maternal Grandmother 59    Cataracts Niece     Breast Cancer Niece 48    Macular degeneration Neg       Social History:   Social History     Socioeconomic History    Marital status: Single   Tobacco Use    Smoking status: Every Day     Current packs/day: 1.00     Average packs/day: 1 pack/day for 20.0 years (20.0 ttl pk-yrs)     Types: Cigarettes    Smokeless tobacco: Never    Tobacco comments:     1 pack daily   Vaping Use    Vaping status: Never Used   Substance and Sexual Activity    Alcohol use: Not Currently    Drug use: No   Other Topics Concern    Caffeine Concern No    Exercise No   Social History Narrative    The patient does not use an assistive device..      The patient does live in a home with stairs.     Social Drivers of Health      [facility]    Social Connections    [facility]    Housing Stability        Medications (Active prior to today's visit):  Current Outpatient Medications   Medication Sig Dispense Refill    GLIPIZIDE 10 MG Oral Tab TAKE 1 TABLET(10 MG) BY MOUTH TWICE DAILY BEFORE MEALS 60 tablet 2    Dulaglutide (TRULICITY) 3 MG/0.5ML Subcutaneous Solution Auto-injector Inject 3 mg into the skin once a week. 6 mL 1    metFORMIN  MG Oral Tablet 24 Hr Take 2 tablets (1,000 mg total) by mouth daily with breakfast.  Start with 1 tablet in the morning. If blood sugars 2 hours after dinner do not decrease to < 200, may increase to 2 tablets 180 tablet 0    Mometasone Furo-Formoterol Fum (DULERA) 100-5 MCG/ACT Inhalation Aerosol Inhale 1 puff into the lungs in the morning and 1 puff before bedtime. 1 each 2    albuterol 108 (90 Base) MCG/ACT Inhalation Aero Soln Inhale 2 puffs into the lungs every 4 (four) hours as needed. 8.5 g 1    nicotine 21 MG/24HR Transdermal Patch 24 Hr Apply 1 patch (21mg) daily for 2 weeks, then apply 1 patch (14mg) daily for 2 weeks, then apply 1 patch (7mg) daily for 2 weeks 14 patch 0    ergocalciferol 1.25 MG (12679 UT) Oral Cap Take 1 capsule (50,000 Units total) by mouth once a week for 90 days, THEN 1 capsule (50,000 Units total) every 14 (fourteen) days. 18 capsule 0    amLODIPine 5 MG Oral Tab Take 1 tablet (5 mg total) by mouth daily. 90 tablet 3    metoprolol succinate ER 50 MG Oral Tablet 24 Hr Take 1 tablet (50 mg total) by mouth daily. 90 tablet 1    alum-mag hydroxide-simethicone 200-200-20 MG/5ML Oral Suspension Take 10 mL by mouth 4 (four) times daily as needed for Indigestion. 355 mL 0    Glucose Blood (ONETOUCH VERIO) In Vitro Strip TEST TWICE DAILY. 200 strip 1    diphenhydrAMINE HCl 25 MG Oral Tab Take 1 tablet (25 mg total) by mouth every 6 (six) hours as needed for Itching.      EPINEPHrine (EPIPEN 2-ROBERTH) 0.3 MG/0.3ML Injection Solution Auto-injector Inject IM in event of  allergic reaction 1 each 0    levocetirizine 5 MG Oral Tab Take 1 tablet (5 mg total) by mouth every evening. 90 tablet 1    atorvastatin 80 MG Oral Tab Take 1 tablet (80 mg total) by mouth nightly. 90 tablet 3    apixaban (ELIQUIS) 5 MG Oral Tab Take 1 tablet (5 mg total) by mouth 2 (two) times daily. 180 tablet 3    OneTouch Delica Lancets 33G Does not apply Misc Use to check blood sugar two times a day 200 each 1    predniSONE 10 MG Oral Tab Take 3 tabs (30mg) daily for 3 days, then take 2 tabs (20mg) daily for  3 days, then take 1 tab (10mg) daily for 3 days. 18 tablet 0    Blood Glucose Monitoring Suppl (ONETOUCH ULTRA SYSTEM) W/DEVICE Does not apply Kit Test sugar twice daily 1 kit 0       Allergies:  Allergies[1]      ROS:   Allergic/Immuno:  See hpi  Cardiovascular:  Negative for irregular heartbeat/palpitations, chest pain, edema  Constitutional:  Negative night sweats,weight loss, irritability and lethargy  ENMT:  Negative for ear drainage, hearing loss and nasal drainage  Eyes:  Negative for eye discharge and vision loss  Gastrointestinal:  Negative for abdominal pain, diarrhea and vomiting  Integumentary:  Negative for pruritus and rash  Respiratory:  Negative for cough, dyspnea and wheezing    PHYSICAL EXAM:   Constitutional: responsive, no acute distress noted  Head/Face: NC/Atraumatic  Eyes/Vision: conjunctiva and lids are normal extraocular motion is intact   Ears/Audiometry: tympanic membranes are normal bilaterally hearing is grossly intact  Nose/Mouth/Throat: nose and throat are clear mucous membranes are moist   Neck/Thyroid: neck is supple without adenopathy  Lymphatic: no abnormal cervical, supraclavicular or axillary adenopathy is noted  Respiratory: normal to inspection lungs are clear to auscultation bilaterally normal respiratory effort   Cardiovascular: regular rate and rhythm no murmurs, gallups, or rubs  Abdomen: soft non-tender non-distended  Skin/Hair: no unusual rashes present   Extremities: no edema, cyanosis, or clubbing     ASSESSMENT/PLAN:   Assessment   Encounter Diagnosis   Name Primary?    Food allergy Yes       Reviewed potential adverse  with oral challenge to catfish including local reaction systemic reactions including anaphylaxis as well as adverse food reactions and intolerances.  Patient acknowledges inherent risk and provides consent for oral challenge to catfish    Patient underwent graded oral challenge with catfish.  Each dose was followed by 20 minutes of  observation.  The fourth and final dose was followed by 70 minutes of observation  Goal cumulative dose was approximately 3 to 4 ounces    Oral challenge to catfish today was was deemed positive after completing the third dose/over 1 hour in office patient developed tingling of her tongue.  No oral swelling noted.  No generalized hives.  Patient was treated with Benadryl 25 mg and observed in office until resolution of symptoms.  Oral challenge terminated at that time.    Patient was observed in office until regular symptoms upon discharge.  Patient discharged in usual health.  Patient informed to contact our office of any issues after leaving the office or you later today  Continue to avoid catfish     Orders This Visit:  No orders of the defined types were placed in this encounter.      Meds This Visit:  Requested Prescriptions      No prescriptions requested or ordered in this encounter       Imaging & Referrals:  None     10/28/2024  Papo Ibarra MD    If medication samples were provided today, they were provided solely for patient education and training related to self administration of these medications.  Teaching, instruction and sample was provided to the patient by myself.  Teaching included  a review of potential adverse side effects as well as potential efficacy.  Patient's questions were answered in regards to medication administration and dosing and potential side effects. Teaching was provided via the teach back method           [1]   Allergies  Allergen Reactions    Ace Inhibitors ANAPHYLAXIS, ANGIOEDEMA and SWELLING     April 2020, hospitalized at [facility] in [place] with tongue swelling and throat closing sensation, on lisinopril at the time. Not intubated      Egg White (Diagnostic) NAUSEA ONLY and Tightness in Throat    Fish-Derived Products SWELLING    Lisinopril ANAPHYLAXIS    Peanuts SWELLING    Shrimp ITCHING, NAUSEA AND VOMITING, Tightness in Throat and WHEEZING    Tomato SWELLING and  UNKNOWN    Tomatoes RASH

## 2024-10-31 ENCOUNTER — PATIENT MESSAGE (OUTPATIENT)
Dept: ENDOCRINOLOGY CLINIC | Facility: CLINIC | Age: 64
End: 2024-10-31

## 2024-11-05 ENCOUNTER — TELEPHONE (OUTPATIENT)
Dept: ENDOCRINOLOGY CLINIC | Facility: CLINIC | Age: 64
End: 2024-11-05

## 2024-11-05 ENCOUNTER — NURSE ONLY (OUTPATIENT)
Dept: ALLERGY | Facility: CLINIC | Age: 64
End: 2024-11-05

## 2024-11-05 ENCOUNTER — OFFICE VISIT (OUTPATIENT)
Dept: ALLERGY | Facility: CLINIC | Age: 64
End: 2024-11-05
Payer: MEDICAID

## 2024-11-05 VITALS — WEIGHT: 190 LBS | HEIGHT: 66 IN | BODY MASS INDEX: 30.53 KG/M2

## 2024-11-05 DIAGNOSIS — E11.9 TYPE 2 DIABETES MELLITUS WITHOUT RETINOPATHY (HCC): Primary | ICD-10-CM

## 2024-11-05 DIAGNOSIS — Z91.018 FOOD ALLERGY: Primary | ICD-10-CM

## 2024-11-05 PROCEDURE — 95076 INGEST CHALLENGE INI 120 MIN: CPT | Performed by: ALLERGY & IMMUNOLOGY

## 2024-11-05 RX ORDER — PEN NEEDLE, DIABETIC 30 GX3/16"
1 NEEDLE, DISPOSABLE MISCELLANEOUS DAILY
Qty: 100 EACH | Refills: 0 | Status: SHIPPED | OUTPATIENT
Start: 2024-11-05

## 2024-11-05 RX ORDER — INSULIN GLARGINE 100 [IU]/ML
5 INJECTION, SOLUTION SUBCUTANEOUS DAILY
Qty: 6 ML | Refills: 0 | Status: SHIPPED | OUTPATIENT
Start: 2024-11-05 | End: 2024-11-18

## 2024-11-05 RX ORDER — DULAGLUTIDE 4.5 MG/.5ML
4.5 INJECTION, SOLUTION SUBCUTANEOUS WEEKLY
Qty: 6 ML | Refills: 0 | Status: SHIPPED | OUTPATIENT
Start: 2024-11-05

## 2024-11-05 NOTE — TELEPHONE ENCOUNTER
Dr. Barahona  -- spoke to pt. Pt is concerned about her high BG's. States even after taking MTF  mg with BG >200, numbers remain high.     Hyperglycemia     Onset of hyperglycemia: ongoing    BG levels: pt reports the following blood sugars   fasting 2 hrs after lunch Before dinner Before bed   11/5 250      11/4 219  180 230   11/3  187 191 219       Symptoms: Jittery and anxious. Once it hits 250+, pt feels this way. When it drops below 180, anxious.     Pattern of hyperglycemia: denies. All day.     Steroid therapy: denies    Acute Illness: denies     Change in Diet: a source of protein (meat, chicken, etc). Bananas, oatmeal for breakfast. Lunch -- 1/2 grilled chicken sandwich.         Dinner -- meat, veggies, and maybe carbs. Pt   doesn't eat snack before bed anymore.     List DM Medications/Compliance:  Glipizide 10mg BID -> confirmed  MTF 500mg ER with BF-> confirmed  Will take 1 extra tablet 2 hours after BF if BG is not <200  Trulicity 3mg weekly-> confirmed, last dose on Saturday 11/2

## 2024-11-05 NOTE — TELEPHONE ENCOUNTER
Patient states that her sugar is still running high.  Today it was 250 fasting.  See also 10/22/24 Patient Message.  Patient is anxious to hear from this offce.  Please call.

## 2024-11-05 NOTE — PROGRESS NOTES
Di Puga is a 64 year old female.    HPI:     Chief Complaint   Patient presents with    Food Allergy     Poss food allergy to egg     Patient is a 64-year-old female who presents for follow-up with a chief complaint of allergies  Patient presents for oral challenge to egg to further evaluate his allergic trigger.  Prior skin testing in 2024 was negative to eggs    Patient denies any active issues today including fevers rashes or respiratory issues.    Eggs: tingling tongue in past   No oral swelling  hives or resp issues     No pets in home     HISTORY:  Past Medical History:    Arrhythmia    tachycardia    Asthma (HCC)    B12 deficiency    Back pain    Back problem    Calculus of kidney    Cancer (HCC)    - cervical    COPD (chronic obstructive pulmonary disease) (HCC)    Essential hypertension    High blood pressure    High cholesterol    History of abnormal cervical Pap smear    Irregular heart beat    Muscle weakness    Neuropathy    Other and unspecified hyperlipidemia    Palpitations    Pulmonary embolism (HCC)    Spinal stenosis    Stroke (HCC)    TIA a few years ago    TIA (transient ischemic attack)    Type II or unspecified type diabetes mellitus without mention of complication, not stated as uncontrolled      Past Surgical History:   Procedure Laterality Date    Cholecystectomy      Colonoscopy N/A 2017    Procedure: COLONOSCOPY, POSSIBLE BIOPSY, POSSIBLE POLYPECTOMY 47159;  Surgeon: Pedro Thomas MD;  Location: [facility]    Colonoscopy N/A 2022    Procedure: COLONOSCOPY/ESOPHAGOGASTRODUODENOSCOPY (EGD);  Surgeon: Cortney Diaz MD;  Location: [facility] ENDOSCOPY    Colonoscopy & polypectomy            Other      Lung biopsy    Other surgical history      Cone biopsy    Other surgical history      Cystoscopy    Removal gallbladder      Tubal ligation  1986      Family History   Problem Relation Age of Onset    Diabetes Mother     Lung  Disorder Mother         Emphysema    Heart Disease Mother     Asthma Mother     Cancer Father 51        Lung    Diabetes Sister     Thyroid Disorder Sister     Glaucoma Sister     Stroke Sister     Prostate Cancer Brother     Diabetes Brother     Diabetes Brother     Cancer Brother         Prostate cancer, copd    Diabetes Brother     Glaucoma Maternal Grandmother     Heart Disease Maternal Grandmother     Ovarian Cancer Maternal Grandmother 59    Cataracts Niece     Breast Cancer Niece 48    Macular degeneration Neg       Social History:   Social History     Socioeconomic History    Marital status: Single   Tobacco Use    Smoking status: Every Day     Current packs/day: 1.00     Average packs/day: 1 pack/day for 20.0 years (20.0 ttl pk-yrs)     Types: Cigarettes    Smokeless tobacco: Never    Tobacco comments:     1 pack daily   Vaping Use    Vaping status: Never Used   Substance and Sexual Activity    Alcohol use: Not Currently    Drug use: No   Other Topics Concern    Caffeine Concern No    Exercise No   Social History Narrative    The patient does not use an assistive device..      The patient does live in a home with stairs.     Social Drivers of Health      [facility]    Social Connections    [facility]    Housing Stability        Medications (Active prior to today's visit):  Current Outpatient Medications   Medication Sig Dispense Refill    GLIPIZIDE 10 MG Oral Tab TAKE 1 TABLET(10 MG) BY MOUTH TWICE DAILY BEFORE MEALS 60 tablet 2    Dulaglutide (TRULICITY) 3 MG/0.5ML Subcutaneous Solution Auto-injector Inject 3 mg into the skin once a week. 6 mL 1    metFORMIN  MG Oral Tablet 24 Hr Take 2 tablets (1,000 mg total) by mouth daily with breakfast. Start with 1 tablet in the morning. If blood sugars 2 hours after dinner do not decrease to < 200, may increase to 2 tablets 180  tablet 0    Mometasone Furo-Formoterol Fum (DULERA) 100-5 MCG/ACT Inhalation Aerosol Inhale 1 puff into the lungs in the morning and 1 puff before bedtime. 1 each 2    albuterol 108 (90 Base) MCG/ACT Inhalation Aero Soln Inhale 2 puffs into the lungs every 4 (four) hours as needed. 8.5 g 1    nicotine 21 MG/24HR Transdermal Patch 24 Hr Apply 1 patch (21mg) daily for 2 weeks, then apply 1 patch (14mg) daily for 2 weeks, then apply 1 patch (7mg) daily for 2 weeks 14 patch 0    ergocalciferol 1.25 MG (00332 UT) Oral Cap Take 1 capsule (50,000 Units total) by mouth once a week for 90 days, THEN 1 capsule (50,000 Units total) every 14 (fourteen) days. 18 capsule 0    amLODIPine 5 MG Oral Tab Take 1 tablet (5 mg total) by mouth daily. 90 tablet 3    metoprolol succinate ER 50 MG Oral Tablet 24 Hr Take 1 tablet (50 mg total) by mouth daily. 90 tablet 1    alum-mag hydroxide-simethicone 200-200-20 MG/5ML Oral Suspension Take 10 mL by mouth 4 (four) times daily as needed for Indigestion. 355 mL 0    Glucose Blood (ONETOUCH VERIO) In Vitro Strip TEST TWICE DAILY. 200 strip 1    diphenhydrAMINE HCl 25 MG Oral Tab Take 1 tablet (25 mg total) by mouth every 6 (six) hours as needed for Itching.      EPINEPHrine (EPIPEN 2-ROBERTH) 0.3 MG/0.3ML Injection Solution Auto-injector Inject IM in event of  allergic reaction 1 each 0    levocetirizine 5 MG Oral Tab Take 1 tablet (5 mg total) by mouth every evening. 90 tablet 1    atorvastatin 80 MG Oral Tab Take 1 tablet (80 mg total) by mouth nightly. 90 tablet 3    apixaban (ELIQUIS) 5 MG Oral Tab Take 1 tablet (5 mg total) by mouth 2 (two) times daily. 180 tablet 3    OneTouch Delica Lancets 33G Does not apply Misc Use to check blood sugar two times a day 200 each 1    Blood Glucose Monitoring Suppl (ONETOUCH ULTRA SYSTEM) W/DEVICE Does not apply Kit Test sugar twice daily 1 kit 0    predniSONE 10 MG Oral Tab Take 3 tabs (30mg) daily for 3 days, then take 2 tabs (20mg) daily for 3 days,  then take 1 tab (10mg) daily for 3 days. 18 tablet 0       Allergies:  Allergies[1]      ROS:   Allergic/Immuno:  See hpi  Cardiovascular:  Negative for irregular heartbeat/palpitations, chest pain, edema  Constitutional:  Negative night sweats,weight loss, irritability and lethargy  ENMT:  Negative for ear drainage, hearing loss and nasal drainage  Eyes:  Negative for eye discharge and vision loss  Gastrointestinal:  Negative for abdominal pain, diarrhea and vomiting  Integumentary:  Negative for pruritus and rash  Respiratory:  Negative for cough, dyspnea and wheezing    PHYSICAL EXAM:   Constitutional: responsive, no acute distress noted  Head/Face: NC/Atraumatic  Eyes/Vision: conjunctiva and lids are normal extraocular motion is intact   Ears/Audiometry: tympanic membranes are normal bilaterally hearing is grossly intact  Nose/Mouth/Throat: nose and throat are clear mucous membranes are moist   Neck/Thyroid: neck is supple without adenopathy  Lymphatic: no abnormal cervical, supraclavicular or axillary adenopathy is noted  Respiratory: normal to inspection lungs are clear to auscultation bilaterally normal respiratory effort   Cardiovascular: regular rate and rhythm no murmurs, gallups, or rubs  Abdomen: soft non-tender non-distended  Skin/Hair: no unusual rashes present   Extremities: no edema, cyanosis, or clubbing     ASSESSMENT/PLAN:   Assessment   Encounter Diagnosis   Name Primary?    Food allergy Yes       Reviewed potential adverse  with oral challenge to egg including local reaction systemic reactions including anaphylaxis as well as food intolerances.  Patient acknowledges inherent risk provides consents for oral challenge to egg.  Patient underwent graded oral challenge with egg white including E[place]an toast.  Each dose was followed by 15 minutes of observation.  The fourth and final dose was followed by 1 hour of observation.  Goal cumulative  dose was approximately 1 whole slice of  Frisian toast    Oral challenge to eggs today was   negative with no signs of allergic process      Recs: Given patient's negative oral challenge in office today patient is showing no signs of an IgE mediated allergy to eggs and therefore may try introducing eggs into her diet in the future.  Patient can be posted with any issues with tolerating eggs in the future.         Orders This Visit:  No orders of the defined types were placed in this encounter.      Meds This Visit:  Requested Prescriptions      No prescriptions requested or ordered in this encounter       Imaging & Referrals:  None     11/5/2024  Papo Ibarra MD    If medication samples were provided today, they were provided solely for patient education and training related to self administration of these medications.  Teaching, instruction and sample was provided to the patient by myself.  Teaching included  a review of potential adverse side effects as well as potential efficacy.  Patient's questions were answered in regards to medication administration and dosing and potential side effects. Teaching was provided via the teach back method           [1]   Allergies  Allergen Reactions    Ace Inhibitors ANAPHYLAXIS, ANGIOEDEMA and SWELLING     April 2020, hospitalized at [facility] in [place] with tongue swelling and throat closing sensation, on lisinopril at the time. Not intubated      Egg White (Diagnostic) NAUSEA ONLY and Tightness in Throat    Fish-Derived Products SWELLING    Lisinopril ANAPHYLAXIS    Peanuts SWELLING    Shrimp ITCHING, NAUSEA AND VOMITING, Tightness in Throat and WHEEZING    Tomato SWELLING and UNKNOWN    Tomatoes RASH

## 2024-11-05 NOTE — TELEPHONE ENCOUNTER
Called Dr. Barahona and she provided the below recommendations:     Jitteriness is not from elevated BG, pt may experience some s/s with BG coming down less than 180 but will get better. Also recommend seeing PCP for jitteriness and anxiety.    STOP MTF since this can cause diarrhea    Due to elevated BG and maxed out glipizide need to consider insulin:     Lantus 5 units daily     Pt will continue with:   Glipizide 10 mg BID  Trulicity 3 mg, when due for refill can increase to 4.5 mg, if pt has not picked up, can send new script.     Discuss any dietary changes since pt used to be well controlled.     Called pt and provided the above recommendation. Pt agreeable to starting Lantus 5 units. Pt aware to stop MTF completely and to start Trulicity 4.5 mg once she finishes the last 2 pens of Trulicity 3 mg, she was only able to  one month supply due to insurance. PT will also follow up with PCP for symptoms.   Pt verbalized understanding and provided correct read back.   Script for Lantus and Trulicity 4.5 mg sent.

## 2024-11-11 RX ORDER — METOPROLOL SUCCINATE 50 MG/1
50 TABLET, EXTENDED RELEASE ORAL DAILY
Qty: 90 TABLET | Refills: 1 | OUTPATIENT
Start: 2024-11-11

## 2024-11-14 ENCOUNTER — PATIENT MESSAGE (OUTPATIENT)
Dept: ENDOCRINOLOGY CLINIC | Facility: CLINIC | Age: 64
End: 2024-11-14

## 2024-11-18 RX ORDER — INSULIN GLARGINE 100 [IU]/ML
10 INJECTION, SOLUTION SUBCUTANEOUS DAILY
Qty: 9 ML | Refills: 1 | Status: SHIPPED | OUTPATIENT
Start: 2024-11-18

## 2024-11-18 NOTE — TELEPHONE ENCOUNTER
Dr Barahona,    Patient is calling to report elevated blood sugars despite starting lantus recently on 11/5/24.     She is asking for a sooner appointment. Patient is scheduled on 1/7/25 for follow up.     Ok to offer grey slot apt on 12/10 at 2 pm?    Reports compliance with:     Lantus 5 units daily   Glipizide 10 mg BID  Trulicity 4.5 mg once a week.     Recent BG before meals and 2 hrs after meal:    11/18   11/17  2 hrs after breakfast 247 lunch 225 Dinner 215 Bedtime 181  11/16  2 hrs after breakfast 210 after lunch 222 2 hrs after dinner 247 bedtime 219  11/15  2 hrs after lunch 308 Dinner 197 Bedtime 289     Denies nausea vomiting abdominal pain. Reports headache and poor vision lately.     No steroids    No recent illness

## 2024-11-18 NOTE — TELEPHONE ENCOUNTER
Per Dr Barahona via WebEx. Ask patient to increase to lantus 10 Units once a day and book appointment on 12/20 at 2 pm.    Called patient and communicated orders from Dr Barahona. Patient verbalizes understating and requesting to send script with the updated instructions.     Appointment scheduled for 12/10 at 2 pm and cancelled appointment on 1/7/25

## 2024-11-26 ENCOUNTER — TELEPHONE (OUTPATIENT)
Dept: ENDOCRINOLOGY CLINIC | Facility: CLINIC | Age: 64
End: 2024-11-26

## 2024-11-26 NOTE — TELEPHONE ENCOUNTER
Patient called to ask if she can make an appointment sooner than 12/10 due to her elevated blood sugar levels. Please call.

## 2024-11-26 NOTE — TELEPHONE ENCOUNTER
Per TE 11/5:  \"Ask patient to increase to lantus 10 Units once a day and book appointment on 12/20 at 2 pm.\"    Scheduled patient for tomorrow at 10:45 with Dr Brooklyn Mcduffie, please advise if you have further recommendations or if recommendations can be deferred to Dr Weeks at appointment tomorrow    Hyperglycemia     Onset of hyperglycemia: ongoing    BG levels:   11/26:   208 fasting  280 2 hours after lunch  11/25:   218 fasting  224 2 hours after lunch  266 2 hours after dinner    11/24:   221 fasting at 1145  218 after lunch at 2:50pm  268 before dinner at 5:51pm      Symptoms :  Blurry vision, no CP, had pain behind eyes yesterday and day before (due to stress)?     Pattern of hyperglycemia: constant    Steroid therapy: no     Acute Illness: no fever. Does have COPD but not flaring up at this moment but always has cough     Change in Diet:  Eating less/trying to watch what she eats.    Seems as though it Doesn't matter what she eats- BG is still elevated.    Did not eat lunch today     List DM Medications/Compliance:  Lantus 10U nightly  Glipizide 10mg BID  Trulicity 4.5mg weekly        Patient states she has also tried to stay hydrated and BG is still contantly elevated.

## 2024-11-27 ENCOUNTER — TELEPHONE (OUTPATIENT)
Dept: ENDOCRINOLOGY CLINIC | Facility: CLINIC | Age: 64
End: 2024-11-27

## 2024-11-27 ENCOUNTER — LAB ENCOUNTER (OUTPATIENT)
Dept: LAB | Facility: HOSPITAL | Age: 64
End: 2024-11-27
Attending: INTERNAL MEDICINE
Payer: MEDICAID

## 2024-11-27 ENCOUNTER — OFFICE VISIT (OUTPATIENT)
Dept: ENDOCRINOLOGY CLINIC | Facility: CLINIC | Age: 64
End: 2024-11-27

## 2024-11-27 VITALS
HEIGHT: 66 IN | HEART RATE: 103 BPM | SYSTOLIC BLOOD PRESSURE: 92 MMHG | BODY MASS INDEX: 31.02 KG/M2 | DIASTOLIC BLOOD PRESSURE: 64 MMHG | WEIGHT: 193 LBS

## 2024-11-27 DIAGNOSIS — G89.29 CHRONIC BILATERAL LOW BACK PAIN WITHOUT SCIATICA: ICD-10-CM

## 2024-11-27 DIAGNOSIS — H25.13 AGE-RELATED NUCLEAR CATARACT OF BOTH EYES: ICD-10-CM

## 2024-11-27 DIAGNOSIS — J42 CHRONIC BRONCHITIS, UNSPECIFIED CHRONIC BRONCHITIS TYPE (HCC): ICD-10-CM

## 2024-11-27 DIAGNOSIS — R31.9 HEMATURIA, UNSPECIFIED TYPE: Primary | ICD-10-CM

## 2024-11-27 DIAGNOSIS — I10 ESSENTIAL HYPERTENSION: ICD-10-CM

## 2024-11-27 DIAGNOSIS — Z71.6 ENCOUNTER FOR SMOKING CESSATION COUNSELING: ICD-10-CM

## 2024-11-27 DIAGNOSIS — M54.50 CHRONIC BILATERAL LOW BACK PAIN WITHOUT SCIATICA: ICD-10-CM

## 2024-11-27 DIAGNOSIS — E11.9 TYPE 2 DIABETES MELLITUS WITHOUT RETINOPATHY (HCC): Primary | ICD-10-CM

## 2024-11-27 DIAGNOSIS — G47.00 INSOMNIA, UNSPECIFIED TYPE: ICD-10-CM

## 2024-11-27 DIAGNOSIS — R07.81 RIB PAIN ON RIGHT SIDE: ICD-10-CM

## 2024-11-27 DIAGNOSIS — E78.2 MIXED HYPERLIPIDEMIA: ICD-10-CM

## 2024-11-27 DIAGNOSIS — E55.9 VITAMIN D DEFICIENCY: ICD-10-CM

## 2024-11-27 DIAGNOSIS — Z00.00 ANNUAL PHYSICAL EXAM: ICD-10-CM

## 2024-11-27 DIAGNOSIS — E11.9 TYPE 2 DIABETES MELLITUS WITHOUT RETINOPATHY (HCC): ICD-10-CM

## 2024-11-27 LAB
ALBUMIN SERPL-MCNC: 4.6 G/DL (ref 3.2–4.8)
ALBUMIN/GLOB SERPL: 1.6 {RATIO} (ref 1–2)
ALP LIVER SERPL-CCNC: 100 U/L
ALT SERPL-CCNC: 15 U/L
ANION GAP SERPL CALC-SCNC: 7 MMOL/L (ref 0–18)
AST SERPL-CCNC: 19 U/L (ref ?–34)
BILIRUB SERPL-MCNC: 0.6 MG/DL (ref 0.2–1.1)
BILIRUB UR QL: NEGATIVE
BUN BLD-MCNC: 9 MG/DL (ref 9–23)
BUN/CREAT SERPL: 13.2 (ref 10–20)
CALCIUM BLD-MCNC: 10 MG/DL (ref 8.7–10.4)
CARTRIDGE EXPIRATION DATE: ABNORMAL DATE
CHLORIDE SERPL-SCNC: 107 MMOL/L (ref 98–112)
CHOLEST SERPL-MCNC: 150 MG/DL (ref ?–200)
CLARITY UR: CLEAR
CO2 SERPL-SCNC: 28 MMOL/L (ref 21–32)
COLOR UR: YELLOW
CREAT BLD-MCNC: 0.68 MG/DL
CREAT UR-SCNC: 279.1 MG/DL
EGFRCR SERPLBLD CKD-EPI 2021: 98 ML/MIN/1.73M2 (ref 60–?)
FASTING PATIENT LIPID ANSWER: YES
FASTING STATUS PATIENT QL REPORTED: YES
GLOBULIN PLAS-MCNC: 2.9 G/DL (ref 2–3.5)
GLUCOSE BLD-MCNC: 173 MG/DL (ref 70–99)
GLUCOSE BLOOD: 217
GLUCOSE UR-MCNC: 30 MG/DL
HDLC SERPL-MCNC: 32 MG/DL (ref 40–59)
HEMOGLOBIN A1C: 8.5 % (ref 4.3–5.6)
KETONES UR-MCNC: NEGATIVE MG/DL
LDLC SERPL CALC-MCNC: 99 MG/DL (ref ?–100)
LEUKOCYTE ESTERASE UR QL STRIP.AUTO: 25
MICROALBUMIN UR-MCNC: 8.2 MG/DL
MICROALBUMIN/CREAT 24H UR-RTO: 29.4 UG/MG (ref ?–30)
NITRITE UR QL STRIP.AUTO: NEGATIVE
NONHDLC SERPL-MCNC: 118 MG/DL (ref ?–130)
OSMOLALITY SERPL CALC.SUM OF ELEC: 297 MOSM/KG (ref 275–295)
PH UR: 6 [PH] (ref 5–8)
POTASSIUM SERPL-SCNC: 3.7 MMOL/L (ref 3.5–5.1)
PROT SERPL-MCNC: 7.5 G/DL (ref 5.7–8.2)
PROT UR-MCNC: 30 MG/DL
SODIUM SERPL-SCNC: 142 MMOL/L (ref 136–145)
SP GR UR STRIP: 1.02 (ref 1–1.03)
TEST STRIP EXPIRATION DATE: NORMAL DATE
TEST STRIP LOT #: NORMAL NUMERIC
TRIGL SERPL-MCNC: 102 MG/DL (ref 30–149)
TSI SER-ACNC: 1.16 UIU/ML (ref 0.55–4.78)
UROBILINOGEN UR STRIP-ACNC: 2
VLDLC SERPL CALC-MCNC: 17 MG/DL (ref 0–30)

## 2024-11-27 PROCEDURE — 82043 UR ALBUMIN QUANTITATIVE: CPT

## 2024-11-27 PROCEDURE — 36415 COLL VENOUS BLD VENIPUNCTURE: CPT

## 2024-11-27 PROCEDURE — 87086 URINE CULTURE/COLONY COUNT: CPT | Performed by: INTERNAL MEDICINE

## 2024-11-27 PROCEDURE — 83036 HEMOGLOBIN GLYCOSYLATED A1C: CPT | Performed by: INTERNAL MEDICINE

## 2024-11-27 PROCEDURE — 82947 ASSAY GLUCOSE BLOOD QUANT: CPT | Performed by: INTERNAL MEDICINE

## 2024-11-27 PROCEDURE — 80061 LIPID PANEL: CPT

## 2024-11-27 PROCEDURE — 80053 COMPREHEN METABOLIC PANEL: CPT

## 2024-11-27 PROCEDURE — 81001 URINALYSIS AUTO W/SCOPE: CPT | Performed by: INTERNAL MEDICINE

## 2024-11-27 PROCEDURE — 82570 ASSAY OF URINE CREATININE: CPT

## 2024-11-27 PROCEDURE — 99214 OFFICE O/P EST MOD 30 MIN: CPT | Performed by: INTERNAL MEDICINE

## 2024-11-27 PROCEDURE — 84443 ASSAY THYROID STIM HORMONE: CPT

## 2024-11-27 RX ORDER — METFORMIN HYDROCHLORIDE 500 MG/1
500 TABLET, EXTENDED RELEASE ORAL 2 TIMES DAILY WITH MEALS
Qty: 360 TABLET | Refills: 3 | Status: SHIPPED | OUTPATIENT
Start: 2024-11-27

## 2024-11-27 RX ORDER — HYDROCHLOROTHIAZIDE 12.5 MG/1
1 CAPSULE ORAL
Qty: 6 EACH | Refills: 1 | Status: SHIPPED | OUTPATIENT
Start: 2024-11-27

## 2024-11-27 RX ORDER — INSULIN LISPRO 100 [IU]/ML
3 INJECTION, SOLUTION INTRAVENOUS; SUBCUTANEOUS 3 TIMES DAILY
Qty: 9 ML | Refills: 0 | Status: SHIPPED | OUTPATIENT
Start: 2024-11-27 | End: 2025-02-25

## 2024-11-27 RX ORDER — INSULIN GLARGINE 100 [IU]/ML
10 INJECTION, SOLUTION SUBCUTANEOUS EVERY MORNING
Qty: 9 ML | Refills: 1 | Status: SHIPPED | OUTPATIENT
Start: 2024-11-27

## 2024-11-27 RX ORDER — BLOOD-GLUCOSE METER
1 EACH MISCELLANEOUS 4 TIMES DAILY
Qty: 1 KIT | Refills: 0 | Status: SHIPPED | OUTPATIENT
Start: 2024-11-27

## 2024-11-27 NOTE — TELEPHONE ENCOUNTER
Pt needs 6 week follow up with Dr Barahona- nothing avail until 2/5 - pt only wants to come to [place]-pl call pt

## 2024-11-27 NOTE — PATIENT INSTRUCTIONS
Please start metformin ER 500mg every morning for 1 week;     Please take metformin ER 500mg twice daily for the 2nd week;     Please take metformin ER 1000mg and 500mg in the evening for the 3rd week;     Please take 1000mg twice daily for the 4th week and thereafter    You may stop at any of the steps once your morning blood sugars are 140 or less and your two hour post meal blood sugars are 180 or less.

## 2024-11-27 NOTE — PROGRESS NOTES
Name: Di Puga  Date: 11/27/24    Referring Physician: Dr. Rene Price    HISTORY OF PRESENT ILLNESS   Di Puga is a 64 year old female who presents for follow-up of evaluaton and management of uncontrolled T2D. She was diagnosed more than 15 years ago on regular screening blood tests. She had been on insulin and then I transitioned her to oral medications.     At the last visit, she had been having elevated blood sugars and so she has been taking the Trulicity 3mg, metformin 1g bid and glipizide 10mg PO bid. Despite this, her blood sugars were still high.     She is still having elevated blood sugars, and has some symptoms when she has blood sugars that are less than 180. We had started her on Lantus recently and blood sugars are slightly better.     Blood Glucose Today: 217  HbA1C or glycohemoglobin is 8.5 today (and it was 8.2 on 8/20/24 and it was 6.4 on 9/13/23 and it was 7.5 on 1/13/23 (and it was 6.8 on 9/27/22 and it was 6.7 on 7/12/22 and it was 6.9 on 4/12/22 and it was 7.9 on 12/01/22 and it was 7.7 on 12/01/21 and it was 8.2 % 1/04/21)  Type 1 or Type 2?: Type 2  Checking 3 times per day  Fasting- 140-200; 2-hour- 210-220  Medications for DM : Trulicity 4.5mg qweekly; Glipizide 10mg PO bid; Lantus 10 units   Episodes of hypoglycemia: none    Dietary compliance: eating healthier, and not as hungry; not snacking    Exercise: its difficult to walk because of spinal stenosis- this is getting better    Polyuria/polydipsia: yes    Blurred vision: none    Flu Vaccine This Season: does not get    Covid Vaccine- yes, got the booster    REVIEW OF SYSTEMS  Eyes: Diabetic retinopathy present: None            Most recent visit to eye doctor in last 12 months: has to make an appt    CV: Cardiovascular disease present: none         Hypertension present: at goal, on meds         Hyperlipidemia present: yes, HDL low, on meds (8/20/24)         Peripheral Vascular Disease present: none    :  Nephropathy present: none (8/20/24); creatinine- 0.65    Neuro: Neuropathy present: has the neuropathy, and sees the podiatrist for this and he checks her feet once a year    Skin: Infection or ulceration: none    Osteoporosis: none; Vitamin D- 4.9 (8/20/24)    Thyroid disease: TSH within normal limits on 8/20/24    Medications:     Current Outpatient Medications:     Blood Glucose Monitoring Suppl (ONETOUCH VERIO) w/Device Does not apply Kit, 1 each in the morning, at noon, in the evening, and at bedtime. Dx: E11.9, Disp: 1 kit, Rfl: 0    insulin glargine (LANTUS SOLOSTAR) 100 UNIT/ML Subcutaneous Solution Pen-injector, Inject 10 Units into the skin daily., Disp: 9 mL, Rfl: 1    Dulaglutide (TRULICITY) 4.5 MG/0.5ML Subcutaneous Solution Auto-injector, Inject 4.5 mg into the skin once a week., Disp: 6 mL, Rfl: 0    GLIPIZIDE 10 MG Oral Tab, TAKE 1 TABLET(10 MG) BY MOUTH TWICE DAILY BEFORE MEALS, Disp: 60 tablet, Rfl: 2    Insulin Pen Needle (PEN NEEDLES) 32G X 4 MM Does not apply Misc, 1 each daily., Disp: 100 each, Rfl: 0    metFORMIN  MG Oral Tablet 24 Hr, Take 2 tablets (1,000 mg total) by mouth daily with breakfast. Start with 1 tablet in the morning. If blood sugars 2 hours after dinner do not decrease to < 200, may increase to 2 tablets (Patient not taking: Reported on 11/27/2024), Disp: 180 tablet, Rfl: 0    Mometasone Furo-Formoterol Fum (DULERA) 100-5 MCG/ACT Inhalation Aerosol, Inhale 1 puff into the lungs in the morning and 1 puff before bedtime., Disp: 1 each, Rfl: 2    predniSONE 10 MG Oral Tab, Take 3 tabs (30mg) daily for 3 days, then take 2 tabs (20mg) daily for 3 days, then take 1 tab (10mg) daily for 3 days., Disp: 18 tablet, Rfl: 0    albuterol 108 (90 Base) MCG/ACT Inhalation Aero Soln, Inhale 2 puffs into the lungs every 4 (four) hours as needed., Disp: 8.5 g, Rfl: 1    nicotine 21 MG/24HR Transdermal Patch 24 Hr, Apply 1 patch (21mg) daily for 2 weeks, then apply 1 patch (14mg) daily for  2 weeks, then apply 1 patch (7mg) daily for 2 weeks, Disp: 14 patch, Rfl: 0    ergocalciferol 1.25 MG (13921 UT) Oral Cap, Take 1 capsule (50,000 Units total) by mouth once a week for 90 days, THEN 1 capsule (50,000 Units total) every 14 (fourteen) days., Disp: 18 capsule, Rfl: 0    amLODIPine 5 MG Oral Tab, Take 1 tablet (5 mg total) by mouth daily., Disp: 90 tablet, Rfl: 3    metoprolol succinate ER 50 MG Oral Tablet 24 Hr, Take 1 tablet (50 mg total) by mouth daily., Disp: 90 tablet, Rfl: 1    alum-mag hydroxide-simethicone 200-200-20 MG/5ML Oral Suspension, Take 10 mL by mouth 4 (four) times daily as needed for Indigestion., Disp: 355 mL, Rfl: 0    Glucose Blood (ONETOUCH VERIO) In Vitro Strip, TEST TWICE DAILY., Disp: 200 strip, Rfl: 1    diphenhydrAMINE HCl 25 MG Oral Tab, Take 1 tablet (25 mg total) by mouth every 6 (six) hours as needed for Itching., Disp: , Rfl:     EPINEPHrine (EPIPEN 2-ROBERTH) 0.3 MG/0.3ML Injection Solution Auto-injector, Inject IM in event of  allergic reaction, Disp: 1 each, Rfl: 0    levocetirizine 5 MG Oral Tab, Take 1 tablet (5 mg total) by mouth every evening., Disp: 90 tablet, Rfl: 1    atorvastatin 80 MG Oral Tab, Take 1 tablet (80 mg total) by mouth nightly., Disp: 90 tablet, Rfl: 3    apixaban (ELIQUIS) 5 MG Oral Tab, Take 1 tablet (5 mg total) by mouth 2 (two) times daily., Disp: 180 tablet, Rfl: 3    OneTouch Delica Lancets 33G Does not apply Misc, Use to check blood sugar two times a day, Disp: 200 each, Rfl: 1    Blood Glucose Monitoring Suppl (ONETOUCH ULTRA SYSTEM) W/DEVICE Does not apply Kit, Test sugar twice daily, Disp: 1 kit, Rfl: 0     Allergies:   Allergies   Allergen Reactions    Ace Inhibitors ANAPHYLAXIS, ANGIOEDEMA and SWELLING     April 2020, hospitalized at [facility] in [place] with tongue swelling and throat closing sensation, on lisinopril at the time. Not intubated      Egg White (Diagnostic) NAUSEA ONLY and Tightness in Throat    Fish-Derived Products SWELLING     Lisinopril ANAPHYLAXIS    Peanuts SWELLING    Shrimp ITCHING, NAUSEA AND VOMITING, Tightness in Throat and WHEEZING    Tomato SWELLING and UNKNOWN    Tomatoes RASH       Social History:   Social History     Socioeconomic History    Marital status: Single   Tobacco Use    Smoking status: Every Day     Current packs/day: 1.00     Average packs/day: 1 pack/day for 20.0 years (20.0 ttl pk-yrs)     Types: Cigarettes    Smokeless tobacco: Never    Tobacco comments:     1 pack daily   Vaping Use    Vaping status: Never Used   Substance and Sexual Activity    Alcohol use: Not Currently    Drug use: No   Other Topics Concern    Caffeine Concern No    Exercise No   Social History Narrative    The patient does not use an assistive device..      The patient does live in a home with stairs.     Social Drivers of Health      [facility]    Social Connections    Received from [facility], [facility]    Housing Stability       Medical History:   Past Medical History:    Arrhythmia    tachycardia    Asthma (HCC)    B12 deficiency    Back pain    Back problem    Calculus of kidney    Cancer (HCC)    1990- cervical    COPD (chronic obstructive pulmonary disease) (HCC)    Essential hypertension    High blood pressure    High cholesterol    History of abnormal cervical Pap smear    Irregular heart beat    Muscle weakness    Neuropathy    Other and unspecified hyperlipidemia    Palpitations    Pulmonary embolism (HCC)    Spinal stenosis    Stroke (HCC)    TIA a few years ago    TIA (transient ischemic attack)    Type II or unspecified type diabetes mellitus without mention of complication, not stated as uncontrolled       Surgical history:   Past Surgical History:   Procedure Laterality Date    Cholecystectomy  1994    Colonoscopy N/A 08/11/2017    Procedure: COLONOSCOPY, POSSIBLE BIOPSY, POSSIBLE POLYPECTOMY 76351;  Surgeon: William  Pedro CROOKS MD;  [place] [facility] [place]    Colonoscopy N/A 2022    Procedure: COLONOSCOPY/ESOPHAGOGASTRODUODENOSCOPY (EGD);  Surgeon: Cortney Diaz MD;  [facility] ENDOSCOPY    Colonoscopy & polypectomy            Other      Lung biopsy    Other surgical history      Cone biopsy    Other surgical history      Cystoscopy    Removal gallbladder      Tubal ligation           PHYSICAL EXAM  Vitals:    24 1030   BP: 92/64   Pulse: 103   Weight: 193 lb (87.5 kg)   Height: 5' 6\" (1.676 m)         General Appearance:  alert, well developed, in no acute distress  Eyes:  normal conjunctivae, sclera., normal sclera and normal pupils  Psychiatric:  oriented to time, self, and place  Nutritional:  no abnormal weight gain or loss  Feet: monofilament normal bilaterally, pulses felt  Bilateral barefoot skin diabetic exam is normal, visualized feet and the appearance is normal.  Bilateral monofilament/sensation of both feet is normal.  Pulsation pedal pulse exam of both lower legs/feet is normal as well.        Lab Data:   Lab Results   Component Value Date     (H) 2024    A1C 8.5 (H) 2024     Lab Results   Component Value Date     (H) 2024    BUN 8 (L) 2024    BUNCREA 12.9 2024    CREATSERUM 0.62 2024    ANIONGAP 5 2024    GFRNAA 105 2022    GFRAA 121 2022    CA 9.7 2024    OSMOCALC 292 2024    ALKPHO 94 2024    AST 14 2024    ALT 10 2024    ALKPHOS 72 2016    BILT 0.7 2024    TP 7.6 2024    ALB 4.5 2024    GLOBULIN 3.1 2024    AGRATIO 1.3 2021     2024    K 3.6 2024     2024    CO2 27.0 2024     Lab Results   Component Value Date    CHOLEST 137 2024    TRIG 102 2024    HDL 31 (L) 2024    LDL 87 2024    VLDL 16 2024    TCHDLRATIO 4.0 2021    NONHDLC 106 2024    CALCNONHDL 155  (H) 01/16/2016     Lab Results   Component Value Date    MALBP 1.40 08/20/2024    CREUR 191.60 08/20/2024    CREAURINE 107.7 02/16/2013    MIALBURINE 0.5 02/16/2013    MCRRATIOUR 4.6 02/16/2013    MICROALBUMIN 0.5 12/21/2021    CREAUR 82 12/21/2021    MALBCREACALC 6 12/21/2021         ASSESSMENT/PLAN:    This is a 64 year-old woman here for follow-up of management of uncontrolled type 2 diabetes. We discussed the ABCs of DM.     1.) Hyperglycemia Management- We discussed the importance of glycemic control to prevent complications of diabetes. We discussed the importance of SBGM. I offered and provided patient education materials and offered a blood glucose log book.   - Continue Trulicity 4.5mg qweekly,   - Continue glipizide 10mg PO bid   - Continue Lantus 10 units for now and we will also start humalog 3 units tid with meals that she could take as needed.  - She should start metformin ER 500mg PO qAM to start with. She should increase this by one tablet each week until her fasting blood sugars are 140 or less and two hour post-prandial blood sugars are 180 or less.     2.) Management of Diabetic Complications- We discussed the complications of diabetes include retinopathy, neuropathy, nephropathy and cardiovascular disease.   - Ophthalmology- she is up to date  - Neuropathy- none  - Lipid panel- we will check in 3 months  - MAC- will check in 3 months  - CMP- will check in 3 months  - BP- at goal, on meds  - Vaccines, does not get the flu vaccine, up to date with booster    3.) Lifestyle Management for Diabetes- We discussed importance of a low CHO diet, and recommend 45gm per meal or 135gm per day. We discussed the importance of trying to follow a Mediterranean diet, with an emphasis on vegetables at every meal, with lots whole grains, and protein from either plant-based sources, or poultry and fish.   - Patient has been eating more healthy foods  - She is not able to exercise, but she is going to try as much as  possible    4.) Vitamin D deficiency  - Continue ergocalciferol 50,000 every week  - Check vitamin D in 3 months    Return to the clinic in 3 months    Prior to this encounter, I spent over 15 minutes with preparing for the visit, including reviewing documents from other specialties as well as from PCP and going over test results. During the face to face encounter, I spent an additional 15 minutes which were determined for follow-up. Greater than 50% of the time was spent in counseling, anticipatory guidance, and coordination of care. Patient concerns were answered to the best of my knowledge.     11/27/24  Floridalma Barahona MD

## 2024-11-29 ENCOUNTER — TELEPHONE (OUTPATIENT)
Dept: ENDOCRINOLOGY CLINIC | Facility: CLINIC | Age: 64
End: 2024-11-29

## 2024-11-29 NOTE — TELEPHONE ENCOUNTER
Continuous Glucose Sensor (FREESTYLE WILFRED 3 PLUS SENSOR) Does not apply Misc, 1 each every 15 (fifteen) days. Dx: E11.9, Disp: 6 each, Rfl: 1    Prior Authorization required:    KEY: [id number]

## 2024-12-02 NOTE — TELEPHONE ENCOUNTER
Medication PA Requested:  (Datappraise WILFRED 3 PLUS SENSOR)                                                          CoverMyMeds Used:  Key:[id number]   Quantity: 6  Day Supply:90   Si each every 15 (fifteen) days.  DX Code: E11.9                                  CPT code (if applicable):   Case Number/Pending Ref#:

## 2024-12-02 NOTE — TELEPHONE ENCOUNTER
Expedited PA for heather 3+ completed via [employer]:  Case Id  [id number]  Reference Id  [id number]  Priority  Priority: Expedited

## 2024-12-04 NOTE — TELEPHONE ENCOUNTER
Received fax from [facility] in regards to Eleuterio. Medication has been approved from 9/3/24 to 12/2/25. DecideQuickhart message sent to pt and approval letter sent to scanning.     Approval # - [id number]     83 year old female, [place]-speaking, with PMH hypothyroidism who presents after a fall. Neuro exam cognitive deficits, high arches distal LE atrophy +  Rhomberg. CT Head negative.    Impression:  suspect underlying mild dmentia with possible superimposed neuropathy    Recommendations  [] plan as above  Can follow up with Neurology, Dr. Samm Barragan at [phone]

## 2024-12-19 ENCOUNTER — OFFICE VISIT (OUTPATIENT)
Dept: SURGERY | Facility: CLINIC | Age: 64
End: 2024-12-19
Payer: MEDICAID

## 2024-12-19 ENCOUNTER — TELEPHONE (OUTPATIENT)
Dept: SURGERY | Facility: CLINIC | Age: 64
End: 2024-12-19

## 2024-12-19 DIAGNOSIS — R31.0 GROSS HEMATURIA: Primary | ICD-10-CM

## 2024-12-19 PROCEDURE — 99214 OFFICE O/P EST MOD 30 MIN: CPT | Performed by: UROLOGY

## 2024-12-19 NOTE — TELEPHONE ENCOUNTER
-LMTCB offering to move 12/23/24 appt w/ Dr. Zuniga from 12/23 to today (12/19/24) @ 1:30pm.  -Outcome pending pt callback.

## 2024-12-19 NOTE — TELEPHONE ENCOUNTER
-S/w pt; identity verified with name & .  -She agreed to move 24 appt w/ [facility] to 24 @ 1:30pm.  -Encounter complete.

## 2024-12-19 NOTE — TELEPHONE ENCOUNTER
Per patient calling to accept an appointment offered by Bridgette NGUYEN for 12/20, but does not know the time.  Please advise

## 2024-12-19 NOTE — PROGRESS NOTES
SUBJECTIVE:  Di Puga is a 64 year old female with a history of diabetes and hypertension who presents for a consultation at the request of, and a copy of this note will be sent to, Rene Mejia, for evaluation of  hematuria.  Noted to have gross hematuria intermittently over the last 2 to 3 months.  No pain.  Has a active smoking history for over 20 years.  No family history of urologic malignancies.  Reports pelvic pressure as if she is giving pain like labor discomfort.  She feels like her uterus is dropped but she denies any vaginal prolapse.  No gross hematuria at present.  She states that the problem is unchanged. Symptoms include see above. She denies any other complaints.    Allergies: Allergies[1]    History:  Past Medical History:    Arrhythmia    tachycardia    Asthma (HCC)    B12 deficiency    Back pain    Back problem    Calculus of kidney    Cancer (HCC)    - cervical    COPD (chronic obstructive pulmonary disease) (HCC)    Essential hypertension    High blood pressure    High cholesterol    History of abnormal cervical Pap smear    Irregular heart beat    Muscle weakness    Neuropathy    Other and unspecified hyperlipidemia    Palpitations    Pulmonary embolism (HCC)    Spinal stenosis    Stroke (HCC)    TIA a few years ago    TIA (transient ischemic attack)    Type II or unspecified type diabetes mellitus without mention of complication, not stated as uncontrolled      Past Surgical History:   Procedure Laterality Date    Cholecystectomy      Colonoscopy N/A 2017    Procedure: COLONOSCOPY, POSSIBLE BIOPSY, POSSIBLE POLYPECTOMY 10863;  Surgeon: Pedro Thomas MD;  [facility] [place] [facility]    Colonoscopy N/A 2022    Procedure: COLONOSCOPY/ESOPHAGOGASTRODUODENOSCOPY (EGD);  Surgeon: Cortney Diaz MD;  Location: [facility] ENDOSCOPY    Colonoscopy & polypectomy            Other      Lung biopsy    Other surgical history      Cone biopsy    Other  surgical history  2013    Cystoscopy    Removal gallbladder      Tubal ligation  1986      Family History   Problem Relation Age of Onset    Diabetes Mother     Lung Disorder Mother         Emphysema    Heart Disease Mother     Asthma Mother     Cancer Father 51        Lung    Diabetes Sister     Thyroid Disorder Sister     Glaucoma Sister     Stroke Sister     Prostate Cancer Brother     Diabetes Brother     Diabetes Brother     Cancer Brother         Prostate cancer, copd    Diabetes Brother     Glaucoma Maternal Grandmother     Heart Disease Maternal Grandmother     Ovarian Cancer Maternal Grandmother 59    Cataracts Niece     Breast Cancer Niece 48    Macular degeneration Neg       Social History:   Social History     Socioeconomic History    Marital status: Single   Tobacco Use    Smoking status: Every Day     Current packs/day: 1.00     Average packs/day: 1 pack/day for 20.0 years (20.0 ttl pk-yrs)     Types: Cigarettes    Smokeless tobacco: Never    Tobacco comments:     1 pack daily   Vaping Use    Vaping status: Never Used   Substance and Sexual Activity    Alcohol use: Not Currently    Drug use: No   Other Topics Concern    Caffeine Concern No    Exercise No   Social History Narrative    The patient does not use an assistive device..      The patient does live in a home with stairs.     Social Drivers of Health      Received from [facility]    Social Connections    Received from [facility], [facility]    Housing Stability            REVIEW OF SYSTEMS:  RESPIRATORY:  Negative for chest tightness, wheezing, cough, shortness of breath,  sputum production,chest pain or pleurisy.  CARDIOVASCULAR:  Negative for pain or chest discomfort, dizziness or fainting, palpitations, leg swelling, nocturia, or claudication.  GASTROINTESTINAL:  Negative for nausea, vomiting, diarrhea, constipation, heartburn or indigestion, abdominal  pains, bloody or tarry stools.  GENERAL: Denies:  weight gain, weight loss, fever, night sweats, bone pain, malaise, and fatigue. Positive for:  none.  All other review of systems reviewed and otherwise negative    OBJECTIVE:  There were no vitals taken for this visit.  She appears well, in no apparent distress.  Alert and oriented times three, pleasant and cooperative.  CARDIOVASCULAR:normal apical impulse  RESPIRATORY: no chest wall deformities or tenderness  ABDOMEN: abdomen is soft without significant tenderness, masses, organomegaly or guarding  Skin exam grossly normal  Intact neurologically grossly  ASSESSMENT/PLAN  Encounter Diagnosis   Name Primary?    Gross hematuria Yes   Recommended:  -CT urogram and office cystoscopy for evaluation.  - Pelvic exam at next visit.  - I strongly encouraged the patient to stop smoking and she understands the risks of continued smoking in terms of malignancy risks.    No orders of the defined types were placed in this encounter.      Meds This Visit:  Requested Prescriptions      No prescriptions requested or ordered in this encounter       Imaging & Referrals:  CT UROGRAM (W+WO) (CPT=74178)                        [1]   Allergies  Allergen Reactions    Ace Inhibitors ANAPHYLAXIS, ANGIOEDEMA and SWELLING     April 2020, hospitalized at [facility] in [place] with tongue swelling and throat closing sensation, on lisinopril at the time. Not intubated      Egg White (Diagnostic) NAUSEA ONLY and Tightness in Throat    Fish-Derived Products SWELLING    Lisinopril ANAPHYLAXIS    Peanuts SWELLING    Shrimp ITCHING, NAUSEA AND VOMITING, Tightness in Throat and WHEEZING    Tomato SWELLING and UNKNOWN    Tomatoes RASH

## 2025-01-02 ENCOUNTER — TELEPHONE (OUTPATIENT)
Dept: SURGERY | Facility: CLINIC | Age: 65
End: 2025-01-02

## 2025-01-02 ENCOUNTER — HOSPITAL ENCOUNTER (OUTPATIENT)
Dept: CT IMAGING | Facility: HOSPITAL | Age: 65
Discharge: HOME | End: 2025-01-02
Attending: UROLOGY
Payer: MEDICAID

## 2025-01-02 DIAGNOSIS — R31.0 GROSS HEMATURIA: ICD-10-CM

## 2025-01-02 LAB
CREAT BLD-MCNC: 0.6 MG/DL
EGFRCR SERPLBLD CKD-EPI 2021: 101 ML/MIN/1.73M2 (ref 60–?)

## 2025-01-02 PROCEDURE — 82565 ASSAY OF CREATININE: CPT

## 2025-01-02 PROCEDURE — 74178 CT ABD&PLV WO CNTR FLWD CNTR: CPT | Performed by: UROLOGY

## 2025-01-02 NOTE — TELEPHONE ENCOUNTER
Fax received from, [facility] approval for ct scan [id number] valid 12/30/24 through 2/13/25, placed in scanning.

## 2025-01-08 ENCOUNTER — OFFICE VISIT (OUTPATIENT)
Dept: ENDOCRINOLOGY CLINIC | Facility: CLINIC | Age: 65
End: 2025-01-08
Payer: MEDICAID

## 2025-01-08 VITALS
BODY MASS INDEX: 30.86 KG/M2 | HEART RATE: 108 BPM | DIASTOLIC BLOOD PRESSURE: 60 MMHG | WEIGHT: 192 LBS | HEIGHT: 66 IN | SYSTOLIC BLOOD PRESSURE: 90 MMHG

## 2025-01-08 DIAGNOSIS — I10 ESSENTIAL HYPERTENSION: ICD-10-CM

## 2025-01-08 DIAGNOSIS — E55.9 VITAMIN D DEFICIENCY: ICD-10-CM

## 2025-01-08 DIAGNOSIS — E78.2 MIXED HYPERLIPIDEMIA: ICD-10-CM

## 2025-01-08 DIAGNOSIS — E11.9 TYPE 2 DIABETES MELLITUS WITHOUT RETINOPATHY (HCC): Primary | ICD-10-CM

## 2025-01-08 LAB
CARTRIDGE EXPIRATION DATE: ABNORMAL DATE
GLUCOSE BLOOD: 174
HEMOGLOBIN A1C: 7.9 % (ref 4.3–5.6)
TEST STRIP EXPIRATION DATE: NORMAL DATE
TEST STRIP LOT #: NORMAL NUMERIC

## 2025-01-08 PROCEDURE — 99214 OFFICE O/P EST MOD 30 MIN: CPT | Performed by: INTERNAL MEDICINE

## 2025-01-08 PROCEDURE — 83036 HEMOGLOBIN GLYCOSYLATED A1C: CPT | Performed by: INTERNAL MEDICINE

## 2025-01-08 PROCEDURE — 82947 ASSAY GLUCOSE BLOOD QUANT: CPT | Performed by: INTERNAL MEDICINE

## 2025-01-08 RX ORDER — DULAGLUTIDE 4.5 MG/.5ML
4.5 INJECTION, SOLUTION SUBCUTANEOUS WEEKLY
Qty: 6 ML | Refills: 3 | Status: SHIPPED | OUTPATIENT
Start: 2025-01-08

## 2025-01-08 NOTE — PROGRESS NOTES
Name: Di Puga  Date: 1/08/25    Referring Physician: Dr. Rene Price    HISTORY OF PRESENT ILLNESS   Di Puga is a 64 year old female who presents for follow-up of evaluaton and management of uncontrolled T2D. She was diagnosed more than 15 years ago on regular screening blood tests. She had been on insulin and then I transitioned her to oral medications.     At the last visit, she had been having elevated blood sugars and so she has been taking the Trulicity 3mg, metformin 1g bid and glipizide 10mg PO bid. Despite this, her blood sugars were still high.     She is still having elevated blood sugars, and has some symptoms when she has blood sugars that are less than 180. We had started her on Lantus recently and blood sugars are slightly better. However, now that she has been slowly increasing her metformin as we had recommended at the last visit, she is finding that blood sugars are dropping both during the day and night.     Blood Glucose Today: 174  HbA1C or glycohemoglobin is 7.9 today (and it was 8.5 on 11/27/24 and it was 8.2 on 8/20/24 and it was 6.4 on 9/13/23 and it was 7.5 on 1/13/23 (and it was 6.8 on 9/27/22 and it was 6.7 on 7/12/22 and it was 6.9 on 4/12/22 and it was 7.9 on 12/01/22 and it was 7.7 on 12/01/21 and it was 8.2 % 1/04/21)  Type 1 or Type 2?: Type 2  Checking 4-5 times per day with Eleuterio  Name: Di Puga  YOB: 1960  Report Period: 12/12/2024 - 01/08/2025 (28 days)  Generated: 01/08/2025  Time CGM Active: 9%        Glucose Statistics and Targets  Average Glucose: 137 mg/dL  Glucose Management Indicator (GMI): N/A  Glucose Variability (%CV): 19.5%  Target Range: 70 - 180 mg/dL        Time in Ranges  Very High: >250 mg/dL --- 0%  High: 181 - 250 mg/dL --- 6%  Target Range: 70 - 180 mg/dL --- 94%  Low: 54 - 69 mg/dL --- 0%  Very Low: <54 mg/dL --- 0%  Fasting- 140-200; 2-hour- 210-220  Medications for DM : Trulicity 4.5mg qweekly; Glipizide 10mg  PO bid; Lantus 10 units; metformin ER 500mg PO bid  Episodes of hypoglycemia: none    Dietary compliance: eating healthier, and not as hungry; not snacking    Exercise: its difficult to walk because of spinal stenosis- this is getting better    Polyuria/polydipsia: yes    Blurred vision: none    Flu Vaccine This Season: does not get    Covid Vaccine- yes, got the booster    REVIEW OF SYSTEMS  Eyes: Diabetic retinopathy present: None            Most recent visit to eye doctor in last 12 months: has to make an appt    CV: Cardiovascular disease present: none         Hypertension present: at goal, on meds         Hyperlipidemia present: yes, HDL low, on meds (8/20/24)         Peripheral Vascular Disease present: none    : Nephropathy present: none (8/20/24); creatinine- 0.65    Neuro: Neuropathy present: has the neuropathy, and sees the podiatrist for this and he checks her feet once a year    Skin: Infection or ulceration: none    Osteoporosis: none; Vitamin D- 4.9 (8/20/24)    Thyroid disease: TSH within normal limits on 8/20/24    Medications:     Current Outpatient Medications:     metFORMIN  MG Oral Tablet 24 Hr, Take 1 tablet (500 mg total) by mouth 2 (two) times daily with meals., Disp: 360 tablet, Rfl: 3    insulin glargine (LANTUS SOLOSTAR) 100 UNIT/ML Subcutaneous Solution Pen-injector, Inject 10 Units into the skin daily., Disp: 9 mL, Rfl: 1    Dulaglutide (TRULICITY) 4.5 MG/0.5ML Subcutaneous Solution Auto-injector, Inject 4.5 mg into the skin once a week., Disp: 6 mL, Rfl: 0    GLIPIZIDE 10 MG Oral Tab, TAKE 1 TABLET(10 MG) BY MOUTH TWICE DAILY BEFORE MEALS, Disp: 60 tablet, Rfl: 2    Blood Glucose Monitoring Suppl (ONETOUCH VERIO) w/Device Does not apply Kit, 1 each in the morning, at noon, in the evening, and at bedtime. Dx: E11.9, Disp: 1 kit, Rfl: 0    insulin glargine (LANTUS SOLOSTAR) 100 UNIT/ML Subcutaneous Solution Pen-injector, Inject 10 Units into the skin every morning., Disp: 9 mL,  Rfl: 1    Insulin Lispro, 1 Unit Dial, (HUMALOG KWIKPEN) 100 UNIT/ML Subcutaneous Solution Pen-injector, Inject 3 Units into the skin in the morning, at noon, and at bedtime., Disp: 9 mL, Rfl: 0    Continuous Glucose Sensor (FREESTYLE WILFRED 3 PLUS SENSOR) Does not apply Misc, 1 each every 15 (fifteen) days. Dx: E11.9, Disp: 6 each, Rfl: 1    Insulin Pen Needle (PEN NEEDLES) 32G X 4 MM Does not apply Misc, 1 each daily., Disp: 100 each, Rfl: 0    Mometasone Furo-Formoterol Fum (DULERA) 100-5 MCG/ACT Inhalation Aerosol, Inhale 1 puff into the lungs in the morning and 1 puff before bedtime., Disp: 1 each, Rfl: 2    predniSONE 10 MG Oral Tab, Take 3 tabs (30mg) daily for 3 days, then take 2 tabs (20mg) daily for 3 days, then take 1 tab (10mg) daily for 3 days., Disp: 18 tablet, Rfl: 0    albuterol 108 (90 Base) MCG/ACT Inhalation Aero Soln, Inhale 2 puffs into the lungs every 4 (four) hours as needed., Disp: 8.5 g, Rfl: 1    nicotine 21 MG/24HR Transdermal Patch 24 Hr, Apply 1 patch (21mg) daily for 2 weeks, then apply 1 patch (14mg) daily for 2 weeks, then apply 1 patch (7mg) daily for 2 weeks, Disp: 14 patch, Rfl: 0    ergocalciferol 1.25 MG (36730 UT) Oral Cap, Take 1 capsule (50,000 Units total) by mouth once a week for 90 days, THEN 1 capsule (50,000 Units total) every 14 (fourteen) days., Disp: 18 capsule, Rfl: 0    amLODIPine 5 MG Oral Tab, Take 1 tablet (5 mg total) by mouth daily., Disp: 90 tablet, Rfl: 3    metoprolol succinate ER 50 MG Oral Tablet 24 Hr, Take 1 tablet (50 mg total) by mouth daily., Disp: 90 tablet, Rfl: 1    alum-mag hydroxide-simethicone 200-200-20 MG/5ML Oral Suspension, Take 10 mL by mouth 4 (four) times daily as needed for Indigestion., Disp: 355 mL, Rfl: 0    Glucose Blood (ONETOUCH VERIO) In Vitro Strip, TEST TWICE DAILY., Disp: 200 strip, Rfl: 1    diphenhydrAMINE HCl 25 MG Oral Tab, Take 1 tablet (25 mg total) by mouth every 6 (six) hours as needed for Itching., Disp: , Rfl:      EPINEPHrine (EPIPEN 2-ROBERTH) 0.3 MG/0.3ML Injection Solution Auto-injector, Inject IM in event of  allergic reaction, Disp: 1 each, Rfl: 0    levocetirizine 5 MG Oral Tab, Take 1 tablet (5 mg total) by mouth every evening., Disp: 90 tablet, Rfl: 1    atorvastatin 80 MG Oral Tab, Take 1 tablet (80 mg total) by mouth nightly., Disp: 90 tablet, Rfl: 3    apixaban (ELIQUIS) 5 MG Oral Tab, Take 1 tablet (5 mg total) by mouth 2 (two) times daily., Disp: 180 tablet, Rfl: 3    OneTouch Delica Lancets 33G Does not apply Misc, Use to check blood sugar two times a day, Disp: 200 each, Rfl: 1    Blood Glucose Monitoring Suppl (ONETOUCH ULTRA SYSTEM) W/DEVICE Does not apply Kit, Test sugar twice daily, Disp: 1 kit, Rfl: 0     Allergies:   Allergies   Allergen Reactions    Ace Inhibitors ANAPHYLAXIS, ANGIOEDEMA and SWELLING     April 2020, hospitalized at [facility] in [place] with tongue swelling and throat closing sensation, on lisinopril at the time. Not intubated      Egg White (Diagnostic) NAUSEA ONLY and Tightness in Throat    Fish-Derived Products SWELLING    Lisinopril ANAPHYLAXIS    Peanuts SWELLING    Shrimp ITCHING, NAUSEA AND VOMITING, Tightness in Throat and WHEEZING    Tomato SWELLING and UNKNOWN    Tomatoes RASH       Social History:   Social History     Socioeconomic History    Marital status: Single   Tobacco Use    Smoking status: Every Day     Current packs/day: 1.00     Average packs/day: 1 pack/day for 20.0 years (20.0 ttl pk-yrs)     Types: Cigarettes    Smokeless tobacco: Never    Tobacco comments:     1 pack daily   Vaping Use    Vaping status: Never Used   Substance and Sexual Activity    Alcohol use: Not Currently    Drug use: No   Other Topics Concern    Caffeine Concern No    Exercise No   Social History Narrative    The patient does not use an assistive device..      The patient does live in a home with stairs.     Social Drivers of Health      [facility], [facility]    Social Connections    Received from [facility] [place] [place], [facility] [place]    Housing Stability       Medical History:   Past Medical History:    Arrhythmia    tachycardia    Asthma (HCC)    B12 deficiency    Back pain    Back problem    Calculus of kidney    Cancer (HCC)    - cervical    COPD (chronic obstructive pulmonary disease) (HCC)    Essential hypertension    High blood pressure    High cholesterol    History of abnormal cervical Pap smear    Irregular heart beat    Muscle weakness    Neuropathy    Other and unspecified hyperlipidemia    Palpitations    Pulmonary embolism (HCC)    Spinal stenosis    Stroke (HCC)    TIA a few years ago    TIA (transient ischemic attack)    Type II or unspecified type diabetes mellitus without mention of complication, not stated as uncontrolled       Surgical history:   Past Surgical History:   Procedure Laterality Date    Cholecystectomy      Colonoscopy N/A 2017    Procedure: COLONOSCOPY, POSSIBLE BIOPSY, POSSIBLE POLYPECTOMY 80896;  Surgeon: Pedro Thomas MD;  Location: [facility]    Colonoscopy N/A 2022    Procedure: COLONOSCOPY/ESOPHAGOGASTRODUODENOSCOPY (EGD);  Surgeon: Cortney Diaz MD;  Location: [facility] ENDOSCOPY    Colonoscopy & polypectomy            Other      Lung biopsy    Other surgical history      Cone biopsy    Other surgical history      Cystoscopy    Removal gallbladder      Tubal ligation           PHYSICAL EXAM  Vitals:    25 0953   BP: 90/60   Pulse: 108   Weight: 192 lb (87.1 kg)   Height: 5' 6\" (1.676 m)         General Appearance:  alert, well developed, in no acute distress  Eyes:  normal conjunctivae, sclera., normal sclera and normal pupils  Psychiatric:  oriented to time, self, and place  Nutritional:  no abnormal weight gain or loss  Feet: monofilament normal bilaterally, pulses felt  Bilateral barefoot skin diabetic exam is normal, visualized  feet and the appearance is normal.  Bilateral monofilament/sensation of both feet is normal.  Pulsation pedal pulse exam of both lower legs/feet is normal as well.        Lab Data:   Lab Results   Component Value Date     (H) 08/20/2024    A1C 7.9 (A) 01/08/2025     Lab Results   Component Value Date     (H) 11/27/2024    BUN 9 11/27/2024    BUNCREA 13.2 11/27/2024    CREATSERUM 0.68 11/27/2024    ANIONGAP 7 11/27/2024    GFRNAA 105 04/30/2022    GFRAA 121 04/30/2022    CA 10.0 11/27/2024    OSMOCALC 297 (H) 11/27/2024    ALKPHO 100 11/27/2024    AST 19 11/27/2024    ALT 15 11/27/2024    ALKPHOS 72 01/16/2016    BILT 0.6 11/27/2024    TP 7.5 11/27/2024    ALB 4.6 11/27/2024    GLOBULIN 2.9 11/27/2024    AGRATIO 1.3 12/21/2021     11/27/2024    K 3.7 11/27/2024     11/27/2024    CO2 28.0 11/27/2024     Lab Results   Component Value Date    CHOLEST 150 11/27/2024    TRIG 102 11/27/2024    HDL 32 (L) 11/27/2024    LDL 99 11/27/2024    VLDL 17 11/27/2024    TCHDLRATIO 4.0 12/21/2021    NONHDLC 118 11/27/2024    CALCNONHDL 155 (H) 01/16/2016     Lab Results   Component Value Date    MALBP 8.20 11/27/2024    CREUR 279.10 11/27/2024    CREAURINE 107.7 02/16/2013    MIALBURINE 0.5 02/16/2013    MCRRATIOUR 4.6 02/16/2013    MICROALBUMIN 0.5 12/21/2021    CREAUR 82 12/21/2021    MALBCREACALC 6 12/21/2021         ASSESSMENT/PLAN:    This is a 64 year-old woman here for follow-up of management of uncontrolled type 2 diabetes. We discussed the ABCs of DM.     1.) Hyperglycemia Management- We discussed the importance of glycemic control to prevent complications of diabetes. We discussed the importance of SBGM. I offered and provided patient education materials and offered a blood glucose log book.   - Continue Trulicity 4.5mg qweekly,   - Continue glipizide 10mg PO bid   - Decrease Lantus from 10 units to 8 units for now and we will also start humalog 3 units tid with meals that she could take as  needed.  - Continue metformin ER 500mg PO bid   - Continue checking blood sugars 4-5 times with Eleuterio    2.) Management of Diabetic Complications- We discussed the complications of diabetes include retinopathy, neuropathy, nephropathy and cardiovascular disease.   - Ophthalmology- she is up to date  - Neuropathy- none  - Lipid panel- we will check in 6 weeks  - MAC- will check in 6 weeks  - CMP- will check in 6 weeks  - BP- at goal, on meds  - Vaccines, does not get the flu vaccine, up to date with booster    3.) Lifestyle Management for Diabetes- We discussed importance of a low CHO diet, and recommend 45gm per meal or 135gm per day. We discussed the importance of trying to follow a Mediterranean diet, with an emphasis on vegetables at every meal, with lots whole grains, and protein from either plant-based sources, or poultry and fish.   - Patient has been eating more healthy foods  - She is not able to exercise, but she is going to try as much as possible    4.) Vitamin D deficiency  - Continue ergocalciferol 50,000 every week  - Check vitamin D in 6 weeks    Return to the clinic in 6 weeks    Prior to this encounter, I spent over 15 minutes with preparing for the visit, including reviewing documents from other specialties as well as from PCP and going over test results. During the face to face encounter, I spent an additional 15 minutes which were determined for follow-up. Greater than 50% of the time was spent in counseling, anticipatory guidance, and coordination of care. Patient concerns were answered to the best of my knowledge.     1/08/25  Floridalma Barahona MD

## 2025-01-08 NOTE — PROGRESS NOTES
Name: Di Puga  YOB: 1960  Report Period: 12/12/2024 - 01/08/2025 (28 days)  Generated: 01/08/2025  Time CGM Active: 9%      Glucose Statistics and Targets  Average Glucose: 137 mg/dL  Glucose Management Indicator (GMI): N/A  Glucose Variability (%CV): 19.5%  Target Range: 70 - 180 mg/dL      Time in Ranges  Very High: >250 mg/dL --- 0%  High: 181 - 250 mg/dL --- 6%  Target Range: 70 - 180 mg/dL --- 94%  Low: 54 - 69 mg/dL --- 0%  Very Low: <54 mg/dL --- 0%

## 2025-01-20 RX ORDER — GLIPIZIDE 10 MG/1
10 TABLET ORAL
Qty: 180 TABLET | Refills: 1 | Status: SHIPPED | OUTPATIENT
Start: 2025-01-20

## 2025-01-20 NOTE — TELEPHONE ENCOUNTER
Endocrine Refill protocol for oral and injectable diabetic medications    Protocol Criteria:  PASSED      If all below requirements are met, send a 90-day supply with 1 refill per provider protocol.    Verify appointment with Endocrinology completed in the last 6 months or scheduled in the next 3 months.  Verify A1C has been completed within the last 6 months and is below 8.5%     Last completed office visit: 1/8/2025 Floridalma Barahona MD   Next scheduled Follow up:   Future Appointments   Date Time Provider Department Center   2/26/2025  4:00 PM Floridalma Barahona MD [facility]      Last A1c result: Last A1c value was 7.9% done 1/8/2025.

## 2025-01-23 ENCOUNTER — PATIENT MESSAGE (OUTPATIENT)
Dept: ENDOCRINOLOGY CLINIC | Facility: CLINIC | Age: 65
End: 2025-01-23

## 2025-01-23 NOTE — TELEPHONE ENCOUNTER
Called patient and adjusted insulin per below. Most of her hypoglycemia is happening in the early morning hours.     At last visit with endo on 1/8/25:  - Continue Trulicity 4.5mg qweekly,   - Continue glipizide 10mg PO bid - takes before  - Decrease Lantus from 10 units to 8 units for now and we will also start humalog 3 units tid with meals that she could take as needed.  - Continue metformin ER 500mg PO bid     Not taking humalog.     Will adjust lantus from 8 --> 6 units at bedtime. If hypoglycemia persists may consider changing dose to morning.    Treats hypoglycemia with 1/2 cup juice, 1-2 glucose tablets, and then eats crackers and cheese    Message routed to Dr. Barahona.

## 2025-01-29 NOTE — TELEPHONE ENCOUNTER
Please review; protocol failed/No Protocol    Last Office Visit: 08/09/2024    Requested Prescriptions   Pending Prescriptions Disp Refills    ELIQUIS 5 MG Oral Tab [Pharmacy Med Name: ELIQUIS 5MG TABLETS] 180 tablet 3     Sig: TAKE 1 TABLET(5 MG) BY MOUTH TWICE DAILY       There is no refill protocol information for this order        Future Appointments         Provider Department Appt Notes    In 5 days Ambika Zuniga MD [facility] 12:30PM Cysto-micro-km    In 4 weeks Floridalma Barahona MD [facility] 6 weeks    In 4 weeks Papo Ibarra MD [facility] oral challenge to peanuts ok to book per TOREY          Recent Outpatient Visits              3 weeks ago Type 2 diabetes mellitus without retinopathy (HCC)    [facility] Floridalma Barahona MD    Office Visit    1 month ago Gross hematuria    [facility] Ambika Zuniga MD    Office Visit    2 months ago Type 2 diabetes mellitus without retinopathy (HCC)    [facility] Floridalma Barahona MD    Office Visit    2 months ago Food allergy    [facility]    Nurse Only    2 months ago Food allergy    [facility] Papo Ibarra MD    Office Visit

## 2025-02-03 ENCOUNTER — TELEPHONE (OUTPATIENT)
Dept: SURGERY | Facility: CLINIC | Age: 65
End: 2025-02-03

## 2025-02-03 ENCOUNTER — PROCEDURE (OUTPATIENT)
Dept: SURGERY | Facility: CLINIC | Age: 65
End: 2025-02-03
Payer: MEDICAID

## 2025-02-03 DIAGNOSIS — R31.0 GROSS HEMATURIA: Primary | ICD-10-CM

## 2025-02-03 LAB
BILIRUB UR QL: NEGATIVE
CLARITY UR: CLEAR
GLUCOSE UR-MCNC: NORMAL MG/DL
KETONES UR-MCNC: NEGATIVE MG/DL
LEUKOCYTE ESTERASE UR QL STRIP.AUTO: NEGATIVE
NITRITE UR QL STRIP.AUTO: NEGATIVE
PH UR: 5.5 [PH] (ref 5–8)
PROT UR-MCNC: NEGATIVE MG/DL
SP GR UR STRIP: 1.01 (ref 1–1.03)
UROBILINOGEN UR STRIP-ACNC: NORMAL

## 2025-02-03 PROCEDURE — 81001 URINALYSIS AUTO W/SCOPE: CPT | Performed by: UROLOGY

## 2025-02-03 RX ORDER — CIPROFLOXACIN 500 MG/1
500 TABLET, FILM COATED ORAL ONCE
Status: COMPLETED | OUTPATIENT
Start: 2025-02-03 | End: 2025-02-03

## 2025-02-03 RX ORDER — CIPROFLOXACIN 500 MG/1
500 TABLET, FILM COATED ORAL 2 TIMES DAILY
Qty: 6 TABLET | Refills: 0 | Status: SHIPPED | OUTPATIENT
Start: 2025-02-03 | End: 2025-02-03 | Stop reason: CLARIF

## 2025-02-03 RX ADMIN — CIPROFLOXACIN 500 MG: 500 TABLET, FILM COATED ORAL at 13:24:00

## 2025-02-03 NOTE — TELEPHONE ENCOUNTER
Order for ciprofloxacin signed for 3 day course, should have been for clinic administration. Spoke with pharmacy and canceled script at pharmacy.     One time ciprofloxacin given to patient in office.

## 2025-02-03 NOTE — PROGRESS NOTES
Di Puga is a 64 year old female.    HPI:     Chief Complaint   Patient presents with    Procedure     Cystoscopy. PT presents      64-year-old female presents for office cystoscopy for intermittent gross painless hematuria seen in consultation December 19, 2024.  No new reported complaints.  Remains on Eliquis.  History of pulmonary emboli.  Continues to report occasional early morning mild gross hematuria, no clots and no obstructive symptoms.  CT urogram January 2, 2025 demonstrated simple appearing right greater than left renal cysts, otherwise no notable urologic findings.  There is a 3.7 cm right ovarian cyst which the patient is aware of which is unchanged with recommendations for repeat imaging in 1 year.  I reviewed this finding with the patient and she is aware to continue to have her primary care or gynecologist review this.        HISTORY:  Past Medical History:    Arrhythmia    tachycardia    Asthma (HCC)    B12 deficiency    Back pain    Back problem    Calculus of kidney    Cancer (HCC)    1990- cervical    COPD (chronic obstructive pulmonary disease) (HCC)    Essential hypertension    High blood pressure    High cholesterol    History of abnormal cervical Pap smear    Irregular heart beat    Muscle weakness    Neuropathy    Other and unspecified hyperlipidemia    Palpitations    Pulmonary embolism (HCC)    Spinal stenosis    Stroke (HCC)    TIA a few years ago    TIA (transient ischemic attack)    Type II or unspecified type diabetes mellitus without mention of complication, not stated as uncontrolled      Past Surgical History:   Procedure Laterality Date    Cholecystectomy  1994    Colonoscopy N/A 08/11/2017    Procedure: COLONOSCOPY, POSSIBLE BIOPSY, POSSIBLE POLYPECTOMY 49011;  Surgeon: Pedro Thomas MD;  [place] [facility] [place] [facility]    Colonoscopy N/A 02/18/2022    Procedure: COLONOSCOPY/ESOPHAGOGASTRODUODENOSCOPY (EGD);  Surgeon: Cortney Diaz MD;  Location: [facility] ENDOSCOPY     Colonoscopy & polypectomy            Other      Lung biopsy    Other surgical history      Cone biopsy    Other surgical history      Cystoscopy    Removal gallbladder      Tubal ligation  1986      Family History   Problem Relation Age of Onset    Diabetes Mother     Lung Disorder Mother         Emphysema    Heart Disease Mother     Asthma Mother     Cancer Father 51        Lung    Diabetes Sister     Thyroid Disorder Sister     Glaucoma Sister     Stroke Sister     Prostate Cancer Brother     Diabetes Brother     Diabetes Brother     Cancer Brother         Prostate cancer, copd    Diabetes Brother     Glaucoma Maternal Grandmother     Heart Disease Maternal Grandmother     Ovarian Cancer Maternal Grandmother 59    Cataracts Niece     Breast Cancer Niece 48    Macular degeneration Neg       Social History:   Social History     Socioeconomic History    Marital status: Single   Tobacco Use    Smoking status: Every Day     Current packs/day: 1.00     Average packs/day: 1 pack/day for 20.0 years (20.0 ttl pk-yrs)     Types: Cigarettes    Smokeless tobacco: Never    Tobacco comments:     1 pack daily   Vaping Use    Vaping status: Never Used   Substance and Sexual Activity    Alcohol use: Not Currently    Drug use: No   Other Topics Concern    Caffeine Concern No    Exercise No   Social History Narrative    The patient does not use an assistive device..      The patient does live in a home with stairs.     Social Drivers of Health      Received [facility], [facility]    Social Connections    Received from [facility], [facility]    Housing Stability        Medications (Active prior to today's visit):  Current Outpatient Medications   Medication Sig Dispense Refill    apixaban (ELIQUIS) 5 MG Oral Tab Take 1 tablet (5 mg total) by mouth 2 (two) times daily. 180 tablet 3    GLIPIZIDE 10 MG Oral Tab TAKE 1 TABLET(10 MG) BY  MOUTH TWICE DAILY BEFORE MEALS 180 tablet 1    Dulaglutide (TRULICITY) 4.5 MG/0.5ML Subcutaneous Solution Auto-injector Inject 4.5 mg into the skin once a week. 6 mL 3    Blood Glucose Monitoring Suppl (ONETOUCH VERIO) w/Device Does not apply Kit 1 each in the morning, at noon, in the evening, and at bedtime. Dx: E11.9 1 kit 0    metFORMIN  MG Oral Tablet 24 Hr Take 1 tablet (500 mg total) by mouth 2 (two) times daily with meals. 360 tablet 3    insulin glargine (LANTUS SOLOSTAR) 100 UNIT/ML Subcutaneous Solution Pen-injector Inject 10 Units into the skin every morning. 9 mL 1    Insulin Lispro, 1 Unit Dial, (HUMALOG KWIKPEN) 100 UNIT/ML Subcutaneous Solution Pen-injector Inject 3 Units into the skin in the morning, at noon, and at bedtime. 9 mL 0    Continuous Glucose Sensor (FREESTYLE WILFRED 3 PLUS SENSOR) Does not apply Misc 1 each every 15 (fifteen) days. Dx: E11.9 6 each 1    insulin glargine (LANTUS SOLOSTAR) 100 UNIT/ML Subcutaneous Solution Pen-injector Inject 10 Units into the skin daily. 9 mL 1    Insulin Pen Needle (PEN NEEDLES) 32G X 4 MM Does not apply Misc 1 each daily. 100 each 0    Mometasone Furo-Formoterol Fum (DULERA) 100-5 MCG/ACT Inhalation Aerosol Inhale 1 puff into the lungs in the morning and 1 puff before bedtime. 1 each 2    albuterol 108 (90 Base) MCG/ACT Inhalation Aero Soln Inhale 2 puffs into the lungs every 4 (four) hours as needed. 8.5 g 1    nicotine 21 MG/24HR Transdermal Patch 24 Hr Apply 1 patch (21mg) daily for 2 weeks, then apply 1 patch (14mg) daily for 2 weeks, then apply 1 patch (7mg) daily for 2 weeks 14 patch 0    ergocalciferol 1.25 MG (86856 UT) Oral Cap Take 1 capsule (50,000 Units total) by mouth once a week for 90 days, THEN 1 capsule (50,000 Units total) every 14 (fourteen) days. 18 capsule 0    amLODIPine 5 MG Oral Tab Take 1 tablet (5 mg total) by mouth daily. 90 tablet 3    metoprolol succinate ER 50 MG Oral Tablet 24 Hr Take 1 tablet (50 mg total) by mouth  daily. 90 tablet 1    alum-mag hydroxide-simethicone 200-200-20 MG/5ML Oral Suspension Take 10 mL by mouth 4 (four) times daily as needed for Indigestion. 355 mL 0    Glucose Blood (ONETOUCH VERIO) In Vitro Strip TEST TWICE DAILY. 200 strip 1    diphenhydrAMINE HCl 25 MG Oral Tab Take 1 tablet (25 mg total) by mouth every 6 (six) hours as needed for Itching.      EPINEPHrine (EPIPEN 2-ROBERTH) 0.3 MG/0.3ML Injection Solution Auto-injector Inject IM in event of  allergic reaction 1 each 0    levocetirizine 5 MG Oral Tab Take 1 tablet (5 mg total) by mouth every evening. 90 tablet 1    atorvastatin 80 MG Oral Tab Take 1 tablet (80 mg total) by mouth nightly. 90 tablet 3    OneTouch Delica Lancets 33G Does not apply Misc Use to check blood sugar two times a day 200 each 1    Blood Glucose Monitoring Suppl (ONETOUCH ULTRA SYSTEM) W/DEVICE Does not apply Kit Test sugar twice daily 1 kit 0    predniSONE 10 MG Oral Tab Take 3 tabs (30mg) daily for 3 days, then take 2 tabs (20mg) daily for 3 days, then take 1 tab (10mg) daily for 3 days. 18 tablet 0       Allergies:  Allergies[1]      ROS:       PHYSICAL EXAM:   Di Puga  : 3/2/1960  Referring Physician: No ref. provider found     Chief Complaint   Patient presents with    Procedure     Cystoscopy. PT presents        Scribed by:     CYSTOSCOPY    Anesthesia:  2% lidocaine gel    Urethra: Normal    Bladder: Normal.  No tumor, stone, diverticulum, or glomerulation  U.O's: Normal  Trabeculation: none       POST CYSTOSCOPY MEDICATIONS: sample one tablet Cipro 500mg given to patient    DIAGNOSIS:     PLAN: see below  minimal pelvic organ prolapse noted  In the supine position on Valsalva         ASSESSMENT/PLAN:   Assessment   Encounter Diagnosis   Name Primary?    Gross hematuria Yes       Recommend:  - Observe for the time being.  - Return to clinic in 6 weeks.  - Follow-up on urine cytology and urinalysis from today.         Orders This Visit:  No orders of the  defined types were placed in this encounter.      Meds This Visit:  Requested Prescriptions     Pending Prescriptions Disp Refills    ciprofloxacin 500 MG Oral Tab 6 tablet 0     Sig: Take 1 tablet (500 mg total) by mouth 2 (two) times daily.       Imaging & Referrals:  None     2/3/2025  Ambika Zuniga MD               [1]   Allergies  Allergen Reactions    Ace Inhibitors ANAPHYLAXIS, ANGIOEDEMA and SWELLING     April 2020, hospitalized at [facility] in [place] with tongue swelling and throat closing sensation, on lisinopril at the time. Not intubated      Egg White (Diagnostic) NAUSEA ONLY and Tightness in Throat    Fish-Derived Products SWELLING    Lisinopril ANAPHYLAXIS    Peanuts SWELLING    Shrimp ITCHING, NAUSEA AND VOMITING, Tightness in Throat and WHEEZING    Tomato SWELLING and UNKNOWN    Tomatoes RASH

## 2025-02-04 LAB — NON GYNE INTERPRETATION: NEGATIVE

## 2025-02-21 DIAGNOSIS — E11.9 TYPE 2 DIABETES MELLITUS WITHOUT RETINOPATHY (HCC): ICD-10-CM

## 2025-02-24 RX ORDER — PEN NEEDLE, DIABETIC 32GX 5/32"
1 NEEDLE, DISPOSABLE MISCELLANEOUS DAILY
Qty: 100 EACH | Refills: 1 | Status: SHIPPED | OUTPATIENT
Start: 2025-02-24

## 2025-02-24 NOTE — TELEPHONE ENCOUNTER
Endocrine Refill protocol for Glucose testing supplies     Protocol Criteria: PASSED     If below requirement is met, send a 90-day supply with 1 refill per provider protocol.    Verify appointment with Endocrinology completed in the last 6 months or scheduled in the next 3 months.    Last completed office visit: 1/8/2025 Floridalma Barahona MD   Next scheduled Follow up:   Future Appointments   Date Time Provider Department Center   2/26/2025  4:00 PM Floridalma Barahona MD [facility]

## 2025-03-02 ENCOUNTER — PATIENT MESSAGE (OUTPATIENT)
Dept: ENDOCRINOLOGY CLINIC | Facility: CLINIC | Age: 65
End: 2025-03-02

## 2025-03-04 ENCOUNTER — TELEPHONE (OUTPATIENT)
Dept: ENDOCRINOLOGY CLINIC | Facility: CLINIC | Age: 65
End: 2025-03-04

## 2025-03-04 DIAGNOSIS — E11.9 TYPE 2 DIABETES MELLITUS WITHOUT RETINOPATHY (HCC): ICD-10-CM

## 2025-03-04 RX ORDER — BLOOD SUGAR DIAGNOSTIC
STRIP MISCELLANEOUS
Qty: 200 STRIP | Refills: 1 | Status: SHIPPED | OUTPATIENT
Start: 2025-03-04

## 2025-03-04 NOTE — TELEPHONE ENCOUNTER
Dr. Barahona,  Patient states BG has been going low over night and then high during the day - patient states it is harder and taking longer to raise up the lows    Name: Di Puga  YOB: 1960  Report Period: 2025 - 2025 (14 days)  Generated: 2025  Time CGM Active: 97%      Glucose Statistics and Targets  Average Glucose: 142 mg/dL  Glucose Management Indicator (GMI): 6.7%  Glucose Variability (%CV): 26.9%  Target Range: 70 - 180 mg/dL      Time in Ranges  Very High: >250 mg/dL --- 0%  High: 181 - 250 mg/dL --- 17%  Target Range: 70 - 180 mg/dL --- 82%  Low: 54 - 69 mg/dL --- 1%  Very Low: <54 mg/dL --- 0%      Hypoglycemia    Onset of hypoglycemia:  overnight    BG levels:  see above and report sent via webex     Pattern of hypoglycemia :  overnight    Symptoms: dizziness and headache     Acute illness: denies    Hypoglycemia Mx/Rule of 15: patient aware    Change in Diet: patient has pretty much weaned off of soda;  states she is eating less (\"don't want BG to explode\") - she is eating in moderation - is not full    List Medications/Compliance:   Glipizide 10mg BID - taking   Trulciity 4.5mg weekly  Lantus 6 units - did not take Saturday or  nights - took 4 units last night  Humalog - not taking  MTF ER 500mg BID - taking     Patient scheduled tomorrow for follow-up apt (she missed apt last week d/t )    Please advise -thanks

## 2025-03-04 NOTE — TELEPHONE ENCOUNTER
Endocrine Refill protocol for Glucose testing supplies     Protocol Criteria: PASSED Reason: N/A    If below requirement is met, send a 90-day supply with 1 refill per provider protocol.    Verify appointment with Endocrinology completed in the last 6 months or scheduled in the next 3 months.    Last completed office visit: 1/8/2025 Floridalma Barahona MD   Next scheduled Follow up:   Future Appointments   Date Time Provider Department Center   3/5/2025  3:00 PM Floridalma Barahona MD [facility] [place]                 6/3/2025 11:20 AM Floridalma Barahona MD [facility]

## 2025-03-04 NOTE — TELEPHONE ENCOUNTER
Hi!    I have taken a look at her blood sugars and I do see that she has had low blood sugars towards the end of last week, or the end of February. She can continue to take 4 units of Lantus and it would also be helpful if she could let us know if there has been any other change in her diet other than weaning herself off of the regular soda (this makes a huge difference in blood sugars, by the way, because there is a LOT of sugar in regular soda).     Thank you.

## 2025-03-04 NOTE — TELEPHONE ENCOUNTER
Pt states she's not had any diet changes besides cutting out soda. She's been mindful of what she eats and how much. She's concerned because she has low BG overnight between 2-5am and is concerned. Pt does have visit with MD tomorrow and RN advised to discuss this with you during appointment. States she will do so. RN advised pt to call our office if BG <80, advised of on call provider after office hours.    Spoke to pt. Provided medication instructions written below by MD. She verbalized understanding.

## 2025-03-04 NOTE — TELEPHONE ENCOUNTER
Patient is following up on patient message patient states last blood sugar reading was at 238 attempting Rn now please call

## 2025-03-05 ENCOUNTER — OFFICE VISIT (OUTPATIENT)
Dept: ENDOCRINOLOGY CLINIC | Facility: CLINIC | Age: 65
End: 2025-03-05
Payer: MEDICAID

## 2025-03-05 VITALS
BODY MASS INDEX: 31.18 KG/M2 | HEIGHT: 66 IN | WEIGHT: 194 LBS | DIASTOLIC BLOOD PRESSURE: 69 MMHG | SYSTOLIC BLOOD PRESSURE: 113 MMHG | HEART RATE: 107 BPM

## 2025-03-05 DIAGNOSIS — E11.9 TYPE 2 DIABETES MELLITUS WITHOUT RETINOPATHY (HCC): Primary | ICD-10-CM

## 2025-03-05 LAB
AMB EXT GMI: 6.7 %
GLUCOSE BLOOD: 175
HEMOGLOBIN A1C: 7.4 % (ref 4.3–5.6)
TEST STRIP EXPIRATION DATE: NORMAL DATE
TEST STRIP LOT #: NORMAL NUMERIC

## 2025-03-05 PROCEDURE — 83036 HEMOGLOBIN GLYCOSYLATED A1C: CPT | Performed by: INTERNAL MEDICINE

## 2025-03-05 PROCEDURE — 82947 ASSAY GLUCOSE BLOOD QUANT: CPT | Performed by: INTERNAL MEDICINE

## 2025-03-05 PROCEDURE — 99213 OFFICE O/P EST LOW 20 MIN: CPT | Performed by: INTERNAL MEDICINE

## 2025-03-05 NOTE — PROGRESS NOTES
Name: Di Puga  YOB: 1960  Report Period: 02/06/2025 - 03/05/2025 (28 days)  Generated: 03/05/2025  Time CGM Active: 99%      Glucose Statistics and Targets  Average Glucose: 140 mg/dL  Glucose Management Indicator (GMI): 6.7%  Glucose Variability (%CV): 25.9%  Target Range: 70 - 180 mg/dL      Time in Ranges  Very High: >250 mg/dL --- 0%  High: 181 - 250 mg/dL --- 15%  Target Range: 70 - 180 mg/dL --- 85%  Low: 54 - 69 mg/dL --- 0%  Very Low: <54 mg/dL --- 0%

## 2025-03-17 ENCOUNTER — OFFICE VISIT (OUTPATIENT)
Dept: SURGERY | Facility: CLINIC | Age: 65
End: 2025-03-17
Payer: MEDICAID

## 2025-03-17 DIAGNOSIS — R30.0 DYSURIA: ICD-10-CM

## 2025-03-17 DIAGNOSIS — R31.0 GROSS HEMATURIA: Primary | ICD-10-CM

## 2025-03-17 LAB
BILIRUB UR QL: NEGATIVE
COLOR UR: YELLOW
GLUCOSE UR-MCNC: 30 MG/DL
KETONES UR-MCNC: NEGATIVE MG/DL
LEUKOCYTE ESTERASE UR QL STRIP.AUTO: 25
NITRITE UR QL STRIP.AUTO: NEGATIVE
PH UR: 5.5 [PH] (ref 5–8)
PROT UR-MCNC: 20 MG/DL
SP GR UR STRIP: 1.03 (ref 1–1.03)
UROBILINOGEN UR STRIP-ACNC: 4

## 2025-03-17 PROCEDURE — 99213 OFFICE O/P EST LOW 20 MIN: CPT | Performed by: UROLOGY

## 2025-03-17 NOTE — PROGRESS NOTES
Di Puga is a 65 year old female.    HPI:     Chief Complaint   Patient presents with    Follow - Up     Patient is here for a 6 week follow up, states that she's having dark urine still and is currently having bilateral flank pain. Patient has a hx of kidney stones.        65-year-old female in follow-up to visit 2024.  She had been seen for gross painless hematuria.  Underwent office cystoscopy 2025, CT urogram 2025 showing no significant abnormalities and a urine cytology which was unremarkable.  She describes dark urine over the last few weeks.  Denies any blood in the urine or dysuria.  Sometimes she says she goes for 5 hours between urination.      HISTORY:  Past Medical History:    Arrhythmia    tachycardia    Asthma (HCC)    B12 deficiency    Back pain    Back problem    Calculus of kidney    Cancer (HCC)    - cervical    COPD (chronic obstructive pulmonary disease) (HCC)    Essential hypertension    High blood pressure    High cholesterol    History of abnormal cervical Pap smear    Irregular heart beat    Muscle weakness    Neuropathy    Other and unspecified hyperlipidemia    Palpitations    Pulmonary embolism (HCC)    Spinal stenosis    Stroke (HCC)    TIA a few years ago    TIA (transient ischemic attack)    Type II or unspecified type diabetes mellitus without mention of complication, not stated as uncontrolled      Past Surgical History:   Procedure Laterality Date    Cholecystectomy      Colonoscopy N/A 2017    Procedure: COLONOSCOPY, POSSIBLE BIOPSY, POSSIBLE POLYPECTOMY 68705;  Surgeon: Pedro Thomas MD;  Location: [facility] SURGICAL [facility]    Colonoscopy N/A 2022    Procedure: COLONOSCOPY/ESOPHAGOGASTRODUODENOSCOPY (EGD);  Surgeon: Cortney Diaz MD;  Location: [facility] ENDOSCOPY    Colonoscopy & polypectomy            Other      Lung biopsy    Other surgical history      Cone biopsy    Other surgical history      Cystoscopy     Removal gallbladder      Tubal ligation  1986      Family History   Problem Relation Age of Onset    Diabetes Mother     Lung Disorder Mother         Emphysema    Heart Disease Mother     Asthma Mother     Cancer Father 51        Lung    Diabetes Sister     Thyroid Disorder Sister     Glaucoma Sister     Stroke Sister     Prostate Cancer Brother     Diabetes Brother     Diabetes Brother     Cancer Brother         Prostate cancer, copd    Diabetes Brother     Glaucoma Maternal Grandmother     Heart Disease Maternal Grandmother     Ovarian Cancer Maternal Grandmother 59    Cataracts Niece     Breast Cancer Niece 48    Macular degeneration Neg       Social History:   Social History     Socioeconomic History    Marital status: Single   Tobacco Use    Smoking status: Every Day     Current packs/day: 1.00     Average packs/day: 1 pack/day for 20.0 years (20.0 ttl pk-yrs)     Types: Cigarettes    Smokeless tobacco: Never    Tobacco comments:     1 pack daily   Vaping Use    Vaping status: Never Used   Substance and Sexual Activity    Alcohol use: Not Currently    Drug use: No   Other Topics Concern    Caffeine Concern No    Exercise No   Social History Narrative    The patient does not use an assistive device..      The patient does live in a home with stairs.     Social Drivers of Health      [facility]    Housing Stability        Medications (Active prior to today's visit):  Current Outpatient Medications   Medication Sig Dispense Refill    ONETOUCH VERIO In Vitro Strip TEST TWICE DAILY. 200 strip 1    Insulin Pen Needle (TRUEPLUS 5-BEVEL PEN NEEDLES) 32G X 4 MM Does not apply Misc USE DAILY 100 each 1    apixaban (ELIQUIS) 5 MG Oral Tab Take 1 tablet (5 mg total) by mouth 2 (two) times daily. 180 tablet 3    GLIPIZIDE 10 MG Oral Tab TAKE 1 TABLET(10 MG) BY MOUTH TWICE DAILY BEFORE MEALS 180 tablet 1    Dulaglutide (TRULICITY) 4.5 MG/0.5ML Subcutaneous  Solution Auto-injector Inject 4.5 mg into the skin once a week. 6 mL 3    Blood Glucose Monitoring Suppl (ONETOUCH VERIO) w/Device Does not apply Kit 1 each in the morning, at noon, in the evening, and at bedtime. Dx: E11.9 1 kit 0    metFORMIN  MG Oral Tablet 24 Hr Take 1 tablet (500 mg total) by mouth 2 (two) times daily with meals. 360 tablet 3    insulin glargine (LANTUS SOLOSTAR) 100 UNIT/ML Subcutaneous Solution Pen-injector Inject 10 Units into the skin every morning. 9 mL 1    Continuous Glucose Sensor (FREESTYLE WILFRED 3 PLUS SENSOR) Does not apply Misc 1 each every 15 (fifteen) days. Dx: E11.9 6 each 1    insulin glargine (LANTUS SOLOSTAR) 100 UNIT/ML Subcutaneous Solution Pen-injector Inject 10 Units into the skin daily. 9 mL 1    Mometasone Furo-Formoterol Fum (DULERA) 100-5 MCG/ACT Inhalation Aerosol Inhale 1 puff into the lungs in the morning and 1 puff before bedtime. 1 each 2    predniSONE 10 MG Oral Tab Take 3 tabs (30mg) daily for 3 days, then take 2 tabs (20mg) daily for 3 days, then take 1 tab (10mg) daily for 3 days. 18 tablet 0    albuterol 108 (90 Base) MCG/ACT Inhalation Aero Soln Inhale 2 puffs into the lungs every 4 (four) hours as needed. 8.5 g 1    nicotine 21 MG/24HR Transdermal Patch 24 Hr Apply 1 patch (21mg) daily for 2 weeks, then apply 1 patch (14mg) daily for 2 weeks, then apply 1 patch (7mg) daily for 2 weeks 14 patch 0    amLODIPine 5 MG Oral Tab Take 1 tablet (5 mg total) by mouth daily. 90 tablet 3    metoprolol succinate ER 50 MG Oral Tablet 24 Hr Take 1 tablet (50 mg total) by mouth daily. 90 tablet 1    alum-mag hydroxide-simethicone 200-200-20 MG/5ML Oral Suspension Take 10 mL by mouth 4 (four) times daily as needed for Indigestion. 355 mL 0    diphenhydrAMINE HCl 25 MG Oral Tab Take 1 tablet (25 mg total) by mouth every 6 (six) hours as needed for Itching.      EPINEPHrine (EPIPEN 2-ROBERTH) 0.3 MG/0.3ML Injection Solution Auto-injector Inject IM in event of  allergic  reaction 1 each 0    levocetirizine 5 MG Oral Tab Take 1 tablet (5 mg total) by mouth every evening. 90 tablet 1    atorvastatin 80 MG Oral Tab Take 1 tablet (80 mg total) by mouth nightly. 90 tablet 3    OneTouch Delica Lancets 33G Does not apply Misc Use to check blood sugar two times a day 200 each 1    Blood Glucose Monitoring Suppl (ONETOUCH ULTRA SYSTEM) W/DEVICE Does not apply Kit Test sugar twice daily 1 kit 0       Allergies:  Allergies[1]      ROS:       PHYSICAL EXAM:        ASSESSMENT/PLAN:   Assessment   Encounter Diagnoses   Name Primary?    Dysuria     Gross hematuria Yes       Recommend:  - Urinalysis and urine culture to be done today.  - Follow-up in 6 months pending those results.         Orders This Visit:  Orders Placed This Encounter   Procedures    Urinalysis, Routine    Urine Culture, Routine       Meds This Visit:  Requested Prescriptions      No prescriptions requested or ordered in this encounter       Imaging & Referrals:  None     3/17/2025  Ambika Zuniga MD               [1]   Allergies  Allergen Reactions    Ace Inhibitors ANAPHYLAXIS, ANGIOEDEMA and SWELLING     April 2020, hospitalized at [facility] in [place] with tongue swelling and throat closing sensation, on lisinopril at the time. Not intubated      Egg White (Diagnostic) NAUSEA ONLY and Tightness in Throat    Fish-Derived Products SWELLING    Lisinopril ANAPHYLAXIS    Peanuts SWELLING    Shrimp ITCHING, NAUSEA AND VOMITING, Tightness in Throat and WHEEZING    Tomato SWELLING and UNKNOWN    Tomatoes RASH

## 2025-04-07 ENCOUNTER — TELEPHONE (OUTPATIENT)
Dept: ALLERGY | Facility: CLINIC | Age: 65
End: 2025-04-07

## 2025-04-07 NOTE — TELEPHONE ENCOUNTER
SARITHA Clayton,  Spoke with patient. Verified name and date of birth. Informed patient Dr. Ibarra's office notice she scheduled an Oral challenge through [facility] for Saturday 6/7/25 and informed patient only the Allergy nurses may schedule this type of appointment.patient states she received a [facility] message stating an appointment came up earlier;.therefore only the Allergy nurse can schedule such appointment. Again informed patient of the days and times Oral Challenges can be performed and office patient 7/23/25 at 9:30 am. Patient accepted appointment and request appointment for 6/7/25 to be cancelled. Appointment cancelled.

## 2025-04-11 ENCOUNTER — HOSPITAL ENCOUNTER (OUTPATIENT)
Dept: CT IMAGING | Facility: HOSPITAL | Age: 65
Discharge: HOME | End: 2025-04-11
Attending: INTERNAL MEDICINE
Payer: MEDICAID

## 2025-04-11 DIAGNOSIS — Z87.891 PERSONAL HISTORY OF TOBACCO USE, PRESENTING HAZARDS TO HEALTH: ICD-10-CM

## 2025-04-11 PROCEDURE — 71271 CT THORAX LUNG CANCER SCR C-: CPT | Performed by: INTERNAL MEDICINE

## 2025-04-15 ENCOUNTER — OFFICE VISIT (OUTPATIENT)
Dept: INTERNAL MEDICINE CLINIC | Facility: CLINIC | Age: 65
End: 2025-04-15

## 2025-04-15 VITALS
TEMPERATURE: 98 F | OXYGEN SATURATION: 97 % | RESPIRATION RATE: 17 BRPM | HEIGHT: 66 IN | HEART RATE: 107 BPM | WEIGHT: 194 LBS | BODY MASS INDEX: 31.18 KG/M2 | DIASTOLIC BLOOD PRESSURE: 70 MMHG | SYSTOLIC BLOOD PRESSURE: 110 MMHG

## 2025-04-15 DIAGNOSIS — E11.9 TYPE 2 DIABETES MELLITUS WITHOUT RETINOPATHY (HCC): ICD-10-CM

## 2025-04-15 DIAGNOSIS — G89.29 CHRONIC BILATERAL LOW BACK PAIN WITH BILATERAL SCIATICA: ICD-10-CM

## 2025-04-15 DIAGNOSIS — J43.9 PULMONARY EMPHYSEMA, UNSPECIFIED EMPHYSEMA TYPE (HCC): ICD-10-CM

## 2025-04-15 DIAGNOSIS — E16.2 HYPOGLYCEMIA: ICD-10-CM

## 2025-04-15 DIAGNOSIS — Z87.898 HISTORY OF ABNORMAL MAMMOGRAM: ICD-10-CM

## 2025-04-15 DIAGNOSIS — I10 ESSENTIAL HYPERTENSION: ICD-10-CM

## 2025-04-15 DIAGNOSIS — R91.8 LUNG NODULES: ICD-10-CM

## 2025-04-15 DIAGNOSIS — Z00.00 ANNUAL PHYSICAL EXAM: Primary | ICD-10-CM

## 2025-04-15 DIAGNOSIS — M54.42 CHRONIC BILATERAL LOW BACK PAIN WITH BILATERAL SCIATICA: ICD-10-CM

## 2025-04-15 DIAGNOSIS — M54.41 CHRONIC BILATERAL LOW BACK PAIN WITH BILATERAL SCIATICA: ICD-10-CM

## 2025-04-15 DIAGNOSIS — Z71.6 ENCOUNTER FOR SMOKING CESSATION COUNSELING: ICD-10-CM

## 2025-04-15 PROCEDURE — 99396 PREV VISIT EST AGE 40-64: CPT | Performed by: INTERNAL MEDICINE

## 2025-04-20 NOTE — PROGRESS NOTES
Subjective:     Patient ID: Di Puga is a 65 year old female.    Pt come in for annual exam complaints of having low BS at night     BS keeps dropping, at night time. At times below 60's, pt will have a snack before bed and iBS will still drop.  Has been working w/ Dr Barahona on this, but in the past 2-3wks, this has become worse.  She is waking up 1-2x/night due to low BS readings and unable to go to sleep until 100+  Denies any other complaints           History/Other:   Review of Systems   Constitutional: Negative.    HENT: Negative.     Eyes: Negative.    Respiratory: Negative.     Cardiovascular: Negative.    Gastrointestinal: Negative.    Endocrine:        Low bs at night   Genitourinary: Negative.    Musculoskeletal: Negative.    Skin: Negative.    Neurological: Negative.    Psychiatric/Behavioral: Negative.       Current Medications[1]  Allergies:Allergies[2]    Past Medical History[3]   Past Surgical History[4]   Family History[5]   Social History: Short Social Hx on File[6]     Objective:   Physical Exam  Vitals and nursing note reviewed.   Constitutional:       Appearance: She is well-developed.   HENT:      Head: Normocephalic and atraumatic.      Right Ear: External ear normal.      Left Ear: External ear normal.      Nose: Nose normal.   Eyes:      Conjunctiva/sclera: Conjunctivae normal.      Pupils: Pupils are equal, round, and reactive to light.   Cardiovascular:      Rate and Rhythm: Normal rate and regular rhythm.      Heart sounds: Normal heart sounds.   Pulmonary:      Effort: Pulmonary effort is normal.      Breath sounds: Normal breath sounds.   Abdominal:      General: Bowel sounds are normal.      Palpations: Abdomen is soft.   Genitourinary:     Vagina: Normal.   Musculoskeletal:         General: Normal range of motion.      Cervical back: Normal range of motion and neck supple.   Skin:     General: Skin is warm and dry.   Neurological:      Mental Status: She is alert and  oriented to person, place, and time.      Deep Tendon Reflexes: Reflexes are normal and symmetric.   Psychiatric:         Behavior: Behavior normal.         Thought Content: Thought content normal.         Judgment: Judgment normal.         Assessment & Plan:   1. Annual physical exam   exam is okay no need for any labs at this time    2. Essential hypertension continue current treatment    3. History of abnormal mammogram we will repeat   4. Type 2 diabetes mellitus without retinopathy (HCC) watch diabetic medication will cut down on glipizide to half a tablet at night   5. Hypoglycemia as above monitor blood sugars let us know the numbers   6. Encounter for smoking cessation counseling continues to smoke not ready to quit   7. Lung nodules follows with pulmonary up-to-date with CT lungs   8. Pulmonary emphysema, unspecified emphysema type (HCC) stable medical management   9. Chronic bilateral low back pain with bilateral sciatica chronic as needed medication for pain       No orders of the defined types were placed in this encounter.      Meds This Visit:  Requested Prescriptions      No prescriptions requested or ordered in this encounter       Imaging & Referrals:  OPHTHALMOLOGY - INTERNAL  [facility] (CPT=77066)            [1]   Current Outpatient Medications   Medication Sig Dispense Refill    ONETOUCH VERIO In Vitro Strip TEST TWICE DAILY. 200 strip 1    Insulin Pen Needle (TRUEPLUS 5-BEVEL PEN NEEDLES) 32G X 4 MM Does not apply Misc USE DAILY 100 each 1    apixaban (ELIQUIS) 5 MG Oral Tab Take 1 tablet (5 mg total) by mouth 2 (two) times daily. 180 tablet 3    GLIPIZIDE 10 MG Oral Tab TAKE 1 TABLET(10 MG) BY MOUTH TWICE DAILY BEFORE MEALS 180 tablet 1    Dulaglutide (TRULICITY) 4.5 MG/0.5ML Subcutaneous Solution Auto-injector Inject 4.5 mg into the skin once a week. 6 mL 3    Blood Glucose Monitoring Suppl (ONETOUCH VERIO) w/Device Does not apply Kit 1 each in the morning, at noon, in the  evening, and at bedtime. Dx: E11.9 1 kit 0    metFORMIN  MG Oral Tablet 24 Hr Take 1 tablet (500 mg total) by mouth 2 (two) times daily with meals. 360 tablet 3    Continuous Glucose Sensor (FREESTYLE WILFRED 3 PLUS SENSOR) Does not apply Misc 1 each every 15 (fifteen) days. Dx: E11.9 6 each 1    Mometasone Furo-Formoterol Fum (DULERA) 100-5 MCG/ACT Inhalation Aerosol Inhale 1 puff into the lungs in the morning and 1 puff before bedtime. 1 each 2    albuterol 108 (90 Base) MCG/ACT Inhalation Aero Soln Inhale 2 puffs into the lungs every 4 (four) hours as needed. 8.5 g 1    nicotine 21 MG/24HR Transdermal Patch 24 Hr Apply 1 patch (21mg) daily for 2 weeks, then apply 1 patch (14mg) daily for 2 weeks, then apply 1 patch (7mg) daily for 2 weeks 14 patch 0    amLODIPine 5 MG Oral Tab Take 1 tablet (5 mg total) by mouth daily. 90 tablet 3    metoprolol succinate ER 50 MG Oral Tablet 24 Hr Take 1 tablet (50 mg total) by mouth daily. 90 tablet 1    alum-mag hydroxide-simethicone 200-200-20 MG/5ML Oral Suspension Take 10 mL by mouth 4 (four) times daily as needed for Indigestion. 355 mL 0    diphenhydrAMINE HCl 25 MG Oral Tab Take 1 tablet (25 mg total) by mouth every 6 (six) hours as needed for Itching.      EPINEPHrine (EPIPEN 2-ROBERTH) 0.3 MG/0.3ML Injection Solution Auto-injector Inject IM in event of  allergic reaction 1 each 0    levocetirizine 5 MG Oral Tab Take 1 tablet (5 mg total) by mouth every evening. 90 tablet 1    atorvastatin 80 MG Oral Tab Take 1 tablet (80 mg total) by mouth nightly. 90 tablet 3    OneTouch Delica Lancets 33G Does not apply Misc Use to check blood sugar two times a day 200 each 1    Blood Glucose Monitoring Suppl (ONETOUCH ULTRA SYSTEM) W/DEVICE Does not apply Kit Test sugar twice daily 1 kit 0   [2]   Allergies  Allergen Reactions    Ace Inhibitors ANAPHYLAXIS, ANGIOEDEMA and SWELLING     April 2020, hospitalized at [facility] in [place] with tongue swelling and throat closing sensation, on  lisinopril at the time. Not intubated      Egg White (Diagnostic) NAUSEA ONLY and Tightness in Throat    Fish-Derived Products SWELLING    Lisinopril ANAPHYLAXIS    Peanuts SWELLING    Shrimp ITCHING, NAUSEA AND VOMITING, Tightness in Throat and WHEEZING    Tomato SWELLING and UNKNOWN    Tomatoes RASH   [3]   Past Medical History:   Arrhythmia    tachycardia    Asthma (HCC)    B12 deficiency    Back pain    Back problem    Calculus of kidney    Cancer (HCC)    - cervical    COPD (chronic obstructive pulmonary disease) (HCC)    Diabetic retinopathy (HCC)    Essential hypertension    Frequent UTI    High blood pressure    High cholesterol    History of abnormal cervical Pap smear    History of blood clots    Irregular heart beat    Muscle weakness    Neuropathy    Numbness    Other and unspecified hyperlipidemia    Palpitations    Pulmonary embolism (HCC)    Rheumatoid arthritis (HCC)    Scoliosis    Spinal stenosis    Stroke (HCC)    TIA a few years ago    TIA (transient ischemic attack)    Tuberculosis    Type II or unspecified type diabetes mellitus without mention of complication, not stated as uncontrolled    Walking troubles    Can’t walk more then a hundred feet without exsaulted   [4]   Past Surgical History:  Procedure Laterality Date    Cholecystectomy      Colonoscopy N/A 2017    Procedure: COLONOSCOPY, POSSIBLE BIOPSY, POSSIBLE POLYPECTOMY 69053;  Surgeon: Pedro Thomas MD;  [place] [facility]    Colonoscopy N/A 2022    Procedure: COLONOSCOPY/ESOPHAGOGASTRODUODENOSCOPY (EGD);  Surgeon: Cortney Diaz MD;  [facility] END[facility]    Colonoscopy & polypectomy      Laparoscopic cholecystectomy            Other      Lung biopsy    Other surgical history      Cone biopsy    Other surgical history      Cystoscopy    Removal gallbladder      Spine surgery procedure unlisted  2021    Neck and spine    Tubal ligation     [5]   Family History  Problem  Relation Age of Onset    Diabetes Mother     Lung Disorder Mother         Emphysema    Heart Disease Mother         Lisa    Asthma Mother     Cancer Father 51        Lung    Diabetes Sister     Thyroid Disorder Sister     Glaucoma Sister     Stroke Sister     Asthma Sister     Hypertension Sister     Prostate Cancer Brother     Diabetes Brother     Diabetes Brother     Cancer Brother         Prostate cancer, copd    Diabetes Brother     Glaucoma Maternal Grandmother     Heart Disease Maternal Grandmother     Ovarian Cancer Maternal Grandmother 59    Cataracts Niece     Breast Cancer Niece 48    Asthma Sister     Macular degeneration Neg    [6]   Social History  Socioeconomic History    Marital status: Single   Tobacco Use    Smoking status: Every Day     Current packs/day: 1.00     Average packs/day: 1 pack/day for 20.0 years (20.0 ttl pk-yrs)     Types: Cigarettes     Passive exposure: Never    Smokeless tobacco: Never    Tobacco comments:     1 pack daily   Vaping Use    Vaping status: Never Used   Substance and Sexual Activity    Alcohol use: Not Currently    Drug use: No   Other Topics Concern    Caffeine Concern No    Exercise No   Social History Narrative    The patient does not use an assistive device..      The patient does live in a home with stairs.     Social Drivers of Health      Received from [facility]    Housing Stability

## 2025-04-25 DIAGNOSIS — E11.9 TYPE 2 DIABETES MELLITUS WITHOUT RETINOPATHY (HCC): Primary | ICD-10-CM

## 2025-04-25 NOTE — TELEPHONE ENCOUNTER
Endocrine Refill protocol for oral and injectable diabetic medications    Protocol Criteria:  PASSED  Reason: N/A    If all below requirements are met, send a 90-day supply with 1 refill per provider protocol.    Verify appointment with Endocrinology completed in the last 6 months or scheduled in the next 3 months.  Verify A1C has been completed within the last 6 months and is below 8.5%     Last completed office visit: 3/5/2025 Floridalma Barahona MD   Next scheduled Follow up:   Future Appointments   Date Time Provider Department Center   6/3/2025 11:20 AM Floridalma Barahona MD [facility]   6/24/2025 11:30 AM Papo Ibarra MD [facility]   7/8/2025  1:00 PM Rene Price MD [id number] [place] [id number]   7/23/2025  9:30 AM Papo Ibarra MD [facility]   9/25/2025  1:30 PM [facility]      Last A1c result: Last A1c value was 7.4% done 3/5/2025.       90 day + 1 refill pending

## 2025-04-30 RX ORDER — GLIPIZIDE 10 MG/1
10 TABLET ORAL
Qty: 180 TABLET | Refills: 1 | Status: SHIPPED | OUTPATIENT
Start: 2025-04-30

## 2025-05-14 ENCOUNTER — PATIENT MESSAGE (OUTPATIENT)
Dept: ENDOCRINOLOGY CLINIC | Facility: CLINIC | Age: 65
End: 2025-05-14

## 2025-05-14 NOTE — TELEPHONE ENCOUNTER
Name: Di Puga  YOB: 1960  Report Period: 04/17/2025 - 05/14/2025 (28 days)  Generated: 05/14/2025  Time CGM Active: 99%      Glucose Statistics and Targets  Average Glucose: 143 mg/dL  Glucose Management Indicator (GMI): 6.7%  Glucose Variability (%CV): 23.7%  Target Range: 70 - 180 mg/dL      Time in Ranges  Very High: >250 mg/dL --- 0%  High: 181 - 250 mg/dL --- 14%  Target Range: 70 - 180 mg/dL --- 86%  Low: 54 - 69 mg/dL --- 0%  Very Low: <54 mg/dL --- 0%

## 2025-05-15 NOTE — TELEPHONE ENCOUNTER
Glipizide 10mg PO take only in the morning.   Skip evening /dinner dose.   If needed, start only 5 with dinner  thanks

## 2025-05-18 NOTE — PROGRESS NOTES
Name: Di Puga  Date: 5/19/25    Referring Physician: Dr. Rene Price    HISTORY OF PRESENT ILLNESS   Di Puga is a 65 year old female who presents for follow-up of evaluaton and management of uncontrolled T2D. She was diagnosed more than 15 years ago on regular screening blood tests. She had been on insulin and then I transitioned her to oral medications.     3/05/25: At the last visit, we had decreased her Lantus to 6 units and continued her glipizide as well as Trulicity. She had been finding that blood sugars were decreasing and so she decreased the lantus further to 4 units.     5/19/25: At the last visit, I had her stop the lantus altogether and continue the glipizide 10mg PO bid, the metformin ER 500mg PO bid, and Trulicity 4.5mg qweekly. Recently, she had been noticing low blood sugars at night. She was recommended to hold the evening glipizide, but could take half of the tablet if necessary. She has been doing this because she thought that she was being recommended to do this. She was still having low blood sugars after dinner and her late night snack. She eats very healthy.     Blood Glucose Today: 167  HbA1C or glycohemoglobin is 7.5 today (and it was 7.9 on 3/05/25 and it was 8.5 on 11/27/24 and it was 8.2 on 8/20/24 and it was 6.4 on 9/13/23 and it was 7.5 on 1/13/23 (and it was 6.8 on 9/27/22 and it was 6.7 on 7/12/22 and it was 6.9 on 4/12/22 and it was 7.9 on 12/01/22 and it was 7.7 on 12/01/21 and it was 8.2 % 1/04/21)  Type 1 or Type 2?: Type 2  Glipizide 10mg PO qAM and 5mg PO qPM  Trulciity 4.5mg weekly  Humalog prn  MTF ER 500mg BID     Checking 4-5 times per day with Eleuterio  Name: Di Puga  YOB: 1960  Report Period: 04/22/2025 - 05/19/2025 (28 days)  Generated: 05/19/2025  Time CGM Active: 98%        Glucose Statistics and Targets  Average Glucose: 146 mg/dL  Glucose Management Indicator (GMI): 6.8%  Glucose Variability (%CV): 25.1%  Target Range:  70 - 180 mg/dL        Time in Ranges  Very High: >250 mg/dL --- 0%  High: 181 - 250 mg/dL --- 17%  Target Range: 70 - 180 mg/dL --- 83%  Low: 54 - 69 mg/dL --- 0%  Very Low: <54 mg/dL --- 0%    Fasting- 120-160 and 2-hour post-prandial- 180-200 at times  Medications for DM : Trulicity 4.5mg qweekly; Glipizide 10mg PO qAM 5mg PO qPM; Metformin ER 500mg PO bid  Episodes of hypoglycemia: none    Dietary compliance: eating healthier, and not as hungry; not snacking    Exercise: its difficult to walk because of spinal stenosis- this is getting better    Polyuria/polydipsia: yes    Blurred vision: none    Flu Vaccine This Season: does not get    Covid Vaccine- yes, got the booster    REVIEW OF SYSTEMS  Eyes: Diabetic retinopathy present: No background of retinopathy; no signs of neovascularization            Most recent visit to eye doctor in last 12 months: 3/07/24    CV: Cardiovascular disease present: none         Hypertension present: at goal, on meds         Hyperlipidemia present: yes, HDL low, on meds (11/27/24)         Peripheral Vascular Disease present: none    : Nephropathy present: none (11/27/24); creatinine- 0.68    Neuro: Neuropathy present: has the neuropathy, and sees the podiatrist for this and he checks her feet once a year    Skin: Infection or ulceration: none    Osteoporosis: none; Vitamin D- 4.9 (8/20/24)    Thyroid disease: TSH within normal limits on 8/20/24    Medications:     Current Outpatient Medications:     glipiZIDE 10 MG Oral Tab, Take 1 tablet (10 mg total) by mouth 2 (two) times daily before meals., Disp: 180 tablet, Rfl: 1    Dulaglutide (TRULICITY) 4.5 MG/0.5ML Subcutaneous Solution Auto-injector, Inject 4.5 mg into the skin once a week., Disp: 6 mL, Rfl: 3    metFORMIN  MG Oral Tablet 24 Hr, Take 1 tablet (500 mg total) by mouth 2 (two) times daily with meals., Disp: 360 tablet, Rfl: 3    Continuous Glucose Sensor (FREESTYLE WILFRED 3 PLUS SENSOR) Does not apply Misc, 1 each  every 15 (fifteen) days. Dx: E11.9, Disp: 6 each, Rfl: 1    metoprolol succinate ER 25 MG Oral Tablet 24 Hr, Take 1 tablet (25 mg total) by mouth daily., Disp: 90 tablet, Rfl: 3    ONETOUCH VERIO In Vitro Strip, TEST TWICE DAILY., Disp: 200 strip, Rfl: 1    Insulin Pen Needle (TRUEPLUS 5-BEVEL PEN NEEDLES) 32G X 4 MM Does not apply Misc, USE DAILY, Disp: 100 each, Rfl: 1    apixaban (ELIQUIS) 5 MG Oral Tab, Take 1 tablet (5 mg total) by mouth 2 (two) times daily., Disp: 180 tablet, Rfl: 3    Blood Glucose Monitoring Suppl (ONETOUCH VERIO) w/Device Does not apply Kit, 1 each in the morning, at noon, in the evening, and at bedtime. Dx: E11.9, Disp: 1 kit, Rfl: 0    Mometasone Furo-Formoterol Fum (DULERA) 100-5 MCG/ACT Inhalation Aerosol, Inhale 1 puff into the lungs in the morning and 1 puff before bedtime., Disp: 1 each, Rfl: 2    albuterol 108 (90 Base) MCG/ACT Inhalation Aero Soln, Inhale 2 puffs into the lungs every 4 (four) hours as needed., Disp: 8.5 g, Rfl: 1    nicotine 21 MG/24HR Transdermal Patch 24 Hr, Apply 1 patch (21mg) daily for 2 weeks, then apply 1 patch (14mg) daily for 2 weeks, then apply 1 patch (7mg) daily for 2 weeks, Disp: 14 patch, Rfl: 0    amLODIPine 5 MG Oral Tab, Take 1 tablet (5 mg total) by mouth daily., Disp: 90 tablet, Rfl: 3    metoprolol succinate ER 50 MG Oral Tablet 24 Hr, Take 1 tablet (50 mg total) by mouth daily., Disp: 90 tablet, Rfl: 1    alum-mag hydroxide-simethicone 200-200-20 MG/5ML Oral Suspension, Take 10 mL by mouth 4 (four) times daily as needed for Indigestion., Disp: 355 mL, Rfl: 0    diphenhydrAMINE HCl 25 MG Oral Tab, Take 1 tablet (25 mg total) by mouth every 6 (six) hours as needed for Itching., Disp: , Rfl:     EPINEPHrine (EPIPEN 2-ROBERTH) 0.3 MG/0.3ML Injection Solution Auto-injector, Inject IM in event of  allergic reaction, Disp: 1 each, Rfl: 0    levocetirizine 5 MG Oral Tab, Take 1 tablet (5 mg total) by mouth every evening., Disp: 90 tablet, Rfl: 1     atorvastatin 80 MG Oral Tab, Take 1 tablet (80 mg total) by mouth nightly., Disp: 90 tablet, Rfl: 3    OneTouch Delica Lancets 33G Does not apply Misc, Use to check blood sugar two times a day, Disp: 200 each, Rfl: 1    Blood Glucose Monitoring Suppl (ONETOUCH ULTRA SYSTEM) W/DEVICE Does not apply Kit, Test sugar twice daily, Disp: 1 kit, Rfl: 0     Allergies:   Allergies   Allergen Reactions    Ace Inhibitors ANAPHYLAXIS, ANGIOEDEMA and SWELLING     April 2020, hospitalized at [facility] in [place] with tongue swelling and throat closing sensation, on lisinopril at the time. Not intubated      Egg White (Diagnostic) NAUSEA ONLY and Tightness in Throat    Fish-Derived Products SWELLING    Lisinopril ANAPHYLAXIS    Peanuts SWELLING    Shrimp ITCHING, NAUSEA AND VOMITING, Tightness in Throat and WHEEZING    Tomato SWELLING and UNKNOWN    Tomatoes RASH       Social History:   Social History     Socioeconomic History    Marital status: Single   Tobacco Use    Smoking status: Every Day     Current packs/day: 1.00     Average packs/day: 1 pack/day for 20.0 years (20.0 ttl pk-yrs)     Types: Cigarettes     Passive exposure: Never    Smokeless tobacco: Never    Tobacco comments:     1 pack daily   Vaping Use    Vaping status: Never Used   Substance and Sexual Activity    Alcohol use: Not Currently    Drug use: No   Other Topics Concern    Caffeine Concern No    Exercise No   Social History Narrative    The patient does not use an assistive device..      The patient does live in a home with stairs.     Social Drivers of Health      Received from [facility]    Housing Stability       Medical History:   Past Medical History:    Arrhythmia    tachycardia    Asthma (HCC)    B12 deficiency    Back pain    Back problem    Calculus of kidney    Cancer (HCC)    1990- cervical    COPD (chronic obstructive pulmonary disease) (HCC)    Diabetic retinopathy (HCC)    Essential hypertension    Frequent UTI    High blood  pressure    High cholesterol    History of abnormal cervical Pap smear    History of blood clots    Irregular heart beat    Muscle weakness    Neuropathy    Numbness    Other and unspecified hyperlipidemia    Palpitations    Pulmonary embolism (HCC)    Rheumatoid arthritis (HCC)    Scoliosis    Spinal stenosis    Stroke (HCC)    TIA a few years ago    TIA (transient ischemic attack)    Tuberculosis    Type II or unspecified type diabetes mellitus without mention of complication, not stated as uncontrolled    Walking troubles    Can’t walk more then a hundred feet without exsaulted       Surgical history:   Past Surgical History:   Procedure Laterality Date    Cholecystectomy      Colonoscopy N/A 2017    Procedure: COLONOSCOPY, POSSIBLE BIOPSY, POSSIBLE POLYPECTOMY 48090;  Surgeon: Pedro Thomas MD;  [facility]    Colonoscopy N/A 2022    Procedure: COLONOSCOPY/ESOPHAGOGASTRODUODENOSCOPY (EGD);  Surgeon: Cortney Diaz MD;  Location: [facility] ENDOSCOPY    Colonoscopy & polypectomy      Laparoscopic cholecystectomy            Other      Lung biopsy    Other surgical history      Cone biopsy    Other surgical history      Cystoscopy    Removal gallbladder      Spine surgery procedure unlisted  2021    Neck and spine    Tubal ligation           PHYSICAL EXAM  Vitals:    25 1032   Weight: 193 lb (87.5 kg)   Height: 5' 6\" (1.676 m)     General Appearance:  alert, well developed, in no acute distress  Eyes:  normal conjunctivae, sclera., normal sclera and normal pupils  Psychiatric:  oriented to time, self, and place  Nutritional:  no abnormal weight gain or loss  Feet: monofilament normal bilaterally, pulses felt  Bilateral barefoot skin diabetic exam is normal, visualized feet and the appearance is normal.  Bilateral monofilament/sensation of both feet is normal.  Pulsation pedal pulse exam of both lower legs/feet is normal as well.        Lab Data:    Lab Results   Component Value Date     (H) 08/20/2024    A1C 7.4 (A) 03/05/2025     Lab Results   Component Value Date     (H) 11/27/2024    BUN 9 11/27/2024    BUNCREA 13.2 11/27/2024    CREATSERUM 0.68 11/27/2024    ANIONGAP 7 11/27/2024    GFRNAA 105 04/30/2022    GFRAA 121 04/30/2022    CA 10.0 11/27/2024    OSMOCALC 297 (H) 11/27/2024    ALKPHO 100 11/27/2024    AST 19 11/27/2024    ALT 15 11/27/2024    ALKPHOS 72 01/16/2016    BILT 0.6 11/27/2024    TP 7.5 11/27/2024    ALB 4.6 11/27/2024    GLOBULIN 2.9 11/27/2024    AGRATIO 1.3 12/21/2021     11/27/2024    K 3.7 11/27/2024     11/27/2024    CO2 28.0 11/27/2024     Lab Results   Component Value Date    CHOLEST 150 11/27/2024    TRIG 102 11/27/2024    HDL 32 (L) 11/27/2024    LDL 99 11/27/2024    VLDL 17 11/27/2024    TCHDLRATIO 4.0 12/21/2021    NONHDLC 118 11/27/2024    CALCNONHDL 155 (H) 01/16/2016     Lab Results   Component Value Date    MALBP 8.20 11/27/2024    CREUR 279.10 11/27/2024    CREAURINE 107.7 02/16/2013    MIALBURINE 0.5 02/16/2013    MCRRATIOUR 4.6 02/16/2013    MICROALBUMIN 0.5 12/21/2021    CREAUR 82 12/21/2021    MALBCREACALC 6 12/21/2021         ASSESSMENT/PLAN:    This is a 65 year-old woman here for follow-up of management of uncontrolled type 2 diabetes. We discussed the ABCs of DM.     1.) Hyperglycemia Management- We discussed the importance of glycemic control to prevent complications of diabetes. We discussed the importance of SBGM. I offered and provided patient education materials and offered a blood glucose log book.   - Continue Trulicity 4.5mg qweekly,   - Continue glipizide 10mg PO qAM and hold the 5mg in the evening  - Hold Lantus for now and she may take humalog 3 units tid with meals as needed.  - Continue metformin ER 500mg PO bid   - Continue checking blood sugars 4-5 times with Eleuterio    2.) Management of Diabetic Complications- We discussed the complications of diabetes include retinopathy,  neuropathy, nephropathy and cardiovascular disease.   - Ophthalmology- she is up to date  - Neuropathy- none  - Lipid panel- had labs done, will follow-up  - MAC- had labs done, will follow-up  - CMP- had labs done, will follow-up  - BP- at goal, on meds  - Vaccines, does not get the flu vaccine, up to date with booster    3.) Lifestyle Management for Diabetes- We discussed importance of a low CHO diet, and recommend 45gm per meal or 135gm per day. We discussed the importance of trying to follow a Mediterranean diet, with an emphasis on vegetables at every meal, with lots whole grains, and protein from either plant-based sources, or poultry and fish.   - Patient has been eating more healthy foods  - She is not able to exercise, but she is going to try as much as possible    4.) Vitamin D deficiency  - Continue ergocalciferol 50,000 every week  - had labs done, will follow-up    Return to the clinic in 3 months    Prior to this encounter, I spent over 15 minutes with preparing for the visit, including reviewing documents from other specialties as well as from PCP and going over test results. During the face to face encounter, I spent an additional 15 minutes which were determined for follow-up. Greater than 50% of the time was spent in counseling, anticipatory guidance, and coordination of care. Patient concerns were answered to the best of my knowledge.     5/19/25  Floridalma Barhaona MD

## 2025-05-19 ENCOUNTER — OFFICE VISIT (OUTPATIENT)
Dept: ENDOCRINOLOGY CLINIC | Facility: CLINIC | Age: 65
End: 2025-05-19

## 2025-05-19 ENCOUNTER — TELEPHONE (OUTPATIENT)
Dept: ENDOCRINOLOGY CLINIC | Facility: CLINIC | Age: 65
End: 2025-05-19

## 2025-05-19 ENCOUNTER — LAB ENCOUNTER (OUTPATIENT)
Dept: LAB | Facility: REFERENCE LAB | Age: 65
End: 2025-05-19
Attending: INTERNAL MEDICINE
Payer: MEDICAID

## 2025-05-19 VITALS — BODY MASS INDEX: 31.02 KG/M2 | WEIGHT: 193 LBS | HEIGHT: 66 IN

## 2025-05-19 DIAGNOSIS — E78.2 MIXED HYPERLIPIDEMIA: ICD-10-CM

## 2025-05-19 DIAGNOSIS — E11.9 TYPE 2 DIABETES MELLITUS WITHOUT RETINOPATHY (HCC): Primary | ICD-10-CM

## 2025-05-19 DIAGNOSIS — E55.9 VITAMIN D DEFICIENCY: ICD-10-CM

## 2025-05-19 DIAGNOSIS — I10 ESSENTIAL HYPERTENSION: ICD-10-CM

## 2025-05-19 DIAGNOSIS — E11.9 TYPE 2 DIABETES MELLITUS WITHOUT RETINOPATHY (HCC): ICD-10-CM

## 2025-05-19 DIAGNOSIS — E53.8 VITAMIN B12 DEFICIENCY: ICD-10-CM

## 2025-05-19 LAB
ALBUMIN SERPL-MCNC: 4.5 G/DL (ref 3.2–4.8)
ALBUMIN/GLOB SERPL: 1.7 {RATIO} (ref 1–2)
ALP LIVER SERPL-CCNC: 101 U/L (ref 50–130)
ALT SERPL-CCNC: 10 U/L (ref 10–49)
ANION GAP SERPL CALC-SCNC: 7 MMOL/L (ref 0–18)
AST SERPL-CCNC: 17 U/L (ref ?–34)
BILIRUB SERPL-MCNC: 0.5 MG/DL (ref 0.2–1.1)
BUN BLD-MCNC: 8 MG/DL (ref 9–23)
BUN/CREAT SERPL: 12.1 (ref 10–20)
CALCIUM BLD-MCNC: 9.1 MG/DL (ref 8.7–10.4)
CHLORIDE SERPL-SCNC: 108 MMOL/L (ref 98–112)
CHOLEST SERPL-MCNC: 134 MG/DL (ref ?–200)
CO2 SERPL-SCNC: 25 MMOL/L (ref 21–32)
CREAT BLD-MCNC: 0.66 MG/DL (ref 0.55–1.02)
CREAT UR-SCNC: 144.6 MG/DL
EGFRCR SERPLBLD CKD-EPI 2021: 98 ML/MIN/1.73M2 (ref 60–?)
FASTING PATIENT LIPID ANSWER: YES
FASTING STATUS PATIENT QL REPORTED: YES
GLOBULIN PLAS-MCNC: 2.7 G/DL (ref 2–3.5)
GLUCOSE BLD-MCNC: 153 MG/DL (ref 70–99)
GLUCOSE BLOOD: 167
HDLC SERPL-MCNC: 30 MG/DL (ref 40–59)
LDLC SERPL CALC-MCNC: 87 MG/DL (ref ?–100)
MICROALBUMIN UR-MCNC: <0.3 MG/DL
NONHDLC SERPL-MCNC: 104 MG/DL (ref ?–130)
OSMOLALITY SERPL CALC.SUM OF ELEC: 291 MOSM/KG (ref 275–295)
POTASSIUM SERPL-SCNC: 3.8 MMOL/L (ref 3.5–5.1)
PROT SERPL-MCNC: 7.2 G/DL (ref 5.7–8.2)
SODIUM SERPL-SCNC: 140 MMOL/L (ref 136–145)
T4 FREE SERPL-MCNC: 1.1 NG/DL (ref 0.8–1.7)
TEST STRIP EXPIRATION DATE: NORMAL DATE
TEST STRIP LOT #: NORMAL NUMERIC
TRIGL SERPL-MCNC: 87 MG/DL (ref 30–149)
TSI SER-ACNC: 1.04 UIU/ML (ref 0.55–4.78)
VIT B12 SERPL-MCNC: 267 PG/ML (ref 211–911)
VIT D+METAB SERPL-MCNC: 9.4 NG/ML (ref 30–100)
VLDLC SERPL CALC-MCNC: 14 MG/DL (ref 0–30)

## 2025-05-19 PROCEDURE — 82570 ASSAY OF URINE CREATININE: CPT

## 2025-05-19 PROCEDURE — 80053 COMPREHEN METABOLIC PANEL: CPT

## 2025-05-19 PROCEDURE — 82607 VITAMIN B-12: CPT

## 2025-05-19 PROCEDURE — 82947 ASSAY GLUCOSE BLOOD QUANT: CPT | Performed by: INTERNAL MEDICINE

## 2025-05-19 PROCEDURE — 80061 LIPID PANEL: CPT

## 2025-05-19 PROCEDURE — 84443 ASSAY THYROID STIM HORMONE: CPT

## 2025-05-19 PROCEDURE — 82306 VITAMIN D 25 HYDROXY: CPT

## 2025-05-19 PROCEDURE — 82043 UR ALBUMIN QUANTITATIVE: CPT

## 2025-05-19 PROCEDURE — 99214 OFFICE O/P EST MOD 30 MIN: CPT | Performed by: INTERNAL MEDICINE

## 2025-05-19 PROCEDURE — 84439 ASSAY OF FREE THYROXINE: CPT

## 2025-05-19 PROCEDURE — 36415 COLL VENOUS BLD VENIPUNCTURE: CPT

## 2025-05-19 RX ORDER — DULAGLUTIDE 4.5 MG/.5ML
4.5 INJECTION, SOLUTION SUBCUTANEOUS WEEKLY
Qty: 6 ML | Refills: 3 | Status: SHIPPED | OUTPATIENT
Start: 2025-05-19

## 2025-05-19 RX ORDER — METOPROLOL SUCCINATE 25 MG/1
25 TABLET, EXTENDED RELEASE ORAL DAILY
Qty: 90 TABLET | Refills: 3 | Status: SHIPPED | OUTPATIENT
Start: 2025-05-19

## 2025-05-19 RX ORDER — HYDROCHLOROTHIAZIDE 12.5 MG/1
1 CAPSULE ORAL
Qty: 6 EACH | Refills: 3 | Status: SHIPPED | OUTPATIENT
Start: 2025-05-19

## 2025-05-19 RX ORDER — ERGOCALCIFEROL 1.25 MG/1
50000 CAPSULE ORAL WEEKLY
Qty: 12 CAPSULE | Refills: 0 | Status: SHIPPED | OUTPATIENT
Start: 2025-05-19 | End: 2025-08-17

## 2025-05-19 NOTE — PROGRESS NOTES
Name: Di Puga  YOB: 1960  Report Period: 04/22/2025 - 05/19/2025 (28 days)  Generated: 05/19/2025  Time CGM Active: 98%      Glucose Statistics and Targets  Average Glucose: 146 mg/dL  Glucose Management Indicator (GMI): 6.8%  Glucose Variability (%CV): 25.1%  Target Range: 70 - 180 mg/dL      Time in Ranges  Very High: >250 mg/dL --- 0%  High: 181 - 250 mg/dL --- 17%  Target Range: 70 - 180 mg/dL --- 83%  Low: 54 - 69 mg/dL --- 0%  Very Low: <54 mg/dL --- 0%

## 2025-05-20 RX ORDER — AMLODIPINE BESYLATE 5 MG/1
5 TABLET ORAL DAILY
Qty: 90 TABLET | Refills: 3 | Status: SHIPPED | OUTPATIENT
Start: 2025-05-20

## 2025-05-20 NOTE — TELEPHONE ENCOUNTER
Hi!    Please let Ms. Puga know that I have reviewed her blood tests and her liver and kidney function is within normal limits. Her cholesterol levels are within normal limits except that her good cholesterol level is lower than goal.     Her vitamin B12 level is low and so I would like her to start taking 500mcg daily. She also has vitamin D deficiency and so I would like to start her on high-dose vitamin D to be taken weekly.     We will recheck labs in 3 months.     Thank you!

## 2025-05-22 ENCOUNTER — TELEPHONE (OUTPATIENT)
Dept: ENDOCRINOLOGY CLINIC | Facility: CLINIC | Age: 65
End: 2025-05-22

## 2025-05-28 RX ORDER — ATORVASTATIN CALCIUM 80 MG/1
80 TABLET, FILM COATED ORAL NIGHTLY
Qty: 90 TABLET | Refills: 3 | Status: SHIPPED | OUTPATIENT
Start: 2025-05-28

## 2025-06-11 ENCOUNTER — HOSPITAL ENCOUNTER (OUTPATIENT)
Dept: MAMMOGRAPHY | Facility: HOSPITAL | Age: 65
Discharge: HOME | End: 2025-06-11
Attending: INTERNAL MEDICINE
Payer: MEDICAID

## 2025-06-11 ENCOUNTER — HOSPITAL ENCOUNTER (OUTPATIENT)
Dept: ULTRASOUND IMAGING | Facility: HOSPITAL | Age: 65
Discharge: HOME | End: 2025-06-11
Attending: INTERNAL MEDICINE
Payer: MEDICAID

## 2025-06-11 DIAGNOSIS — Z87.898 HISTORY OF ABNORMAL MAMMOGRAM: ICD-10-CM

## 2025-06-11 PROCEDURE — 77062 BREAST TOMOSYNTHESIS BI: CPT | Performed by: INTERNAL MEDICINE

## 2025-06-11 PROCEDURE — 77066 DX MAMMO INCL CAD BI: CPT | Performed by: INTERNAL MEDICINE

## 2025-06-11 PROCEDURE — 76642 ULTRASOUND BREAST LIMITED: CPT | Performed by: INTERNAL MEDICINE

## 2025-07-08 ENCOUNTER — OFFICE VISIT (OUTPATIENT)
Dept: INTERNAL MEDICINE CLINIC | Facility: CLINIC | Age: 65
End: 2025-07-08

## 2025-07-08 VITALS
HEIGHT: 66 IN | BODY MASS INDEX: 30.37 KG/M2 | DIASTOLIC BLOOD PRESSURE: 70 MMHG | OXYGEN SATURATION: 96 % | WEIGHT: 189 LBS | SYSTOLIC BLOOD PRESSURE: 126 MMHG | RESPIRATION RATE: 18 BRPM | HEART RATE: 104 BPM | TEMPERATURE: 98 F

## 2025-07-08 DIAGNOSIS — G89.29 CHRONIC BILATERAL THORACIC BACK PAIN: ICD-10-CM

## 2025-07-08 DIAGNOSIS — R94.31 ABNORMAL EKG: ICD-10-CM

## 2025-07-08 DIAGNOSIS — R00.2 PALPITATIONS: Primary | ICD-10-CM

## 2025-07-08 DIAGNOSIS — M54.6 CHRONIC BILATERAL THORACIC BACK PAIN: ICD-10-CM

## 2025-07-08 DIAGNOSIS — R10.11 RUQ ABDOMINAL PAIN: ICD-10-CM

## 2025-07-08 LAB
ALBUMIN SERPL-MCNC: 4.5 G/DL (ref 3.2–4.8)
ALBUMIN/GLOB SERPL: 1.7 {RATIO} (ref 1–2)
ALP LIVER SERPL-CCNC: 97 U/L (ref 50–130)
ALT SERPL-CCNC: 10 U/L (ref 10–49)
ANION GAP SERPL CALC-SCNC: 8 MMOL/L (ref 0–18)
AST SERPL-CCNC: 18 U/L (ref ?–34)
BASOPHILS # BLD AUTO: 0.02 X10(3) UL (ref 0–0.2)
BASOPHILS NFR BLD AUTO: 0.4 %
BILIRUB SERPL-MCNC: 0.5 MG/DL (ref 0.2–1.1)
BUN BLD-MCNC: 8 MG/DL (ref 9–23)
BUN/CREAT SERPL: 10.4 (ref 10–20)
CALCIUM BLD-MCNC: 9.6 MG/DL (ref 8.7–10.4)
CHLORIDE SERPL-SCNC: 107 MMOL/L (ref 98–112)
CO2 SERPL-SCNC: 26 MMOL/L (ref 21–32)
CREAT BLD-MCNC: 0.77 MG/DL (ref 0.55–1.02)
DEPRECATED RDW RBC AUTO: 48.6 FL (ref 35.1–46.3)
EGFRCR SERPLBLD CKD-EPI 2021: 86 ML/MIN/1.73M2 (ref 60–?)
EOSINOPHIL # BLD AUTO: 0.07 X10(3) UL (ref 0–0.7)
EOSINOPHIL NFR BLD AUTO: 1.4 %
ERYTHROCYTE [DISTWIDTH] IN BLOOD BY AUTOMATED COUNT: 15.9 % (ref 11–15)
FASTING STATUS PATIENT QL REPORTED: YES
GLOBULIN PLAS-MCNC: 2.7 G/DL (ref 2–3.5)
GLUCOSE BLD-MCNC: 108 MG/DL (ref 70–99)
HCT VFR BLD AUTO: 37.5 % (ref 35–48)
HGB BLD-MCNC: 12.1 G/DL (ref 12–16)
IMM GRANULOCYTES # BLD AUTO: 0.01 X10(3) UL (ref 0–1)
IMM GRANULOCYTES NFR BLD: 0.2 %
LYMPHOCYTES # BLD AUTO: 2.09 X10(3) UL (ref 1–4)
LYMPHOCYTES NFR BLD AUTO: 41 %
MAGNESIUM SERPL-MCNC: 1.6 MG/DL (ref 1.6–2.6)
MCH RBC QN AUTO: 26.7 PG (ref 26–34)
MCHC RBC AUTO-ENTMCNC: 32.3 G/DL (ref 31–37)
MCV RBC AUTO: 82.8 FL (ref 80–100)
MONOCYTES # BLD AUTO: 0.3 X10(3) UL (ref 0.1–1)
MONOCYTES NFR BLD AUTO: 5.9 %
NEUTROPHILS # BLD AUTO: 2.61 X10 (3) UL (ref 1.5–7.7)
NEUTROPHILS # BLD AUTO: 2.61 X10(3) UL (ref 1.5–7.7)
NEUTROPHILS NFR BLD AUTO: 51.1 %
OSMOLALITY SERPL CALC.SUM OF ELEC: 291 MOSM/KG (ref 275–295)
PLATELET # BLD AUTO: 292 10(3)UL (ref 150–450)
POTASSIUM SERPL-SCNC: 3.6 MMOL/L (ref 3.5–5.1)
PROT SERPL-MCNC: 7.2 G/DL (ref 5.7–8.2)
RBC # BLD AUTO: 4.53 X10(6)UL (ref 3.8–5.3)
SODIUM SERPL-SCNC: 141 MMOL/L (ref 136–145)
WBC # BLD AUTO: 5.1 X10(3) UL (ref 4–11)

## 2025-07-08 PROCEDURE — 36415 COLL VENOUS BLD VENIPUNCTURE: CPT | Performed by: INTERNAL MEDICINE

## 2025-07-12 ENCOUNTER — APPOINTMENT (OUTPATIENT)
Dept: CT IMAGING | Facility: HOSPITAL | Age: 65
End: 2025-07-12
Attending: EMERGENCY MEDICINE
Payer: MEDICAID

## 2025-07-12 ENCOUNTER — HOSPITAL ENCOUNTER (EMERGENCY)
Facility: HOSPITAL | Age: 65
Discharge: HOME | End: 2025-07-12
Attending: EMERGENCY MEDICINE
Payer: MEDICAID

## 2025-07-12 VITALS
BODY MASS INDEX: 31 KG/M2 | OXYGEN SATURATION: 99 % | RESPIRATION RATE: 18 BRPM | SYSTOLIC BLOOD PRESSURE: 134 MMHG | WEIGHT: 189 LBS | HEART RATE: 87 BPM | TEMPERATURE: 99 F | DIASTOLIC BLOOD PRESSURE: 81 MMHG

## 2025-07-12 DIAGNOSIS — M54.6 ACUTE BILATERAL THORACIC BACK PAIN: Primary | ICD-10-CM

## 2025-07-12 LAB
ALBUMIN SERPL-MCNC: 4.5 G/DL (ref 3.2–4.8)
ALP LIVER SERPL-CCNC: 102 U/L (ref 50–130)
ALT SERPL-CCNC: 10 U/L (ref 10–49)
ANION GAP SERPL CALC-SCNC: 7 MMOL/L (ref 0–18)
AST SERPL-CCNC: 14 U/L (ref ?–34)
BASOPHILS # BLD AUTO: 0.02 X10(3) UL (ref 0–0.2)
BASOPHILS NFR BLD AUTO: 0.5 %
BILIRUB DIRECT SERPL-MCNC: 0.1 MG/DL (ref ?–0.3)
BILIRUB SERPL-MCNC: 0.4 MG/DL (ref 0.2–1.1)
BUN BLD-MCNC: 8 MG/DL (ref 9–23)
BUN/CREAT SERPL: 12.3 (ref 10–20)
CALCIUM BLD-MCNC: 9.7 MG/DL (ref 8.7–10.4)
CHLORIDE SERPL-SCNC: 108 MMOL/L (ref 98–112)
CO2 SERPL-SCNC: 25 MMOL/L (ref 21–32)
CREAT BLD-MCNC: 0.65 MG/DL (ref 0.55–1.02)
DEPRECATED RDW RBC AUTO: 47.8 FL (ref 35.1–46.3)
EGFRCR SERPLBLD CKD-EPI 2021: 98 ML/MIN/1.73M2 (ref 60–?)
EOSINOPHIL # BLD AUTO: 0.06 X10(3) UL (ref 0–0.7)
EOSINOPHIL NFR BLD AUTO: 1.5 %
ERYTHROCYTE [DISTWIDTH] IN BLOOD BY AUTOMATED COUNT: 15.9 % (ref 11–15)
GLUCOSE BLD-MCNC: 120 MG/DL (ref 70–99)
HCT VFR BLD AUTO: 36.9 % (ref 35–48)
HGB BLD-MCNC: 12 G/DL (ref 12–16)
IMM GRANULOCYTES # BLD AUTO: 0.01 X10(3) UL (ref 0–1)
IMM GRANULOCYTES NFR BLD: 0.2 %
LYMPHOCYTES # BLD AUTO: 1.74 X10(3) UL (ref 1–4)
LYMPHOCYTES NFR BLD AUTO: 42.5 %
MCH RBC QN AUTO: 26.5 PG (ref 26–34)
MCHC RBC AUTO-ENTMCNC: 32.5 G/DL (ref 31–37)
MCV RBC AUTO: 81.6 FL (ref 80–100)
MONOCYTES # BLD AUTO: 0.26 X10(3) UL (ref 0.1–1)
MONOCYTES NFR BLD AUTO: 6.4 %
NEUTROPHILS # BLD AUTO: 2 X10 (3) UL (ref 1.5–7.7)
NEUTROPHILS # BLD AUTO: 2 X10(3) UL (ref 1.5–7.7)
NEUTROPHILS NFR BLD AUTO: 48.9 %
NT-PROBNP SERPL-MCNC: 77 PG/ML (ref ?–125)
OSMOLALITY SERPL CALC.SUM OF ELEC: 290 MOSM/KG (ref 275–295)
PLATELET # BLD AUTO: 293 10(3)UL (ref 150–450)
POTASSIUM SERPL-SCNC: 3.7 MMOL/L (ref 3.5–5.1)
PROT SERPL-MCNC: 7.1 G/DL (ref 5.7–8.2)
RBC # BLD AUTO: 4.52 X10(6)UL (ref 3.8–5.3)
SODIUM SERPL-SCNC: 140 MMOL/L (ref 136–145)
TROPONIN I SERPL HS-MCNC: 16 NG/L (ref ?–34)
WBC # BLD AUTO: 4.1 X10(3) UL (ref 4–11)

## 2025-07-12 PROCEDURE — 74177 CT ABD & PELVIS W/CONTRAST: CPT | Performed by: RADIOLOGY

## 2025-07-12 PROCEDURE — 99285 EMERGENCY DEPT VISIT HI MDM: CPT

## 2025-07-12 PROCEDURE — 80076 HEPATIC FUNCTION PANEL: CPT | Performed by: EMERGENCY MEDICINE

## 2025-07-12 PROCEDURE — 99284 EMERGENCY DEPT VISIT MOD MDM: CPT

## 2025-07-12 PROCEDURE — 84484 ASSAY OF TROPONIN QUANT: CPT | Performed by: EMERGENCY MEDICINE

## 2025-07-12 PROCEDURE — 96374 THER/PROPH/DIAG INJ IV PUSH: CPT

## 2025-07-12 PROCEDURE — 71275 CT ANGIOGRAPHY CHEST: CPT | Performed by: RADIOLOGY

## 2025-07-12 PROCEDURE — 93010 ELECTROCARDIOGRAM REPORT: CPT

## 2025-07-12 PROCEDURE — 93005 ELECTROCARDIOGRAM TRACING: CPT

## 2025-07-12 PROCEDURE — 83880 ASSAY OF NATRIURETIC PEPTIDE: CPT | Performed by: EMERGENCY MEDICINE

## 2025-07-12 PROCEDURE — 85025 COMPLETE CBC W/AUTO DIFF WBC: CPT | Performed by: EMERGENCY MEDICINE

## 2025-07-12 PROCEDURE — 80048 BASIC METABOLIC PNL TOTAL CA: CPT | Performed by: EMERGENCY MEDICINE

## 2025-07-12 RX ORDER — HYDROCODONE BITARTRATE AND ACETAMINOPHEN 5; 325 MG/1; MG/1
1 TABLET ORAL ONCE
Refills: 0 | Status: COMPLETED | OUTPATIENT
Start: 2025-07-12 | End: 2025-07-12

## 2025-07-12 RX ORDER — ONDANSETRON 2 MG/ML
4 INJECTION INTRAMUSCULAR; INTRAVENOUS ONCE
Status: COMPLETED | OUTPATIENT
Start: 2025-07-12 | End: 2025-07-12

## 2025-07-12 RX ORDER — CYCLOBENZAPRINE HCL 10 MG
5 TABLET ORAL NIGHTLY
Qty: 3 TABLET | Refills: 0 | Status: SHIPPED | OUTPATIENT
Start: 2025-07-12 | End: 2025-07-18

## 2025-07-12 NOTE — ED PROVIDER NOTES
Patient Seen in: [facility] Emergency Department    History     Chief Complaint   Patient presents with    Back Pain       HPI    65-year-old female with a history of cholecystectomy, history of PE as well on anticoagulation who reports that she has lower bilateral thoracic pain that returned over the last week in addition to right upper quadrant pain.  She denies any nausea or emesis or chest pain or dyspnea.      History reviewed. Past Medical History[1]    History reviewed. Past Surgical History[2]      Medications :  Prescriptions Prior to Admission[3]     Family History[4]    Smoking Status: Social Hx on file[5]    Constitutional and vital signs reviewed.      Social History and Family History elements reviewed from today, pertinent positives to the presenting problem noted.    Physical Exam     ED Triage Vitals [07/12/25 1233]   /88   Pulse 104   Resp 18   Temp 98.5 °F (36.9 °C)   Temp src Temporal   SpO2 98 %   O2 Device None (Room air)       All measures to prevent infection transmission during my interaction with the patient were taken. The patient was already wearing a droplet mask on my arrival to the room. Personal protective equipment was worn throughout the duration of the exam.  Handwashing was performed prior to and after the exam.  Stethoscope and any equipment used during my examination was cleaned with super sani-cloth germicidal wipes following the exam.     Physical Exam    General: NAD  Head: Normocephalic and atraumatic.  Mouth/Throat/Ears/Nose: Oropharynx is clear    Eyes: Conjunctivae and EOM are normal.   Neck: Normal range of motion. Supple. No midline cervical ttp.   Cardiovascular: Normal rate, regular rhythm, normal heart sounds.  Respiratory/Chest: Clear and equal bilaterally. Exhibits no tenderness.  Gastrointestinal: Soft, mild epigastric/RUQ tender, non-distended. Bowel sounds are normal.   Musculoskeletal:No swelling or deformity.   Neurological: Alert and  appropriate. No focal deficits.   Skin: Skin is warm and dry. No pallor.  Psychiatric: Has a normal mood and affect.      ED Course        Labs Reviewed   BASIC METABOLIC PANEL (8) - Abnormal; Notable for the following components:       Result Value    Glucose 120 (*)     BUN 8 (*)     All other components within normal limits   CBC WITH DIFFERENTIAL WITH PLATELET - Abnormal; Notable for the following components:    RDW-SD 47.8 (*)     RDW 15.9 (*)     All other components within normal limits   HEPATIC FUNCTION PANEL (7) - Normal   TROPONIN I HIGH SENSITIVITY - Normal   PRO BETA NATRIURETIC PEPTIDE - Normal     EKG    Rate, intervals and axes as noted on EKG Report.  Rate: 98  Rhythm: Sinus Rhythm  Reading: No STEMI.            As Interpreted by me    Imaging Results Available and Reviewed while in ED: CT ABDOMEN+PELVIS(CONTRAST ONLY)(CPT=74177)  Result Date: 7/12/2025  CONCLUSION: No acute finding. A 4.9 cm right ovarian cyst measured 3.6 cm July 1, 2022. Follow-up ultrasound nonemergent ultrasound recommended. Electronically Verified and Signed by Attending Radiologist: Jayden Alegria MD 7/12/2025 3:24 PM Workstation: [id number]    CT CHEST PE AORTA (IV ONLY) (CPT=71260)  Result Date: 7/12/2025  CONCLUSION: No acute pulmonary embolism to the first subsegmental level. Diffusely enlarged thyroid gland. Correlate with clinical/laboratory signs of thyroid dysfunction. Lesser incidental findings as above. Electronically Verified and Signed by Attending Radiologist: Lauren Coppola MD 7/12/2025 3:17 PM Workstation: [id number]    ED Medications Administered:   Medications   HYDROcodone-acetaminophen (Norco) 5-325 MG per tab 1 tablet (1 tablet Oral Given 7/12/25 1402)   ondansetron (Zofran) 4 MG/2ML injection 4 mg (4 mg Intravenous Given 7/12/25 1402)   iopamidol 76% (ISOVUE-370) injection for power injector (80 mL Intravenous Given 7/12/25 1428)         MDM     Vitals:    07/12/25 1233   BP: 138/88   Pulse: 104    Resp: 18   Temp: 98.5 °F (36.9 °C)   TempSrc: Temporal   SpO2: 98%   Weight: 85.7 kg     *I personally reviewed and interpreted all ED vitals.    Pulse Ox: 98%, Room air, Normal     Monitor Interpretation:   normal sinus rhythm as interpreted by me.  The cardiac monitor was ordered given thoracic pain .      Medical Decision Making      Differential Diagnosis/ Diagnostic Considerations: PE, ACS, gastritis    Complicating Factors: The patient already has cholecystectomy to contribute to the complexity of this ED evaluation.    I reviewed prior chart records including note from May 19, 2025.  Lab work unremarkable for acute findings on my interpretation, presentation inconsistent with ACS.  CT scans notable for no large proximal pulmonary embolus on my interpretation, no acute findings.  Considered admission however feels improved after supportive care.    Dc In stable condition.  Patient is comfortable with the plan.    Prescriptions: As below      Disposition and Plan     Clinical Impression:  1. Acute bilateral thoracic back pain        Disposition:  Discharge    Follow-up:  Rene Price MD  [address] [place] [id number]  [phone]    Schedule an appointment as soon as possible for a visit in 1 day(s)        Medications Prescribed:  Current Discharge Medication List        START taking these medications    Details   cyclobenzaprine 10 MG Oral Tab Take 0.5 tablets (5 mg total) by mouth nightly for 6 days.  Qty: 3 tablet, Refills: 0                              [1]   Past Medical History:   Arrhythmia    tachycardia    Asthma (HCC)    B12 deficiency    Back pain    Back problem    Calculus of kidney    Cancer (HCC)    1990- cervical    COPD (chronic obstructive pulmonary disease) (HCC)    Diabetic retinopathy (HCC)    Essential hypertension    Frequent UTI    High blood pressure    High cholesterol    History of abnormal cervical Pap smear    History of blood clots    Irregular heart beat    Muscle  weakness    Neuropathy    Numbness    Other and unspecified hyperlipidemia    Palpitations    Pulmonary embolism (HCC)    Rheumatoid arthritis (HCC)    Scoliosis    Spinal stenosis    Stroke (HCC)    TIA a few years ago    TIA (transient ischemic attack)    Tuberculosis    Type II or unspecified type diabetes mellitus without mention of complication, not stated as uncontrolled    Walking troubles    Can’t walk more then a hundred feet without exsaulted   [2]   Past Surgical History:  Procedure Laterality Date    Cholecystectomy      Colonoscopy N/A 2017    Procedure: COLONOSCOPY, POSSIBLE BIOPSY, POSSIBLE POLYPECTOMY 56757;  Surgeon: Pedro Thomas MD;  [facility]    Colonoscopy N/A 2022    Procedure: COLONOSCOPY/ESOPHAGOGASTRODUODENOSCOPY (EGD);  Surgeon: Cortney Diaz MD;  Location: [facility] ENDOSCOPY    Colonoscopy & polypectomy      Laparoscopic cholecystectomy            Other      Lung biopsy    Other surgical history      Cone biopsy    Other surgical history      Cystoscopy    Removal gallbladder      Spine surgery procedure unlisted  2021    Neck and spine    Tubal ligation     [3] (Not in a hospital admission)   [4]   Family History  Problem Relation Age of Onset    Cancer Self         cervical ca    Diabetes Mother     Lung Disorder Mother         Emphysema    Heart Disease Mother         Lisa    Asthma Mother     Cancer Father 51        Lung    Diabetes Sister     Thyroid Disorder Sister     Glaucoma Sister     Stroke Sister     Asthma Sister     Hypertension Sister     Asthma Sister     Prostate Cancer Brother     Diabetes Brother     Diabetes Brother     Cancer Brother         Prostate cancer, copd    Diabetes Brother     Glaucoma Maternal Grandmother     Heart Disease Maternal Grandmother     Ovarian Cancer Maternal Grandmother 59    Cataracts Niece     Breast Cancer Niece 48    Macular degeneration Neg     Pancreatic Cancer Neg    [5]    Social History  Socioeconomic History    Marital status: Single   Tobacco Use    Smoking status: Every Day     Current packs/day: 1.00     Average packs/day: 1 pack/day for 20.0 years (20.0 ttl pk-yrs)     Types: Cigarettes     Passive exposure: Never    Smokeless tobacco: Never    Tobacco comments:     1 pack daily   Vaping Use    Vaping status: Never Used   Substance and Sexual Activity    Alcohol use: Not Currently    Drug use: No   Other Topics Concern    Caffeine Concern No    Exercise No

## 2025-07-12 NOTE — ED INITIAL ASSESSMENT (HPI)
Bilateral lower back pain for over a week, today began having left arm and aw pain. Denies chest pain or shortness.

## 2025-07-13 LAB
ATRIAL RATE: 98 BPM
ATRIAL RATE: 99 BPM
P AXIS: 56 DEGREES
P AXIS: 60 DEGREES
P-R INTERVAL: 150 MS
P-R INTERVAL: 162 MS
Q-T INTERVAL: 350 MS
Q-T INTERVAL: 354 MS
QRS DURATION: 76 MS
QRS DURATION: 80 MS
QTC CALCULATION (BEZET): 446 MS
QTC CALCULATION (BEZET): 454 MS
R AXIS: -6 DEGREES
R AXIS: -6 DEGREES
T AXIS: 48 DEGREES
T AXIS: 56 DEGREES
VENTRICULAR RATE: 98 BPM
VENTRICULAR RATE: 99 BPM

## 2025-07-14 ENCOUNTER — TELEPHONE (OUTPATIENT)
Dept: INTERNAL MEDICINE CLINIC | Facility: CLINIC | Age: 65
End: 2025-07-14

## 2025-07-14 DIAGNOSIS — M54.50 CHRONIC BILATERAL LOW BACK PAIN WITHOUT SCIATICA: Primary | ICD-10-CM

## 2025-07-14 DIAGNOSIS — N83.201 CYST OF RIGHT OVARY: ICD-10-CM

## 2025-07-14 DIAGNOSIS — G89.29 CHRONIC BILATERAL LOW BACK PAIN WITHOUT SCIATICA: Primary | ICD-10-CM

## 2025-07-14 RX ORDER — METHYLPREDNISOLONE 4 MG/1
TABLET ORAL
Qty: 1 EACH | Refills: 0 | Status: SHIPPED | OUTPATIENT
Start: 2025-07-14

## 2025-07-14 NOTE — TELEPHONE ENCOUNTER
Called and spoke with patient and identified full name and date of birth.  Relayed Dr. Price  message and patient voiced understanding with treatment plan  Provided patient with all below contacts information and advised she call to schedule appointments      OBGYN  Provider Address Phone   Alisa Bernard MD [address]  [facility] [address] [phone]        Pain Management:  Titus Lester DO [address] [place] [id number] [phone]    Scheduling for UC Pelvis:  To schedule an appointment for your radiology test please call [facility] Scheduling at [phone].

## 2025-07-14 NOTE — TELEPHONE ENCOUNTER
For the back pain we can send steroids I will also refer to physiatry, ne need to do the US of Ab but will need non emergen US of pelvis due to the ovarian cyst, will also refer to ob gyn  could be causing the right side pain.

## 2025-07-14 NOTE — TELEPHONE ENCOUNTER
Patient went to the ER on Friday due to back pain.  Patient states she is waiting on insurance to approve cyclobenzaprine prescribed in the ER.  Patient states she is still experiencing back pain, not as bad as Friday.  Patient had CT abdomen/pelvis done in the ER.  Patient is scheduled for ultrasound abdomen/pelvis 8/14/25.  Patient is asking if she still needs the ultrasound done?  Patient was seen in the office 7/8/25    Please advise.

## 2025-07-14 NOTE — PROGRESS NOTES
Subjective:     Patient ID: Di Puga is a 65 year old female.    Patient comes in today with complaint of having palpitations for the last week or so has been checking blood pressure at home there is some fluctuation.  Today feels no palpitations  T also with some back pain wrapping around to the right upper quadrant no urinary issues no bowel movement issues          History/Other:   Review of Systems   Constitutional: Negative.    HENT: Negative.     Eyes: Negative.    Respiratory: Negative.     Cardiovascular:  Positive for palpitations.   Gastrointestinal:  Positive for abdominal pain.   Genitourinary: Negative.    Musculoskeletal:  Positive for back pain.   Skin: Negative.    Neurological: Negative.    Psychiatric/Behavioral: Negative.       Current Medications[1]  Allergies:Allergies[2]    Past Medical History[3]   Past Surgical History[4]   Family History[5]   Social History: Short Social Hx on File[6]     Objective:   Physical Exam  Vitals and nursing note reviewed.   Constitutional:       Appearance: She is well-developed.   HENT:      Head: Normocephalic and atraumatic.      Right Ear: External ear normal.      Left Ear: External ear normal.      Nose: Nose normal.   Eyes:      Conjunctiva/sclera: Conjunctivae normal.      Pupils: Pupils are equal, round, and reactive to light.   Cardiovascular:      Rate and Rhythm: Normal rate and regular rhythm.      Heart sounds: Normal heart sounds.   Pulmonary:      Effort: Pulmonary effort is normal.      Breath sounds: Normal breath sounds.   Abdominal:      General: Bowel sounds are normal.      Palpations: Abdomen is soft.   Genitourinary:     Vagina: Normal.   Musculoskeletal:         General: Normal range of motion.      Cervical back: Normal range of motion and neck supple.   Skin:     General: Skin is warm and dry.   Neurological:      Mental Status: She is alert and oriented to person, place, and time.      Deep Tendon Reflexes: Reflexes are  normal and symmetric.   Psychiatric:         Behavior: Behavior normal.         Thought Content: Thought content normal.         Judgment: Judgment normal.         Assessment & Plan:   1. Palpitations EKG noted with nonspecific changes will order Holter monitor cardiac echo will follow results will follow labs   2. RUQ abdominal pain will order ultrasound will follow results   3. Chronic bilateral thoracic back pain medication for pain   4. Abnormal EKG as above will follow results any new or worsening symptoms to let us know       Orders Placed This Encounter   Procedures    Comp Metabolic Panel (14)    Magnesium [E]    CBC With Differential With Platelet       Meds This Visit:  Requested Prescriptions      No prescriptions requested or ordered in this encounter       Imaging & Referrals:  ELECTROCARDIOGRAM, COMPLETE  CARDIO - INTERNAL  US ABDOMEN LIMITED (CPT=76705)  CARD ECHO 2D DOPPLER (CPT=93306)            [1]   Current Outpatient Medications   Medication Sig Dispense Refill    atorvastatin 80 MG Oral Tab Take 1 tablet (80 mg total) by mouth nightly. 90 tablet 3    amLODIPine 5 MG Oral Tab Take 1 tablet (5 mg total) by mouth daily. 90 tablet 3    metoprolol succinate ER 25 MG Oral Tablet 24 Hr Take 1 tablet (25 mg total) by mouth daily. 90 tablet 3    Continuous Glucose Sensor (FREESTYLE WILFRED 3 PLUS SENSOR) Does not apply Misc 1 each every 15 (fifteen) days. Dx: E11.9 6 each 3    Dulaglutide (TRULICITY) 4.5 MG/0.5ML Subcutaneous Solution Auto-injector Inject 4.5 mg into the skin once a week. 6 mL 3    ergocalciferol 1.25 MG (78084 UT) Oral Cap Take 1 capsule (50,000 Units total) by mouth once a week. 12 capsule 0    glipiZIDE 10 MG Oral Tab Take 1 tablet (10 mg total) by mouth 2 (two) times daily before meals. 180 tablet 1    ONETOUCH VERIO In Vitro Strip TEST TWICE DAILY. 200 strip 1    Insulin Pen Needle (TRUEPLUS 5-BEVEL PEN NEEDLES) 32G X 4 MM Does not apply Misc USE DAILY 100 each 1    apixaban (ELIQUIS)  5 MG Oral Tab Take 1 tablet (5 mg total) by mouth 2 (two) times daily. 180 tablet 3    Blood Glucose Monitoring Suppl (ONETOUCH VERIO) w/Device Does not apply Kit 1 each in the morning, at noon, in the evening, and at bedtime. Dx: E11.9 1 kit 0    metFORMIN  MG Oral Tablet 24 Hr Take 1 tablet (500 mg total) by mouth 2 (two) times daily with meals. 360 tablet 3    Mometasone Furo-Formoterol Fum (DULERA) 100-5 MCG/ACT Inhalation Aerosol Inhale 1 puff into the lungs in the morning and 1 puff before bedtime. 1 each 2    albuterol 108 (90 Base) MCG/ACT Inhalation Aero Soln Inhale 2 puffs into the lungs every 4 (four) hours as needed. 8.5 g 1    nicotine 21 MG/24HR Transdermal Patch 24 Hr Apply 1 patch (21mg) daily for 2 weeks, then apply 1 patch (14mg) daily for 2 weeks, then apply 1 patch (7mg) daily for 2 weeks 14 patch 0    metoprolol succinate ER 50 MG Oral Tablet 24 Hr Take 1 tablet (50 mg total) by mouth daily. 90 tablet 1    alum-mag hydroxide-simethicone 200-200-20 MG/5ML Oral Suspension Take 10 mL by mouth 4 (four) times daily as needed for Indigestion. 355 mL 0    diphenhydrAMINE HCl 25 MG Oral Tab Take 1 tablet (25 mg total) by mouth every 6 (six) hours as needed for Itching.      EPINEPHrine (EPIPEN 2-ROBERTH) 0.3 MG/0.3ML Injection Solution Auto-injector Inject IM in event of  allergic reaction 1 each 0    levocetirizine 5 MG Oral Tab Take 1 tablet (5 mg total) by mouth every evening. 90 tablet 1    OneTouch Delica Lancets 33G Does not apply Misc Use to check blood sugar two times a day 200 each 1    Blood Glucose Monitoring Suppl (ONETOUCH ULTRA SYSTEM) W/DEVICE Does not apply Kit Test sugar twice daily 1 kit 0    cyclobenzaprine 10 MG Oral Tab Take 0.5 tablets (5 mg total) by mouth nightly for 6 days. 3 tablet 0   [2]   Allergies  Allergen Reactions    Ace Inhibitors ANAPHYLAXIS, ANGIOEDEMA and SWELLING     April 2020, hospitalized at [facility] in [place] with tongue swelling and throat closing sensation, on  lisinopril at the time. Not intubated      Egg White (Diagnostic) NAUSEA ONLY and Tightness in Throat    Fish-Derived Products SWELLING    Lisinopril ANAPHYLAXIS    Peanuts SWELLING    Shrimp ITCHING, NAUSEA AND VOMITING, Tightness in Throat and WHEEZING    Tomato SWELLING and UNKNOWN    Tomatoes RASH   [3]   Past Medical History:   Arrhythmia    tachycardia    Asthma (HCC)    B12 deficiency    Back pain    Back problem    Calculus of kidney    Cancer (HCC)    - cervical    COPD (chronic obstructive pulmonary disease) (HCC)    Diabetic retinopathy (HCC)    Essential hypertension    Frequent UTI    High blood pressure    High cholesterol    History of abnormal cervical Pap smear    History of blood clots    Irregular heart beat    Muscle weakness    Neuropathy    Numbness    Other and unspecified hyperlipidemia    Palpitations    Pulmonary embolism (HCC)    Rheumatoid arthritis (HCC)    Scoliosis    Spinal stenosis    Stroke (HCC)    TIA a few years ago    TIA (transient ischemic attack)    Tuberculosis    Type II or unspecified type diabetes mellitus without mention of complication, not stated as uncontrolled    Walking troubles    Can’t walk more then a hundred feet without exsaulted   [4]   Past Surgical History:  Procedure Laterality Date    Cholecystectomy      Colonoscopy N/A 2017    Procedure: COLONOSCOPY, POSSIBLE BIOPSY, POSSIBLE POLYPECTOMY 24476;  Surgeon: Pedro Thomas MD;  [place] [facility] [place] [facility]    Colonoscopy N/A 2022    Procedure: COLONOSCOPY/ESOPHAGOGASTRODUODENOSCOPY (EGD);  Surgeon: Cortney Diaz MD;  Location[facility] ENDOSCOPY    Colonoscopy & polypectomy      Laparoscopic cholecystectomy            Other      Lung biopsy    Other surgical history      Cone biopsy    Other surgical history      Cystoscopy    Removal gallbladder      Spine surgery procedure unlisted  2021    Neck and spine    Tubal ligation     [5]   Family History  Problem  Relation Age of Onset    Cancer Self         cervical ca    Diabetes Mother     Lung Disorder Mother         Emphysema    Heart Disease Mother         Lisa    Asthma Mother     Cancer Father 51        Lung    Diabetes Sister     Thyroid Disorder Sister     Glaucoma Sister     Stroke Sister     Asthma Sister     Hypertension Sister     Asthma Sister     Prostate Cancer Brother     Diabetes Brother     Diabetes Brother     Cancer Brother         Prostate cancer, copd    Diabetes Brother     Glaucoma Maternal Grandmother     Heart Disease Maternal Grandmother     Ovarian Cancer Maternal Grandmother 59    Cataracts Niece     Breast Cancer Niece 48    Macular degeneration Neg     Pancreatic Cancer Neg    [6]   Social History  Socioeconomic History    Marital status: Single   Tobacco Use    Smoking status: Every Day     Current packs/day: 1.00     Average packs/day: 1 pack/day for 20.0 years (20.0 ttl pk-yrs)     Types: Cigarettes     Passive exposure: Never    Smokeless tobacco: Never    Tobacco comments:     1 pack daily   Vaping Use    Vaping status: Never Used   Substance and Sexual Activity    Alcohol use: Not Currently    Drug use: No   Other Topics Concern    Caffeine Concern No    Exercise No   Social History Narrative    The patient does not use an assistive device..      The patient does live in a home with stairs.     Social Drivers of Health      Received from [facility]    Housing Stability

## 2025-07-16 ENCOUNTER — HOSPITAL ENCOUNTER (OUTPATIENT)
Dept: CV DIAGNOSTICS | Facility: HOSPITAL | Age: 65
Discharge: HOME | End: 2025-07-16
Attending: INTERNAL MEDICINE
Payer: MEDICAID

## 2025-07-16 DIAGNOSIS — R94.31 ABNORMAL EKG: ICD-10-CM

## 2025-07-16 DIAGNOSIS — R00.2 PALPITATIONS: ICD-10-CM

## 2025-07-16 PROCEDURE — 93306 TTE W/DOPPLER COMPLETE: CPT | Performed by: INTERNAL MEDICINE

## 2025-07-17 ENCOUNTER — HOSPITAL ENCOUNTER (EMERGENCY)
Facility: HOSPITAL | Age: 65
Discharge: HOME | End: 2025-07-18
Attending: EMERGENCY MEDICINE
Payer: MEDICAID

## 2025-07-17 ENCOUNTER — APPOINTMENT (OUTPATIENT)
Dept: GENERAL RADIOLOGY | Facility: HOSPITAL | Age: 65
End: 2025-07-17
Attending: EMERGENCY MEDICINE
Payer: MEDICAID

## 2025-07-17 VITALS
SYSTOLIC BLOOD PRESSURE: 111 MMHG | RESPIRATION RATE: 18 BRPM | DIASTOLIC BLOOD PRESSURE: 62 MMHG | TEMPERATURE: 98 F | OXYGEN SATURATION: 98 % | BODY MASS INDEX: 30.53 KG/M2 | HEART RATE: 87 BPM | WEIGHT: 190 LBS | HEIGHT: 66 IN

## 2025-07-17 DIAGNOSIS — R00.2 PALPITATIONS: Primary | ICD-10-CM

## 2025-07-17 LAB
ALBUMIN SERPL-MCNC: 4.7 G/DL (ref 3.2–4.8)
ALBUMIN/GLOB SERPL: 1.7 (ref 1–2)
ALP LIVER SERPL-CCNC: 123 U/L (ref 50–130)
ALT SERPL-CCNC: 15 U/L (ref 10–49)
ANION GAP SERPL CALC-SCNC: 9 MMOL/L (ref 0–18)
AST SERPL-CCNC: 18 U/L (ref ?–34)
BASOPHILS # BLD AUTO: 0.02 X10(3) UL (ref 0–0.2)
BASOPHILS NFR BLD AUTO: 0.3 %
BILIRUB SERPL-MCNC: 0.4 MG/DL (ref 0.2–1.1)
BILIRUB UR QL: NEGATIVE
BUN BLD-MCNC: 11 MG/DL (ref 9–23)
BUN/CREAT SERPL: 13.8 (ref 10–20)
CALCIUM BLD-MCNC: 9.4 MG/DL (ref 8.7–10.4)
CHLORIDE SERPL-SCNC: 108 MMOL/L (ref 98–112)
CLARITY UR: CLEAR
CO2 SERPL-SCNC: 24 MMOL/L (ref 21–32)
COLOR UR: YELLOW
CREAT BLD-MCNC: 0.8 MG/DL (ref 0.55–1.02)
DEPRECATED RDW RBC AUTO: 47.5 FL (ref 35.1–46.3)
EGFRCR SERPLBLD CKD-EPI 2021: 82 ML/MIN/1.73M2 (ref 60–?)
EOSINOPHIL # BLD AUTO: 0.06 X10(3) UL (ref 0–0.7)
EOSINOPHIL NFR BLD AUTO: 1 %
ERYTHROCYTE [DISTWIDTH] IN BLOOD BY AUTOMATED COUNT: 16.1 % (ref 11–15)
FLUAV + FLUBV RNA SPEC NAA+PROBE: NEGATIVE
FLUAV + FLUBV RNA SPEC NAA+PROBE: NEGATIVE
GLOBULIN PLAS-MCNC: 2.8 G/DL (ref 2–3.5)
GLUCOSE BLD-MCNC: 139 MG/DL (ref 70–99)
GLUCOSE UR-MCNC: NORMAL MG/DL
HCT VFR BLD AUTO: 37.7 % (ref 35–48)
HGB BLD-MCNC: 12.1 G/DL (ref 12–16)
IMM GRANULOCYTES # BLD AUTO: 0.01 X10(3) UL (ref 0–1)
IMM GRANULOCYTES NFR BLD: 0.2 %
LEUKOCYTE ESTERASE UR QL STRIP.AUTO: 25
LIPASE SERPL-CCNC: 100 U/L (ref 12–53)
LYMPHOCYTES # BLD AUTO: 2.69 X10(3) UL (ref 1–4)
LYMPHOCYTES NFR BLD AUTO: 42.6 %
MAGNESIUM SERPL-MCNC: 1.4 MG/DL (ref 1.6–2.6)
MCH RBC QN AUTO: 26.1 PG (ref 26–34)
MCHC RBC AUTO-ENTMCNC: 32.1 G/DL (ref 31–37)
MCV RBC AUTO: 81.3 FL (ref 80–100)
MONOCYTES # BLD AUTO: 0.4 X10(3) UL (ref 0.1–1)
MONOCYTES NFR BLD AUTO: 6.3 %
NEUTROPHILS # BLD AUTO: 3.13 X10 (3) UL (ref 1.5–7.7)
NEUTROPHILS # BLD AUTO: 3.13 X10(3) UL (ref 1.5–7.7)
NEUTROPHILS NFR BLD AUTO: 49.6 %
NITRITE UR QL STRIP.AUTO: NEGATIVE
OSMOLALITY SERPL CALC.SUM OF ELEC: 294 MOSM/KG (ref 275–295)
PH UR: 6 (ref 5–8)
PLATELET # BLD AUTO: 298 10(3)UL (ref 150–450)
POTASSIUM SERPL-SCNC: 3.2 MMOL/L (ref 3.5–5.1)
PROT SERPL-MCNC: 7.5 G/DL (ref 5.7–8.2)
RBC # BLD AUTO: 4.64 X10(6)UL (ref 3.8–5.3)
RSV RNA SPEC NAA+PROBE: NEGATIVE
SARS-COV-2 RNA RESP QL NAA+PROBE: NOT DETECTED
SODIUM SERPL-SCNC: 141 MMOL/L (ref 136–145)
SP GR UR STRIP: >1.03 (ref 1–1.03)
TROPONIN I SERPL HS-MCNC: 15 NG/L (ref ?–34)
TROPONIN I SERPL HS-MCNC: 15 NG/L (ref ?–34)
TSI SER-ACNC: 2.66 UIU/ML (ref 0.55–4.78)
UROBILINOGEN UR STRIP-ACNC: 6
WBC # BLD AUTO: 6.3 X10(3) UL (ref 4–11)

## 2025-07-17 PROCEDURE — 84484 ASSAY OF TROPONIN QUANT: CPT

## 2025-07-17 PROCEDURE — 80053 COMPREHEN METABOLIC PANEL: CPT

## 2025-07-17 PROCEDURE — 99285 EMERGENCY DEPT VISIT HI MDM: CPT

## 2025-07-17 PROCEDURE — 36415 COLL VENOUS BLD VENIPUNCTURE: CPT

## 2025-07-17 PROCEDURE — 99284 EMERGENCY DEPT VISIT MOD MDM: CPT

## 2025-07-17 PROCEDURE — 85025 COMPLETE CBC W/AUTO DIFF WBC: CPT | Performed by: EMERGENCY MEDICINE

## 2025-07-17 PROCEDURE — 87086 URINE CULTURE/COLONY COUNT: CPT | Performed by: EMERGENCY MEDICINE

## 2025-07-17 PROCEDURE — 80053 COMPREHEN METABOLIC PANEL: CPT | Performed by: EMERGENCY MEDICINE

## 2025-07-17 PROCEDURE — 93005 ELECTROCARDIOGRAM TRACING: CPT

## 2025-07-17 PROCEDURE — 0241U SARS-COV-2/FLU A AND B/RSV BY PCR (GENEXPERT): CPT | Performed by: EMERGENCY MEDICINE

## 2025-07-17 PROCEDURE — 84484 ASSAY OF TROPONIN QUANT: CPT | Performed by: EMERGENCY MEDICINE

## 2025-07-17 PROCEDURE — 83735 ASSAY OF MAGNESIUM: CPT | Performed by: EMERGENCY MEDICINE

## 2025-07-17 PROCEDURE — 84443 ASSAY THYROID STIM HORMONE: CPT | Performed by: EMERGENCY MEDICINE

## 2025-07-17 PROCEDURE — 83690 ASSAY OF LIPASE: CPT | Performed by: EMERGENCY MEDICINE

## 2025-07-17 PROCEDURE — 81001 URINALYSIS AUTO W/SCOPE: CPT | Performed by: EMERGENCY MEDICINE

## 2025-07-17 PROCEDURE — 93010 ELECTROCARDIOGRAM REPORT: CPT

## 2025-07-17 PROCEDURE — 71045 X-RAY EXAM CHEST 1 VIEW: CPT | Performed by: RADIOLOGY

## 2025-07-17 PROCEDURE — 85025 COMPLETE CBC W/AUTO DIFF WBC: CPT

## 2025-07-17 RX ORDER — POTASSIUM CHLORIDE 1500 MG/1
40 TABLET, EXTENDED RELEASE ORAL ONCE
Status: COMPLETED | OUTPATIENT
Start: 2025-07-17 | End: 2025-07-17

## 2025-07-17 RX ORDER — CYCLOBENZAPRINE HCL 10 MG
10 TABLET ORAL ONCE
Status: COMPLETED | OUTPATIENT
Start: 2025-07-17 | End: 2025-07-17

## 2025-07-17 RX ORDER — MAGNESIUM OXIDE 400 MG/1
400 TABLET ORAL ONCE
Status: COMPLETED | OUTPATIENT
Start: 2025-07-17 | End: 2025-07-18

## 2025-07-17 RX ORDER — ALBUTEROL SULFATE 90 UG/1
2 INHALANT RESPIRATORY (INHALATION) EVERY 4 HOURS PRN
Qty: 8.5 G | Refills: 1 | Status: SHIPPED | OUTPATIENT
Start: 2025-07-17

## 2025-07-17 RX ORDER — CYCLOBENZAPRINE HCL 10 MG
10 TABLET ORAL 3 TIMES DAILY PRN
Qty: 20 TABLET | Refills: 0 | Status: SHIPPED | OUTPATIENT
Start: 2025-07-17 | End: 2025-07-24

## 2025-07-17 NOTE — TELEPHONE ENCOUNTER
Dr Flanagan- please sign pended order for Albuterol 108, if agreeable    Last office visit: 8/23/24  Last refill: 8/23/24

## 2025-07-18 LAB
ATRIAL RATE: 103 BPM
P AXIS: 75 DEGREES
P-R INTERVAL: 158 MS
Q-T INTERVAL: 354 MS
QRS DURATION: 84 MS
QTC CALCULATION (BEZET): 463 MS
R AXIS: 34 DEGREES
T AXIS: 66 DEGREES
VENTRICULAR RATE: 103 BPM

## 2025-07-18 NOTE — ED INITIAL ASSESSMENT (HPI)
Patient to the ED from home d/t palpitations. States that she is having mild chest tightness, epigastric pain, and palpitations for awhile now. Endorses mid back pain, and nausea as well. Also states she is intermittently short of breath. A&Ox4.

## 2025-07-18 NOTE — ED QUICK NOTES
Pt refused to wait for discharge papers and said she would look at her mychart.    Pt A&Ox 4, stable and ambulatory leaving the ER.

## 2025-07-18 NOTE — ED PROVIDER NOTES
Patient Seen in: [facility] Emergency Department        History  Chief Complaint   Patient presents with    Arrythmia/Palpitations     Stated Complaint: fever heart palpitations back pain    Subjective:   HPI          65-year-old female with history of PE on anticoagulation, COPD, diabetes, hypertension, who presents for evaluation of palpitations.  Palpitations started at about 5 PM when she was making dinner.  It would last about 30 seconds at a time, and feels like she is experiencing extra beats.  She also had some tightness under the breast.  She had similar symptoms last year and a Zio patch did not detect any significant arrhythmias.  She also had an echo yesterday.  Currently she is asymptomatic.  She also reports 2 days of fever to 101 Fahrenheit.  She has some loose watery stools which are actually improving today.  No neck symptoms, rash, cough or congestion, runny nose or sore throat, dysuria or hematuria.  No abdominal pain.  No vomiting.      Objective:     Past Medical History:    Arrhythmia    tachycardia    Asthma (HCC)    B12 deficiency    Back pain    Back problem    Calculus of kidney    Cancer (HCC)    1990- cervical    COPD (chronic obstructive pulmonary disease) (HCC)    Diabetic retinopathy (HCC)    Essential hypertension    Frequent UTI    High blood pressure    High cholesterol    History of abnormal cervical Pap smear    History of blood clots    Irregular heart beat    Muscle weakness    Neuropathy    Numbness    Other and unspecified hyperlipidemia    Palpitations    Pulmonary embolism (HCC)    Rheumatoid arthritis (HCC)    Scoliosis    Spinal stenosis    Stroke (HCC)    TIA a few years ago    TIA (transient ischemic attack)    Tuberculosis    Type II or unspecified type diabetes mellitus without mention of complication, not stated as uncontrolled    Walking troubles    Can’t walk more then a hundred feet without exsaulted              Past Surgical History:   Procedure  Laterality Date    Cholecystectomy      Colonoscopy N/A 2017    Procedure: COLONOSCOPY, POSSIBLE BIOPSY, POSSIBLE POLYPECTOMY 96766;  Surgeon: Pedro Thomas MD;  Location: [facility]    Colonoscopy N/A 2022    Procedure: COLONOSCOPY/ESOPHAGOGASTRODUODENOSCOPY (EGD);  Surgeon: Cortney Diaz MD;  Location: [facility] ENDOSCOPY    Colonoscopy & polypectomy      Laparoscopic cholecystectomy            Other      Lung biopsy    Other surgical history      Cone biopsy    Other surgical history      Cystoscopy    Removal gallbladder      Spine surgery procedure unlisted  2021    Neck and spine    Tubal ligation  1986                Social History     Socioeconomic History    Marital status: Single   Tobacco Use    Smoking status: Every Day     Current packs/day: 1.00     Average packs/day: 1 pack/day for 20.0 years (20.0 ttl pk-yrs)     Types: Cigarettes     Passive exposure: Never    Smokeless tobacco: Never    Tobacco comments:     1 pack daily   Vaping Use    Vaping status: Never Used   Substance and Sexual Activity    Alcohol use: Not Currently    Drug use: No   Other Topics Concern    Caffeine Concern No    Exercise No   Social History Narrative    The patient does not use an assistive device..      The patient does live in a home with stairs.     Social Drivers of Health      Received from [facility]    Housing Stability                                Physical Exam    ED Triage Vitals [25]   /84   Pulse 108   Resp 20   Temp 98 °F (36.7 °C)   Temp src Temporal   SpO2 99 %   O2 Device None (Room air)       Current Vitals:   Vital Signs  BP: 111/62  Pulse: 87  Resp: 18  Temp: 98 °F (36.7 °C)  Temp src: Temporal  MAP (mmHg): 77    Oxygen Therapy  SpO2: 98 %  O2 Device: None (Room air)            Physical Exam  Vitals and nursing note reviewed.   Constitutional:       Appearance: She is well-developed.   HENT:      Head: Normocephalic  and atraumatic.   Eyes:      Extraocular Movements: Extraocular movements intact.   Cardiovascular:      Rate and Rhythm: Normal rate and regular rhythm.      Heart sounds: Normal heart sounds.   Pulmonary:      Effort: Pulmonary effort is normal.      Breath sounds: Normal breath sounds.   Abdominal:      General: There is no distension.      Palpations: Abdomen is soft.      Tenderness: There is no abdominal tenderness.   Musculoskeletal:         General: Normal range of motion.      Cervical back: Normal range of motion.      Right lower leg: No edema.      Left lower leg: No edema.   Skin:     General: Skin is warm.      Capillary Refill: Capillary refill takes less than 2 seconds.   Neurological:      Mental Status: She is alert.      Comments: No focal deficits     Differential diagnosis includes but is not limited to arrhythmia, ACS/MI, viral illness, electrolyte derangement, gastroenteritis,            ED Course  Labs Reviewed   CBC WITH DIFFERENTIAL WITH PLATELET - Abnormal; Notable for the following components:       Result Value    RDW-SD 47.5 (*)     RDW 16.1 (*)     All other components within normal limits   COMP METABOLIC PANEL (14) - Abnormal; Notable for the following components:    Glucose 139 (*)     Potassium 3.2 (*)     All other components within normal limits   MAGNESIUM - Abnormal; Notable for the following components:    Magnesium 1.4 (*)     All other components within normal limits   LIPASE - Abnormal; Notable for the following components:    Lipase 100 (*)     All other components within normal limits   URINALYSIS WITH CULTURE REFLEX - Abnormal; Notable for the following components:    Spec Gravity >1.030 (*)     Ketones Urine Trace (*)     Blood Urine 1+ (*)     Protein Urine Trace (*)     Urobilinogen Urine 6 (*)     Leukocyte Esterase Urine 25 (*)     RBC Urine 6-10 (*)     Squamous Epi. Cells Few (*)     Ca Oxalate Crystals Few (*)     All other components within normal limits    TROPONIN I HIGH SENSITIVITY - Normal   TSH W REFLEX TO FREE T4 - Normal   TROPONIN I HIGH SENSITIVITY - Normal   SARS-COV-2/FLU A AND B/RSV BY PCR (GENEXPERT) - Normal    Narrative:     This test is intended for the qualitative detection and differentiation of SARS-CoV-2, influenza A, influenza B, and respiratory syncytial virus (RSV) viral RNA in nasopharyngeal or nares swabs from individuals suspected of respiratory viral infection consistent with COVID-19 by their healthcare provider. Signs and symptoms of respiratory viral infection due to SARS-CoV-2, influenza, and RSV can be similar.    Test performed using the Xpert Xpress SARS-CoV-2/FLU/RSV (real time RT-PCR)  assay on the [employer] instrument, [employer], [place] [address].   This test is being used under the Food and Drug Administration's Emergency Use Authorization.    The authorized Fact Sheet for Healthcare Providers for this assay is available upon request from the laboratory.   URINE CULTURE, ROUTINE     EKG    Rate, intervals and axes as noted on EKG Report.  Rate: 103  Rhythm: Sinus Rhythm  Reading: No STEMI, no ectopy            Preliminary Radiology Report  [facility], [facility]  [phone] - Phone    [facility]    NAME: JUAN QUISPE    DATE OF EXAM: 07/17/2025  Patient No:  [id number]  Physician:  MAUREEN^CAMMY^^^  YOB: 1960    Past Medical History (entered by Technologist):    Reason For Exam (entered by Technologist):  Palpitations, chest pain, and intermittent shortness of breath.  Other Notes (entered by Technologist): POD 2, ROOM 28    Additional Information (per Vision Radiologist):            CHEST RADIOGRAPH(S)    COMPARISON: 7/27/2024      IMPRESSION:  No focal lung consolidation.  No pneumothorax or pleural effusion.  Normal cardiomediastinal silhouette.  No acute osseous abnormality.      Report finalized on 07/18/25 at 12:29 AM ET.      Dr. Felicita Yañez MD    CT ABDOMEN+PELVIS(CONTRAST  ONLY)(CPT=74177)  Result Date: 2025  CONCLUSION: No acute finding. A 4.9 cm right ovarian cyst measured 3.6 cm 2022. Follow-up ultrasound nonemergent ultrasound recommended. Electronically Verified and Signed by Attending Radiologist: Jayden Alegria MD 2025 3:24 PM Workstation: [id number]    CT CHEST PE AORTA (IV ONLY) (CPT=71260)  Result Date: 2025  CONCLUSION: No acute pulmonary embolism to the first subsegmental level. Diffusely enlarged thyroid gland. Correlate with clinical/laboratory signs of thyroid dysfunction. Lesser incidental findings as above. Electronically Verified and Signed by Attending Radiologist: Lauren Coppola MD 2025 3:17 PM Workstation: [id number]      Narrative  Transthoracic Echocardiogram    Name:Di Puga    Date: 2025 :  1960 Ht:  (66in)  BP: 110 / 80  MRN:  [id number]    Age:  65years    Wt:  (189lb) HR: 93bpm  Loc:  [facility]       Gndr: F          BSA: 1.95m^2  Sonographer: Hayder HSIEH, RVT    Ordering:    Rene Price    Referring:   Rene Price    ----------------------------------------------------------------------------  History/Indications:  Palpitations. Abnormal EKG.    ----------------------------------------------------------------------------  Procedure information:  A transthoracic complete 2D study was performed.  Additional evaluation included M-mode, complete spectral Doppler, and color  Doppler.  Patient status:  Outpatient.  Location:  Echo laboratory.  Comparison was made to the study of 2021.    This was a routine study.  Transthoracic echocardiography for ventricular function evaluation and  assessment of valvular function. Image quality was adequate.    ECG rhythm:   Normal sinus    ----------------------------------------------------------------------------    Conclusions:    1. Left ventricle: The cavity size was normal. Wall thickness was mildly     increased. Systolic function was normal. The estimated  ejection fraction     was 55-60%. No diagnostic evidence for regional wall motion     abnormalities. Left ventricular diastolic function cannot be assessed due     to E-A fusion.  2. Right ventricle: The cavity size was at the upper limits of normal.     Systolic function was normal. There is hypercontractility of the apical     wall that can be a variant of Wolff's sign seen in pulmonary embolism     - recommend clinical correlation.  3. Left atrium: The atrium was normal in size. The left atrial volume was     normal.  4. Right atrium: The atrium was normal in size.  5. Ascending aorta: The ascending aorta was upper limits of normal at 3.6cm     diameter.  6. Pulmonary arteries: Systolic pressure was within the normal range,     estimated to be 25mm Hg.  7. Inferior vena cava: The IVC was normal-sized.  *    ----------------------------------------------------------------------------  *  Findings:  Left ventricle:  The cavity size was normal. Wall thickness was mildly  increased. Systolic function was normal. The estimated ejection fraction was  55-60%. No diagnostic evidence for regional wall motion abnormalities. Left  ventricular diastolic function cannot be assessed due to E-A fusion.  Left atrium:  The atrium was normal in size. The left atrial volume was  normal.  Right ventricle:  The cavity size was at the upper limits of normal.  Systolic function was normal. There is hypercontractility of the apical wall  that can be a variant of Wolff's sign seen in pulmonary embolism -  recommend clinical correlation.  Right atrium:  The atrium was normal in size.  Mitral valve:  The valve was structurally normal. Leaflet separation was  normal.  Doppler:  Transvalvular velocity was within the normal range. There  was no evidence for stenosis. There was no significant regurgitation.  Aortic valve:  The valve was structurally normal. The valve was trileaflet.  Cusp separation was normal.  Doppler:  Transvalvular  velocity was within the  normal range. There was no evidence for stenosis. There was no significant  regurgitation.  Tricuspid valve:  The valve is structurally normal. Leaflet separation was  normal.  Doppler:  Transvalvular velocity was within the normal range. There  was no evidence for stenosis. There was mild regurgitation.  Pulmonic valve:   Not well visualized. Cusp separation was normal.  Doppler:   Transvalvular velocity was within the normal range. There was no evidence  for stenosis. There was no significant regurgitation.  Pericardium:   There was no pericardial effusion.  Aorta:  Ascending aorta: The ascending aorta was upper limits of normal at 3.6cm  diameter.  Pulmonary arteries:  Systolic pressure was within the normal range, estimated to be 25mm Hg.  Systemic veins:  Central venous respirophasic diameter changes are in the  normal range (>50%).  Inferior vena cava: The IVC was normal-sized.    ----------------------------------------------------------------------------  Measurements     Left ventricle               Value             Ref   IVS thickness, ED, PLAX  (H) 1.1   cm          0.6 - 0.9   LV ID, ED, PLAX              4.8   cm          3.8 - 5.2   LV ID, ES, PLAX              3.3   cm          2.2 - 3.5   LV PW thickness, ED,     (H) 1.0   cm          0.6 - 0.9   PLAX   IVS/LV PW ratio, ED,         1.10              ---------   PLAX   LV PW/LV ID ratio, ED,       0.21              ---------   PLAX   Stroke volume/bsa, 2D        31    ml/m^2      ---------   LV end-diastolic volume,     68    ml          48 - 140   1-p A4C   LV ejection fraction,        54    %           46 - 78   1-p A4C   Stroke volume, 1-p A4C       37    ml          ---------   LV end-diastolic             35    ml/m^2      30 - 82   volume/bsa, 1-p A4C   Stroke volume/bsa, 1-p       19    ml/m^2      ---------   A4C   LV end-diastolic volume,     65    ml          46 - 106   2-p   LV end-systolic volume,      30    ml           14 - 42   2-p   LV ejection fraction,        54    %           54 - 74   2-p   Stroke volume, 2-p           35    ml          ---------   LV end-diastolic             33    ml/m^2      29 - 61   volume/bsa, 2-p   LV end-systolic              15    ml/m^2      8 - 24   volume/bsa, 2-p   Stroke volume/bsa, 2-p       18.1  ml/m^2      ---------   LV e', lateral           (L) 7.0   cm/sec      >=10.0   LV E/e', lateral             8                 <=13   LV e', medial            (L) 5.5   cm/sec      >=7.0   LV E/e', medial              10                ---------   LV e', average               6.2   cm/sec      ---------   LV E/e', average             9                 <=14     LVOT                         Value             Ref   LVOT ID                      2.4   cm          ---------   LVOT peak velocity, S        0.74  m/sec       ---------   LVOT VTI, S                  13.5  cm          ---------   LVOT peak gradient, S        2     mm Hg       ---------   LVOT mean gradient, S        1     mm Hg       ---------   Stroke volume (SV), LVOT     61    ml          ---------   DP   Cardiac output (Qs),         5.7   L/min       ---------   LVOT DP   Cardiac index (Qs/bsa),      2.9   L/(min-m^2) ---------   LVOT DP   Stroke index (SV/bsa),       31    ml/m^2      ---------   LVOT DP     Aortic root                  Value             Ref   Aortic root ID               3.6   cm          2.6 - 4.1     Ascending aorta              Value             Ref   Ascending aorta ID       (H) 3.6   cm          1.9 - 3.5     Left atrium                  Value             Ref   LA ID, A-P, ES           (L) 2.6   cm          2.7 - 3.8   LA volume, S                 44    ml          22 - 52   LA volume/bsa, S             23    ml/m^2      16 - 34   LA volume, ES, 1-p A4C       40    ml          22 - 52   LA volume, ES, 1-p A2C       48    ml          22 - 52   LA volume, ES, A/L           48    ml          ---------   LA  volume/bsa, ES, A/L       25    ml/m^2      16 - 34   LA/aortic root ratio         0.72              ---------     Mitral valve                 Value             Ref   Mitral E-wave peak           0.54  m/sec       ---------   velocity   Mitral A-wave peak           0.94  m/sec       ---------   velocity   Mitral deceleration time     140   ms          ---------   Mitral E/A ratio, peak       0.6               ---------     Pulmonary artery             Value             Ref   PA pressure, S, DP           25    mm Hg       ---------     Tricuspid valve              Value             Ref   Tricuspid regurg peak        2.37  m/sec       <=2.8   velocity   Tricuspid peak RV-RA         22    mm Hg       ---------   gradient     Systemic veins               Value             Ref   Estimated CVP                3     mm Hg       ---------     Inferior vena cava           Value             Ref   ID                           0.8   cm          <=2.1     Right ventricle              Value             Ref   TAPSE, MM                    1.75  cm          >=1.70   RV pressure, S, DP           25    mm Hg       ---------   RV s', lateral               11.6  cm/sec      >=9.5  Legend:  (L)  and  (H)  maggie values outside specified reference range.    ----------------------------------------------------------------------------    Prepared and electronically signed by  Titus Arce  07/16/2025 21:45                [facility]             Medical Decision Making  Vital signs stable throughout the ED stay.  No arrhythmias noted on duration of telemetry monitoring.  EKG without acute ischemia or arrhythmia.  CMP with mild hypokalemia and hypomagnesemia which were repleted orally.  CBC unremarkable.  Viral testing negative for COVID-19, influenza and RSV.  2 troponins within normal limits and acute coronary syndrome felt to be unlikely.  Urinalysis with squamous contamination and in the absence of LUTS would defer antibiotics until culture  results.  Per my independent interpretation of chest x-ray, no clear pneumonia.  Advise continue follow-up with her cardiologist for further evaluation of her palpitations, she does have a event monitor scheduled for next month.  Return precautions provided and discussed and she is comfortable with the plan.    PE considered though felt to be unlikely as patient is on therapeutic anticoagulation, also had CT chest on 7/12/2025 which was negative for PE.    Problems Addressed:  Palpitations: complicated acute illness or injury with systemic symptoms    Amount and/or Complexity of Data Reviewed  Labs: ordered. Decision-making details documented in ED Course.  Radiology: ordered and independent interpretation performed. Decision-making details documented in ED Course.  ECG/medicine tests: ordered and independent interpretation performed. Decision-making details documented in ED Course.    Risk  Prescription drug management.  Risk Details: Flexeril refill prescribed        Disposition and Plan     Clinical Impression:  1. Palpitations         Disposition:  Discharge  7/17/2025 11:50 pm    Follow-up:  Rene Price MD  [address] [place] [id number]  [phone]    Follow up            Medications Prescribed:  Current Discharge Medication List        START taking these medications    Details   !! cyclobenzaprine 10 MG Oral Tab Take 1 tablet (10 mg total) by mouth 3 (three) times daily as needed for Muscle spasms.  Qty: 20 tablet, Refills: 0       !! - Potential duplicate medications found. Please discuss with provider.                Supplementary Documentation:

## 2025-07-18 NOTE — ED QUICK NOTES
Assumed care of patient @ this time - patient arrived to ED with main c.o. palpitations - patient confirms she has been having epigastric pain with chest discomfort for quite some time - confirms she is nauseous and has intermittent SOB and back pain intermittently. Patient breathing non-labored on RA + pt hemodynamically stable @ this time.

## 2025-07-24 ENCOUNTER — OFFICE VISIT (OUTPATIENT)
Dept: INTERNAL MEDICINE CLINIC | Facility: CLINIC | Age: 65
End: 2025-07-24
Payer: MEDICAID

## 2025-07-24 VITALS
BODY MASS INDEX: 30.53 KG/M2 | SYSTOLIC BLOOD PRESSURE: 124 MMHG | OXYGEN SATURATION: 98 % | RESPIRATION RATE: 18 BRPM | TEMPERATURE: 98 F | HEIGHT: 66 IN | HEART RATE: 78 BPM | DIASTOLIC BLOOD PRESSURE: 76 MMHG | WEIGHT: 190 LBS

## 2025-07-24 DIAGNOSIS — E11.40 TYPE 2 DIABETES MELLITUS WITH DIABETIC NEUROPATHY, WITHOUT LONG-TERM CURRENT USE OF INSULIN (HCC): ICD-10-CM

## 2025-07-24 DIAGNOSIS — Z09 ENCOUNTER FOR EXAMINATION FOLLOWING TREATMENT AT HOSPITAL: Primary | ICD-10-CM

## 2025-07-24 DIAGNOSIS — Z00.00 ENCOUNTER FOR PREVENTIVE CARE: ICD-10-CM

## 2025-07-24 DIAGNOSIS — Z59.41 FOOD INSECURITY: ICD-10-CM

## 2025-07-24 DIAGNOSIS — Z59.10 INADEQUATE HOUSING, UNSPECIFIED: ICD-10-CM

## 2025-07-24 PROCEDURE — 99214 OFFICE O/P EST MOD 30 MIN: CPT | Performed by: INTERNAL MEDICINE

## 2025-07-24 SDOH — ECONOMIC STABILITY - HOUSING INSECURITY: INADEQUATE HOUSING UNSPECIFIED: Z59.10

## 2025-07-24 SDOH — ECONOMIC STABILITY - FOOD INSECURITY: FOOD INSECURITY: Z59.41

## 2025-07-24 NOTE — PROGRESS NOTES
Subjective:     Patient ID: Di Puga is a 65 year old female.    Patient comes in today for follow-up she has been ER with complaint of palpitation and fever workup was negative  Feeling better she has appointment with cardiology next month also for Holter monitor she had a chest x-ray CT in ER everything was okay recent echo results noted will follow-up with cardiology    Had some left knee pain and swelling over the weekend but that is resolved now        History/Other:   Review of Systems   Constitutional: Negative.    HENT: Negative.     Eyes: Negative.    Respiratory: Negative.     Cardiovascular: Negative.    Gastrointestinal: Negative.    Genitourinary: Negative.    Musculoskeletal: Negative.    Skin: Negative.    Neurological: Negative.    Psychiatric/Behavioral: Negative.       Current Medications[1]  Allergies:Allergies[2]    Past Medical History[3]   Past Surgical History[4]   Family History[5]   Social History: Short Social Hx on File[6]     Objective:   Physical Exam  Vitals and nursing note reviewed.   Constitutional:       Appearance: She is well-developed.   HENT:      Head: Normocephalic and atraumatic.      Right Ear: External ear normal.      Left Ear: External ear normal.      Nose: Nose normal.   Eyes:      Conjunctiva/sclera: Conjunctivae normal.      Pupils: Pupils are equal, round, and reactive to light.   Cardiovascular:      Rate and Rhythm: Normal rate and regular rhythm.      Heart sounds: Normal heart sounds.   Pulmonary:      Effort: Pulmonary effort is normal.      Breath sounds: Normal breath sounds.   Abdominal:      General: Bowel sounds are normal.      Palpations: Abdomen is soft.   Genitourinary:     Vagina: Normal.   Musculoskeletal:         General: Normal range of motion.      Cervical back: Normal range of motion and neck supple.   Skin:     General: Skin is warm and dry.   Neurological:      Mental Status: She is alert and oriented to person, place, and time.       Deep Tendon Reflexes: Reflexes are normal and symmetric.   Psychiatric:         Behavior: Behavior normal.         Thought Content: Thought content normal.         Judgment: Judgment normal.         Assessment & Plan:   1. Encounter for examination following treatment at hospital doing better overall workup in ER noted   2. Food insecurity we will have  contact patient   3. Inadequate housing, unspecified as above   4. Encounter for preventive care refer to ob for Pap smear   5. Type 2 diabetes mellitus with diabetic neuropathy, without long-term current use of insulin (HCC) will refer to different ophthalmologist        No orders of the defined types were placed in this encounter.      Meds This Visit:  Requested Prescriptions      No prescriptions requested or ordered in this encounter       Imaging & Referrals:  OPHTHALMOLOGY - INTERNAL  OBG - INTERNAL            [1]   Current Outpatient Medications   Medication Sig Dispense Refill    ALBUTEROL 108 (90 Base) MCG/ACT Inhalation Aero Soln INHALE 2 PUFFS INTO THE LUNGS EVERY 4 HOURS AS NEEDED 8.5 g 1    cyclobenzaprine 10 MG Oral Tab Take 1 tablet (10 mg total) by mouth 3 (three) times daily as needed for Muscle spasms. 20 tablet 0    methylPREDNISolone (MEDROL) 4 MG Oral Tablet Therapy Pack As directed. 1 each 0    atorvastatin 80 MG Oral Tab Take 1 tablet (80 mg total) by mouth nightly. 90 tablet 3    amLODIPine 5 MG Oral Tab Take 1 tablet (5 mg total) by mouth daily. 90 tablet 3    metoprolol succinate ER 25 MG Oral Tablet 24 Hr Take 1 tablet (25 mg total) by mouth daily. 90 tablet 3    Continuous Glucose Sensor (FREESTYLE WILFRED 3 PLUS SENSOR) Does not apply Misc 1 each every 15 (fifteen) days. Dx: E11.9 6 each 3    Dulaglutide (TRULICITY) 4.5 MG/0.5ML Subcutaneous Solution Auto-injector Inject 4.5 mg into the skin once a week. 6 mL 3    ergocalciferol 1.25 MG (39405 UT) Oral Cap Take 1 capsule (50,000 Units total) by mouth once a week. 12 capsule 0     glipiZIDE 10 MG Oral Tab Take 1 tablet (10 mg total) by mouth 2 (two) times daily before meals. 180 tablet 1    ONETOUCH VERIO In Vitro Strip TEST TWICE DAILY. 200 strip 1    Insulin Pen Needle (TRUEPLUS 5-BEVEL PEN NEEDLES) 32G X 4 MM Does not apply Misc USE DAILY 100 each 1    apixaban (ELIQUIS) 5 MG Oral Tab Take 1 tablet (5 mg total) by mouth 2 (two) times daily. 180 tablet 3    Blood Glucose Monitoring Suppl (ONETOUCH VERIO) w/Device Does not apply Kit 1 each in the morning, at noon, in the evening, and at bedtime. Dx: E11.9 1 kit 0    metFORMIN  MG Oral Tablet 24 Hr Take 1 tablet (500 mg total) by mouth 2 (two) times daily with meals. 360 tablet 3    Mometasone Furo-Formoterol Fum (DULERA) 100-5 MCG/ACT Inhalation Aerosol Inhale 1 puff into the lungs in the morning and 1 puff before bedtime. 1 each 2    nicotine 21 MG/24HR Transdermal Patch 24 Hr Apply 1 patch (21mg) daily for 2 weeks, then apply 1 patch (14mg) daily for 2 weeks, then apply 1 patch (7mg) daily for 2 weeks 14 patch 0    metoprolol succinate ER 50 MG Oral Tablet 24 Hr Take 1 tablet (50 mg total) by mouth daily. 90 tablet 1    alum-mag hydroxide-simethicone 200-200-20 MG/5ML Oral Suspension Take 10 mL by mouth 4 (four) times daily as needed for Indigestion. 355 mL 0    diphenhydrAMINE HCl 25 MG Oral Tab Take 1 tablet (25 mg total) by mouth every 6 (six) hours as needed for Itching.      EPINEPHrine (EPIPEN 2-ROBERTH) 0.3 MG/0.3ML Injection Solution Auto-injector Inject IM in event of  allergic reaction 1 each 0    levocetirizine 5 MG Oral Tab Take 1 tablet (5 mg total) by mouth every evening. 90 tablet 1    OneTouch Delica Lancets 33G Does not apply Misc Use to check blood sugar two times a day 200 each 1    Blood Glucose Monitoring Suppl (ONETOUCH ULTRA SYSTEM) W/DEVICE Does not apply Kit Test sugar twice daily 1 kit 0   [2]   Allergies  Allergen Reactions    Ace Inhibitors ANAPHYLAXIS, ANGIOEDEMA and SWELLING     April 2020, hospitalized  at [facility] in [place] with tongue swelling and throat closing sensation, on lisinopril at the time. Not intubated      Egg White (Diagnostic) NAUSEA ONLY and Tightness in Throat    Fish-Derived Products SWELLING    Lisinopril ANAPHYLAXIS    Peanuts SWELLING    Shrimp ITCHING, NAUSEA AND VOMITING, Tightness in Throat and WHEEZING    Tomato SWELLING and UNKNOWN    Tomatoes RASH   [3]   Past Medical History:   Arrhythmia    tachycardia    Asthma (HCC)    B12 deficiency    Back pain    Back problem    Calculus of kidney    Cancer (HCC)    - cervical    COPD (chronic obstructive pulmonary disease) (HCC)    Diabetic retinopathy (HCC)    Essential hypertension    Frequent UTI    High blood pressure    High cholesterol    History of abnormal cervical Pap smear    History of blood clots    Irregular heart beat    Muscle weakness    Neuropathy    Numbness    Other and unspecified hyperlipidemia    Palpitations    Pulmonary embolism (HCC)    Rheumatoid arthritis (HCC)    Scoliosis    Spinal stenosis    Stroke (HCC)    TIA a few years ago    TIA (transient ischemic attack)    Tuberculosis    Type II or unspecified type diabetes mellitus without mention of complication, not stated as uncontrolled    Walking troubles    Can’t walk more then a hundred feet without exsaulted   [4]   Past Surgical History:  Procedure Laterality Date    Cholecystectomy      Colonoscopy N/A 2017    Procedure: COLONOSCOPY, POSSIBLE BIOPSY, POSSIBLE POLYPECTOMY 35760;  Surgeon: Pedro Thomas MD;  Location: [facility]    Colonoscopy N/A 2022    Procedure: COLONOSCOPY/ESOPHAGOGASTRODUODENOSCOPY (EGD);  Surgeon: Cortney Diaz MD;  Location: [facility] ENDOSCOPY    Colonoscopy & polypectomy      Laparoscopic cholecystectomy            Other      Lung biopsy    Other surgical history      Cone biopsy    Other surgical history      Cystoscopy    Removal gallbladder      Spine surgery procedure unlisted  2021     Neck and spine    Tubal ligation  1986   [5]   Family History  Problem Relation Age of Onset    Cancer Self         cervical ca    Diabetes Mother     Lung Disorder Mother         Emphysema    Heart Disease Mother         Lisa    Asthma Mother     Cancer Father 51        Lung    Diabetes Sister     Thyroid Disorder Sister     Glaucoma Sister     Stroke Sister     Asthma Sister     Hypertension Sister     Asthma Sister     Prostate Cancer Brother     Diabetes Brother     Diabetes Brother     Cancer Brother         Prostate cancer, copd    Diabetes Brother     Glaucoma Maternal Grandmother     Heart Disease Maternal Grandmother     Ovarian Cancer Maternal Grandmother 59    Cataracts Niece     Breast Cancer Niece 48    Macular degeneration Neg     Pancreatic Cancer Neg    [6]   Social History  Socioeconomic History    Marital status: Single   Tobacco Use    Smoking status: Every Day     Current packs/day: 1.00     Average packs/day: 1 pack/day for 20.0 years (20.0 ttl pk-yrs)     Types: Cigarettes     Passive exposure: Never    Smokeless tobacco: Never    Tobacco comments:     1 pack daily   Vaping Use    Vaping status: Never Used   Substance and Sexual Activity    Alcohol use: Not Currently    Drug use: No   Other Topics Concern    Caffeine Concern No    Exercise No   Social History Narrative    The patient does not use an assistive device..      The patient does live in a home with stairs.     Social Drivers of Health     Food Insecurity: Food Insecurity Present (7/24/2025)    NCSS - Food Insecurity     Worried About Running Out of Food in the Last Year: Yes     Ran Out of Food in the Last Year: Yes   Transportation Needs: No Transportation Needs (7/24/2025)    NCSS - Transportation     Lack of Transportation: No   Housing Stability: At Risk (7/24/2025)    NCSS - Housing/Utilities     Has Housing: Yes     Worried About Losing Housing: Yes     Unable to Get Utilities: No

## 2025-07-28 ENCOUNTER — PATIENT OUTREACH (OUTPATIENT)
Age: 65
End: 2025-07-28

## 2025-07-28 NOTE — PROGRESS NOTES
07/28/25 0954   Call Details   Call Attempt # 1   SDOH Domain(s) with needs Housing Instability   SDOH Free Text Details   Housing Instability Details Patient shares interest in senior housing.   Resources Provided   Resources Provided [facility]   Outreach Outcome   SDOH Overall Outreach Outcome Complete     Spoke with patient and shares interest in senior housing. Patient shares no current concerns at this time, just exploring options. Advised to call office back if needed.

## 2025-08-05 ENCOUNTER — HOSPITAL ENCOUNTER (EMERGENCY)
Facility: HOSPITAL | Age: 65
Discharge: HOME | End: 2025-08-05
Attending: EMERGENCY MEDICINE

## 2025-08-05 ENCOUNTER — APPOINTMENT (OUTPATIENT)
Dept: GENERAL RADIOLOGY | Facility: HOSPITAL | Age: 65
End: 2025-08-05

## 2025-08-05 VITALS
SYSTOLIC BLOOD PRESSURE: 111 MMHG | WEIGHT: 198 LBS | RESPIRATION RATE: 24 BRPM | OXYGEN SATURATION: 99 % | BODY MASS INDEX: 32 KG/M2 | DIASTOLIC BLOOD PRESSURE: 83 MMHG | TEMPERATURE: 98 F | HEART RATE: 97 BPM

## 2025-08-05 DIAGNOSIS — J44.1 COPD EXACERBATION (HCC): Primary | ICD-10-CM

## 2025-08-05 LAB
ALBUMIN SERPL-MCNC: 4.7 G/DL (ref 3.2–4.8)
ALBUMIN/GLOB SERPL: 1.6 (ref 1–2)
ALP LIVER SERPL-CCNC: 103 U/L (ref 50–130)
ALT SERPL-CCNC: 12 U/L (ref 10–49)
ANION GAP SERPL CALC-SCNC: 6 MMOL/L (ref 0–18)
AST SERPL-CCNC: 17 U/L (ref ?–34)
ATRIAL RATE: 115 BPM
BASOPHILS # BLD AUTO: 0.02 X10(3) UL (ref 0–0.2)
BASOPHILS NFR BLD AUTO: 0.4 %
BILIRUB SERPL-MCNC: 0.6 MG/DL (ref 0.2–1.1)
BUN BLD-MCNC: 8 MG/DL (ref 9–23)
BUN/CREAT SERPL: 12.7 (ref 10–20)
CALCIUM BLD-MCNC: 9.6 MG/DL (ref 8.7–10.4)
CHLORIDE SERPL-SCNC: 110 MMOL/L (ref 98–112)
CO2 SERPL-SCNC: 25 MMOL/L (ref 21–32)
CREAT BLD-MCNC: 0.63 MG/DL (ref 0.55–1.02)
D DIMER PPP FEU-MCNC: <0.27 UG/ML FEU (ref ?–0.64)
DEPRECATED RDW RBC AUTO: 49.3 FL (ref 35.1–46.3)
EGFRCR SERPLBLD CKD-EPI 2021: 99 ML/MIN/1.73M2 (ref 60–?)
EOSINOPHIL # BLD AUTO: 0.06 X10(3) UL (ref 0–0.7)
EOSINOPHIL NFR BLD AUTO: 1.2 %
ERYTHROCYTE [DISTWIDTH] IN BLOOD BY AUTOMATED COUNT: 16.4 % (ref 11–15)
GLOBULIN PLAS-MCNC: 2.9 G/DL (ref 2–3.5)
GLUCOSE BLD-MCNC: 84 MG/DL (ref 70–99)
HCT VFR BLD AUTO: 38.3 % (ref 35–48)
HGB BLD-MCNC: 12.3 G/DL (ref 12–16)
IMM GRANULOCYTES # BLD AUTO: 0 X10(3) UL (ref 0–1)
IMM GRANULOCYTES NFR BLD: 0 %
LYMPHOCYTES # BLD AUTO: 2.08 X10(3) UL (ref 1–4)
LYMPHOCYTES NFR BLD AUTO: 40.2 %
MCH RBC QN AUTO: 26.6 PG (ref 26–34)
MCHC RBC AUTO-ENTMCNC: 32.1 G/DL (ref 31–37)
MCV RBC AUTO: 82.9 FL (ref 80–100)
MONOCYTES # BLD AUTO: 0.27 X10(3) UL (ref 0.1–1)
MONOCYTES NFR BLD AUTO: 5.2 %
NEUTROPHILS # BLD AUTO: 2.75 X10 (3) UL (ref 1.5–7.7)
NEUTROPHILS # BLD AUTO: 2.75 X10(3) UL (ref 1.5–7.7)
NEUTROPHILS NFR BLD AUTO: 53 %
OSMOLALITY SERPL CALC.SUM OF ELEC: 290 MOSM/KG (ref 275–295)
P AXIS: 57 DEGREES
P-R INTERVAL: 160 MS
PLATELET # BLD AUTO: 293 10(3)UL (ref 150–450)
POTASSIUM SERPL-SCNC: 3.7 MMOL/L (ref 3.5–5.1)
PROT SERPL-MCNC: 7.6 G/DL (ref 5.7–8.2)
Q-T INTERVAL: 340 MS
QRS DURATION: 76 MS
QTC CALCULATION (BEZET): 470 MS
R AXIS: -1 DEGREES
RBC # BLD AUTO: 4.62 X10(6)UL (ref 3.8–5.3)
SODIUM SERPL-SCNC: 141 MMOL/L (ref 136–145)
T AXIS: 55 DEGREES
TROPONIN I SERPL HS-MCNC: 16 NG/L (ref ?–34)
VENTRICULAR RATE: 115 BPM
WBC # BLD AUTO: 5.2 X10(3) UL (ref 4–11)

## 2025-08-05 PROCEDURE — 71045 X-RAY EXAM CHEST 1 VIEW: CPT

## 2025-08-05 PROCEDURE — 96374 THER/PROPH/DIAG INJ IV PUSH: CPT

## 2025-08-05 PROCEDURE — 85025 COMPLETE CBC W/AUTO DIFF WBC: CPT | Performed by: EMERGENCY MEDICINE

## 2025-08-05 PROCEDURE — 99285 EMERGENCY DEPT VISIT HI MDM: CPT

## 2025-08-05 PROCEDURE — 84484 ASSAY OF TROPONIN QUANT: CPT | Performed by: EMERGENCY MEDICINE

## 2025-08-05 PROCEDURE — 85379 FIBRIN DEGRADATION QUANT: CPT | Performed by: EMERGENCY MEDICINE

## 2025-08-05 PROCEDURE — 93005 ELECTROCARDIOGRAM TRACING: CPT

## 2025-08-05 PROCEDURE — 93010 ELECTROCARDIOGRAM REPORT: CPT

## 2025-08-05 PROCEDURE — 80053 COMPREHEN METABOLIC PANEL: CPT | Performed by: EMERGENCY MEDICINE

## 2025-08-05 RX ORDER — METHYLPREDNISOLONE 4 MG/1
TABLET ORAL
Qty: 1 EACH | Refills: 0 | Status: SHIPPED | OUTPATIENT
Start: 2025-08-05

## 2025-08-05 RX ORDER — DOXYCYCLINE 100 MG/1
100 CAPSULE ORAL 2 TIMES DAILY
Qty: 14 CAPSULE | Refills: 0 | Status: SHIPPED | OUTPATIENT
Start: 2025-08-05 | End: 2025-08-12

## 2025-08-05 RX ORDER — METHYLPREDNISOLONE SODIUM SUCCINATE 125 MG/2ML
60 INJECTION INTRAMUSCULAR; INTRAVENOUS ONCE
Status: COMPLETED | OUTPATIENT
Start: 2025-08-05 | End: 2025-08-05

## 2025-08-14 ENCOUNTER — HOSPITAL ENCOUNTER (OUTPATIENT)
Dept: ULTRASOUND IMAGING | Facility: HOSPITAL | Age: 65
Discharge: HOME | End: 2025-08-14
Attending: INTERNAL MEDICINE

## 2025-08-14 DIAGNOSIS — R10.11 RUQ ABDOMINAL PAIN: ICD-10-CM

## 2025-08-14 PROCEDURE — 76705 ECHO EXAM OF ABDOMEN: CPT | Performed by: INTERNAL MEDICINE

## 2025-08-23 ENCOUNTER — TELEPHONE (OUTPATIENT)
Dept: ENDOCRINOLOGY CLINIC | Facility: CLINIC | Age: 65
End: 2025-08-23

## 2025-08-23 DIAGNOSIS — E11.9 TYPE 2 DIABETES MELLITUS WITHOUT RETINOPATHY (HCC): ICD-10-CM

## 2025-08-23 RX ORDER — HYDROCHLOROTHIAZIDE 12.5 MG/1
1 CAPSULE ORAL
Qty: 6 EACH | Refills: 3 | Status: SHIPPED | OUTPATIENT
Start: 2025-08-23

## (undated) DIAGNOSIS — M47.816 LUMBAR FACET JOINT SYNDROME: Primary | ICD-10-CM

## (undated) DIAGNOSIS — M62.81 MUSCLE WEAKNESS ON EXAMINATION: ICD-10-CM

## (undated) DIAGNOSIS — M54.50 LUMBAR PAIN: ICD-10-CM

## (undated) DIAGNOSIS — R31.9 HEMATURIA, UNSPECIFIED TYPE: Primary | ICD-10-CM

## (undated) DIAGNOSIS — R30.0 DYSURIA: Primary | ICD-10-CM

## (undated) DIAGNOSIS — G95.19 NEUROGENIC CLAUDICATION (HCC): ICD-10-CM

## (undated) DIAGNOSIS — E11.9 TYPE 2 DIABETES MELLITUS WITHOUT RETINOPATHY (HCC): Primary | ICD-10-CM

## (undated) DIAGNOSIS — R29.898 LEG WEAKNESS, BILATERAL: ICD-10-CM

## (undated) DIAGNOSIS — Z98.1 S/P CERVICAL SPINAL FUSION: Primary | ICD-10-CM

## (undated) DIAGNOSIS — E78.2 MIXED HYPERLIPIDEMIA: ICD-10-CM

## (undated) DIAGNOSIS — E11.9 TYPE 2 DIABETES MELLITUS WITHOUT RETINOPATHY (HCC): ICD-10-CM

## (undated) DEVICE — FORCEP RADIAL JAW 4

## (undated) DEVICE — 3.0MM ROUND FLUTED AGGRESSIVE

## (undated) DEVICE — MEDI-VAC NON-CONDUCTIVE SUCTION TUBING 6MM X 1.8M (6FT.) L: Brand: CARDINAL HEALTH

## (undated) DEVICE — KIT CLEAN ENDOKIT 1.1OZ GOWNX2

## (undated) DEVICE — KIT ENDO ORCAPOD 160/180/190

## (undated) DEVICE — UNDYED BRAIDED (POLYGLACTIN 910), SYNTHETIC ABSORBABLE SUTURE: Brand: COATED VICRYL

## (undated) DEVICE — 35 ML SYRINGE REGULAR TIP: Brand: MONOJECT

## (undated) DEVICE — DRAPE,THYROID,SOFT,STERILE: Brand: MEDLINE

## (undated) DEVICE — #15 STERILE STAINLESS BLADE: Brand: STERILE STAINLESS BLADES

## (undated) DEVICE — LIGHT HANDLE

## (undated) DEVICE — KENDALL SCD EXPRESS SLEEVES, KNEE LENGTH, MEDIUM: Brand: KENDALL SCD

## (undated) DEVICE — LINE MNTR ADLT SET O2 INTMD

## (undated) DEVICE — SUTURE VICRYL 3-0 RB-1

## (undated) DEVICE — C-ARM: Brand: UNBRANDED

## (undated) DEVICE — 3.0MM PRECISION ROUND

## (undated) DEVICE — TZ MEDICAL TITANIUM DISTRACTION PINS 12MM: Brand: TZ MEDICAL TITANIUM DISTRACTION PINS

## (undated) DEVICE — SPONGE: SPECIALTY PEANUT XR 100/CS: Brand: MEDICAL ACTION INDUSTRIES

## (undated) DEVICE — MARKER SKIN PREP RESIST STRL

## (undated) DEVICE — 5.0MM X 7.0MM FLUTED DRUM

## (undated) DEVICE — CONMED SCOPE SAVER BITE BLOCK, 20X27 MM: Brand: SCOPE SAVER

## (undated) DEVICE — SUTURE VICRYL 3-0 SH

## (undated) DEVICE — CAUTERY BLADE 2IN INS E1455

## (undated) DEVICE — STERILE LATEX POWDER-FREE SURGICAL GLOVESWITH NITRILE COATING: Brand: PROTEXIS

## (undated) DEVICE — STERILE POLYISOPRENE POWDER-FREE SURGICAL GLOVES: Brand: PROTEXIS

## (undated) DEVICE — Device: Brand: DEFENDO AIR/WATER/SUCTION AND BIOPSY VALVE

## (undated) DEVICE — INSTRUMENT 7080902 PLATE HOLDING PIN

## (undated) DEVICE — HEXAGONAL CAPTIVATOR STIFF 13M

## (undated) DEVICE — Device

## (undated) DEVICE — DRAPE MICROSCOPE NEURO PENTERO

## (undated) DEVICE — STERILE SYNTHETIC POLYISOPRENE POWDER-FREE SURGICAL GLOVES WITH HYDROGEL COATING: Brand: PROTEXIS

## (undated) DEVICE — Device: Brand: INTELLICART™

## (undated) DEVICE — GOWN,SIRUS,FABRIC-REINFORCED,X-LARGE: Brand: MEDLINE

## (undated) DEVICE — DRAPE TABLE COVER 44X90 TC-10

## (undated) DEVICE — SOL  .9 1000ML BTL

## (undated) DEVICE — 3M™ MEDIPORE™ SOFT CLOTH TAPE, 4 INCH X 10 YARDS, 12 ROLLS/CASE, 2964: Brand: 3M™ MEDIPORE™

## (undated) DEVICE — 3M(TM) TEGADERM(TM) TRANSPARENT FILM DRESSING FRAME STYLE 1628: Brand: 3M™ TEGADERM™

## (undated) DEVICE — FLOSEAL HEMOSTATIC MATRIX, 5ML: Brand: FLOSEAL HEMOSTATIC MATRIX

## (undated) DEVICE — 3M(TM) MEDIPORE(TM) +PAD SOFT CLOTH ADHESIVE WOUND DRESSING 3569: Brand: 3M™ MEDIPORE™

## (undated) DEVICE — LAMINECTOMY CDS: Brand: MEDLINE INDUSTRIES, INC.

## (undated) DEVICE — PAD SACRAL SPAN AID

## (undated) DEVICE — 5.0MM PRECISION ROUND

## (undated) DEVICE — SNARE OPTMZ PLPCTM TRP

## (undated) DEVICE — SUTURE VICRYL 0 CT-1

## (undated) NOTE — LETTER
January 27, 2021    Nanette Lane MD  [address] [address]     Patient: Stanley Garcia   YOB: 1960   Date of Visit: 1/27/2021       Dear Dr. Nazia Varghese MD:    Thank you for referring Khoa Tess to me for evalu Surgical History: Mary Tran has a past surgical history that includes cholecystectomy (1994); tubal ligation (1986); colonoscopy & polypectomy (2012); other surgical history (1993) (Cone biopsy); other surgical history (2013) (Cystoscopy); and co • amLODIPine Besylate 5 MG Oral Tab amlodipine 5 mg tablet     • docusate sodium 100 MG Oral Cap Take 1 capsule (100 mg total) by mouth 2 (two) times daily as needed for constipation.  30 capsule 0   • MetFORMIN HCl 500 MG Oral Tab Take 2 tablets (1,000 mg Extraocular Movement       Right Left     Full, Ortho Full, Ortho          Dilation     Both eyes: 1.0% Mydriacyl and 2.5% Ramana Synephrine @ 11:01 AM            Slit Lamp and Fundus Exam     Slit Lamp Exam       Right Left    Lids/Lashes Dermatochalasis, M Follow up instructions:  Return in about 1 year (around 1/27/2022) for Diabetic eye exam.    1/27/2021  Scribed by: Ariela Cooper MD        If you have questions, please do not hesitate to call me. I look forward to following [facility] along with you.

## (undated) NOTE — MR AVS SNAPSHOT
[address] [facility] [address]  [phone]               Thank you for choosing us for your health care visit with Jo Ho MD.  We are glad to serve you and happy to provide you with this summary of your Follow-up Instructions     Return in about 6 months (around 12/23/2017), or if symptoms worsen or fail to improve. Scheduling Instructions     Friday June 23, 2017     Imaging:   PELVIS [facility] PELVIS) (CTT=[id number])    Instructions:   To schedule clinic staff will provide you with the phone number you should call to schedule your appointment.      If you are confident that your benefit plan will not require a referral or authorization, such as [place], please feel free to schedule your marcin Patient with mild pain RLQ, will check urine and also pelvic US   Acute vaginitis  -     Chlamydia/Gc Amplification [E];  Future  Patient with mild discharge, c/w BV, will give her cleocin for 3 days vaginallyl       Allergies as of Jun 23, 2017     No Know You can access your MyChart to more actively manage your health care and view more details from this visit by going to [email or web address].   If you've recently had a stay at the Hospital you can access your discharge instructions in [address] by hair You don’t need to join a gym. Home exercises work great.  Put more priority on exercise in your life                    Visit [facility] online at  [email or web address]

## (undated) NOTE — LETTER
11/10/2022    Ellie Hendricks        2015 [address] [facility]            Dear Ellie Hendricks,      Our records indicate that you are due for an appointment for an EGD or Upper Endoscopy with Luisito Gipson MD. Our doctors are booking out about 3-5 months for procedures. Please call our office to schedule a phone screening appointment to plan for the procedure(s). Your medical well-being is important to us. If your insurance requires a referral, please call your primary care office to request one.       Thank you,      The Physicians and Staff at [facility]

## (undated) NOTE — LETTER
ASTHMA ACTION PLAN for Di Puga     : 3/2/1960          Date: 4/15/2025    Rene Price MD  [facility], [address] [address]  [phone] 1.  GREEN - GO!  % Personal Best Peak Flow. Use controller medicine.   2.  YELLOW - Caution. 50-79% Personal Best Peak Flow.  Use reliever meds.   3.  RED - Stop. <50% Personal Best Peak Flow.  Get help from a doctor.  SDon’t forget to rinse your mouth after using steroid inhalers.  RRemember to get your Flu vaccine every fall!      ACT Goal: 20 or greater    1. GREEN - Go! Use controller medicine.   Breathing is good, No cough or wheeze, Can work and play Medicine:  Formoterol-Mometasone (Dulera):  1 puff every 12 hours           2. YELLOW - Caution. Take reliever medicine to keep asthma attack from getting bad.   Cough, Wheezing, Tight Chest, Wake up at night Medicine:  Call PCP if no relief or symptoms are worse after 2 treatments           3. RED - Stop! Danger! Get help from a doctor now!   Medicine not helping, Breathing hard & fast, Nose opens wide, Can't walk, Ribs show, Can't talk well Medicine:   Call 911 or go to Emergency Room if no relief after 2 treatments or symptoms are worse    If your symptoms do not improve and you cannot contact your doctor, go to the emergency room or call 911 immediately!   Seek medical attention if you require your rescue inhaler/medication more than 2-3 times in a 24 hour period. If you require your rescue inhaler/medication more than 2-3 times weekly, your asthma may not be under proper control and you should contact your healthcare provider.   Please schedule your follow-up asthma appointment today!  [x] Asthma Action Plan reviewed with patient (and caregiver if necessary) and a copy of the plan was given to the patient/caregiver.   [] Asthma Action Plan reviewed with patient (and caregiver if necessary) on the phone and mailed copy to patient.          Physician Patient Caretaker (if necessary)   MD Di Santillan

## (undated) NOTE — LETTER
[address], [place]  Authorization for Invasive Procedures  1. I hereby authorize Dr. Maribel Curtis , my physician and whomever may be designated as the doctor's assistant, to perform the following operation and/or procedure:  Colonoscopy and Esophagogastroduodenoscopy on Wandy Sierra at [facility].    2. My physician has explained to me the nature and purpose of the operation or other procedure, possible alternative methods of treatment, the risks involved and the possibility of complications to me. I understand the probable consequences of declining the recommended procedure and the alternative methods of treatment. I acknowledge that no guarantee has been made as to the result that may be obtained. 3. I recognize that during the course of this operation or other procedure, unforeseen conditions may necessitate additional or different procedures than those listed above. I, therefore, further authorize and request that the above-named physician, his/her physician assistants, or designees perform such procedures as are, in his/her professional opinion, necessary and desirable. If I have a Do Not Attempt Resuscitation (DNAR) order in place, that status will be suspended while in the operating room, procedural suite, and during the recovery period unless otherwise explicitly stated by me (or a person authorized to consent on my behalf). The surgeon or my attending physician will determine when the applicable recovery period ends for purposes of reinstating the DNAR order. 4. Should the need arise during my operation or immediate post-operative period; I also consent to the administration of blood and/or blood products.  Further, I understand that despite careful testing and screening of blood and blood products, I may still be subject to ill effects as a result of recieving a blood transfusion an/or blood producst. The following are some, but not all, of the potential risks that can occur: fever and allergic reactions, hemolytic reactions, transmission of disease such as hepatitis, AIDS, cytomegalovirus (CMV), and flluid overload. In the event that I wish to have autologous transfusions of my own blood, or a directed donor transfusion, I will discuss this with my physician. 5. I consent to the photographing of the operations or procedures to be performed for the purposes of advancing medicine, science, and/or education, provided my identity is not revealed. If the procedure has been videotaped, the physician/surgeon will obtain the original videotape. [facility] will not be responsible for storage or maintenance of this tape. 6. I consent to the presence of a  or observer as deemed necessary by my physician or his designee. 7. Any tissues or organs removed in the operation or other procedure may be disposed of by and at the discretion of [facility].    8. I understand that the physician and his/her physician assistants may not be employees or agents of [facility], [facility], nor [facility], but are independent medical practitioners who have been permitted to use its facilities for the care and treatment of their patients. 9. Patients having a sterilization procedure: I understand that if the procedure is successful the results will be permanent and it will therefore be impossible for me to inseminate, conceive or bear children. I also understand that the procedure is intended to result in sterility, although the result has not been guaranteed. 10. I CERTIFY THAT I HAVE READ AND FULLY UNDERSTAND THE ABOVE CONSENT TO OPERATION and/or OTHER PROCEDURE. 11. I acknowledge that my physician has explained sedation/analgesia administration to me including the risks and benefits.  I consent to the administration of sedation/analgesia as may be necessary or desirable in the judgment of my physician. Signature of Patient:  ________________________________________________ Date: _________Time: _________    Responsible person in case of minor or unconscious: _____________________________Relationship: ____________     Witness Signature: ____________________________________________ Date: __________ Time: ___________    Statement of Physician  My signature below affirms that prior to the time of the procedure, I have explained to the patient and/or her legal representative, the risks and benefits involved in the proposed treatment and any reasonable alternative to the proposed treatment. I have also explained the risks and benefits involved in the refusal of the proposed treatment and have answered the patient's questions. If I have a significant financial interest in this procedure/surgery, I have disclosed this and had a discussion with my patient.   Signature of Physician:   ________________________________________Date: _________Time:_______ Patient Name: Jair Fulton  : 3/2/1960  Printed: 2022    Medical Record #: [id number]

## (undated) NOTE — LETTER
[facility] Testing Department  Phone: [phone]  OUTSIDE TESTING RESULT REQUEST      TO:   Dr Tasha Hall Date: 3/2/21    FAX #: [phone]     IMPORTANT: FOR YOUR IMMEDIATE ATTENTION  Please FAX all test results listed below to: [phone]

## (undated) NOTE — LETTER
21    RE: Donavan Marrero     : 3/2/1960  Dear Dr. Méndez Shoulder,    This letter is to inform you that your patient is being scheduled for surgery with Dr. Nancy Neville on 3/22/21 at [facility]. We have asked the patient to contact your office to giles

## (undated) NOTE — LETTER
[facility]  Administration of Anesthesia  1.  Earlene Vizcarra, or _________________________________ acting on her behalf, (Patient) (Dependent/Representative) request to receive anesthesia for my pending procedure/operation/treatment spinal bleeding, seizure, cardiac arrest and death. 7. AWARENESS: I understand that it is possible (but unlikely) to have explicit memory of events from the operating room while under general anesthesia.   8. ELECTROCONVULSIVE THERAPY PATIENTS: This consen signature below affirms that prior to the time of the procedure, I have explained to the patient and/or his/her guardian, the risks and benefits of undergoing anesthesia, as well as any reasonable alternatives.     __________________________________________

## (undated) NOTE — LETTER
[facility]  Administration of Anesthesia  1.  Edgard July, or _________________________________ acting on her behalf, (Patient) (Dependent/Representative) request to receive anesthesia for my pending procedure/operation/treatment spinal bleeding, seizure, cardiac arrest and death. 7. AWARENESS: I understand that it is possible (but unlikely) to have explicit memory of events from the operating room while under general anesthesia.   8. ELECTROCONVULSIVE THERAPY PATIENTS: This consen signature below affirms that prior to the time of the procedure, I have explained to the patient and/or his/her guardian, the risks and benefits of undergoing anesthesia, as well as any reasonable alternatives.     __________________________________________

## (undated) NOTE — LETTER
[facility]  Administration of Anesthesia  1.  Danyell Leblanc, or _________________________________ acting on her behalf, (Patient) (Dependent/Representative) request to receive anesthesia for my pending procedure/operation/treatment spinal bleeding, seizure, cardiac arrest and death. 7. AWARENESS: I understand that it is possible (but unlikely) to have explicit memory of events from the operating room while under general anesthesia.   8. ELECTROCONVULSIVE THERAPY PATIENTS: This consen signature below affirms that prior to the time of the procedure, I have explained to the patient and/or his/her guardian, the risks and benefits of undergoing anesthesia, as well as any reasonable alternatives.     __________________________________________

## (undated) NOTE — LETTER
[address], [place]  Authorization for Invasive Procedures  1.  I hereby authorize Dr. Jose Juan Cagle , my physician and whomever may be designated as the doctor's assistant, to perform the following operation and/or procedure:  Esop potential risks that can occur: fever and allergic reactions, hemolytic reactions, transmission of disease such as hepatitis, AIDS, cytomegalovirus (CMV), and flluid overload.  In the event that I wish to have autologous transfusions of my own blood, or a d judgment of my physician.      Signature of Patient:  ________________________________________________ Date: _________Time: _________    Responsible person in case of minor or unconscious: _____________________________Relationship: ____________     Witness

## (undated) NOTE — LETTER
March 7, 2024      No Recipients     Patient: Di Puga   YOB: 1960   Date of Visit: 3/7/2024       Dear Dr. Leal Recipients:    Thank you for referring Di Puga to me for evaluation. Here is my assessment and plan of care:    Di Puga is a 64 year old female.    HPI:     HPI    Pt is here for diabetic eye exam.  Pt states distance vision has gotten worse with glasses on.     Pt has been a diabetic for 19 years       Pt's diabetes is currently controlled by pills and injectable  Pt checks BS daily  Last blood sugar was 150 this morning  Last HA1C was 7.2 on 1/9/24  Endocrinologist:     Last edited by Florecita Kwok OT on 3/7/2024  1:48 PM.        Patient History:  Past Medical History:   Diagnosis Date    Arrhythmia     tachycardia    Asthma (HCC)     B12 deficiency     Back pain     Back problem     Calculus of kidney 2017    Cancer (HCC)     1990- cervical    COPD (chronic obstructive pulmonary disease) (HCC)     Essential hypertension     High blood pressure     High cholesterol     History of abnormal cervical Pap smear     Muscle weakness     Neuropathy     Other and unspecified hyperlipidemia     Palpitations     Pulmonary embolism (HCC)     Spinal stenosis     Stroke (HCC)     TIA a few years ago    TIA (transient ischemic attack)     Type II or unspecified type diabetes mellitus without mention of complication, not stated as uncontrolled        Surgical History: Di Puga has a past surgical history that includes cholecystectomy (1994); tubal ligation (1986); colonoscopy & polypectomy (2012); other surgical history (1993) (Cone biopsy); other surgical history (2013) (Cystoscopy); colonoscopy (N/A, 08/11/2017) (Procedure: COLONOSCOPY, POSSIBLE BIOPSY, POSSIBLE POLYPECTOMY 67731;  Surgeon: Pedro Thomas MD;  [facility] [facility]); other (Lung biopsy); removal gallbladder; colonoscopy (N/A, 02/18/2022) (Procedure:  COLONOSCOPY/ESOPHAGOGASTRODUODENOSCOPY (EGD);  Surgeon: Cortney Diaz MD;  Location: [facility] ENDOSCOPY); and .    Family History   Problem Relation Age of Onset    Diabetes Mother     Lung Disorder Mother         Emphysema    Heart Disease Mother     Asthma Mother     Cancer Father 51        Lung    Diabetes Sister     Thyroid Disorder Sister     Glaucoma Sister     Stroke Sister     Prostate Cancer Brother     Diabetes Brother     Diabetes Brother     Cancer Brother         Prostate cancer, copd    Diabetes Brother     Glaucoma Maternal Grandmother     Heart Disease Maternal Grandmother     Ovarian Cancer Maternal Grandmother 59    Cataracts Niece     Breast Cancer Niece 48    Macular degeneration Neg        Social History:   Social History     Socioeconomic History    Marital status: Single   Tobacco Use    Smoking status: Every Day     Packs/day: 1.00     Years: 20.00     Additional pack years: 0.00     Total pack years: 20.00     Types: Cigarettes    Smokeless tobacco: Never    Tobacco comments:     1 pack daily   Vaping Use    Vaping Use: Never used   Substance and Sexual Activity    Alcohol use: Not Currently    Drug use: No   Other Topics Concern    Caffeine Concern No    Exercise No   Social History Narrative    The patient does not use an assistive device..      The patient does live in a home with stairs.       Medications:  Current Outpatient Medications   Medication Sig Dispense Refill    atorvastatin 80 MG Oral Tab Take 1 tablet (80 mg total) by mouth nightly. 90 tablet 3    apixaban (ELIQUIS) 5 MG Oral Tab Take 1 tablet (5 mg total) by mouth 2 (two) times daily. 180 tablet 3    glipiZIDE 5 MG Oral Tab Take 2 tablets (10 mg total) by mouth 2 (two) times daily before meals. 360 tablet 0    metFORMIN HCl 1000 MG Oral Tab Take 1 tablet (1,000 mg total) by mouth 2 (two) times daily with meals. 180 tablet 0    Dulaglutide (TRULICITY) 1.5 MG/0.5ML Subcutaneous Solution Pen-injector ADMINISTER 1.5 MG UNDER THE  SKIN 1 TIME A WEEK 6 mL 0    Glucose Blood (ONETOUCH VERIO) In Vitro Strip TEST BLOOD SUGAR TWICE DAILY. 200 strip 1    amLODIPine 5 MG Oral Tab Take 1 tablet (5 mg total) by mouth daily. 90 tablet 3    traZODone 50 MG Oral Tab Take 1 tablet (50 mg total) by mouth nightly. 30 tablet 1    metoprolol succinate ER 25 MG Oral Tablet 24 Hr Take 1 tablet (25 mg total) by mouth daily. 90 tablet 3    albuterol 108 (90 Base) MCG/ACT Inhalation Aero Soln Inhale 2 puffs into the lungs every 4 (four) hours as needed. 8.5 g 1    Budesonide-Formoterol Fumarate (SYMBICORT) 160-4.5 MCG/ACT Inhalation Aerosol Inhale 2 puffs into the lungs 2 (two) times daily. 1 each 3    Acetaminophen-Codeine (TYLENOL WITH CODEINE #3) 300-30 MG Oral Tab Take 1 tablet by mouth every 8 (eight) hours as needed for Pain. 30 tablet 0    Blood Glucose Monitoring Suppl (ONETOUCH VERIO FLEX SYSTEM) w/Device Does not apply Kit Use to check blood sugar two times a day 1 kit 0    OneTouch Delica Lancets 33G Does not apply Misc Use to check blood sugar two times a day 200 each 1    Blood Glucose Monitoring Suppl (ONETOUCH ULTRA SYSTEM) W/DEVICE Does not apply Kit Test sugar twice daily 1 kit 0    glipiZIDE 2.5 MG Oral Tab Take 2.5 mg by mouth every evening. (Patient not taking: Reported on 1/18/2024) 90 tablet 0       Allergies:  Allergies   Allergen Reactions    Ace Inhibitors ANAPHYLAXIS, ANGIOEDEMA and SWELLING     April 2020, hospitalized at [facility] in [place] with tongue swelling and throat closing sensation, on lisinopril at the time. Not intubated      Egg White (Diagnostic) NAUSEA ONLY and Tightness in Throat    Fish-Derived Products SWELLING    Lisinopril ANAPHYLAXIS    Peanuts SWELLING    Shrimp ITCHING, NAUSEA AND VOMITING, Tightness in Throat and WHEEZING    Tomato SWELLING and UNKNOWN    Tomatoes RASH       ROS:     ROS    Positive for: Endocrine, Eyes  Negative for: Constitutional, Gastrointestinal, Neurological, Skin, Genitourinary, Musculoskeletal,  HENT, Cardiovascular, Respiratory, Psychiatric, Allergic/Imm, Heme/Lymph  Last edited by Selina Hu, O.T. on 3/7/2024  1:02 PM.          PHYSICAL EXAM:     Base Eye Exam       Visual Acuity (Snellen - Linear)         Right Left    Dist cc 20/20 20/20    Near cc 20/20 20/20      Correction: Glasses   Did not bring glasses.  VA was checked with most recent prescription in phoropter.             Tonometry (Icare, 1:17 PM)         Right Left    Pressure 16 15              Pupils         Pupils    Right PERRL    Left PERRL              Visual Fields         Left Right     Full Full              Extraocular Movement         Right Left     Full, Ortho Full, Ortho              Neuro/Psych       Oriented x3: Yes    Mood/Affect: Normal              Dilation       Both eyes: 1.0% Mydriacyl and 2.5% Ramana Synephrine @ 1:17 PM              Dilation #2       Both eyes: 1.0% Mydriacyl and 2.5% Ramana Synephrine @ 1:17 PM                  Slit Lamp and Fundus Exam       Slit Lamp Exam         Right Left    Lids/Lashes Dermatochalasis, Meibomian gland dysfunction Dermatochalasis, Meibomian gland dysfunction    Conjunctiva/Sclera Normal Normal    Cornea Clear Clear    Anterior Chamber Deep and quiet Deep and quiet    Iris No transillumination defects No transillumination defects    Lens 1+ Nuclear sclerosis 1+ Nuclear sclerosis    Vitreous Clear Clear              Fundus Exam         Right Left    Disc Good rim, Temporal crescent Good rim, Temporal crescent    C/D Ratio 0.4 0.4    Macula Normal- no BDR Normal- no BDR    Vessels Normal Normal    Periphery Normal Normal                  Refraction       Wearing Rx       Type: Forgot glasses              Manifest Refraction (Auto)         Sphere Cylinder [place] Dist VA Add Near VA    Right          Left -1.75 +0.50 010                 Manifest Refraction #2         Sphere Cylinder [place] Dist VA Add Near VA    Right -1.50 +1.25 180 20/20 +2.50 20/20    Left -2.00 +0.50 010 20/20 +2.50  20/20              Manifest Refraction Comments    Unable to get Auto on right eye.              Final Rx         Sphere Cylinder [place] Dist VA Add Near VA    Right -1.50 +1.25 180 20/20 +2.50 20/20    Left -2.00 +0.50 010 20/20 +2.50 20/20      Type: Progressive bifocal                     ASSESSMENT/PLAN:     Diagnoses and Plan:     Type 2 diabetes mellitus without retinopathy (HCC)  Diabetes type II: no background of retinopathy, no signs of neovascularization noted.  Discussed ocular and systemic benefits of blood sugar control.  Diagnosis and treatment discussed in detail with patient.    Will see patient in 1 year for a diabetic exam    Age-related nuclear cataract of both eyes  Discussed early cataracts with patient. Told patient that cataracts are age appropriate and they are not surgical at this time.  No treatment recommended at this time.     No orders of the defined types were placed in this encounter.      Meds This Visit:  Requested Prescriptions      No prescriptions requested or ordered in this encounter        Follow up instructions:  Return in about 1 year (around 3/7/2025) for Diabetic eye exam.    3/7/2024  Scribed by: Jamar Jeffers MD        If you have questions, please do not hesitate to call me. I look forward to following Di along with you.    Sincerely,        Jamar Jeffers MD        CC:   No Recipients    Document electronically generated by: Jamar Jeffers MD

## (undated) NOTE — LETTER
4/1/2021          To Whom It May Concern:    Terri Oseguera is currently under my medical care and due to her medical problems she is at increased risk of doing poorly if she gets COVID-19 infection.  He daughter Viral Malone   is the primary care t

## (undated) NOTE — LETTER
01/12/22        Greyson Razo  [address]      Dear Carin Castañeda,    [address] [facility] records indicate that you have outstanding lab work and or testing that was ordered for you and has not yet been completed:     To provide you with the best

## (undated) NOTE — LETTER
[facility], [place]  Authorization for Invasive Procedures  1.  I hereby authorize Dr. Jose Huynh , my physician and whomever may be designated as the doctor's assistant, to perform the following operation and/or procedure:  Esop potential risks that can occur: fever and allergic reactions, hemolytic reactions, transmission of disease such as hepatitis, AIDS, cytomegalovirus (CMV), and flluid overload.  In the event that I wish to have autologous transfusions of my own blood, or a d judgment of my physician.      Signature of Patient:  ________________________________________________ Date: _________Time: _________    Responsible person in case of minor or unconscious: _____________________________Relationship: ____________     Witness

## (undated) NOTE — LETTER
[facility]  Administration of Anesthesia  1. Cathi Edmondson, or _________________________________ acting on her behalf, (Patient) (Dependent/Representative) request to receive anesthesia for my pending procedure/operation/treatment. A physician (anesthesiologist) alone or an anesthesiologist working with a nurse anesthetist may administer my anesthesia. 2. I understand that my anesthesiologist is not an employee or agent of the hospital, but is an independent medical practitioner who has been permitted to use its facilities for the care and treatment of his/her patients. 3. I acknowledge that a physician from [facility] or their designate(s), recommended anesthesia for me using her/his medical judgment. The type(s) of anesthesia I may receive include:                a) General Anesthesia, b) Spinal/Epidural Anesthesia, c) Regional Anesthesia or d) Monitored Anesthesia Care. 4. If my spinal, regional or monitored anesthesia care (local) is not satisfactory for my comfort, or if my medical condition requires, I consent to the administration of general anesthesia. 5. I am aware that the practice of anesthesiology is not an exact science and that some foreseeable risks or consequences may occur. Some common risks/consequences include sore throat and hoarseness, nausea and vomiting, muscle soreness, backache, damage to the mouth/teeth/vocal cords and eye injury. I understand that more rare but serious potential risks of anesthesia include blood pressure changes, drug reactions, cardiac arrest, brain damage, paralysis or death. These risks apply to whether I have general, spinal/epidural, regional or monitored anesthesia care. 6. OBSTETRIC PATIENTS: Specific risks/consequences of spinal/epidural anesthesia may include itching, low blood pressure, difficulty urinating, slowing of the baby's heart rate and headache.  Rare risks include infections, high spinal block, spinal bleeding, seizure, cardiac arrest and death. 7. AWARENESS: I understand that it is possible (but unlikely) to have explicit memory of events from the operating room while under general anesthesia. 8. ELECTROCONVULSIVE THERAPY PATIENTS: This consent serves for all treatments in a single course of therapy. 9. I understand that I must inform my anesthesiologist when I last ate and/or drank to minimize the risk of anesthesia. 10. If I am pregnant, or may pregnant, I understand that elective surgery should be postponed until after the baby is born. Anesthetics cross the placenta and may temporarily anesthetize the baby. Although fetal complications of anesthesia during pregnancy are rare, they may include birth defects, premature labor, brain damage and death. 11. I certify that I informed the anesthesiologist, to the best of my ability, about medication I take including blood thinners, anticoagulants, herbal remedies, narcotics and recreational drugs (e.g. cocaine, marijuana, PCP). Failure to inform my anesthesiologist about these medicines may increase my risk of anesthetic complications. The nature and purpose of my anesthetic management was explained to me. I had the opportunity to ask questions and the answers and information provided meet my satisfaction.   I retain the right to withdraw this consent at any time prior to the administration of said anesthetic.    ___________________________________________________           _____________________________________________________  Patient Signature                                                                                      Witness Signature                ___________________________________________________           _____________________________________________________  Date/Time                                                                                               Responsible person in case of minor/ unconscious pt /Relationship    My signature below affirms that prior to the time of the procedure, I have explained to the patient and/or his/her guardian, the risks and benefits of undergoing anesthesia, as well as any reasonable alternatives.     ___________________________________________________            _____________________________________________________  Physician Signature                            Date/Time  Patient Name: Darren Quiñones     : 3/2/1960     Printed: 2022      Medical Record #: [id number]                              Page 1 of 1    ----------ANESTHESIA CONSENT----------

## (undated) NOTE — LETTER
[facility] Testing Department  Phone: [phone]  Right Fax: [phone]    ADDRESSEE INFORMATION: SENDER INFORMATION:   To:   Dr Callum Velazquez From: Arik Morris RN     Department: Pre-Admission Testing   Fax Number: [phone] Date: 3/2/2021

## (undated) NOTE — LETTER
February 9, 2021         Tom Velasquez MD  [address] [address]      Patient: Dimple Gaviria   YOB: 1960   Date of Visit: 2/9/2021       Dear Dr. Bruna Duque MD,    I saw your patient, Dimple Gaviria, on 2/9/20

## (undated) NOTE — Clinical Note
Pt has HFU appt tomorrow, 3/31. Pt reports she is doing okay since discharge. Pt feels her breathing is better. Medication list reviewed. Please review in detail as the AVS reflects not what was advised at discharge.  The cardiologist was contacted and s/w